# Patient Record
Sex: FEMALE | Race: WHITE | Employment: OTHER | URBAN - METROPOLITAN AREA
[De-identification: names, ages, dates, MRNs, and addresses within clinical notes are randomized per-mention and may not be internally consistent; named-entity substitution may affect disease eponyms.]

---

## 2017-07-31 ENCOUNTER — HOSPITAL ENCOUNTER (EMERGENCY)
Age: 67
Discharge: HOME | End: 2017-07-31
Attending: EMERGENCY MEDICINE
Payer: MEDICARE

## 2017-07-31 VITALS
BODY MASS INDEX: 35.34 KG/M2 | TEMPERATURE: 97.4 F | HEART RATE: 58 BPM | OXYGEN SATURATION: 95 % | RESPIRATION RATE: 17 BRPM | SYSTOLIC BLOOD PRESSURE: 144 MMHG | DIASTOLIC BLOOD PRESSURE: 61 MMHG | HEIGHT: 60 IN | WEIGHT: 180 LBS

## 2017-07-31 DIAGNOSIS — B02.9 HERPES ZOSTER WITHOUT COMPLICATION: Primary | ICD-10-CM

## 2017-07-31 PROCEDURE — 99282 EMERGENCY DEPT VISIT SF MDM: CPT

## 2017-07-31 RX ORDER — ACYCLOVIR 800 MG/1
800 TABLET ORAL
Qty: 25 TAB | Refills: 0 | Status: SHIPPED | OUTPATIENT
Start: 2017-07-31 | End: 2017-08-05

## 2017-07-31 RX ORDER — HYDROCODONE BITARTRATE AND ACETAMINOPHEN 5; 325 MG/1; MG/1
1 TABLET ORAL
Qty: 15 TAB | Refills: 0 | Status: SHIPPED | OUTPATIENT
Start: 2017-07-31 | End: 2017-10-06

## 2017-07-31 NOTE — ED TRIAGE NOTES
Patient arrived to ER with reports or right foot, ankle, and hip pain that started 6 days ago. Treated with steroids and flexeril for pain, however after completing flexeril and steroids, patient noted a rash to lower right leg, hip and buttocks. Rash is sore.

## 2017-07-31 NOTE — ED PROVIDER NOTES
HPI Comments: 5:08 PM   Saskia Sutton is a 78 y.o. female presents to the ED c/o andrey right leg onset 6 days ago. Associated sxs include right foot, ankle, knee, hip, low back and leg pain. Pt states her pain starts in her right ankle and shoots up her leg to her right hip and her pain is 5/10 in all locations. Pt saw PCP 6 days ago because she thought it was a bug bite and was given Flexeril and Steroids with no relief. Pt denies any other symptoms or complaints. Written by HEATHER Ivy, as dictated by Odnina Arceo PA-C     The history is provided by the patient. No  was used. Past Medical History:   Diagnosis Date    [facility]fib ([facility]     Anxiety     Chronic kidney disease     Diabetes ([facility]     High cholesterol     Hypertension     Kidney stones        Past Surgical History:   Procedure Laterality Date    CARDIAC SURG PROCEDURE UNLIST      HX CHOLECYSTECTOMY      HX HEART CATHETERIZATION      HX HYSTERECTOMY      HX ORTHOPAEDIC      bilateral knee surgery    HX ORTHOPAEDIC      right elbow    HX UROLOGICAL      stent placement         Family History:   Problem Relation Age of Onset    Breast Cancer Maternal Aunt        Social History     Social History    Marital status:      Spouse name: N/A    Number of children: N/A    Years of education: N/A     Occupational History    Not on file. Social History Main Topics    Smoking status: Never Smoker    Smokeless tobacco: Never Used    Alcohol use No    Drug use: No    Sexual activity: Not on file     Other Topics Concern    Not on file     Social History Narrative         ALLERGIES: Review of patient's allergies indicates no known allergies. Review of Systems   Musculoskeletal: Positive for arthralgias (right foot, right ankle, right knee and right hip, 5/10), back pain (right low back, 5/10) and myalgias (right leg 5/10). Skin: Positive for color change (erythema on right leg).    All other systems reviewed and are negative. Vitals:    07/31/17 1714   BP: 144/61   Pulse: (!) 58   Resp: 17   Temp: 97.4 °F (36.3 °C)   SpO2: 95%   Weight: 81.6 kg (180 lb)   Height: 5' (1.524 m)            Physical Exam   Constitutional: She is oriented to person, place, and time. She appears well-developed and well-nourished. No distress. Pt appears well sitting up in bed in no distress, speaking in full sentences without evidence of dyspnea, increased work of breathing or any obvious pain at this time     HENT:   Head: Normocephalic and atraumatic. Right Ear: Hearing, tympanic membrane, external ear and ear canal normal.   Left Ear: Hearing, tympanic membrane, external ear and ear canal normal.   Nose: Nose normal.   Mouth/Throat: Uvula is midline, oropharynx is clear and moist and mucous membranes are normal. Mucous membranes are not dry. No trismus in the jaw. No uvula swelling. No oropharyngeal exudate, posterior oropharyngeal edema, posterior oropharyngeal erythema or tonsillar abscesses. No face, tongue and swelling  Airway patent, no throat swelling   No intraoral lesions    Neck: Normal range of motion. Neck supple. Cardiovascular: Normal rate, regular rhythm, normal heart sounds and intact distal pulses. No murmur heard. Pulmonary/Chest: Effort normal and breath sounds normal. No stridor. No respiratory distress. She has no wheezes. She has no rales. She exhibits no tenderness. Abdominal: Soft. Bowel sounds are normal. There is no tenderness. Musculoskeletal:        Legs:  Tender, grouped vesicles over erythematous base scattered over right buttock, thigh, lower leg and foot in dermatomal pattern    Neurological: She is alert and oriented to person, place, and time. Skin: Rash noted. See musc  Remainder of skin clear    Psychiatric: She has a normal mood and affect. Judgment normal.   Nursing note and vitals reviewed.      RESULTS:    PULSE OXIMETRY NOTE:  Pulse-ox is 95% on RA  Interpretation: normal       No orders to display        Labs Reviewed - No data to display    No results found for this or any previous visit (from the past 12 hour(s)). MDM  Number of Diagnoses or Management Options  Herpes zoster without complication:   Diagnosis management comments: Shingles     ED Course     MEDICATIONS GIVEN:  Medications - No data to display     Procedures      PROGRESS NOTE:  5:08 PM  Initial assessment performed. DISCHARGE NOTE:  5:24 PM  Merlene Back's  results have been reviewed with her. She has been counseled regarding her diagnosis, treatment, and plan. She verbally conveys understanding and agreement of the signs, symptoms, diagnosis, treatment and prognosis and additionally agrees to follow up as discussed. She also agrees with the care-plan and conveys that all of her questions have been answered. I have also provided discharge instructions for her that include: educational information regarding their diagnosis and treatment, and list of reasons why they would want to return to the ED prior to their follow-up appointment, should her condition change. The patient has been provided with education for proper Emergency Department utilization. CLINICAL IMPRESSION:    1. Herpes zoster without complication        PLAN: DISCHARGE HOME    Follow-up Information     Follow up With Details Comments [address] [facility],  Schedule an appointment as soon as possible for a visit in 2 days for primary care follow up  [address]      THE [facility] EMERGENCY DEPT Go to As needed, If symptoms worsen [address] [place] [id number] [phone]          Current Discharge Medication List      START taking these medications    Details   acyclovir (ZOVIRAX) 800 mg tablet Take 1 Tab by mouth five (5) times daily for 5 days.   Qty: 25 Tab, Refills: 0      HYDROcodone-acetaminophen (NORCO) 5-325 mg per tablet Take 1 Tab by mouth every four (4) hours as needed for Pain. Max Daily Amount: 6 Tabs. Qty: 15 Tab, Refills: 0         CONTINUE these medications which have NOT CHANGED    Details   cyclobenzaprine (FLEXERIL) 10 mg tablet Take 10 mg by mouth three (3) times daily as needed for Muscle Spasm(s). metoprolol (LOPRESSOR) 50 mg tablet Take 37.5 mg by mouth daily. furosemide (LASIX) 40 mg tablet Take 40 mg by mouth daily. warfarin (COUMADIN) 5 mg tablet Take 5 mg by mouth daily. diltiazem (TAZTIA XT) 300 mg SR capsule Take 300 mg by mouth daily. aspirin delayed-release 81 mg tablet Take  by mouth daily. folic acid (FOLVITE) 1 mg tablet Take  by mouth daily. potassium chloride (K-DUR, KLOR-CON) 20 mEq tablet Take  by mouth two (2) times a day. simvastatin (ZOCOR) 10 mg tablet Take  by mouth nightly. STOP taking these medications       acetaminophen-codeine (TYLENOL-CODEINE #3) 300-30 mg per tablet Comments:   Reason for Stopping:                   SCRIBE ATTESTATION STATEMENT  Documented by: Maria Elena Saul, tor for and in the presence of Maria Dolores Champagne PA-C      PROVIDER ATTESTATION STATEMENT  Maria Dolores Champagne PA-C  : I personally performed the services described in the documentation, reviewed the documentation, as recorded by the scribe in my presence, and it accurately and completely records my words and actions.

## 2017-07-31 NOTE — DISCHARGE INSTRUCTIONS

## 2017-10-06 ENCOUNTER — APPOINTMENT (OUTPATIENT)
Dept: GENERAL RADIOLOGY | Age: 67
End: 2017-10-06
Attending: EMERGENCY MEDICINE
Payer: MEDICARE

## 2017-10-06 ENCOUNTER — HOSPITAL ENCOUNTER (EMERGENCY)
Age: 67
Discharge: HOME | End: 2017-10-06
Attending: EMERGENCY MEDICINE
Payer: MEDICARE

## 2017-10-06 VITALS
TEMPERATURE: 97.6 F | RESPIRATION RATE: 20 BRPM | WEIGHT: 252 LBS | BODY MASS INDEX: 39.55 KG/M2 | HEIGHT: 67 IN | DIASTOLIC BLOOD PRESSURE: 57 MMHG | HEART RATE: 65 BPM | SYSTOLIC BLOOD PRESSURE: 149 MMHG | OXYGEN SATURATION: 100 %

## 2017-10-06 DIAGNOSIS — M79.672 LEFT FOOT PAIN: Primary | ICD-10-CM

## 2017-10-06 PROCEDURE — 99283 EMERGENCY DEPT VISIT LOW MDM: CPT

## 2017-10-06 PROCEDURE — 73630 X-RAY EXAM OF FOOT: CPT

## 2017-10-06 PROCEDURE — L1902 AFO ANKLE GAUNTLET PRE OTS: HCPCS

## 2017-10-07 NOTE — ED PROVIDER NOTES
HPI Comments: 8:02 PM   Unique Hayden is a 75 y.o. female presents to the ED c/o 9/10 left foot pain onset yesterday last night while walking and hearing a pop. Associated sxs include gait difficulty. Pt took Tylenol with no relief. Pt was advised by her PCP to only take Tylenol because of existing medical conditions. NKDA. PMHx includes HTN, DM, coma and anxiety. PSHx includes bilateral knee surgery. Pt denies left leg pain, wound, left foot swelling, injury and any other symptoms or complaints. Written by HEATHER Morales, as dictated by Jackie Roth PA-C     Patient is a 75 y.o. female presenting with foot pain. The history is provided by the patient. No  was used. Foot Pain    This is a new problem. The current episode started yesterday. Past Medical History:   Diagnosis Date    A-fib ([facility] [address]     Anxiety     Chronic kidney disease     Diabetes ([facility]     High cholesterol     Hypertension     Kidney stones        Past Surgical History:   Procedure Laterality Date    CARDIAC SURG PROCEDURE UNLIST      HX CHOLECYSTECTOMY      HX HEART CATHETERIZATION      HX HYSTERECTOMY      HX ORTHOPAEDIC      bilateral knee surgery    HX ORTHOPAEDIC      right elbow    HX UROLOGICAL      stent placement         Family History:   Problem Relation Age of Onset    Breast Cancer Maternal Aunt        Social History     Social History    Marital status:      Spouse name: N/A    Number of children: N/A    Years of education: N/A     Occupational History    Not on file. Social History Main Topics    Smoking status: Never Smoker    Smokeless tobacco: Never Used    Alcohol use No    Drug use: No    Sexual activity: Not on file     Other Topics Concern    Not on file     Social History Narrative         ALLERGIES: Review of patient's allergies indicates no known allergies. Review of Systems   Musculoskeletal: Positive for arthralgias (left foot).  Negative for joint swelling (left foot) and myalgias (left leg). Skin: Negative for wound. All other systems reviewed and are negative. Vitals:    10/06/17 1854   BP: 149/57   Pulse: 65   Resp: 20   Temp: 97.6 °F (36.4 °C)   SpO2: 100%   Weight: 114.3 kg (252 lb)   Height: 5' 7\" (1.702 m)            Physical Exam   Constitutional: She is oriented to person, place, and time. She appears well-developed and well-nourished. Pt appears well sitting up in wheelchair, no distress, speaking in full sentences without evidence of dyspnea, increased work of breathing or any obvious pain at this time     HENT:   Head: Normocephalic and atraumatic. Cardiovascular: Normal rate, regular rhythm, normal heart sounds and intact distal pulses. No murmur heard. Pulmonary/Chest: Effort normal and breath sounds normal. No respiratory distress. She has no wheezes. She has no rales. Musculoskeletal:        Right ankle: Normal. Achilles tendon normal.        Right foot: There is tenderness. There is no swelling, no crepitus and no deformity. Feet:    Neurological: She is alert and oriented to person, place, and time. Psychiatric: She has a normal mood and affect. Judgment normal.   Nursing note and vitals reviewed. RESULTS:    CARDIAC MONITOR NOTE:  Cardiac Rhythm: NSR  Rate: 65 bpm     PULSE OXIMETRY NOTE:  Pulse-ox is 100% on RA  Interpretation: normal        XR FOOT LT MIN 3 V    (Results Pending)      RADIOLOGY FINDINGS  Left foot X-ray shows NAP  Pending review by Radiologist  Recorded by Michel Lanza ED Scribe, as dictated by Deborah Bennett PA-C      Labs Reviewed - No data to display    No results found for this or any previous visit (from the past 12 hour(s)).      MDM  Number of Diagnoses or Management Options  Left foot pain:   Diagnosis management comments: Tendonitis, fx, strain       Amount and/or Complexity of Data Reviewed  Tests in the radiology section of CPT®: ordered and reviewed (XR Left foot)      ED Course     MEDICATIONS GIVEN:  Medications - No data to display     Procedures    PROGRESS NOTE:  8:02 PM  Initial assessment performed. Placed in post op shoe, she has crutches at home. Advised to use. No weight bearing. F/u with ortho or podiatry       DISCHARGE NOTE:  8:06 PM   Merlene Back's  results have been reviewed with her. She has been counseled regarding her diagnosis, treatment, and plan. She verbally conveys understanding and agreement of the signs, symptoms, diagnosis, treatment and prognosis and additionally agrees to follow up as discussed. She also agrees with the care-plan and conveys that all of her questions have been answered. I have also provided discharge instructions for her that include: educational information regarding their diagnosis and treatment, and list of reasons why they would want to return to the ED prior to their follow-up appointment, should her condition change. The patient has been provided with education for proper Emergency Department utilization. CLINICAL IMPRESSION:    1. Left foot pain        PLAN: DISCHARGE HOME    Follow-up Information     Follow up With Details Comments Contact Info    Jenny Negrete MD Schedule an appointment as soon as possible for a visit in 2 days for orthopedic follow up  [address]      THE [facility] EMERGENCY DEPT Go to As needed, If symptoms worsen [address] [id number]  [phone]          Current Discharge Medication List              [address] [facility]  Documented by: tor Gonzalez for and in the presence of Yolanda Cee PA-C      PROVIDER ATTESTATION STATEMENT  Yolanda Cee PA-C  : I personally performed the services described in the documentation, reviewed the documentation, as recorded by the scribe in my presence, and it accurately and completely records my words and actions.

## 2017-10-07 NOTE — ED NOTES
Arrived into ed w/  via w/c - reports walking out of her mothers NH room last pm and sudden onset pain in Left foot - reports pain as ache and worse w/ weight bearing. No known injury reported. Pt reports pain from top of left foot to heel area.

## 2017-10-07 NOTE — DISCHARGE INSTRUCTIONS

## 2017-12-30 ENCOUNTER — HOSPITAL ENCOUNTER (EMERGENCY)
Age: 67
Discharge: HOME | End: 2017-12-30
Attending: EMERGENCY MEDICINE
Payer: MEDICARE

## 2017-12-30 VITALS
RESPIRATION RATE: 16 BRPM | OXYGEN SATURATION: 98 % | TEMPERATURE: 99.2 F | HEART RATE: 56 BPM | BODY MASS INDEX: 39.55 KG/M2 | SYSTOLIC BLOOD PRESSURE: 165 MMHG | HEIGHT: 67 IN | DIASTOLIC BLOOD PRESSURE: 58 MMHG | WEIGHT: 252 LBS

## 2017-12-30 DIAGNOSIS — L03.115 CELLULITIS OF RIGHT LEG: ICD-10-CM

## 2017-12-30 DIAGNOSIS — T14.90XD HEALING WOUND: Primary | ICD-10-CM

## 2017-12-30 PROCEDURE — 99282 EMERGENCY DEPT VISIT SF MDM: CPT

## 2017-12-30 RX ORDER — CEPHALEXIN 250 MG/1
500 CAPSULE ORAL
Status: DISCONTINUED | OUTPATIENT
Start: 2017-12-30 | End: 2017-12-30 | Stop reason: HOSPADM

## 2017-12-30 RX ORDER — CEPHALEXIN 500 MG/1
500 CAPSULE ORAL 2 TIMES DAILY
Qty: 14 CAP | Refills: 0 | Status: SHIPPED | OUTPATIENT
Start: 2017-12-30 | End: 2018-01-06

## 2017-12-31 NOTE — ED NOTES
I have reviewed discharge instructions with the patient. The patient verbalized understanding. Patient armband removed and shredded.  Patient given Rx x 1

## 2017-12-31 NOTE — ED PROVIDER NOTES
EMERGENCY DEPARTMENT HISTORY AND PHYSICAL EXAM    Date: 12/30/2017  Patient Name: Manoj Jarvis    History of Presenting Illness     Chief Complaint   Patient presents with    Skin Problem         History Provided By: Patient    Chief Complaint: Rash  Duration: 1 Weeks  Timing:  Acute  Location: Right lateral thigh  Severity: 0 out of 10  Associated Symptoms: denies any other associated signs or symptoms    Additional History (Context):   7:45 PM  Manoj Jarvis is a 75 y.o. female with PMHx of HTN, high cholesterol, A fib, diabetes, CKD, and kidney stones who presents to the emergency department C/O rash to right lateral thigh onset 1 week ago. No associated sxs. Pt reports she has her heater situated next to her right thigh when sitting and states concerns about this affecting/cotributing to the rash as redness waxes and wanes. Reports Hx of shingles, this is different. Denies known existence of abnormal mole in this area or history of skin cancers. Last INR was \"low;\" gets checked every 3 weeks. Pt reports she has not taken his evening blood pressure medications yet. Pt reports she is on Coumadin. PCP is Taiwo Butler DO. NKDA. Pt denies known injury/fall, fever, and any other sxs or complaints. PCP: Taiwo Butler DO    Current Facility-Administered Medications   Medication Dose Route Frequency Provider Last Rate Last Dose    cephALEXin (KEFLEX) capsule 500 mg  500 mg Oral NOW PALAK Waite         Current Outpatient Prescriptions   Medication Sig Dispense Refill    cephALEXin (KEFLEX) 500 mg capsule Take 1 Cap by mouth two (2) times a day for 7 days. 14 Cap 0    amiodarone (CORDARONE) 200 mg tablet Take 200 mg by mouth.  metoprolol (LOPRESSOR) 50 mg tablet Take 37.5 mg by mouth daily.  furosemide (LASIX) 40 mg tablet Take 40 mg by mouth daily.  warfarin (COUMADIN) 5 mg tablet Take 5 mg by mouth daily. 5 mg on Friday. 2.5 mg all other days.       diltiazem (TAZTIA XT) 300 mg SR capsule Take 300 mg by mouth daily.  aspirin delayed-release 81 mg tablet Take  by mouth daily.  folic acid (FOLVITE) 1 mg tablet Take  by mouth daily.  potassium chloride (K-DUR, KLOR-CON) 20 mEq tablet Take  by mouth two (2) times a day.  simvastatin (ZOCOR) 10 mg tablet Take  by mouth nightly. Past History     Past Medical History:  Past Medical History:   Diagnosis Date    [facility] [address]     Anxiety     Chronic kidney disease     Diabetes ([facility]     High cholesterol     Hypertension     Kidney stones        Past Surgical History:  Past Surgical History:   Procedure Laterality Date    CARDIAC SURG PROCEDURE UNLIST      HX CHOLECYSTECTOMY      HX HEART CATHETERIZATION      HX HYSTERECTOMY      HX ORTHOPAEDIC      bilateral knee surgery    HX ORTHOPAEDIC      right elbow    HX UROLOGICAL      stent placement       Family History:  Family History   Problem Relation Age of Onset    Breast Cancer Maternal Aunt        Social History:  Social History   Substance Use Topics    Smoking status: Never Smoker    Smokeless tobacco: Never Used    Alcohol use No       Allergies:  No Known Allergies      Review of Systems   Review of Systems   Constitutional: Negative for fever. Skin: Positive for rash (right thigh). All other systems reviewed and are negative. Physical Exam     Vitals:    12/30/17 1944   BP: 165/58   Pulse: (!) 56   Resp: 16   Temp: 99.2 °F (37.3 °C)   SpO2: 98%   Weight: 114.3 kg (252 lb)   Height: 5' 7\" (1.702 m)     Physical Exam   Nursing note and vitals reviewed. Vital signs and nursing notes reviewed. CONSTITUTIONAL: Alert. Well-appearing; obese; in no apparent distress. EXT: Normal ROM in all four extremities; non-tender to palpation. SKIN: Normal for age and race; warm; dry; good turgor.  Right lateral mid thigh: 1cm dark scab with surrounding mild erythema in webbed pattern; no swelling, tenderness or discharge; possible early cellulitis from healing scabbed wound, but more likely hypervascularity from applied heat. NEURO: A & O x3. PSYCH:  Mood and affect appropriate. Diagnostic Study Results     Labs -   No results found for this or any previous visit (from the past 12 hour(s)). Radiologic Studies -   No orders to display     CT Results  (Last 48 hours)    None        CXR Results  (Last 48 hours)    None          Medications given in the ED-  Medications   cephALEXin (KEFLEX) capsule 500 mg (not administered)         Medical Decision Making   I am the first provider for this patient. I reviewed the vital signs, available nursing notes, past medical history, past surgical history, family history and social history. Vital Signs-Reviewed the patient's vital signs. Pulse Oximetry Analysis - 98% on RA     Records Reviewed: Nursing Notes      Procedures:  Procedures    ED Course:   7:45 PM Initial assessment performed. The patients presenting problems have been discussed, and they are in agreement with the care plan formulated and outlined with them. I have encouraged them to ask questions as they arise throughout their visit. Diagnosis and Disposition       DISCHARGE NOTE:  7:57 PM  Merlene Back's  results have been reviewed with her. She has been counseled regarding her diagnosis, treatment, and plan. She verbally conveys understanding and agreement of the signs, symptoms, diagnosis, treatment and prognosis and additionally agrees to follow up as discussed. She also agrees with the care-plan and conveys that all of her questions have been answered. I have also provided discharge instructions for her that include: educational information regarding their diagnosis and treatment, and list of reasons why they would want to return to the ED prior to their follow-up appointment, should her condition change. She has been provided with education for proper emergency department utilization. CLINICAL IMPRESSION:    1.  Healing wound    2. Cellulitis of right leg        PLAN:  1. D/C Home  2. Discharge Medication List as of 12/30/2017  7:57 PM      START taking these medications    Details   cephALEXin (KEFLEX) 500 mg capsule Take 1 Cap by mouth two (2) times a day for 7 days. , Normal, Disp-14 Cap, R-0         CONTINUE these medications which have NOT CHANGED    Details   amiodarone (CORDARONE) 200 mg tablet Take 200 mg by mouth., Historical Med      metoprolol (LOPRESSOR) 50 mg tablet Take 37.5 mg by mouth daily. , Historical Med      furosemide (LASIX) 40 mg tablet Take 40 mg by mouth daily. , Historical Med      warfarin (COUMADIN) 5 mg tablet Take 5 mg by mouth daily. 5 mg on Friday. 2.5 mg all other days. , Historical Med      diltiazem (TAZTIA XT) 300 mg SR capsule Take 300 mg by mouth daily. , Historical Med      aspirin delayed-release 81 mg tablet Take  by mouth daily. , Historical Med      folic acid (FOLVITE) 1 mg tablet Take  by mouth daily. , Historical Med      potassium chloride (K-DUR, KLOR-CON) 20 mEq tablet Take  by mouth two (2) times a day., Historical Med      simvastatin (ZOCOR) 10 mg tablet Take  by mouth nightly., Historical Med           3. Follow-up Information     Follow up With Details Comments [address] [place] [address], DO Schedule an appointment as soon as possible for a visit in 3 days For primary care follow up. [address] [facility]      [facility] [place] [facility] EMERGENCY DEPT Go to As needed, If symptoms worsen [address] [place] [address]  [phone]        _______________________________    Attestations: This note is prepared by Omar Bradley, acting as Scribe for [employer]. [employer]:  The scribe's documentation has been prepared under my direction and personally reviewed by me in its entirety.   I confirm that the note above accurately reflects all work, treatment, procedures, and medical decision making performed by me.  _______________________________

## 2017-12-31 NOTE — DISCHARGE INSTRUCTIONS
Cellulitis: Care Instructions  Your Care Instructions    Cellulitis is a skin infection. It often occurs after a break in the skin from a scrape, cut, bite, or puncture, or after a rash. The doctor has checked you carefully, but problems can develop later. If you notice any problems or new symptoms, get medical treatment right away. Follow-up care is a key part of your treatment and safety. Be sure to make and go to all appointments, and call your doctor if you are having problems. It's also a good idea to know your test results and keep a list of the medicines you take. How can you care for yourself at home? · Take your antibiotics as directed. Do not stop taking them just because you feel better. You need to take the full course of antibiotics. · Prop up the infected area on pillows to reduce pain and swelling. Try to keep the area above the level of your heart as often as you can. · If your doctor told you how to care for your wound, follow your doctor's instructions. If you did not get instructions, follow this general advice:  ¨ Wash the wound with clean water 2 times a day. Don't use hydrogen peroxide or alcohol, which can slow healing. ¨ You may cover the wound with a thin layer of petroleum jelly, such as Vaseline, and a nonstick bandage. ¨ Apply more petroleum jelly and replace the bandage as needed. · Be safe with medicines. Take pain medicines exactly as directed. ¨ If the doctor gave you a prescription medicine for pain, take it as prescribed. ¨ If you are not taking a prescription pain medicine, ask your doctor if you can take an over-the-counter medicine. To prevent cellulitis in the future  · Try to prevent cuts, scrapes, or other injuries to your skin. Cellulitis most often occurs where there is a break in the skin. · If you get a scrape, cut, mild burn, or bite, wash the wound with clean water as soon as you can to help avoid infection.  Don't use hydrogen peroxide or alcohol, which can slow healing. · If you have swelling in your legs (edema), support stockings and good skin care may help prevent leg sores and cellulitis. · Take care of your feet, especially if you have diabetes or other conditions that increase the risk of infection. Wear shoes and socks. Do not go barefoot. If you have athlete's foot or other skin problems on your feet, talk to your doctor about how to treat them. When should you call for help? Call your doctor now or seek immediate medical care if:  ? · You have signs that your infection is getting worse, such as:  ¨ Increased pain, swelling, warmth, or redness. ¨ Red streaks leading from the area. ¨ Pus draining from the area. ¨ A fever. ? · You get a rash. ? Watch closely for changes in your health, and be sure to contact your doctor if:  ? · You are not getting better after 1 day (24 hours). ? · You do not get better as expected. Where can you learn more? Go to http://jaiden-krzysztof.info/. Lahoma Claude in the search box to learn more about \"Cellulitis: Care Instructions. \"  Current as of: October 13, 2016  Content Version: 11.4  © 3441-3686 [employer]. Care instructions adapted under license by [employer] (which disclaims liability or warranty for this information). If you have questions about a medical condition or this instruction, always ask your healthcare professional. [place] [address] any warranty or liability for your use of this information.

## 2018-01-17 ENCOUNTER — HOSPITAL ENCOUNTER (OUTPATIENT)
Dept: GENERAL RADIOLOGY | Age: 68
Discharge: HOME | End: 2018-01-17
Payer: COMMERCIAL

## 2018-01-17 DIAGNOSIS — M25.531 WRIST PAIN, RIGHT: ICD-10-CM

## 2018-01-17 PROCEDURE — 73110 X-RAY EXAM OF WRIST: CPT

## 2018-01-23 ENCOUNTER — HOSPITAL ENCOUNTER (OUTPATIENT)
Dept: GENERAL RADIOLOGY | Age: 68
Discharge: HOME | End: 2018-01-23
Payer: MEDICARE

## 2018-01-23 DIAGNOSIS — M25.519 PAIN IN JOINT, SHOULDER REGION: ICD-10-CM

## 2018-01-23 PROCEDURE — 73030 X-RAY EXAM OF SHOULDER: CPT

## 2018-06-04 ENCOUNTER — APPOINTMENT (OUTPATIENT)
Dept: CT IMAGING | Age: 68
End: 2018-06-04
Attending: EMERGENCY MEDICINE
Payer: MEDICARE

## 2018-06-04 ENCOUNTER — APPOINTMENT (OUTPATIENT)
Dept: ULTRASOUND IMAGING | Age: 68
End: 2018-06-04
Attending: EMERGENCY MEDICINE
Payer: MEDICARE

## 2018-06-04 ENCOUNTER — HOSPITAL ENCOUNTER (EMERGENCY)
Age: 68
Discharge: HOME | End: 2018-06-04
Attending: EMERGENCY MEDICINE
Payer: MEDICARE

## 2018-06-04 VITALS
DIASTOLIC BLOOD PRESSURE: 58 MMHG | HEART RATE: 57 BPM | TEMPERATURE: 98 F | HEIGHT: 66 IN | WEIGHT: 252 LBS | SYSTOLIC BLOOD PRESSURE: 169 MMHG | OXYGEN SATURATION: 100 % | RESPIRATION RATE: 20 BRPM | BODY MASS INDEX: 40.5 KG/M2

## 2018-06-04 DIAGNOSIS — R10.9 RIGHT FLANK PAIN: Primary | ICD-10-CM

## 2018-06-04 DIAGNOSIS — N83.8 OVARIAN MASS, RIGHT: ICD-10-CM

## 2018-06-04 DIAGNOSIS — N20.0 KIDNEY STONES: ICD-10-CM

## 2018-06-04 LAB
APPEARANCE UR: CLEAR
BACTERIA URNS QL MICRO: ABNORMAL /HPF
BILIRUB UR QL: NEGATIVE
COLOR UR: YELLOW
EPITH CASTS URNS QL MICRO: ABNORMAL /LPF (ref 0–5)
GLUCOSE UR STRIP.AUTO-MCNC: NEGATIVE MG/DL
HGB UR QL STRIP: NEGATIVE
KETONES UR QL STRIP.AUTO: NEGATIVE MG/DL
LEUKOCYTE ESTERASE UR QL STRIP.AUTO: ABNORMAL
NITRITE UR QL STRIP.AUTO: NEGATIVE
PH UR STRIP: 7 [PH] (ref 5–8)
PROT UR STRIP-MCNC: NEGATIVE MG/DL
RBC #/AREA URNS HPF: ABNORMAL /HPF (ref 0–5)
SP GR UR REFRACTOMETRY: 1.01 (ref 1–1.03)
UROBILINOGEN UR QL STRIP.AUTO: 0.2 EU/DL (ref 0.2–1)
WBC URNS QL MICRO: ABNORMAL /HPF (ref 0–5)

## 2018-06-04 PROCEDURE — 74011250637 HC RX REV CODE- 250/637: Performed by: EMERGENCY MEDICINE

## 2018-06-04 PROCEDURE — 74176 CT ABD & PELVIS W/O CONTRAST: CPT

## 2018-06-04 PROCEDURE — 99284 EMERGENCY DEPT VISIT MOD MDM: CPT

## 2018-06-04 PROCEDURE — 81001 URINALYSIS AUTO W/SCOPE: CPT | Performed by: EMERGENCY MEDICINE

## 2018-06-04 PROCEDURE — 76856 US EXAM PELVIC COMPLETE: CPT

## 2018-06-04 RX ORDER — HYDRALAZINE HYDROCHLORIDE 50 MG/1
25 TABLET, FILM COATED ORAL 3 TIMES DAILY
COMMUNITY

## 2018-06-04 RX ORDER — DOCUSATE SODIUM 100 MG/1
100 CAPSULE, LIQUID FILLED ORAL
Qty: 20 CAP | Refills: 0 | Status: SHIPPED | OUTPATIENT
Start: 2018-06-04 | End: 2018-06-14

## 2018-06-04 RX ORDER — OXYCODONE AND ACETAMINOPHEN 5; 325 MG/1; MG/1
2 TABLET ORAL
Status: COMPLETED | OUTPATIENT
Start: 2018-06-04 | End: 2018-06-04

## 2018-06-04 RX ORDER — HYDROCODONE BITARTRATE AND ACETAMINOPHEN 5; 325 MG/1; MG/1
TABLET ORAL
Qty: 12 TAB | Refills: 0 | Status: ON HOLD | OUTPATIENT
Start: 2018-06-04 | End: 2021-01-01

## 2018-06-04 RX ORDER — SPIRONOLACTONE 25 MG/1
25 TABLET ORAL DAILY
Status: ON HOLD | COMMUNITY
End: 2021-01-01

## 2018-06-04 RX ORDER — ALLOPURINOL 100 MG/1
300 TABLET ORAL DAILY
Status: ON HOLD | COMMUNITY
End: 2021-01-01 | Stop reason: DRUGHIGH

## 2018-06-04 RX ORDER — ONDANSETRON 2 MG/ML
4 INJECTION INTRAMUSCULAR; INTRAVENOUS
Status: DISCONTINUED | OUTPATIENT
Start: 2018-06-04 | End: 2018-06-04 | Stop reason: HOSPADM

## 2018-06-04 RX ADMIN — OXYCODONE HYDROCHLORIDE AND ACETAMINOPHEN 2 TABLET: 5; 325 TABLET ORAL at 12:08

## 2018-06-04 NOTE — ED TRIAGE NOTES
Patient with complaints of right back pain with occasion pain to the right side and abdomen. Patient with history of infected kidney stone.

## 2018-06-04 NOTE — DISCHARGE INSTRUCTIONS
Flank Pain: Care Instructions  Your Care Instructions  Flank pain is pain on the side of the back just below the rib cage and above the waist. It can be on one or both sides. Flank pain has many possible causes, including a kidney stone, a urinary tract infection, or back strain. Flank pain may get better on its own. But don't ignore new symptoms, such as fever, nausea and vomiting, urination problems, pain that gets worse, and dizziness. These may be signs of a more serious problem. You may have to have tests or other treatment. Follow-up care is a key part of your treatment and safety. Be sure to make and go to all appointments, and call your doctor if you are having problems. It's also a good idea to know your test results and keep a list of the medicines you take. How can you care for yourself at home? · Rest until you feel better. · Take pain medicines exactly as directed. ¨ If the doctor gave you a prescription medicine for pain, take it as prescribed. ¨ If you are not taking a prescription pain medicine, ask your doctor if you can take an over-the-counter pain medicine, such as acetaminophen (Tylenol), ibuprofen (Advil, Motrin), or naproxen (Aleve). Read and follow all instructions on the label. · If your doctor prescribed antibiotics, take them as directed. Do not stop taking them just because you feel better. You need to take the full course of antibiotics. · To apply heat, put a warm water bottle, a heating pad set on low, or a warm cloth on the painful area. Do not go to sleep with a heating pad on your skin. · To prevent dehydration, drink plenty of fluids, enough so that your urine is light yellow or clear like water. Choose water and other caffeine-free clear liquids until you feel better. If you have kidney, heart, or liver disease and have to limit fluids, talk with your doctor before you increase the amount of fluids you drink. When should you call for help?   Call your doctor now or seek immediate medical care if:  ? · Your flank pain gets worse. ? · You have new symptoms, such as fever, nausea, or vomiting. ? · You have symptoms of a urinary problem. For example:  ¨ You have blood or pus in your urine. ¨ You have chills or body aches. ¨ It hurts to urinate. ¨ You have groin or belly pain. ? Watch closely for changes in your health, and be sure to contact your doctor if you do not get better as expected. Where can you learn more? Go to http://jaiden-krzysztof.info/. Enter S191 in the search box to learn more about \"Flank Pain: Care Instructions. \"  Current as of: March 20, 2017  Content Version: 11.4  © 0868-0731 [employer]. Care instructions adapted under license by [employer] (which disclaims liability or warranty for this information). If you have questions about a medical condition or this instruction, always ask your healthcare professional. [place] [address] any warranty or liability for your use of this information. Kidney Stone: Care Instructions  Your Care Instructions    Kidney stones are formed when salts, minerals, and other substances normally found in the urine clump together. They can be as small as grains of sand or, rarely, as large as golf balls. While the stone is traveling through the ureter, which is the tube that carries urine from the kidney to the bladder, you will probably feel pain. The pain may be mild or very severe. You may also have some blood in your urine. As soon as the stone reaches the bladder, any intense pain should go away. If a stone is too large to pass on its own, you may need a medical procedure to help you pass the stone. The doctor has checked you carefully, but problems can develop later. If you notice any problems or new symptoms, get medical treatment right away. Follow-up care is a key part of your treatment and safety.  Be sure to make and go to all appointments, and call your doctor if you are having problems. It's also a good idea to know your test results and keep a list of the medicines you take. How can you care for yourself at home? · Drink plenty of fluids, enough so that your urine is light yellow or clear like water. If you have kidney, heart, or liver disease and have to limit fluids, talk with your doctor before you increase the amount of fluids you drink. · Take pain medicines exactly as directed. Call your doctor if you think you are having a problem with your medicine. ¨ If the doctor gave you a prescription medicine for pain, take it as prescribed. ¨ If you are not taking a prescription pain medicine, ask your doctor if you can take an over-the-counter medicine. Read and follow all instructions on the label. · Your doctor may ask you to strain your urine so that you can collect your kidney stone when it passes. You can use a kitchen strainer or a tea strainer to catch the stone. Store it in a plastic bag until you see your doctor again. Preventing future kidney stones  Some changes in your diet may help prevent kidney stones. Depending on the cause of your stones, your doctor may recommend that you:  · Drink plenty of fluids, enough so that your urine is light yellow or clear like water. If you have kidney, heart, or liver disease and have to limit fluids, talk with your doctor before you increase the amount of fluids you drink. · Limit coffee, tea, and alcohol. Also avoid grapefruit juice. · Do not take more than the recommended daily dose of vitamins C and D.  · Avoid antacids such as Gaviscon, Maalox, Mylanta, or Tums. · Limit the amount of salt (sodium) in your diet. · Eat a balanced diet that is not too high in protein. · Limit foods that are high in a substance called oxalate, which can cause kidney stones. These foods include dark green vegetables, rhubarb, chocolate, wheat bran, nuts, cranberries, and beans.   When should you call for help?  Call your doctor now or seek immediate medical care if:  ? · You cannot keep down fluids. ? · Your pain gets worse. ? · You have a fever or chills. ? · You have new or worse pain in your back just below your rib cage (the flank area). ? · You have new or more blood in your urine. ? Watch closely for changes in your health, and be sure to contact your doctor if:  ? · You do not get better as expected. Where can you learn more? Go to http://jaiden-krzysztof.info/. Enter I881 in the search box to learn more about \"Kidney Stone: Care Instructions. \"  Current as of: May 12, 2017  Content Version: 11.4  © 2143-8227 [employer]. Care instructions adapted under license by [employer] (which disclaims liability or warranty for this information). If you have questions about a medical condition or this instruction, always ask your healthcare professional. [place] [address] any warranty or liability for your use of this information.

## 2018-06-04 NOTE — ED PROVIDER NOTES
EMERGENCY DEPARTMENT HISTORY AND PHYSICAL EXAM    Date: 6/4/2018  Patient Name: Armand Logan    History of Presenting Illness     Chief Complaint   Patient presents with    Back Pain         History Provided By: Patient    Chief Complaint: gradually worsening back pain  Duration: 3-4 days ago   Timing:  Gradual and Worsening  Location: back  Severity: 9 out of 10  Associated Symptoms: nausea and leg swelling    Additional History (Context):   11:53 AM  Armand Logan is a 75 y.o. 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 Associated sxs include nausea and leg swelling. PSHx of cholecystectomy, cardiac surgery, heart catheterization, hysterectomy, and orthopedic surgeries. Pt takes Warfarin. Pt is a non smoker and a non EtOH user. Pt denies fever, vomiting, chest pain, SOB, new exercising or lifting and any other sxs or complaints. PCP: Tevin Feliz DO    Current Facility-Administered Medications   Medication Dose Route Frequency Provider Last Rate Last Dose    ondansetron (ZOFRAN) injection 4 mg  4 mg IntraVENous NOW Shanique Verde MD         Current Outpatient Prescriptions   Medication Sig Dispense Refill    allopurinol (ZYLOPRIM) 100 mg tablet Take  by mouth daily.  spironolactone (ALDACTONE) 25 mg tablet Take 25 mg by mouth daily.  hydrALAZINE (APRESOLINE) 50 mg tablet Take 25 mg by mouth three (3) times daily.  HYDROcodone-acetaminophen (NORCO) 5-325 mg per tablet Take 1 tablets PO every 6 hours as needed for pain control. If over the counter ibuprofen or acetaminophen was suggested, then only take the vicodin for pain not well controlled with the over the counter medication.  12 Tab 0    docusate sodium (COLACE) 100 mg capsule Take 1 Cap by mouth two (2) times daily as needed for Constipation for up to 10 days. 20 Cap 0    metoprolol (LOPRESSOR) 50 mg tablet Take 37.5 mg by mouth daily.  warfarin (COUMADIN) 5 mg tablet Take 5 mg by mouth daily. 5 mg on Friday. 2.5 mg all other days.  aspirin delayed-release 81 mg tablet Take  by mouth daily.  folic acid (FOLVITE) 1 mg tablet Take  by mouth daily.  amiodarone (CORDARONE) 200 mg tablet Take 200 mg by mouth.  furosemide (LASIX) 40 mg tablet Take 40 mg by mouth daily.  diltiazem (TAZTIA XT) 300 mg SR capsule Take 300 mg by mouth daily.  potassium chloride (K-DUR, KLOR-CON) 20 mEq tablet Take  by mouth two (2) times a day.  simvastatin (ZOCOR) 10 mg tablet Take  by mouth nightly. Past History     Past Medical History:  Past Medical History:   Diagnosis Date    [facility]     Anxiety     Chronic kidney disease     Diabetes ([facility]     High cholesterol     Hypertension     Kidney stones        Past Surgical History:  Past Surgical History:   Procedure Laterality Date    CARDIAC SURG PROCEDURE UNLIST      HX CHOLECYSTECTOMY      HX HEART CATHETERIZATION      HX HYSTERECTOMY      HX ORTHOPAEDIC      bilateral knee surgery    HX ORTHOPAEDIC      right elbow    HX UROLOGICAL      stent placement       Family History:  Family History   Problem Relation Age of Onset    Breast Cancer Maternal Aunt        Social History:  Social History   Substance Use Topics    Smoking status: Never Smoker    Smokeless tobacco: Never Used    Alcohol use No       Allergies:  No Known Allergies      Review of Systems   Review of Systems   Constitutional: Negative for fever. Respiratory: Negative for shortness of breath. Cardiovascular: Negative for chest pain. Gastrointestinal: Positive for nausea. Negative for vomiting. Musculoskeletal: Positive for back pain. All other systems reviewed and are negative.       Physical Exam     Vitals:    06/04/18 1143   BP: 169/58   Pulse: (!) 57   Resp: 20 Temp: 98 °F (36.7 °C)   SpO2: 100%   Weight: 114.3 kg (252 lb)   Height: 5' 6\" (1.676 m)     Physical Exam   Nursing note and vitals reviewed. Constitutional: Well appearing, mild acute distress  Head: Normocephalic, Atraumatic  Eyes: Pupils are equal, round, and reactive to light, EOMI  Neck: Supple  Cardiovascular: Regular rate and rhythm, no murmurs, rubs, or gallops  Chest: Normal work of breathing and chest excursion bilaterally  Lungs: Clear to ausculation bilaterally  Abdomen: Soft, RLQ tenderness without rebound or guarding, non distended, normoactive bowel sounds  Back: Tenderness over right flank and lumbar spine without bony point TTP. Extremities: No evidence of trauma or deformity  Skin: Warm and dry, normal cap refill  Neuro: Alert and appropriate, normal gait, strength and sensation full symmetric  Psychiatric: Anxious mood and affect     Diagnostic Study Results     Labs -     Recent Results (from the past 12 hour(s))   URINALYSIS W/ RFLX MICROSCOPIC    Collection Time: 06/04/18  1:15 PM   Result Value Ref Range    Color YELLOW      Appearance CLEAR      Specific gravity 1.011 1.005 - 1.030      pH (UA) 7.0 5.0 - 8.0      Protein NEGATIVE  NEG mg/dL    Glucose NEGATIVE  NEG mg/dL    Ketone NEGATIVE  NEG mg/dL    Bilirubin NEGATIVE  NEG      Blood NEGATIVE  NEG      Urobilinogen 0.2 0.2 - 1.0 EU/dL    Nitrites NEGATIVE  NEG      Leukocyte Esterase TRACE (A) NEG     URINE MICROSCOPIC ONLY    Collection Time: 06/04/18  1:15 PM   Result Value Ref Range    WBC 0 to 2 0 - 5 /hpf    RBC 0 to 1 0 - 5 /hpf    Epithelial cells FEW 0 - 5 /lpf    Bacteria FEW (A) NEG /hpf       Radiologic Studies -   US PELV NON OB W TV   Final Result   IMPRESSION:     1. Technically limited examination secondary to body habitus and bowel gas. 2. Nonvisualization of the left ovary. 3. Predominantly anechoic right ovarian nodule, with limited visualization of  normal-appearing ovarian parenchyma.  Subsequently, there is limited  characterization of venous waveforms, without identifiable arterial waveforms on  the provided images. With regards to the right ovarian nodule, the sonographic  appearance is nonspecific, but has decreased in size on recent prior CT exams,  and decreased in size from 2014, favoring a benign process. Consideration for  GYN follow-up with repeat ultrasound in 6 months recommended. As read by the radiologist.   CT ABD PELV WO CONT   Final Result   IMPRESSION:     1. Nonobstructing 7 mm calculus in the left renal pelvis without hydronephrosis  or significant perinephric stranding. No obstructing urolithiasis or  hydronephrosis.     2. 5.9 cm cystic mass in the right adnexa, previously 6.4 cm.     3. Cholecystectomy.     4. Scoliosis and degenerative changes in the lumbar spine. As read by the radiologist.     CT Results  (Last 48 hours)               06/04/18 1405  CT ABD PELV WO CONT Final result    Impression:  IMPRESSION:       1. Nonobstructing 7 mm calculus in the left renal pelvis without hydronephrosis   or significant perinephric stranding. No obstructing urolithiasis or   hydronephrosis. 2. 5.9 cm cystic mass in the right adnexa, previously 6.4 cm. 3. Cholecystectomy. 4. Scoliosis and degenerative changes in the lumbar spine. Narrative:  CT of the abdomen and pelvis without contrast.       INDICATION: Back pain, history of kidney stone. COMPARISON: CT 01/02/2016. TECHNIQUE: Axial CT imaging of the abdomen and pelvis was performed without   intravenous contrast. Multiplanar reformats were generated. One or more dose   reduction techniques were used on this CT: automated exposure control,   adjustment of the mAs and/or kVp according to patient's size, and iterative   reconstruction techniques.  The specific techniques utilized on this CT exam have   been documented in the patient's electronic medical record.       _______________       FINDINGS:       LOWER CHEST: Unremarkable. LIVER, BILIARY: Liver is normal. No biliary dilation. Gallbladder is surgically   absent. SPLEEN: Normal.       PANCREAS: Normal.       ADRENALS: Normal.       KIDNEYS: Again noted is 6 mm x 4 mm x 7 mm calculus in the left renal pelvis   without hydronephrosis or new perinephric stranding. No other discrete calculus   within either kidneys, ureters, or urinary bladder. 1 cm cortical cystic lesion   anterior interpolar left kidney. LYMPH NODES: No enlarged lymph nodes. GASTROINTESTINAL TRACT: Normal retrocecal appendix. No bowel dilation or wall   thickening. VASCULATURE: Aortic atherosclerosis without aneurysm. PELVIC ORGANS: 5.9 cm x 5.1 cm x 5.9 cm cystic mass in the right adnexa,   previously 6.4 cm x 5.3 cm x 6.4 cm. Uterus and left adnexa appear unremarkable. BONES: Levoscoliosis and degenerative trace anterolisthesis of L4 on L5. L3-S1   degenerative discogenic changes. No acute or aggressive osseous abnormalities   identified. OTHER: None.       _______________             As read by the radiologist.    CXR Results  (Last 48 hours)    None          Medications given in the ED-  Medications   ondansetron (ZOFRAN) injection 4 mg (not administered)   oxyCODONE-acetaminophen (PERCOCET) 5-325 mg per tablet 2 Tab (2 Tabs Oral Given 6/4/18 1208)         Medical Decision Making   I am the first provider for this patient. I reviewed the vital signs, available nursing notes, past medical history, past surgical history, family history and social history. Vital Signs-Reviewed the patient's vital signs. Pulse Oximetry Analysis - 100% on room air. Records Reviewed: Nursing Notes    Provider Notes (Medical Decision Making): 75year old female with right flank pain. Only abnormality on imaging is ovarian mass. UA without evidence of infection. Awaiting US to further characterize mass.     Procedures:  Procedures    ED Course:   11:53 AM Initial assessment performed. The patients presenting problems have been discussed, and they are in agreement with the care plan formulated and outlined with them. I have encouraged them to ask questions as they arise throughout their visit. 2:43 PM Pt updated on CT results. Plan for US to evaluate adnexal mass. BEDSIDE TRANSFER OF CARE:  3:15 PM  Patient care will be transferred from Matthew Stack MD to Viktoriya Stephenson MD.  Discussed available diagnostic results and care plan at length at beside. Patient and family made aware of provider change. All questions answered. Patient and family voiced understanding. Written by  Colleen Rosas, ED Scribe, as dictated by Matthew Stack MD.    5:25 PM Pt reports 6/10 flank pain. Abdomen obese, no obvious mass. No HSM. Heart and lung with normal limits. Skin warm, dry, and no rash. [facility] experience difficulty accessing blood in ED with multiple attempts and pt did not want access from neck or legs. She would like to follow up with PCP for labs in 1-2 days. Recommended follow up with OBGYN 1-2 days. Return for vomiting, worsening pain, bleeding, or any signs or symptoms of concern. Understand liver disease, sepsis, anemia, and electrolyte abnormal which can lead to disability and/or death. Diagnosis and Disposition       DISCHARGE NOTE:  5:30 PM  Merlene Back's  results have been reviewed with her. She has been counseled regarding her diagnosis, treatment, and plan. She verbally conveys understanding and agreement of the signs, symptoms, diagnosis, treatment and prognosis and additionally agrees to follow up as discussed. She also agrees with the care-plan and conveys that all of her questions have been answered. I have also provided discharge instructions for her that include: educational information regarding their diagnosis and treatment, and list of reasons why they would want to return to the ED prior to their follow-up appointment, should her condition change.  She has been provided with education for proper emergency department utilization. CLINICAL IMPRESSION:    1. Right flank pain    2. Ovarian mass, right    3. Kidney stones        PLAN:  1. D/C Home  2. Current Discharge Medication List      START taking these medications    Details   HYDROcodone-acetaminophen (NORCO) 5-325 mg per tablet Take 1 tablets PO every 6 hours as needed for pain control. If over the counter ibuprofen or acetaminophen was suggested, then only take the vicodin for pain not well controlled with the over the counter medication. Qty: 12 Tab, Refills: 0    Associated Diagnoses: Right flank pain; Kidney stones      docusate sodium (COLACE) 100 mg capsule Take 1 Cap by mouth two (2) times daily as needed for Constipation for up to 10 days. Qty: 20 Cap, Refills: 0           3. Follow-up Information     Follow up With Details Comments [address] [facility],  Schedule an appointment as soon as possible for a visit in 2 days For primary care follow up [address] [facility]      Cordelia Bar MD Schedule an appointment as soon as possible for a visit in 2 days For follow up with OBGYN  [address] [address] [facility]      [facility] EMERGENCY DEPT Go to As needed, as symptoms worsen [address] [place] [address]  [phone]        _______________________________    Attestations: This note is prepared by Leroy Lai, acting as Scribe for Negar Valdovinos MD.    Negar Valdovinos MD:  The scribe's documentation has been prepared under my direction and personally reviewed by me in its entirety. I confirm that the note above accurately reflects all work, treatment, procedures, and medical decision making performed by me.     This note is prepared by Joshua Aquino, acting as Scribe for Les Tamayo MD.    Les Tamayo MD:  The scribe's documentation has been prepared under my direction and personally reviewed by me in its entirety.   I confirm that the note above accurately reflects all work, treatment, procedures, and medical decision making performed by me.  _______________________________

## 2018-06-04 NOTE — ED NOTES
Pt is difficult lab draw. Multiple attempts by multiple staff members to obtain blood work. Phlebotomy team called to assist. Dr. Ekaterina Pena notified.

## 2018-07-25 ENCOUNTER — HOSPITAL ENCOUNTER (OUTPATIENT)
Dept: MRI IMAGING | Age: 68
Discharge: HOME | End: 2018-07-25
Attending: INTERNAL MEDICINE
Payer: MEDICARE

## 2018-07-25 DIAGNOSIS — M54.17 LUMBOSACRAL RADICULOPATHY: ICD-10-CM

## 2018-07-25 PROCEDURE — 72148 MRI LUMBAR SPINE W/O DYE: CPT

## 2020-01-01 ENCOUNTER — HOSPITAL ENCOUNTER (OUTPATIENT)
Dept: LAB | Age: 70
Discharge: HOME | End: 2020-12-21
Payer: MEDICARE

## 2020-01-01 LAB
FAX TO INFO,FAXT: NORMAL
FAX TO INFO,FAXT: NORMAL
FAX TO NUMBER,FAXN: NORMAL
FAX TO NUMBER,FAXN: NORMAL
H PYLORI IGA SER-ACNC: <9 UNITS (ref 0–8.9)
H PYLORI IGG SER IA-ACNC: 0.18 INDEX VALUE (ref 0–0.79)
HEMOCCULT STL QL: POSITIVE

## 2020-01-01 PROCEDURE — 82272 OCCULT BLD FECES 1-3 TESTS: CPT

## 2020-12-20 ENCOUNTER — HOSPITAL ENCOUNTER (OUTPATIENT)
Dept: LAB | Age: 70
Discharge: HOME | End: 2020-12-20
Payer: MEDICARE

## 2020-12-20 LAB
BASOPHILS # BLD: 0 K/UL (ref 0–0.1)
BASOPHILS NFR BLD: 0 % (ref 0–2)
CHOLEST SERPL-MCNC: 208 MG/DL
DIFFERENTIAL METHOD BLD: ABNORMAL
EOSINOPHIL # BLD: 0.2 K/UL (ref 0–0.4)
EOSINOPHIL NFR BLD: 3 % (ref 0–5)
ERYTHROCYTE [DISTWIDTH] IN BLOOD BY AUTOMATED COUNT: 14.2 % (ref 11.6–14.5)
HCT VFR BLD AUTO: 35.7 % (ref 35–45)
HDLC SERPL-MCNC: 47 MG/DL (ref 40–60)
HDLC SERPL: 4.4 {RATIO} (ref 0–5)
HGB BLD-MCNC: 11.9 G/DL (ref 12–16)
LDLC SERPL CALC-MCNC: 128.6 MG/DL (ref 0–100)
LIPID PROFILE,FLP: ABNORMAL
LYMPHOCYTES # BLD: 1 K/UL (ref 0.9–3.6)
LYMPHOCYTES NFR BLD: 17 % (ref 21–52)
MCH RBC QN AUTO: 33.9 PG (ref 24–34)
MCHC RBC AUTO-ENTMCNC: 33.3 G/DL (ref 31–37)
MCV RBC AUTO: 101.7 FL (ref 74–97)
MONOCYTES # BLD: 0.5 K/UL (ref 0.05–1.2)
MONOCYTES NFR BLD: 8 % (ref 3–10)
NEUTS SEG # BLD: 4.1 K/UL (ref 1.8–8)
NEUTS SEG NFR BLD: 72 % (ref 40–73)
PLATELET # BLD AUTO: 117 K/UL (ref 135–420)
PMV BLD AUTO: 10.1 FL (ref 9.2–11.8)
RBC # BLD AUTO: 3.51 M/UL (ref 4.2–5.3)
TRIGL SERPL-MCNC: 162 MG/DL (ref ?–150)
VLDLC SERPL CALC-MCNC: 32.4 MG/DL
WBC # BLD AUTO: 5.7 K/UL (ref 4.6–13.2)

## 2020-12-20 PROCEDURE — 36415 COLL VENOUS BLD VENIPUNCTURE: CPT

## 2020-12-20 PROCEDURE — 80061 LIPID PANEL: CPT

## 2020-12-20 PROCEDURE — 85025 COMPLETE CBC W/AUTO DIFF WBC: CPT

## 2020-12-20 PROCEDURE — 86677 HELICOBACTER PYLORI ANTIBODY: CPT

## 2021-01-01 ENCOUNTER — APPOINTMENT (OUTPATIENT)
Dept: CT IMAGING | Age: 71
DRG: 207 | End: 2021-01-01
Attending: EMERGENCY MEDICINE
Payer: MEDICARE

## 2021-01-01 ENCOUNTER — HOSPITAL ENCOUNTER (OUTPATIENT)
Age: 71
Setting detail: OUTPATIENT SURGERY
Discharge: HOME | End: 2021-01-15
Attending: INTERNAL MEDICINE | Admitting: INTERNAL MEDICINE
Payer: MEDICARE

## 2021-01-01 ENCOUNTER — APPOINTMENT (OUTPATIENT)
Dept: GENERAL RADIOLOGY | Age: 71
DRG: 207 | End: 2021-01-01
Attending: INTERNAL MEDICINE
Payer: MEDICARE

## 2021-01-01 ENCOUNTER — APPOINTMENT (OUTPATIENT)
Dept: VASCULAR SURGERY | Age: 71
DRG: 207 | End: 2021-01-01
Attending: INTERNAL MEDICINE
Payer: MEDICARE

## 2021-01-01 ENCOUNTER — HOSPITAL ENCOUNTER (EMERGENCY)
Age: 71
Discharge: HOME | End: 2021-11-30
Attending: EMERGENCY MEDICINE
Payer: MEDICARE

## 2021-01-01 ENCOUNTER — APPOINTMENT (OUTPATIENT)
Dept: CT IMAGING | Age: 71
End: 2021-01-01
Attending: EMERGENCY MEDICINE
Payer: MEDICARE

## 2021-01-01 ENCOUNTER — ANESTHESIA EVENT (OUTPATIENT)
Dept: ENDOSCOPY | Age: 71
End: 2021-01-01
Payer: MEDICARE

## 2021-01-01 ENCOUNTER — TRANSCRIBE ORDER (OUTPATIENT)
Dept: REGISTRATION | Age: 71
End: 2021-01-01

## 2021-01-01 ENCOUNTER — APPOINTMENT (OUTPATIENT)
Dept: GENERAL RADIOLOGY | Age: 71
DRG: 207 | End: 2021-01-01
Attending: EMERGENCY MEDICINE
Payer: MEDICARE

## 2021-01-01 ENCOUNTER — HOSPITAL ENCOUNTER (OUTPATIENT)
Dept: PREADMISSION TESTING | Age: 71
Discharge: HOME | End: 2021-01-11
Payer: MEDICARE

## 2021-01-01 ENCOUNTER — ANESTHESIA (OUTPATIENT)
Dept: ENDOSCOPY | Age: 71
End: 2021-01-01
Payer: MEDICARE

## 2021-01-01 ENCOUNTER — APPOINTMENT (OUTPATIENT)
Dept: GENERAL RADIOLOGY | Age: 71
End: 2021-01-01
Attending: EMERGENCY MEDICINE
Payer: MEDICARE

## 2021-01-01 ENCOUNTER — APPOINTMENT (OUTPATIENT)
Dept: NON INVASIVE DIAGNOSTICS | Age: 71
DRG: 207 | End: 2021-01-01
Attending: INTERNAL MEDICINE
Payer: MEDICARE

## 2021-01-01 ENCOUNTER — ANESTHESIA (OUTPATIENT)
Dept: MEDSURG UNIT | Age: 71
DRG: 207 | End: 2021-01-01
Payer: MEDICARE

## 2021-01-01 ENCOUNTER — HOSPITAL ENCOUNTER (OUTPATIENT)
Dept: GENERAL RADIOLOGY | Age: 71
Discharge: HOME | End: 2021-06-07
Payer: MEDICARE

## 2021-01-01 ENCOUNTER — ANESTHESIA EVENT (OUTPATIENT)
Dept: MEDSURG UNIT | Age: 71
DRG: 207 | End: 2021-01-01
Payer: MEDICARE

## 2021-01-01 ENCOUNTER — TRANSCRIBE ORDER (OUTPATIENT)
Dept: SCHEDULING | Age: 71
End: 2021-01-01

## 2021-01-01 ENCOUNTER — HOSPITAL ENCOUNTER (OUTPATIENT)
Dept: MRI IMAGING | Age: 71
Discharge: HOME | End: 2021-07-19
Attending: PODIATRIST
Payer: MEDICARE

## 2021-01-01 ENCOUNTER — HOSPITAL ENCOUNTER (INPATIENT)
Age: 71
LOS: 16 days | DRG: 207 | End: 2021-12-21
Attending: EMERGENCY MEDICINE | Admitting: EMERGENCY MEDICINE
Payer: MEDICARE

## 2021-01-01 VITALS
HEIGHT: 66 IN | SYSTOLIC BLOOD PRESSURE: 148 MMHG | HEART RATE: 90 BPM | RESPIRATION RATE: 24 BRPM | DIASTOLIC BLOOD PRESSURE: 73 MMHG | OXYGEN SATURATION: 100 % | WEIGHT: 190.92 LBS | BODY MASS INDEX: 30.68 KG/M2 | TEMPERATURE: 97.9 F

## 2021-01-01 VITALS — WEIGHT: 238 LBS | BODY MASS INDEX: 39.61 KG/M2

## 2021-01-01 VITALS
BODY MASS INDEX: 40.65 KG/M2 | RESPIRATION RATE: 15 BRPM | OXYGEN SATURATION: 99 % | SYSTOLIC BLOOD PRESSURE: 142 MMHG | HEIGHT: 65 IN | TEMPERATURE: 97.2 F | WEIGHT: 244 LBS | DIASTOLIC BLOOD PRESSURE: 64 MMHG | HEART RATE: 60 BPM

## 2021-01-01 VITALS
OXYGEN SATURATION: 95 % | BODY MASS INDEX: 40.5 KG/M2 | DIASTOLIC BLOOD PRESSURE: 59 MMHG | TEMPERATURE: 97.4 F | SYSTOLIC BLOOD PRESSURE: 145 MMHG | HEART RATE: 66 BPM | HEIGHT: 66 IN | RESPIRATION RATE: 19 BRPM | WEIGHT: 252 LBS

## 2021-01-01 DIAGNOSIS — A41.9 SEPSIS WITH ACUTE HYPOXIC RESPIRATORY FAILURE AND SEPTIC SHOCK, DUE TO UNSPECIFIED ORGANISM (HCC): Primary | ICD-10-CM

## 2021-01-01 DIAGNOSIS — J96.01 SEPSIS WITH ACUTE HYPOXIC RESPIRATORY FAILURE AND SEPTIC SHOCK, DUE TO UNSPECIFIED ORGANISM (HCC): Primary | ICD-10-CM

## 2021-01-01 DIAGNOSIS — M79.673 FOOT PAIN: Primary | ICD-10-CM

## 2021-01-01 DIAGNOSIS — G89.29 CHRONIC PAIN: Primary | ICD-10-CM

## 2021-01-01 DIAGNOSIS — M79.673 FOOT PAIN: ICD-10-CM

## 2021-01-01 DIAGNOSIS — R10.9 RIGHT FLANK PAIN: Primary | ICD-10-CM

## 2021-01-01 DIAGNOSIS — J12.82 PNEUMONIA DUE TO COVID-19 VIRUS: ICD-10-CM

## 2021-01-01 DIAGNOSIS — U07.1 COVID-19: ICD-10-CM

## 2021-01-01 DIAGNOSIS — G89.29 CHRONIC PAIN: ICD-10-CM

## 2021-01-01 DIAGNOSIS — J18.9 COMMUNITY ACQUIRED PNEUMONIA OF RIGHT LOWER LOBE OF LUNG: ICD-10-CM

## 2021-01-01 DIAGNOSIS — J96.01 ACUTE RESPIRATORY FAILURE WITH HYPOXEMIA (HCC): ICD-10-CM

## 2021-01-01 DIAGNOSIS — R79.1 SUBTHERAPEUTIC INTERNATIONAL NORMALIZED RATIO (INR): ICD-10-CM

## 2021-01-01 DIAGNOSIS — U07.1 PNEUMONIA DUE TO COVID-19 VIRUS: ICD-10-CM

## 2021-01-01 DIAGNOSIS — R65.21 SEPSIS WITH ACUTE HYPOXIC RESPIRATORY FAILURE AND SEPTIC SHOCK, DUE TO UNSPECIFIED ORGANISM (HCC): Primary | ICD-10-CM

## 2021-01-01 DIAGNOSIS — E66.01 CLASS 3 SEVERE OBESITY WITH SERIOUS COMORBIDITY AND BODY MASS INDEX (BMI) OF 40.0 TO 44.9 IN ADULT, UNSPECIFIED OBESITY TYPE (HCC): ICD-10-CM

## 2021-01-01 DIAGNOSIS — Z71.89 GOALS OF CARE, COUNSELING/DISCUSSION: ICD-10-CM

## 2021-01-01 LAB
25(OH)D3 SERPL-MCNC: 67.9 NG/ML (ref 30–100)
ALBUMIN SERPL-MCNC: 1.9 G/DL (ref 3.4–5)
ALBUMIN SERPL-MCNC: 1.9 G/DL (ref 3.4–5)
ALBUMIN SERPL-MCNC: 2 G/DL (ref 3.4–5)
ALBUMIN SERPL-MCNC: 2.2 G/DL (ref 3.4–5)
ALBUMIN SERPL-MCNC: 2.3 G/DL (ref 3.4–5)
ALBUMIN SERPL-MCNC: 2.4 G/DL (ref 3.4–5)
ALBUMIN SERPL-MCNC: 2.9 G/DL (ref 3.4–5)
ALBUMIN SERPL-MCNC: 3.3 G/DL (ref 3.4–5)
ALBUMIN/GLOB SERPL: 0.5 {RATIO} (ref 0.8–1.7)
ALBUMIN/GLOB SERPL: 0.6 {RATIO} (ref 0.8–1.7)
ALBUMIN/GLOB SERPL: 0.7 {RATIO} (ref 0.8–1.7)
ALP SERPL-CCNC: 110 U/L (ref 45–117)
ALP SERPL-CCNC: 63 U/L (ref 45–117)
ALP SERPL-CCNC: 63 U/L (ref 45–117)
ALP SERPL-CCNC: 65 U/L (ref 45–117)
ALP SERPL-CCNC: 66 U/L (ref 45–117)
ALP SERPL-CCNC: 68 U/L (ref 45–117)
ALP SERPL-CCNC: 74 U/L (ref 45–117)
ALP SERPL-CCNC: 75 U/L (ref 45–117)
ALP SERPL-CCNC: 81 U/L (ref 45–117)
ALP SERPL-CCNC: 83 U/L (ref 45–117)
ALP SERPL-CCNC: 84 U/L (ref 45–117)
ALP SERPL-CCNC: 86 U/L (ref 45–117)
ALP SERPL-CCNC: 89 U/L (ref 45–117)
ALP SERPL-CCNC: 92 U/L (ref 45–117)
ALP SERPL-CCNC: 93 U/L (ref 45–117)
ALT SERPL-CCNC: 17 U/L (ref 13–56)
ALT SERPL-CCNC: 18 U/L (ref 13–56)
ALT SERPL-CCNC: 19 U/L (ref 13–56)
ALT SERPL-CCNC: 19 U/L (ref 13–56)
ALT SERPL-CCNC: 21 U/L (ref 13–56)
ALT SERPL-CCNC: 22 U/L (ref 13–56)
ALT SERPL-CCNC: 33 U/L (ref 13–56)
ALT SERPL-CCNC: 35 U/L (ref 13–56)
ALT SERPL-CCNC: 36 U/L (ref 13–56)
ALT SERPL-CCNC: 37 U/L (ref 13–56)
ALT SERPL-CCNC: 40 U/L (ref 13–56)
ALT SERPL-CCNC: 50 U/L (ref 13–56)
ALT SERPL-CCNC: 60 U/L (ref 13–56)
ALT SERPL-CCNC: 74 U/L (ref 13–56)
ALT SERPL-CCNC: 89 U/L (ref 13–56)
ANION GAP SERPL CALC-SCNC: 10 MMOL/L (ref 3–18)
ANION GAP SERPL CALC-SCNC: 5 MMOL/L (ref 3–18)
ANION GAP SERPL CALC-SCNC: 6 MMOL/L (ref 3–18)
ANION GAP SERPL CALC-SCNC: 6 MMOL/L (ref 3–18)
ANION GAP SERPL CALC-SCNC: 7 MMOL/L (ref 3–18)
ANION GAP SERPL CALC-SCNC: 8 MMOL/L (ref 3–18)
ANION GAP SERPL CALC-SCNC: 9 MMOL/L (ref 3–18)
ANION GAP SERPL CALC-SCNC: 9 MMOL/L (ref 3–18)
APPEARANCE UR: CLEAR
APPEARANCE UR: CLEAR
APTT PPP: 67.2 SEC (ref 23–36.4)
ARTERIAL PATENCY WRIST A: ABNORMAL
ARTERIAL PATENCY WRIST A: POSITIVE
AST SERPL-CCNC: 23 U/L (ref 10–38)
AST SERPL-CCNC: 24 U/L (ref 10–38)
AST SERPL-CCNC: 25 U/L (ref 10–38)
AST SERPL-CCNC: 28 U/L (ref 10–38)
AST SERPL-CCNC: 32 U/L (ref 10–38)
AST SERPL-CCNC: 36 U/L (ref 10–38)
AST SERPL-CCNC: 39 U/L (ref 10–38)
AST SERPL-CCNC: 41 U/L (ref 10–38)
AST SERPL-CCNC: 45 U/L (ref 10–38)
AST SERPL-CCNC: 53 U/L (ref 10–38)
AST SERPL-CCNC: 54 U/L (ref 10–38)
AST SERPL-CCNC: 68 U/L (ref 10–38)
ATRIAL RATE: 66 BPM
AV R PG: 66.77 MMHG
B PERT DNA SPEC QL NAA+PROBE: NOT DETECTED
BACTERIA SPEC CULT: ABNORMAL
BACTERIA SPEC CULT: NORMAL
BACTERIA URNS QL MICRO: ABNORMAL /HPF
BACTERIA URNS QL MICRO: ABNORMAL /HPF
BASE DEFICIT BLD-SCNC: 0.1 MMOL/L
BASE DEFICIT BLD-SCNC: 0.9 MMOL/L
BASE DEFICIT BLD-SCNC: 0.9 MMOL/L
BASE DEFICIT BLD-SCNC: 1.2 MMOL/L
BASE DEFICIT BLD-SCNC: 1.5 MMOL/L
BASE DEFICIT BLD-SCNC: 2.3 MMOL/L
BASE DEFICIT BLD-SCNC: 3.1 MMOL/L
BASE DEFICIT BLD-SCNC: 3.4 MMOL/L
BASE DEFICIT BLD-SCNC: 3.9 MMOL/L
BASE DEFICIT BLD-SCNC: 3.9 MMOL/L
BASE EXCESS BLD CALC-SCNC: 0.1 MMOL/L
BASE EXCESS BLD CALC-SCNC: 0.5 MMOL/L
BASE EXCESS BLD CALC-SCNC: 0.6 MMOL/L
BASE EXCESS BLD CALC-SCNC: 2.1 MMOL/L
BASOPHILS # BLD: 0 K/UL (ref 0–0.1)
BASOPHILS NFR BLD: 0 % (ref 0–2)
BDY SITE: ABNORMAL
BILIRUB SERPL-MCNC: 0.3 MG/DL (ref 0.2–1)
BILIRUB SERPL-MCNC: 0.4 MG/DL (ref 0.2–1)
BILIRUB SERPL-MCNC: 0.4 MG/DL (ref 0.2–1)
BILIRUB SERPL-MCNC: 0.5 MG/DL (ref 0.2–1)
BILIRUB SERPL-MCNC: 0.6 MG/DL (ref 0.2–1)
BILIRUB SERPL-MCNC: 0.7 MG/DL (ref 0.2–1)
BILIRUB SERPL-MCNC: 0.8 MG/DL (ref 0.2–1)
BILIRUB SERPL-MCNC: 0.8 MG/DL (ref 0.2–1)
BILIRUB SERPL-MCNC: 1 MG/DL (ref 0.2–1)
BILIRUB UR QL: NEGATIVE
BILIRUB UR QL: NEGATIVE
BNP SERPL-MCNC: 1010 PG/ML (ref 0–900)
BNP SERPL-MCNC: 1726 PG/ML (ref 0–900)
BNP SERPL-MCNC: 829 PG/ML (ref 0–900)
BODY TEMPERATURE: 97.6
BODY TEMPERATURE: 97.7
BODY TEMPERATURE: 97.7
BODY TEMPERATURE: 98
BODY TEMPERATURE: 99.1
BORDETELLA PARAPERTUSSIS PCR, BORPAR: NOT DETECTED
BUN SERPL-MCNC: 21 MG/DL (ref 7–18)
BUN SERPL-MCNC: 21 MG/DL (ref 7–18)
BUN SERPL-MCNC: 23 MG/DL (ref 7–18)
BUN SERPL-MCNC: 33 MG/DL (ref 7–18)
BUN SERPL-MCNC: 36 MG/DL (ref 7–18)
BUN SERPL-MCNC: 54 MG/DL (ref 7–18)
BUN SERPL-MCNC: 60 MG/DL (ref 7–18)
BUN SERPL-MCNC: 65 MG/DL (ref 7–18)
BUN SERPL-MCNC: 67 MG/DL (ref 7–18)
BUN SERPL-MCNC: 68 MG/DL (ref 7–18)
BUN SERPL-MCNC: 69 MG/DL (ref 7–18)
BUN SERPL-MCNC: 69 MG/DL (ref 7–18)
BUN SERPL-MCNC: 71 MG/DL (ref 7–18)
BUN SERPL-MCNC: 71 MG/DL (ref 7–18)
BUN SERPL-MCNC: 73 MG/DL (ref 7–18)
BUN/CREAT SERPL: 17 (ref 12–20)
BUN/CREAT SERPL: 18 (ref 12–20)
BUN/CREAT SERPL: 19 (ref 12–20)
BUN/CREAT SERPL: 26 (ref 12–20)
BUN/CREAT SERPL: 31 (ref 12–20)
BUN/CREAT SERPL: 36 (ref 12–20)
BUN/CREAT SERPL: 39 (ref 12–20)
BUN/CREAT SERPL: 41 (ref 12–20)
BUN/CREAT SERPL: 41 (ref 12–20)
BUN/CREAT SERPL: 44 (ref 12–20)
BUN/CREAT SERPL: 45 (ref 12–20)
BUN/CREAT SERPL: 45 (ref 12–20)
BUN/CREAT SERPL: 50 (ref 12–20)
BUN/CREAT SERPL: 58 (ref 12–20)
BUN/CREAT SERPL: 60 (ref 12–20)
C PNEUM DNA SPEC QL NAA+PROBE: NOT DETECTED
CALCIUM SERPL-MCNC: 8.2 MG/DL (ref 8.5–10.1)
CALCIUM SERPL-MCNC: 8.2 MG/DL (ref 8.5–10.1)
CALCIUM SERPL-MCNC: 8.3 MG/DL (ref 8.5–10.1)
CALCIUM SERPL-MCNC: 8.4 MG/DL (ref 8.5–10.1)
CALCIUM SERPL-MCNC: 8.4 MG/DL (ref 8.5–10.1)
CALCIUM SERPL-MCNC: 8.5 MG/DL (ref 8.5–10.1)
CALCIUM SERPL-MCNC: 8.6 MG/DL (ref 8.5–10.1)
CALCIUM SERPL-MCNC: 8.7 MG/DL (ref 8.5–10.1)
CALCIUM SERPL-MCNC: 8.7 MG/DL (ref 8.5–10.1)
CALCIUM SERPL-MCNC: 8.8 MG/DL (ref 8.5–10.1)
CALCIUM SERPL-MCNC: 8.9 MG/DL (ref 8.5–10.1)
CALCIUM SERPL-MCNC: 8.9 MG/DL (ref 8.5–10.1)
CALCIUM SERPL-MCNC: 9 MG/DL (ref 8.5–10.1)
CALCULATED P AXIS, ECG09: 72 DEGREES
CALCULATED R AXIS, ECG10: -11 DEGREES
CALCULATED R AXIS, ECG10: -27 DEGREES
CALCULATED T AXIS, ECG11: 10 DEGREES
CALCULATED T AXIS, ECG11: 129 DEGREES
CC UR VC: ABNORMAL
CHLORIDE SERPL-SCNC: 103 MMOL/L (ref 100–111)
CHLORIDE SERPL-SCNC: 104 MMOL/L (ref 100–111)
CHLORIDE SERPL-SCNC: 105 MMOL/L (ref 100–111)
CHLORIDE SERPL-SCNC: 106 MMOL/L (ref 100–111)
CHLORIDE SERPL-SCNC: 106 MMOL/L (ref 100–111)
CHLORIDE SERPL-SCNC: 109 MMOL/L (ref 100–111)
CHLORIDE SERPL-SCNC: 111 MMOL/L (ref 100–111)
CHLORIDE SERPL-SCNC: 112 MMOL/L (ref 100–111)
CHLORIDE SERPL-SCNC: 113 MMOL/L (ref 100–111)
CHLORIDE SERPL-SCNC: 115 MMOL/L (ref 100–111)
CHLORIDE SERPL-SCNC: 116 MMOL/L (ref 100–111)
CHLORIDE SERPL-SCNC: 118 MMOL/L (ref 100–111)
CHLORIDE SERPL-SCNC: 118 MMOL/L (ref 100–111)
CHLORIDE UR-SCNC: <10 MMOL/L (ref 55–125)
CK MB CFR SERPL CALC: ABNORMAL % (ref 0–4)
CK MB CFR SERPL CALC: NORMAL % (ref 0–4)
CK MB CFR SERPL CALC: NORMAL % (ref 0–4)
CK MB SERPL-MCNC: <1 NG/ML (ref 5–25)
CK SERPL-CCNC: 145 U/L (ref 26–192)
CK SERPL-CCNC: 54 U/L (ref 26–192)
CK SERPL-CCNC: 63 U/L (ref 26–192)
CO2 SERPL-SCNC: 21 MMOL/L (ref 21–32)
CO2 SERPL-SCNC: 22 MMOL/L (ref 21–32)
CO2 SERPL-SCNC: 23 MMOL/L (ref 21–32)
CO2 SERPL-SCNC: 23 MMOL/L (ref 21–32)
CO2 SERPL-SCNC: 24 MMOL/L (ref 21–32)
CO2 SERPL-SCNC: 24 MMOL/L (ref 21–32)
CO2 SERPL-SCNC: 25 MMOL/L (ref 21–32)
CO2 SERPL-SCNC: 25 MMOL/L (ref 21–32)
CO2 SERPL-SCNC: 26 MMOL/L (ref 21–32)
CO2 SERPL-SCNC: 26 MMOL/L (ref 21–32)
CO2 SERPL-SCNC: 27 MMOL/L (ref 21–32)
CO2 SERPL-SCNC: 27 MMOL/L (ref 21–32)
CO2 SERPL-SCNC: 31 MMOL/L (ref 21–32)
COLOR UR: YELLOW
COLOR UR: YELLOW
COVID-19 RAPID TEST, COVR: DETECTED
CREAT SERPL-MCNC: 1.04 MG/DL (ref 0.6–1.3)
CREAT SERPL-MCNC: 1.12 MG/DL (ref 0.6–1.3)
CREAT SERPL-MCNC: 1.15 MG/DL (ref 0.6–1.3)
CREAT SERPL-MCNC: 1.18 MG/DL (ref 0.6–1.3)
CREAT SERPL-MCNC: 1.26 MG/DL (ref 0.6–1.3)
CREAT SERPL-MCNC: 1.27 MG/DL (ref 0.6–1.3)
CREAT SERPL-MCNC: 1.28 MG/DL (ref 0.6–1.3)
CREAT SERPL-MCNC: 1.45 MG/DL (ref 0.6–1.3)
CREAT SERPL-MCNC: 1.5 MG/DL (ref 0.6–1.3)
CREAT SERPL-MCNC: 1.51 MG/DL (ref 0.6–1.3)
CREAT SERPL-MCNC: 1.57 MG/DL (ref 0.6–1.3)
CREAT SERPL-MCNC: 1.6 MG/DL (ref 0.6–1.3)
CREAT SERPL-MCNC: 1.61 MG/DL (ref 0.6–1.3)
CREAT SERPL-MCNC: 1.67 MG/DL (ref 0.6–1.3)
CREAT SERPL-MCNC: 1.79 MG/DL (ref 0.6–1.3)
CREAT UR-MCNC: 62 MG/DL (ref 30–125)
CRP SERPL-MCNC: 0.5 MG/DL (ref 0–0.3)
CRP SERPL-MCNC: 0.9 MG/DL (ref 0–0.3)
CRP SERPL-MCNC: 11.5 MG/DL (ref 0–0.3)
CRP SERPL-MCNC: 14.9 MG/DL (ref 0–0.3)
CRP SERPL-MCNC: 2.1 MG/DL (ref 0–0.3)
CRP SERPL-MCNC: 7.8 MG/DL (ref 0–0.3)
D DIMER PPP FEU-MCNC: 1.96 UG/ML(FEU)
D DIMER PPP FEU-MCNC: 2.27 UG/ML(FEU)
D DIMER PPP FEU-MCNC: 2.36 UG/ML(FEU)
D DIMER PPP FEU-MCNC: 2.39 UG/ML(FEU)
D DIMER PPP FEU-MCNC: 2.43 UG/ML(FEU)
D DIMER PPP FEU-MCNC: 2.59 UG/ML(FEU)
DIAGNOSIS, 93000: NORMAL
DIAGNOSIS, 93000: NORMAL
DIFFERENTIAL METHOD BLD: ABNORMAL
ECHO AO ROOT DIAM: 2.75 CM
ECHO AR MAX VEL PISA: 408.57 CM/S
ECHO AV AREA PEAK VELOCITY: 1.98 CM2
ECHO AV AREA VTI: 1.75 CM2
ECHO AV AREA/BSA PEAK VELOCITY: 0.9 CM2/M2
ECHO AV AREA/BSA VTI: 0.8 CM2/M2
ECHO AV MEAN GRADIENT: 6.75 MMHG
ECHO AV PEAK GRADIENT: 12.23 MMHG
ECHO AV PEAK VELOCITY: 174.82 CM/S
ECHO AV REGURGITANT PHT: 1054.37 MS
ECHO AV VTI: 44.44 CM
ECHO EST RA PRESSURE: 15 MMHG
ECHO IVC PROX: 2.75 CM
ECHO LA AREA 4C: 17.57 CM2
ECHO LA MAJOR AXIS: 3.21 CM
ECHO LA MINOR AXIS: 1.45 CM
ECHO LA VOL 2C: 27.56 ML (ref 22–52)
ECHO LA VOL 4C: 34.09 ML (ref 22–52)
ECHO LA VOL BP: 38.34 ML (ref 22–52)
ECHO LA VOL/BSA BIPLANE: 17.35 ML/M2 (ref 16–28)
ECHO LA VOLUME INDEX A2C: 12.47 ML/M2 (ref 16–28)
ECHO LA VOLUME INDEX A4C: 15.43 ML/M2 (ref 16–28)
ECHO LV E' LATERAL VELOCITY: 6.99 CM/S
ECHO LV E' SEPTAL VELOCITY: 5.71 CM/S
ECHO LV EDV A2C: 86.17 ML
ECHO LV EDV A4C: 124.68 ML
ECHO LV EDV BP: 104.15 ML (ref 56–104)
ECHO LV EDV INDEX A4C: 56.4 ML/M2
ECHO LV EDV INDEX BP: 47.1 ML/M2
ECHO LV EDV NDEX A2C: 39 ML/M2
ECHO LV EJECTION FRACTION A2C: 55 PERCENT
ECHO LV EJECTION FRACTION A4C: 68 PERCENT
ECHO LV EJECTION FRACTION BIPLANE: 62.1 PERCENT (ref 55–100)
ECHO LV ESV A2C: 38.38 ML
ECHO LV ESV A4C: 40.44 ML
ECHO LV ESV BP: 39.45 ML (ref 19–49)
ECHO LV ESV INDEX A2C: 17.4 ML/M2
ECHO LV ESV INDEX A4C: 18.3 ML/M2
ECHO LV ESV INDEX BP: 17.9 ML/M2
ECHO LV INTERNAL DIMENSION DIASTOLIC: 5.18 CM (ref 3.9–5.3)
ECHO LV INTERNAL DIMENSION SYSTOLIC: 3.71 CM
ECHO LV IVSD: 0.79 CM (ref 0.6–0.9)
ECHO LV MASS 2D: 154.7 G (ref 67–162)
ECHO LV MASS INDEX 2D: 70 G/M2 (ref 43–95)
ECHO LV POSTERIOR WALL DIASTOLIC: 0.9 CM (ref 0.6–0.9)
ECHO LVOT CARDIAC OUTPUT: 4.54 LITER/MINUTE
ECHO LVOT DIAM: 1.87 CM
ECHO LVOT PEAK GRADIENT: 6.42 MMHG
ECHO LVOT PEAK VELOCITY: 126.65 CM/S
ECHO LVOT SV: 47.8 ML
ECHO LVOT SV: 77.7 ML
ECHO LVOT VTI: 28.38 CM
ECHO MV A VELOCITY: 70.5 CM/S
ECHO MV E DECELERATION TIME (DT): 202.68 MS
ECHO MV E VELOCITY: 99.81 CM/S
ECHO MV E/A RATIO: 1.42
ECHO MV E/E' LATERAL: 14.28
ECHO MV E/E' RATIO (AVERAGED): 15.88
ECHO MV E/E' SEPTAL: 17.48
ECHO MV REGURGITANT VTIA: 146.7 CM
ECHO RA AREA 4C: 17.36 CM2
ECHO RIGHT VENTRICULAR SYSTOLIC PRESSURE (RVSP): 41.17 MMHG
ECHO RV INTERNAL DIMENSION: 3.91 CM
ECHO RV TAPSE: 2.14 CM (ref 1.5–2)
ECHO TV REGURGITANT MAX VELOCITY: 255.77 CM/S
ECHO TV REGURGITANT PEAK GRADIENT: 26.17 MMHG
EOSINOPHIL # BLD: 0 K/UL (ref 0–0.4)
EOSINOPHIL # BLD: 0.1 K/UL (ref 0–0.4)
EOSINOPHIL # BLD: 0.1 K/UL (ref 0–0.4)
EOSINOPHIL # BLD: 0.2 K/UL (ref 0–0.4)
EOSINOPHIL NFR BLD: 0 % (ref 0–5)
EOSINOPHIL NFR BLD: 1 % (ref 0–5)
EOSINOPHIL NFR BLD: 1 % (ref 0–5)
EOSINOPHIL NFR BLD: 2 % (ref 0–5)
EPITH CASTS URNS QL MICRO: ABNORMAL /LPF (ref 0–5)
EPITH CASTS URNS QL MICRO: ABNORMAL /LPF (ref 0–5)
ERYTHROCYTE [DISTWIDTH] IN BLOOD BY AUTOMATED COUNT: 13.3 % (ref 11.6–14.5)
ERYTHROCYTE [DISTWIDTH] IN BLOOD BY AUTOMATED COUNT: 13.4 % (ref 11.6–14.5)
ERYTHROCYTE [DISTWIDTH] IN BLOOD BY AUTOMATED COUNT: 13.6 % (ref 11.6–14.5)
ERYTHROCYTE [DISTWIDTH] IN BLOOD BY AUTOMATED COUNT: 13.6 % (ref 11.6–14.5)
ERYTHROCYTE [DISTWIDTH] IN BLOOD BY AUTOMATED COUNT: 13.7 % (ref 11.6–14.5)
ERYTHROCYTE [DISTWIDTH] IN BLOOD BY AUTOMATED COUNT: 14 % (ref 11.6–14.5)
ERYTHROCYTE [DISTWIDTH] IN BLOOD BY AUTOMATED COUNT: 14.1 % (ref 11.6–14.5)
ERYTHROCYTE [DISTWIDTH] IN BLOOD BY AUTOMATED COUNT: 14.3 % (ref 11.6–14.5)
ERYTHROCYTE [DISTWIDTH] IN BLOOD BY AUTOMATED COUNT: 14.3 % (ref 11.6–14.5)
ERYTHROCYTE [DISTWIDTH] IN BLOOD BY AUTOMATED COUNT: 14.4 % (ref 11.6–14.5)
ERYTHROCYTE [DISTWIDTH] IN BLOOD BY AUTOMATED COUNT: 14.6 % (ref 11.6–14.5)
FERRITIN SERPL-MCNC: 1104 NG/ML (ref 8–388)
FERRITIN SERPL-MCNC: 1354 NG/ML (ref 8–388)
FERRITIN SERPL-MCNC: 836 NG/ML (ref 8–388)
FERRITIN SERPL-MCNC: 902 NG/ML (ref 8–388)
FERRITIN SERPL-MCNC: 918 NG/ML (ref 8–388)
FIBRINOGEN PPP-MCNC: 318 MG/DL (ref 210–451)
FIBRINOGEN PPP-MCNC: 376 MG/DL (ref 210–451)
FIBRINOGEN PPP-MCNC: 402 MG/DL (ref 210–451)
FLUAV H1 2009 PAND RNA SPEC QL NAA+PROBE: NOT DETECTED
FLUAV H1 RNA SPEC QL NAA+PROBE: NOT DETECTED
FLUAV H3 RNA SPEC QL NAA+PROBE: NOT DETECTED
FLUAV SUBTYP SPEC NAA+PROBE: NOT DETECTED
FLUBV RNA SPEC QL NAA+PROBE: NOT DETECTED
GAS FLOW.O2 O2 DELIVERY SYS: ABNORMAL L/MIN
GAS FLOW.O2 SETTING OXYMISER: 18 BPM
GAS FLOW.O2 SETTING OXYMISER: 20 BPM
GAS FLOW.O2 SETTING OXYMISER: 20 BPM
GLOBULIN SER CALC-MCNC: 2.9 G/DL (ref 2–4)
GLOBULIN SER CALC-MCNC: 3 G/DL (ref 2–4)
GLOBULIN SER CALC-MCNC: 3.1 G/DL (ref 2–4)
GLOBULIN SER CALC-MCNC: 3.2 G/DL (ref 2–4)
GLOBULIN SER CALC-MCNC: 3.2 G/DL (ref 2–4)
GLOBULIN SER CALC-MCNC: 3.3 G/DL (ref 2–4)
GLOBULIN SER CALC-MCNC: 3.4 G/DL (ref 2–4)
GLOBULIN SER CALC-MCNC: 3.5 G/DL (ref 2–4)
GLOBULIN SER CALC-MCNC: 3.8 G/DL (ref 2–4)
GLOBULIN SER CALC-MCNC: 4 G/DL (ref 2–4)
GLOBULIN SER CALC-MCNC: 4.1 G/DL (ref 2–4)
GLOBULIN SER CALC-MCNC: 4.5 G/DL (ref 2–4)
GLOBULIN SER CALC-MCNC: 4.9 G/DL (ref 2–4)
GLUCOSE BLD STRIP.AUTO-MCNC: 106 MG/DL (ref 70–110)
GLUCOSE BLD STRIP.AUTO-MCNC: 115 MG/DL (ref 70–110)
GLUCOSE BLD STRIP.AUTO-MCNC: 120 MG/DL (ref 70–110)
GLUCOSE BLD STRIP.AUTO-MCNC: 122 MG/DL (ref 70–110)
GLUCOSE BLD STRIP.AUTO-MCNC: 130 MG/DL (ref 70–110)
GLUCOSE BLD STRIP.AUTO-MCNC: 130 MG/DL (ref 70–110)
GLUCOSE BLD STRIP.AUTO-MCNC: 133 MG/DL (ref 70–110)
GLUCOSE BLD STRIP.AUTO-MCNC: 133 MG/DL (ref 70–110)
GLUCOSE BLD STRIP.AUTO-MCNC: 136 MG/DL (ref 70–110)
GLUCOSE BLD STRIP.AUTO-MCNC: 139 MG/DL (ref 70–110)
GLUCOSE BLD STRIP.AUTO-MCNC: 141 MG/DL (ref 70–110)
GLUCOSE BLD STRIP.AUTO-MCNC: 144 MG/DL (ref 70–110)
GLUCOSE BLD STRIP.AUTO-MCNC: 144 MG/DL (ref 70–110)
GLUCOSE BLD STRIP.AUTO-MCNC: 145 MG/DL (ref 70–110)
GLUCOSE BLD STRIP.AUTO-MCNC: 146 MG/DL (ref 70–110)
GLUCOSE BLD STRIP.AUTO-MCNC: 153 MG/DL (ref 70–110)
GLUCOSE BLD STRIP.AUTO-MCNC: 154 MG/DL (ref 70–110)
GLUCOSE BLD STRIP.AUTO-MCNC: 155 MG/DL (ref 70–110)
GLUCOSE BLD STRIP.AUTO-MCNC: 155 MG/DL (ref 70–110)
GLUCOSE BLD STRIP.AUTO-MCNC: 157 MG/DL (ref 70–110)
GLUCOSE BLD STRIP.AUTO-MCNC: 158 MG/DL (ref 70–110)
GLUCOSE BLD STRIP.AUTO-MCNC: 158 MG/DL (ref 70–110)
GLUCOSE BLD STRIP.AUTO-MCNC: 159 MG/DL (ref 70–110)
GLUCOSE BLD STRIP.AUTO-MCNC: 159 MG/DL (ref 70–110)
GLUCOSE BLD STRIP.AUTO-MCNC: 161 MG/DL (ref 70–110)
GLUCOSE BLD STRIP.AUTO-MCNC: 161 MG/DL (ref 70–110)
GLUCOSE BLD STRIP.AUTO-MCNC: 162 MG/DL (ref 70–110)
GLUCOSE BLD STRIP.AUTO-MCNC: 165 MG/DL (ref 70–110)
GLUCOSE BLD STRIP.AUTO-MCNC: 172 MG/DL (ref 70–110)
GLUCOSE BLD STRIP.AUTO-MCNC: 172 MG/DL (ref 70–110)
GLUCOSE BLD STRIP.AUTO-MCNC: 176 MG/DL (ref 70–110)
GLUCOSE BLD STRIP.AUTO-MCNC: 179 MG/DL (ref 70–110)
GLUCOSE BLD STRIP.AUTO-MCNC: 185 MG/DL (ref 70–110)
GLUCOSE BLD STRIP.AUTO-MCNC: 187 MG/DL (ref 70–110)
GLUCOSE BLD STRIP.AUTO-MCNC: 196 MG/DL (ref 70–110)
GLUCOSE BLD STRIP.AUTO-MCNC: 199 MG/DL (ref 70–110)
GLUCOSE BLD STRIP.AUTO-MCNC: 205 MG/DL (ref 70–110)
GLUCOSE BLD STRIP.AUTO-MCNC: 210 MG/DL (ref 70–110)
GLUCOSE BLD STRIP.AUTO-MCNC: 214 MG/DL (ref 70–110)
GLUCOSE BLD STRIP.AUTO-MCNC: 218 MG/DL (ref 70–110)
GLUCOSE BLD STRIP.AUTO-MCNC: 221 MG/DL (ref 70–110)
GLUCOSE BLD STRIP.AUTO-MCNC: 227 MG/DL (ref 70–110)
GLUCOSE BLD STRIP.AUTO-MCNC: 233 MG/DL (ref 70–110)
GLUCOSE BLD STRIP.AUTO-MCNC: 249 MG/DL (ref 70–110)
GLUCOSE BLD STRIP.AUTO-MCNC: 256 MG/DL (ref 70–110)
GLUCOSE BLD STRIP.AUTO-MCNC: 256 MG/DL (ref 70–110)
GLUCOSE BLD STRIP.AUTO-MCNC: 260 MG/DL (ref 70–110)
GLUCOSE BLD STRIP.AUTO-MCNC: 279 MG/DL (ref 70–110)
GLUCOSE BLD STRIP.AUTO-MCNC: 295 MG/DL (ref 70–110)
GLUCOSE BLD STRIP.AUTO-MCNC: 88 MG/DL (ref 70–110)
GLUCOSE BLD STRIP.AUTO-MCNC: 93 MG/DL (ref 70–110)
GLUCOSE SERPL-MCNC: 101 MG/DL (ref 74–99)
GLUCOSE SERPL-MCNC: 125 MG/DL (ref 74–99)
GLUCOSE SERPL-MCNC: 125 MG/DL (ref 74–99)
GLUCOSE SERPL-MCNC: 126 MG/DL (ref 74–99)
GLUCOSE SERPL-MCNC: 133 MG/DL (ref 74–99)
GLUCOSE SERPL-MCNC: 135 MG/DL (ref 74–99)
GLUCOSE SERPL-MCNC: 141 MG/DL (ref 74–99)
GLUCOSE SERPL-MCNC: 153 MG/DL (ref 74–99)
GLUCOSE SERPL-MCNC: 159 MG/DL (ref 74–99)
GLUCOSE SERPL-MCNC: 162 MG/DL (ref 74–99)
GLUCOSE SERPL-MCNC: 174 MG/DL (ref 74–99)
GLUCOSE SERPL-MCNC: 214 MG/DL (ref 74–99)
GLUCOSE SERPL-MCNC: 226 MG/DL (ref 74–99)
GLUCOSE SERPL-MCNC: 251 MG/DL (ref 74–99)
GLUCOSE SERPL-MCNC: 83 MG/DL (ref 74–99)
GLUCOSE UR STRIP.AUTO-MCNC: NEGATIVE MG/DL
GLUCOSE UR STRIP.AUTO-MCNC: NEGATIVE MG/DL
HADV DNA SPEC QL NAA+PROBE: NOT DETECTED
HBV CORE AB SERPL QL IA: NEGATIVE
HBV SURFACE AB SER QL IA: NEGATIVE
HBV SURFACE AB SERPL IA-ACNC: 4.69 MIU/ML
HBV SURFACE AG SER QL: <0.1 INDEX
HBV SURFACE AG SER QL: NEGATIVE
HCO3 BLD-SCNC: 21 MMOL/L (ref 22–26)
HCO3 BLD-SCNC: 21.6 MMOL/L (ref 22–26)
HCO3 BLD-SCNC: 21.7 MMOL/L (ref 22–26)
HCO3 BLD-SCNC: 21.8 MMOL/L (ref 22–26)
HCO3 BLD-SCNC: 21.9 MMOL/L (ref 22–26)
HCO3 BLD-SCNC: 22.1 MMOL/L (ref 22–26)
HCO3 BLD-SCNC: 23.4 MMOL/L (ref 22–26)
HCO3 BLD-SCNC: 23.7 MMOL/L (ref 22–26)
HCO3 BLD-SCNC: 23.7 MMOL/L (ref 22–26)
HCO3 BLD-SCNC: 24 MMOL/L (ref 22–26)
HCO3 BLD-SCNC: 24 MMOL/L (ref 22–26)
HCO3 BLD-SCNC: 24.4 MMOL/L (ref 22–26)
HCO3 BLD-SCNC: 24.5 MMOL/L (ref 22–26)
HCO3 BLD-SCNC: 26.2 MMOL/L (ref 22–26)
HCOV 229E RNA SPEC QL NAA+PROBE: NOT DETECTED
HCOV HKU1 RNA SPEC QL NAA+PROBE: NOT DETECTED
HCOV NL63 RNA SPEC QL NAA+PROBE: NOT DETECTED
HCOV OC43 RNA SPEC QL NAA+PROBE: NOT DETECTED
HCT VFR BLD AUTO: 28.3 % (ref 35–45)
HCT VFR BLD AUTO: 29.5 % (ref 35–45)
HCT VFR BLD AUTO: 29.7 % (ref 35–45)
HCT VFR BLD AUTO: 31.3 % (ref 35–45)
HCT VFR BLD AUTO: 31.3 % (ref 35–45)
HCT VFR BLD AUTO: 31.7 % (ref 35–45)
HCT VFR BLD AUTO: 32.1 % (ref 35–45)
HCT VFR BLD AUTO: 32.2 % (ref 35–45)
HCT VFR BLD AUTO: 32.7 % (ref 35–45)
HCT VFR BLD AUTO: 32.8 % (ref 35–45)
HCT VFR BLD AUTO: 33.4 % (ref 35–45)
HCT VFR BLD AUTO: 34.1 % (ref 35–45)
HCT VFR BLD AUTO: 35.4 % (ref 35–45)
HCT VFR BLD AUTO: 36.5 % (ref 35–45)
HCT VFR BLD AUTO: 41.6 % (ref 35–45)
HCV AB SER IA-ACNC: <0.02 INDEX
HCV AB SERPL QL IA: NEGATIVE
HCV COMMENT,HCGAC: NORMAL
HEP BS AB COMMENT,HBSAC: ABNORMAL
HGB BLD-MCNC: 10.4 G/DL (ref 12–16)
HGB BLD-MCNC: 10.5 G/DL (ref 12–16)
HGB BLD-MCNC: 10.5 G/DL (ref 12–16)
HGB BLD-MCNC: 10.7 G/DL (ref 12–16)
HGB BLD-MCNC: 10.8 G/DL (ref 12–16)
HGB BLD-MCNC: 11 G/DL (ref 12–16)
HGB BLD-MCNC: 11.3 G/DL (ref 12–16)
HGB BLD-MCNC: 11.7 G/DL (ref 12–16)
HGB BLD-MCNC: 12.5 G/DL (ref 12–16)
HGB BLD-MCNC: 13.6 G/DL (ref 12–16)
HGB BLD-MCNC: 9.6 G/DL (ref 12–16)
HGB BLD-MCNC: 9.7 G/DL (ref 12–16)
HGB BLD-MCNC: 9.9 G/DL (ref 12–16)
HGB UR QL STRIP: NEGATIVE
HGB UR QL STRIP: NEGATIVE
HMPV RNA SPEC QL NAA+PROBE: NOT DETECTED
HPIV1 RNA SPEC QL NAA+PROBE: NOT DETECTED
HPIV2 RNA SPEC QL NAA+PROBE: NOT DETECTED
HPIV3 RNA SPEC QL NAA+PROBE: NOT DETECTED
HPIV4 RNA SPEC QL NAA+PROBE: NOT DETECTED
HYALINE CASTS URNS QL MICRO: ABNORMAL /LPF (ref 0–2)
IMM GRANULOCYTES # BLD AUTO: 0 K/UL (ref 0–0.04)
IMM GRANULOCYTES # BLD AUTO: 0.1 K/UL (ref 0–0.04)
IMM GRANULOCYTES # BLD AUTO: 0.2 K/UL (ref 0–0.04)
IMM GRANULOCYTES NFR BLD AUTO: 0 % (ref 0–0.5)
IMM GRANULOCYTES NFR BLD AUTO: 1 % (ref 0–0.5)
IMM GRANULOCYTES NFR BLD AUTO: 2 % (ref 0–0.5)
IMM GRANULOCYTES NFR BLD AUTO: 3 % (ref 0–0.5)
INR PPP: 1.2 (ref 0.8–1.2)
INR PPP: 1.3 (ref 0.8–1.2)
INR PPP: 1.3 (ref 0.8–1.2)
INR PPP: 1.5 (ref 0.8–1.2)
INR PPP: 1.8 (ref 0.8–1.2)
INR PPP: 2.1 (ref 0.8–1.2)
INR PPP: 2.1 (ref 0.8–1.2)
INR PPP: 2.2 (ref 0.8–1.2)
INR PPP: 2.2 (ref 0.8–1.2)
INR PPP: 2.3 (ref 0.8–1.2)
INR PPP: 2.6 (ref 0.8–1.2)
INR PPP: 3.3 (ref 0.8–1.2)
INR PPP: 4.6 (ref 0.8–1.2)
INR PPP: 5.1 (ref 0.8–1.2)
INSPIRATION.DURATION SETTING TIME VENT: 1.16 SEC
KETONES UR QL STRIP.AUTO: NEGATIVE MG/DL
KETONES UR QL STRIP.AUTO: NEGATIVE MG/DL
L PNEUMO AG UR QL IA: NEGATIVE
LA VOL DISK BP: 36.22 ML (ref 22–52)
LACTATE BLD-SCNC: 1.61 MMOL/L (ref 0.4–2)
LACTATE BLD-SCNC: 2.19 MMOL/L (ref 0.4–2)
LDH SERPL L TO P-CCNC: 512 U/L (ref 81–234)
LDH SERPL L TO P-CCNC: 571 U/L (ref 81–234)
LEUKOCYTE ESTERASE UR QL STRIP.AUTO: ABNORMAL
LEUKOCYTE ESTERASE UR QL STRIP.AUTO: ABNORMAL
LIPASE SERPL-CCNC: 111 U/L (ref 73–393)
LVOT MG: 3.54 MMHG
LYMPHOCYTES # BLD: 0.2 K/UL (ref 0.9–3.6)
LYMPHOCYTES # BLD: 0.3 K/UL (ref 0.9–3.6)
LYMPHOCYTES # BLD: 0.5 K/UL (ref 0.9–3.6)
LYMPHOCYTES # BLD: 0.5 K/UL (ref 0.9–3.6)
LYMPHOCYTES # BLD: 0.6 K/UL (ref 0.9–3.6)
LYMPHOCYTES # BLD: 0.7 K/UL (ref 0.9–3.6)
LYMPHOCYTES NFR BLD: 1 % (ref 21–52)
LYMPHOCYTES NFR BLD: 11 % (ref 21–52)
LYMPHOCYTES NFR BLD: 13 % (ref 21–52)
LYMPHOCYTES NFR BLD: 15 % (ref 21–52)
LYMPHOCYTES NFR BLD: 2 % (ref 21–52)
LYMPHOCYTES NFR BLD: 2 % (ref 21–52)
LYMPHOCYTES NFR BLD: 3 % (ref 21–52)
LYMPHOCYTES NFR BLD: 4 % (ref 21–52)
M PNEUMO DNA SPEC QL NAA+PROBE: NOT DETECTED
M TB IFN-G BLD-IMP: ABNORMAL
M TB IFN-G BLD-IMP: NEGATIVE
MAGNESIUM SERPL-MCNC: 2 MG/DL (ref 1.6–2.6)
MCH RBC QN AUTO: 31.9 PG (ref 24–34)
MCH RBC QN AUTO: 32 PG (ref 24–34)
MCH RBC QN AUTO: 32.1 PG (ref 24–34)
MCH RBC QN AUTO: 32.2 PG (ref 24–34)
MCH RBC QN AUTO: 32.3 PG (ref 24–34)
MCH RBC QN AUTO: 32.4 PG (ref 24–34)
MCH RBC QN AUTO: 32.5 PG (ref 24–34)
MCH RBC QN AUTO: 33 PG (ref 24–34)
MCH RBC QN AUTO: 33.1 PG (ref 24–34)
MCH RBC QN AUTO: 33.4 PG (ref 24–34)
MCH RBC QN AUTO: 35.5 PG (ref 24–34)
MCHC RBC AUTO-ENTMCNC: 31.7 G/DL (ref 31–37)
MCHC RBC AUTO-ENTMCNC: 32.3 G/DL (ref 31–37)
MCHC RBC AUTO-ENTMCNC: 32.4 G/DL (ref 31–37)
MCHC RBC AUTO-ENTMCNC: 32.6 G/DL (ref 31–37)
MCHC RBC AUTO-ENTMCNC: 32.7 G/DL (ref 31–37)
MCHC RBC AUTO-ENTMCNC: 32.9 G/DL (ref 31–37)
MCHC RBC AUTO-ENTMCNC: 33 G/DL (ref 31–37)
MCHC RBC AUTO-ENTMCNC: 33.1 G/DL (ref 31–37)
MCHC RBC AUTO-ENTMCNC: 33.1 G/DL (ref 31–37)
MCHC RBC AUTO-ENTMCNC: 33.2 G/DL (ref 31–37)
MCHC RBC AUTO-ENTMCNC: 33.5 G/DL (ref 31–37)
MCHC RBC AUTO-ENTMCNC: 33.6 G/DL (ref 31–37)
MCHC RBC AUTO-ENTMCNC: 33.8 G/DL (ref 31–37)
MCHC RBC AUTO-ENTMCNC: 34.2 G/DL (ref 31–37)
MCHC RBC AUTO-ENTMCNC: 34.3 G/DL (ref 31–37)
MCV RBC AUTO: 100 FL (ref 78–100)
MCV RBC AUTO: 100.6 FL (ref 78–100)
MCV RBC AUTO: 100.6 FL (ref 78–100)
MCV RBC AUTO: 100.9 FL (ref 78–100)
MCV RBC AUTO: 101.3 FL (ref 78–100)
MCV RBC AUTO: 103.7 FL (ref 78–100)
MCV RBC AUTO: 95.5 FL (ref 78–100)
MCV RBC AUTO: 96.1 FL (ref 78–100)
MCV RBC AUTO: 96.3 FL (ref 78–100)
MCV RBC AUTO: 96.3 FL (ref 78–100)
MCV RBC AUTO: 97.6 FL (ref 78–100)
MCV RBC AUTO: 97.8 FL (ref 78–100)
MCV RBC AUTO: 98.1 FL (ref 78–100)
MCV RBC AUTO: 99 FL (ref 78–100)
MCV RBC AUTO: 99.7 FL (ref 78–100)
MONOCYTES # BLD: 0.1 K/UL (ref 0.05–1.2)
MONOCYTES # BLD: 0.2 K/UL (ref 0.05–1.2)
MONOCYTES # BLD: 0.2 K/UL (ref 0.05–1.2)
MONOCYTES # BLD: 0.3 K/UL (ref 0.05–1.2)
MONOCYTES # BLD: 0.4 K/UL (ref 0.05–1.2)
MONOCYTES # BLD: 0.4 K/UL (ref 0.05–1.2)
MONOCYTES # BLD: 0.5 K/UL (ref 0.05–1.2)
MONOCYTES # BLD: 0.6 K/UL (ref 0.05–1.2)
MONOCYTES NFR BLD: 2 % (ref 3–10)
MONOCYTES NFR BLD: 3 % (ref 3–10)
MONOCYTES NFR BLD: 4 % (ref 3–10)
MONOCYTES NFR BLD: 5 % (ref 3–10)
MONOCYTES NFR BLD: 8 % (ref 3–10)
MONOCYTES NFR BLD: 9 % (ref 3–10)
MR MG: 69.24 MMHG
NEUTS SEG # BLD: 11.6 K/UL (ref 1.8–8)
NEUTS SEG # BLD: 11.7 K/UL (ref 1.8–8)
NEUTS SEG # BLD: 11.8 K/UL (ref 1.8–8)
NEUTS SEG # BLD: 12 K/UL (ref 1.8–8)
NEUTS SEG # BLD: 2.4 K/UL (ref 1.8–8)
NEUTS SEG # BLD: 4 K/UL (ref 1.8–8)
NEUTS SEG # BLD: 4.2 K/UL (ref 1.8–8)
NEUTS SEG # BLD: 7 K/UL (ref 1.8–8)
NEUTS SEG # BLD: 7.2 K/UL (ref 1.8–8)
NEUTS SEG # BLD: 7.3 K/UL (ref 1.8–8)
NEUTS SEG # BLD: 7.4 K/UL (ref 1.8–8)
NEUTS SEG # BLD: 8 K/UL (ref 1.8–8)
NEUTS SEG # BLD: 8.9 K/UL (ref 1.8–8)
NEUTS SEG # BLD: 9.1 K/UL (ref 1.8–8)
NEUTS SEG NFR BLD: 77 % (ref 40–73)
NEUTS SEG NFR BLD: 80 % (ref 40–73)
NEUTS SEG NFR BLD: 81 % (ref 40–73)
NEUTS SEG NFR BLD: 89 % (ref 40–73)
NEUTS SEG NFR BLD: 89 % (ref 40–73)
NEUTS SEG NFR BLD: 91 % (ref 40–73)
NEUTS SEG NFR BLD: 92 % (ref 40–73)
NEUTS SEG NFR BLD: 93 % (ref 40–73)
NEUTS SEG NFR BLD: 94 % (ref 40–73)
NITRITE UR QL STRIP.AUTO: NEGATIVE
NITRITE UR QL STRIP.AUTO: NEGATIVE
NRBC # BLD: 0 K/UL (ref 0–0.01)
NRBC # BLD: 0.02 K/UL (ref 0–0.01)
NRBC # BLD: 0.02 K/UL (ref 0–0.01)
NRBC BLD-RTO: 0 PER 100 WBC
NRBC BLD-RTO: 0.2 PER 100 WBC
NRBC BLD-RTO: 0.2 PER 100 WBC
O2/TOTAL GAS SETTING VFR VENT: 100 %
O2/TOTAL GAS SETTING VFR VENT: 100 %
O2/TOTAL GAS SETTING VFR VENT: 3 %
O2/TOTAL GAS SETTING VFR VENT: 45 %
O2/TOTAL GAS SETTING VFR VENT: 45 %
O2/TOTAL GAS SETTING VFR VENT: 55 %
O2/TOTAL GAS SETTING VFR VENT: 60 %
O2/TOTAL GAS SETTING VFR VENT: 60 %
O2/TOTAL GAS SETTING VFR VENT: 65 %
O2/TOTAL GAS SETTING VFR VENT: 70 %
O2/TOTAL GAS SETTING VFR VENT: 80 %
P-R INTERVAL, ECG05: 186 MS
PAW @ MEAN EXP FLOW ON VENT: 13 CMH2O
PAW @ MEAN EXP FLOW ON VENT: 13 CMH2O
PAW @ MEAN EXP FLOW ON VENT: 14 CMH2O
PAW @ MEAN EXP FLOW ON VENT: 15 CMH2O
PCO2 BLD: 32.7 MMHG (ref 35–45)
PCO2 BLD: 33.3 MMHG (ref 35–45)
PCO2 BLD: 33.9 MMHG (ref 35–45)
PCO2 BLD: 34 MMHG (ref 35–45)
PCO2 BLD: 34.5 MMHG (ref 35–45)
PCO2 BLD: 35 MMHG (ref 35–45)
PCO2 BLD: 36.7 MMHG (ref 35–45)
PCO2 BLD: 37.1 MMHG (ref 35–45)
PCO2 BLD: 37.8 MMHG (ref 35–45)
PCO2 BLD: 38.9 MMHG (ref 35–45)
PCO2 BLD: 38.9 MMHG (ref 35–45)
PCO2 BLD: 39.2 MMHG (ref 35–45)
PCO2 BLD: 39.9 MMHG (ref 35–45)
PCO2 BLD: 42.4 MMHG (ref 35–45)
PEEP RESPIRATORY: 10 CMH2O
PEEP RESPIRATORY: 6 CMH2O
PEEP RESPIRATORY: 8 CMH2O
PH BLD: 7.34 [PH] (ref 7.35–7.45)
PH BLD: 7.36 [PH] (ref 7.35–7.45)
PH BLD: 7.37 [PH] (ref 7.35–7.45)
PH BLD: 7.37 [PH] (ref 7.35–7.45)
PH BLD: 7.38 [PH] (ref 7.35–7.45)
PH BLD: 7.4 [PH] (ref 7.35–7.45)
PH BLD: 7.4 [PH] (ref 7.35–7.45)
PH BLD: 7.41 [PH] (ref 7.35–7.45)
PH BLD: 7.44 [PH] (ref 7.35–7.45)
PH BLD: 7.45 [PH] (ref 7.35–7.45)
PH BLD: 7.46 [PH] (ref 7.35–7.45)
PH UR STRIP: 5 [PH] (ref 5–8)
PH UR STRIP: 6.5 [PH] (ref 5–8)
PIP ISTAT,IPIP: 20
PIP ISTAT,IPIP: 23
PIP ISTAT,IPIP: 24
PIP ISTAT,IPIP: 26
PIP ISTAT,IPIP: 32
PLATELET # BLD AUTO: 102 K/UL (ref 135–420)
PLATELET # BLD AUTO: 106 K/UL (ref 135–420)
PLATELET # BLD AUTO: 113 K/UL (ref 135–420)
PLATELET # BLD AUTO: 17 K/UL (ref 135–420)
PLATELET # BLD AUTO: 18 K/UL (ref 135–420)
PLATELET # BLD AUTO: 22 K/UL (ref 135–420)
PLATELET # BLD AUTO: 28 K/UL (ref 135–420)
PLATELET # BLD AUTO: 30 K/UL (ref 135–420)
PLATELET # BLD AUTO: 48 K/UL (ref 135–420)
PLATELET # BLD AUTO: 55 K/UL (ref 135–420)
PLATELET # BLD AUTO: 87 K/UL (ref 135–420)
PLATELET # BLD AUTO: 87 K/UL (ref 135–420)
PLATELET # BLD AUTO: 88 K/UL (ref 135–420)
PLATELET # BLD AUTO: 92 K/UL (ref 135–420)
PLATELET # BLD AUTO: 93 K/UL (ref 135–420)
PMV BLD AUTO: 10.3 FL (ref 9.2–11.8)
PMV BLD AUTO: 11.3 FL (ref 9.2–11.8)
PMV BLD AUTO: 11.4 FL (ref 9.2–11.8)
PMV BLD AUTO: 11.9 FL (ref 9.2–11.8)
PMV BLD AUTO: 12.1 FL (ref 9.2–11.8)
PMV BLD AUTO: 12.2 FL (ref 9.2–11.8)
PMV BLD AUTO: 12.2 FL (ref 9.2–11.8)
PMV BLD AUTO: 12.3 FL (ref 9.2–11.8)
PMV BLD AUTO: 12.4 FL (ref 9.2–11.8)
PMV BLD AUTO: 12.4 FL (ref 9.2–11.8)
PMV BLD AUTO: 12.5 FL (ref 9.2–11.8)
PMV BLD AUTO: 12.5 FL (ref 9.2–11.8)
PMV BLD AUTO: 12.8 FL (ref 9.2–11.8)
PMV BLD AUTO: 13 FL (ref 9.2–11.8)
PO2 BLD: 106 MMHG (ref 80–100)
PO2 BLD: 107 MMHG (ref 80–100)
PO2 BLD: 114 MMHG (ref 80–100)
PO2 BLD: 50 MMHG (ref 80–100)
PO2 BLD: 52 MMHG (ref 80–100)
PO2 BLD: 57 MMHG (ref 80–100)
PO2 BLD: 60 MMHG (ref 80–100)
PO2 BLD: 62 MMHG (ref 80–100)
PO2 BLD: 62 MMHG (ref 80–100)
PO2 BLD: 63 MMHG (ref 80–100)
PO2 BLD: 65 MMHG (ref 80–100)
PO2 BLD: 66 MMHG (ref 80–100)
PO2 BLD: 68 MMHG (ref 80–100)
PO2 BLD: 69 MMHG (ref 80–100)
POTASSIUM SERPL-SCNC: 3.5 MMOL/L (ref 3.5–5.5)
POTASSIUM SERPL-SCNC: 3.7 MMOL/L (ref 3.5–5.5)
POTASSIUM SERPL-SCNC: 3.7 MMOL/L (ref 3.5–5.5)
POTASSIUM SERPL-SCNC: 3.8 MMOL/L (ref 3.5–5.5)
POTASSIUM SERPL-SCNC: 3.8 MMOL/L (ref 3.5–5.5)
POTASSIUM SERPL-SCNC: 4.1 MMOL/L (ref 3.5–5.5)
POTASSIUM SERPL-SCNC: 4.2 MMOL/L (ref 3.5–5.5)
POTASSIUM SERPL-SCNC: 4.2 MMOL/L (ref 3.5–5.5)
POTASSIUM SERPL-SCNC: 4.3 MMOL/L (ref 3.5–5.5)
POTASSIUM SERPL-SCNC: 4.6 MMOL/L (ref 3.5–5.5)
PROCALCITONIN SERPL-MCNC: 0.08 NG/ML
PROCALCITONIN SERPL-MCNC: 0.16 NG/ML
PROCALCITONIN SERPL-MCNC: 0.17 NG/ML
PROCALCITONIN SERPL-MCNC: 0.28 NG/ML
PROCALCITONIN SERPL-MCNC: <0.05 NG/ML
PROT SERPL-MCNC: 4.8 G/DL (ref 6.4–8.2)
PROT SERPL-MCNC: 5 G/DL (ref 6.4–8.2)
PROT SERPL-MCNC: 5 G/DL (ref 6.4–8.2)
PROT SERPL-MCNC: 5.2 G/DL (ref 6.4–8.2)
PROT SERPL-MCNC: 5.3 G/DL (ref 6.4–8.2)
PROT SERPL-MCNC: 5.4 G/DL (ref 6.4–8.2)
PROT SERPL-MCNC: 5.4 G/DL (ref 6.4–8.2)
PROT SERPL-MCNC: 5.7 G/DL (ref 6.4–8.2)
PROT SERPL-MCNC: 6 G/DL (ref 6.4–8.2)
PROT SERPL-MCNC: 6.2 G/DL (ref 6.4–8.2)
PROT SERPL-MCNC: 6.3 G/DL (ref 6.4–8.2)
PROT SERPL-MCNC: 6.3 G/DL (ref 6.4–8.2)
PROT SERPL-MCNC: 6.4 G/DL (ref 6.4–8.2)
PROT SERPL-MCNC: 7.4 G/DL (ref 6.4–8.2)
PROT SERPL-MCNC: 8.2 G/DL (ref 6.4–8.2)
PROT UR STRIP-MCNC: 100 MG/DL
PROT UR STRIP-MCNC: NEGATIVE MG/DL
PROT UR-MCNC: 36 MG/DL
PROT/CREAT UR-RTO: 0.6
PROTHROMBIN TIME: 14.4 SEC (ref 11.5–15.2)
PROTHROMBIN TIME: 15.8 SEC (ref 11.5–15.2)
PROTHROMBIN TIME: 15.9 SEC (ref 11.5–15.2)
PROTHROMBIN TIME: 17.8 SEC (ref 11.5–15.2)
PROTHROMBIN TIME: 20.5 SEC (ref 11.5–15.2)
PROTHROMBIN TIME: 22.4 SEC (ref 11.5–15.2)
PROTHROMBIN TIME: 22.8 SEC (ref 11.5–15.2)
PROTHROMBIN TIME: 23.5 SEC (ref 11.5–15.2)
PROTHROMBIN TIME: 23.7 SEC (ref 11.5–15.2)
PROTHROMBIN TIME: 24.5 SEC (ref 11.5–15.2)
PROTHROMBIN TIME: 27.1 SEC (ref 11.5–15.2)
PROTHROMBIN TIME: 32.1 SEC (ref 11.5–15.2)
PROTHROMBIN TIME: 42.1 SEC (ref 11.5–15.2)
PROTHROMBIN TIME: 45.3 SEC (ref 11.5–15.2)
Q-T INTERVAL, ECG07: 290 MS
Q-T INTERVAL, ECG07: 428 MS
QRS DURATION, ECG06: 102 MS
QRS DURATION, ECG06: 98 MS
QTC CALCULATION (BEZET), ECG08: 428 MS
QTC CALCULATION (BEZET), ECG08: 448 MS
QUANTIFERON CRITERIA, QFI1T: ABNORMAL
QUANTIFERON CRITERIA, QFI1T: NORMAL
QUANTIFERON MITOGEN VALUE: 0.33 IU/ML
QUANTIFERON MITOGEN VALUE: 0.64 IU/ML
QUANTIFERON NIL VALUE: 0 IU/ML
QUANTIFERON NIL VALUE: 0.01 IU/ML
QUANTIFERON TB1 AG: 0.01 IU/ML
QUANTIFERON TB1 AG: 0.05 IU/ML
QUANTIFERON TB2 AG: 0.01 IU/ML
QUANTIFERON TB2 AG: 0.01 IU/ML
RBC # BLD AUTO: 2.94 M/UL (ref 4.2–5.3)
RBC # BLD AUTO: 3 M/UL (ref 4.2–5.3)
RBC # BLD AUTO: 3.07 M/UL (ref 4.2–5.3)
RBC # BLD AUTO: 3.18 M/UL (ref 4.2–5.3)
RBC # BLD AUTO: 3.2 M/UL (ref 4.2–5.3)
RBC # BLD AUTO: 3.21 M/UL (ref 4.2–5.3)
RBC # BLD AUTO: 3.25 M/UL (ref 4.2–5.3)
RBC # BLD AUTO: 3.26 M/UL (ref 4.2–5.3)
RBC # BLD AUTO: 3.32 M/UL (ref 4.2–5.3)
RBC # BLD AUTO: 3.32 M/UL (ref 4.2–5.3)
RBC # BLD AUTO: 3.35 M/UL (ref 4.2–5.3)
RBC # BLD AUTO: 3.38 M/UL (ref 4.2–5.3)
RBC # BLD AUTO: 3.52 M/UL (ref 4.2–5.3)
RBC # BLD AUTO: 3.55 M/UL (ref 4.2–5.3)
RBC # BLD AUTO: 4.24 M/UL (ref 4.2–5.3)
RBC #/AREA URNS HPF: ABNORMAL /HPF (ref 0–5)
RBC #/AREA URNS HPF: ABNORMAL /HPF (ref 0–5)
RSV RNA SPEC QL NAA+PROBE: NOT DETECTED
RV+EV RNA SPEC QL NAA+PROBE: NOT DETECTED
S PNEUM AG UR QL: NEGATIVE
SAO2 % BLD: 87.2 % (ref 92–97)
SAO2 % BLD: 87.4 % (ref 92–97)
SAO2 % BLD: 90.7 % (ref 92–97)
SAO2 % BLD: 90.8 % (ref 92–97)
SAO2 % BLD: 91 % (ref 92–97)
SAO2 % BLD: 91.1 % (ref 92–97)
SAO2 % BLD: 91.7 % (ref 92–97)
SAO2 % BLD: 93.2 % (ref 92–97)
SAO2 % BLD: 93.4 % (ref 92–97)
SAO2 % BLD: 93.7 % (ref 92–97)
SAO2 % BLD: 94.6 % (ref 92–97)
SAO2 % BLD: 97.7 % (ref 92–97)
SAO2 % BLD: 98.1 % (ref 92–97)
SAO2 % BLD: 98.7 % (ref 92–97)
SARS-COV-2 PCR, COVPCR: DETECTED
SARS-COV-2, COV2NT: NOT DETECTED
SARS-COV-2, NAA: DETECTED
SERVICE CMNT-IMP: ABNORMAL
SERVICE CMNT-IMP: NORMAL
SODIUM SERPL-SCNC: 138 MMOL/L (ref 136–145)
SODIUM SERPL-SCNC: 139 MMOL/L (ref 136–145)
SODIUM SERPL-SCNC: 139 MMOL/L (ref 136–145)
SODIUM SERPL-SCNC: 140 MMOL/L (ref 136–145)
SODIUM SERPL-SCNC: 142 MMOL/L (ref 136–145)
SODIUM SERPL-SCNC: 142 MMOL/L (ref 136–145)
SODIUM SERPL-SCNC: 143 MMOL/L (ref 136–145)
SODIUM SERPL-SCNC: 143 MMOL/L (ref 136–145)
SODIUM SERPL-SCNC: 144 MMOL/L (ref 136–145)
SODIUM SERPL-SCNC: 145 MMOL/L (ref 136–145)
SODIUM SERPL-SCNC: 147 MMOL/L (ref 136–145)
SODIUM SERPL-SCNC: 148 MMOL/L (ref 136–145)
SODIUM SERPL-SCNC: 150 MMOL/L (ref 136–145)
SODIUM UR-SCNC: 13 MMOL/L (ref 20–110)
SOURCE, COVRS: ABNORMAL
SP GR UR REFRACTOMETRY: 1.01 (ref 1–1.03)
SP GR UR REFRACTOMETRY: 1.01 (ref 1–1.03)
SPECIMEN TYPE: ABNORMAL
T3FREE SERPL-MCNC: 1.2 PG/ML (ref 2.18–3.98)
T4 FREE SERPL-MCNC: 1.6 NG/DL (ref 0.7–1.5)
TOTAL RESP. RATE, ITRR: 18
TOTAL RESP. RATE, ITRR: 18
TOTAL RESP. RATE, ITRR: 19
TOTAL RESP. RATE, ITRR: 24
TROPONIN I SERPL-MCNC: 0.03 NG/ML (ref 0–0.04)
TROPONIN I SERPL-MCNC: 0.07 NG/ML (ref 0–0.04)
TROPONIN I SERPL-MCNC: <0.02 NG/ML (ref 0–0.04)
TROPONIN-HIGH SENSITIVITY: 31 NG/L (ref 0–54)
TSH SERPL DL<=0.05 MIU/L-ACNC: 0.19 UIU/ML (ref 0.36–3.74)
UROBILINOGEN UR QL STRIP.AUTO: 0.2 EU/DL (ref 0.2–1)
UROBILINOGEN UR QL STRIP.AUTO: 0.2 EU/DL (ref 0.2–1)
VENTILATION MODE VENT: ABNORMAL
VENTRICULAR RATE, ECG03: 131 BPM
VENTRICULAR RATE, ECG03: 66 BPM
VT SETTING VENT: 369 ML
VT SETTING VENT: 400 ML
VT SETTING VENT: 403 ML
VT SETTING VENT: 430 ML
WBC # BLD AUTO: 10 K/UL (ref 4.6–13.2)
WBC # BLD AUTO: 10.3 K/UL (ref 4.6–13.2)
WBC # BLD AUTO: 12.5 K/UL (ref 4.6–13.2)
WBC # BLD AUTO: 12.7 K/UL (ref 4.6–13.2)
WBC # BLD AUTO: 12.8 K/UL (ref 4.6–13.2)
WBC # BLD AUTO: 12.9 K/UL (ref 4.6–13.2)
WBC # BLD AUTO: 3 K/UL (ref 4.6–13.2)
WBC # BLD AUTO: 5.1 K/UL (ref 4.6–13.2)
WBC # BLD AUTO: 5.4 K/UL (ref 4.6–13.2)
WBC # BLD AUTO: 7.7 K/UL (ref 4.6–13.2)
WBC # BLD AUTO: 7.8 K/UL (ref 4.6–13.2)
WBC # BLD AUTO: 7.8 K/UL (ref 4.6–13.2)
WBC # BLD AUTO: 8.3 K/UL (ref 4.6–13.2)
WBC # BLD AUTO: 9 K/UL (ref 4.6–13.2)
WBC # BLD AUTO: 9.8 K/UL (ref 4.6–13.2)
WBC URNS QL MICRO: ABNORMAL /HPF (ref 0–5)
WBC URNS QL MICRO: ABNORMAL /HPF (ref 0–5)
YEAST BUDDING URNS QL: ABNORMAL

## 2021-01-01 PROCEDURE — 84300 ASSAY OF URINE SODIUM: CPT

## 2021-01-01 PROCEDURE — 87449 NOS EACH ORGANISM AG IA: CPT

## 2021-01-01 PROCEDURE — 82728 ASSAY OF FERRITIN: CPT

## 2021-01-01 PROCEDURE — 86140 C-REACTIVE PROTEIN: CPT

## 2021-01-01 PROCEDURE — 74011250636 HC RX REV CODE- 250/636: Performed by: NURSE ANESTHETIST, CERTIFIED REGISTERED

## 2021-01-01 PROCEDURE — 73630 X-RAY EXAM OF FOOT: CPT

## 2021-01-01 PROCEDURE — 85027 COMPLETE CBC AUTOMATED: CPT

## 2021-01-01 PROCEDURE — 74011636637 HC RX REV CODE- 636/637: Performed by: FAMILY MEDICINE

## 2021-01-01 PROCEDURE — 87077 CULTURE AEROBIC IDENTIFY: CPT

## 2021-01-01 PROCEDURE — 74011000258 HC RX REV CODE- 258: Performed by: INTERNAL MEDICINE

## 2021-01-01 PROCEDURE — 85384 FIBRINOGEN ACTIVITY: CPT

## 2021-01-01 PROCEDURE — 82962 GLUCOSE BLOOD TEST: CPT

## 2021-01-01 PROCEDURE — 71045 X-RAY EXAM CHEST 1 VIEW: CPT

## 2021-01-01 PROCEDURE — 74011250637 HC RX REV CODE- 250/637: Performed by: INTERNAL MEDICINE

## 2021-01-01 PROCEDURE — 65610000006 HC RM INTENSIVE CARE

## 2021-01-01 PROCEDURE — 36415 COLL VENOUS BLD VENIPUNCTURE: CPT

## 2021-01-01 PROCEDURE — 82803 BLOOD GASES ANY COMBINATION: CPT

## 2021-01-01 PROCEDURE — A4217 STERILE WATER/SALINE, 500 ML: HCPCS

## 2021-01-01 PROCEDURE — 84481 FREE ASSAY (FT-3): CPT

## 2021-01-01 PROCEDURE — 74011000636 HC RX REV CODE- 636: Performed by: EMERGENCY MEDICINE

## 2021-01-01 PROCEDURE — 74011636637 HC RX REV CODE- 636/637: Performed by: INTERNAL MEDICINE

## 2021-01-01 PROCEDURE — 86480 TB TEST CELL IMMUN MEASURE: CPT

## 2021-01-01 PROCEDURE — 85025 COMPLETE CBC W/AUTO DIFF WBC: CPT

## 2021-01-01 PROCEDURE — 80053 COMPREHEN METABOLIC PANEL: CPT

## 2021-01-01 PROCEDURE — 74011250636 HC RX REV CODE- 250/636: Performed by: FAMILY MEDICINE

## 2021-01-01 PROCEDURE — 93005 ELECTROCARDIOGRAM TRACING: CPT

## 2021-01-01 PROCEDURE — C9113 INJ PANTOPRAZOLE SODIUM, VIA: HCPCS | Performed by: INTERNAL MEDICINE

## 2021-01-01 PROCEDURE — 85610 PROTHROMBIN TIME: CPT

## 2021-01-01 PROCEDURE — 94003 VENT MGMT INPAT SUBQ DAY: CPT

## 2021-01-01 PROCEDURE — 82306 VITAMIN D 25 HYDROXY: CPT

## 2021-01-01 PROCEDURE — 74011000250 HC RX REV CODE- 250: Performed by: INTERNAL MEDICINE

## 2021-01-01 PROCEDURE — 77010033678 HC OXYGEN DAILY

## 2021-01-01 PROCEDURE — 74011250637 HC RX REV CODE- 250/637: Performed by: FAMILY MEDICINE

## 2021-01-01 PROCEDURE — 77030018798 HC PMP KT ENTRL FED COVD -A

## 2021-01-01 PROCEDURE — 83880 ASSAY OF NATRIURETIC PEPTIDE: CPT

## 2021-01-01 PROCEDURE — 36600 WITHDRAWAL OF ARTERIAL BLOOD: CPT

## 2021-01-01 PROCEDURE — 74011250636 HC RX REV CODE- 250/636: Performed by: INTERNAL MEDICINE

## 2021-01-01 PROCEDURE — 99284 EMERGENCY DEPT VISIT MOD MDM: CPT

## 2021-01-01 PROCEDURE — 65270000029 HC RM PRIVATE

## 2021-01-01 PROCEDURE — 77030040393 HC DRSG OPTIFOAM GENT MDII -B

## 2021-01-01 PROCEDURE — 76040000019: Performed by: INTERNAL MEDICINE

## 2021-01-01 PROCEDURE — 76450000000

## 2021-01-01 PROCEDURE — 83605 ASSAY OF LACTIC ACID: CPT

## 2021-01-01 PROCEDURE — 0202U NFCT DS 22 TRGT SARS-COV-2: CPT

## 2021-01-01 PROCEDURE — 84439 ASSAY OF FREE THYROXINE: CPT

## 2021-01-01 PROCEDURE — 87040 BLOOD CULTURE FOR BACTERIA: CPT

## 2021-01-01 PROCEDURE — 87635 SARS-COV-2 COVID-19 AMP PRB: CPT

## 2021-01-01 PROCEDURE — 77030005513 HC CATH URETH FOL11 MDII -B

## 2021-01-01 PROCEDURE — 82550 ASSAY OF CK (CPK): CPT

## 2021-01-01 PROCEDURE — 96375 TX/PRO/DX INJ NEW DRUG ADDON: CPT

## 2021-01-01 PROCEDURE — 0BH17EZ INSERTION OF ENDOTRACHEAL AIRWAY INTO TRACHEA, VIA NATURAL OR ARTIFICIAL OPENING: ICD-10-PCS | Performed by: HOSPITALIST

## 2021-01-01 PROCEDURE — 83615 LACTATE (LD) (LDH) ENZYME: CPT

## 2021-01-01 PROCEDURE — 0BP1XDZ REMOVAL OF INTRALUMINAL DEVICE FROM TRACHEA, EXTERNAL APPROACH: ICD-10-PCS | Performed by: HOSPITALIST

## 2021-01-01 PROCEDURE — 84145 PROCALCITONIN (PCT): CPT

## 2021-01-01 PROCEDURE — 74011000250 HC RX REV CODE- 250: Performed by: NURSE ANESTHETIST, CERTIFIED REGISTERED

## 2021-01-01 PROCEDURE — 74011250636 HC RX REV CODE- 250/636: Performed by: EMERGENCY MEDICINE

## 2021-01-01 PROCEDURE — 77030040392 HC DRSG OPTIFOAM MDII -A

## 2021-01-01 PROCEDURE — 82436 ASSAY OF URINE CHLORIDE: CPT

## 2021-01-01 PROCEDURE — 65660000000 HC RM CCU STEPDOWN

## 2021-01-01 PROCEDURE — 86803 HEPATITIS C AB TEST: CPT

## 2021-01-01 PROCEDURE — 86022 PLATELET ANTIBODIES: CPT

## 2021-01-01 PROCEDURE — 84484 ASSAY OF TROPONIN QUANT: CPT

## 2021-01-01 PROCEDURE — 81001 URINALYSIS AUTO W/SCOPE: CPT

## 2021-01-01 PROCEDURE — 99233 SBSQ HOSP IP/OBS HIGH 50: CPT | Performed by: NURSE PRACTITIONER

## 2021-01-01 PROCEDURE — 84443 ASSAY THYROID STIM HORMONE: CPT

## 2021-01-01 PROCEDURE — 85379 FIBRIN DEGRADATION QUANT: CPT

## 2021-01-01 PROCEDURE — 74011000258 HC RX REV CODE- 258: Performed by: EMERGENCY MEDICINE

## 2021-01-01 PROCEDURE — 77030021593 HC FCPS BIOP ENDOSC BSC -A: Performed by: INTERNAL MEDICINE

## 2021-01-01 PROCEDURE — 77030040831 HC BAG URINE DRNG MDII -A

## 2021-01-01 PROCEDURE — 76060000031 HC ANESTHESIA FIRST 0.5 HR: Performed by: INTERNAL MEDICINE

## 2021-01-01 PROCEDURE — 93970 EXTREMITY STUDY: CPT

## 2021-01-01 PROCEDURE — 2709999900 HC NON-CHARGEABLE SUPPLY

## 2021-01-01 PROCEDURE — 94002 VENT MGMT INPAT INIT DAY: CPT

## 2021-01-01 PROCEDURE — 77010033711 HC HIGH FLOW OXYGEN

## 2021-01-01 PROCEDURE — 83735 ASSAY OF MAGNESIUM: CPT

## 2021-01-01 PROCEDURE — 87340 HEPATITIS B SURFACE AG IA: CPT

## 2021-01-01 PROCEDURE — 88305 TISSUE EXAM BY PATHOLOGIST: CPT

## 2021-01-01 PROCEDURE — 77030040361 HC SLV COMPR DVT MDII -B: Performed by: INTERNAL MEDICINE

## 2021-01-01 PROCEDURE — 77030020291 HC FLEXSEAL FMS BMS -C

## 2021-01-01 PROCEDURE — 96374 THER/PROPH/DIAG INJ IV PUSH: CPT

## 2021-01-01 PROCEDURE — 74011250636 HC RX REV CODE- 250/636

## 2021-01-01 PROCEDURE — 74011250636 HC RX REV CODE- 250/636: Performed by: NURSE PRACTITIONER

## 2021-01-01 PROCEDURE — 77030039961 HC KT CUST COLON BSC -D: Performed by: INTERNAL MEDICINE

## 2021-01-01 PROCEDURE — 87186 SC STD MICRODIL/AGAR DIL: CPT

## 2021-01-01 PROCEDURE — 87086 URINE CULTURE/COLONY COUNT: CPT

## 2021-01-01 PROCEDURE — 86704 HEP B CORE ANTIBODY TOTAL: CPT

## 2021-01-01 PROCEDURE — 74011000258 HC RX REV CODE- 258

## 2021-01-01 PROCEDURE — 2709999900 HC NON-CHARGEABLE SUPPLY: Performed by: INTERNAL MEDICINE

## 2021-01-01 PROCEDURE — 99221 1ST HOSP IP/OBS SF/LOW 40: CPT | Performed by: NURSE PRACTITIONER

## 2021-01-01 PROCEDURE — 82553 CREATINE MB FRACTION: CPT

## 2021-01-01 PROCEDURE — 74176 CT ABD & PELVIS W/O CONTRAST: CPT

## 2021-01-01 PROCEDURE — 31500 INSERT EMERGENCY AIRWAY: CPT

## 2021-01-01 PROCEDURE — 84156 ASSAY OF PROTEIN URINE: CPT

## 2021-01-01 PROCEDURE — 85730 THROMBOPLASTIN TIME PARTIAL: CPT

## 2021-01-01 PROCEDURE — U0003 INFECTIOUS AGENT DETECTION BY NUCLEIC ACID (DNA OR RNA); SEVERE ACUTE RESPIRATORY SYNDROME CORONAVIRUS 2 (SARS-COV-2) (CORONAVIRUS DISEASE [COVID-19]), AMPLIFIED PROBE TECHNIQUE, MAKING USE OF HIGH THROUGHPUT TECHNOLOGIES AS DESCRIBED BY CMS-2020-01-R: HCPCS

## 2021-01-01 PROCEDURE — 73718 MRI LOWER EXTREMITY W/O DYE: CPT

## 2021-01-01 PROCEDURE — 86706 HEP B SURFACE ANTIBODY: CPT

## 2021-01-01 PROCEDURE — 99285 EMERGENCY DEPT VISIT HI MDM: CPT

## 2021-01-01 PROCEDURE — 93306 TTE W/DOPPLER COMPLETE: CPT

## 2021-01-01 PROCEDURE — 94762 N-INVAS EAR/PLS OXIMTRY CONT: CPT

## 2021-01-01 PROCEDURE — 83690 ASSAY OF LIPASE: CPT

## 2021-01-01 PROCEDURE — 77030040361 HC SLV COMPR DVT MDII -B

## 2021-01-01 PROCEDURE — 99232 SBSQ HOSP IP/OBS MODERATE 35: CPT | Performed by: NURSE PRACTITIONER

## 2021-01-01 PROCEDURE — 71275 CT ANGIOGRAPHY CHEST: CPT

## 2021-01-01 PROCEDURE — 5A1955Z RESPIRATORY VENTILATION, GREATER THAN 96 CONSECUTIVE HOURS: ICD-10-PCS | Performed by: HOSPITALIST

## 2021-01-01 RX ORDER — ISOSORBIDE MONONITRATE 30 MG/1
30 TABLET, EXTENDED RELEASE ORAL DAILY
COMMUNITY

## 2021-01-01 RX ORDER — FUROSEMIDE 10 MG/ML
20 INJECTION INTRAMUSCULAR; INTRAVENOUS ONCE
Status: COMPLETED | OUTPATIENT
Start: 2021-01-01 | End: 2021-01-01

## 2021-01-01 RX ORDER — GLYCOPYRROLATE 0.2 MG/ML
0.2 INJECTION INTRAMUSCULAR; INTRAVENOUS DAILY PRN
Status: DISCONTINUED | OUTPATIENT
Start: 2021-01-01 | End: 2021-01-01 | Stop reason: HOSPADM

## 2021-01-01 RX ORDER — DOCUSATE SODIUM 100 MG/1
100 CAPSULE, LIQUID FILLED ORAL 2 TIMES DAILY
COMMUNITY

## 2021-01-01 RX ORDER — INSULIN GLARGINE 100 [IU]/ML
10 INJECTION, SOLUTION SUBCUTANEOUS
Status: DISCONTINUED | OUTPATIENT
Start: 2021-01-01 | End: 2021-01-01

## 2021-01-01 RX ORDER — LORAZEPAM 2 MG/ML
1 INJECTION INTRAMUSCULAR
Status: DISCONTINUED | OUTPATIENT
Start: 2021-01-01 | End: 2021-01-01 | Stop reason: HOSPADM

## 2021-01-01 RX ORDER — AMOXICILLIN AND CLAVULANATE POTASSIUM 875; 125 MG/1; MG/1
1 TABLET, FILM COATED ORAL 2 TIMES DAILY
Qty: 14 TABLET | Refills: 0 | Status: SHIPPED | OUTPATIENT
Start: 2021-01-01 | End: 2021-01-01

## 2021-01-01 RX ORDER — CALCIUM CARB/MAGNESIUM CARB 311-232MG
10 TABLET ORAL
Status: DISCONTINUED | OUTPATIENT
Start: 2021-01-01 | End: 2021-01-01

## 2021-01-01 RX ORDER — ONDANSETRON 2 MG/ML
4 INJECTION INTRAMUSCULAR; INTRAVENOUS
Status: DISCONTINUED | OUTPATIENT
Start: 2021-01-01 | End: 2021-01-01 | Stop reason: HOSPADM

## 2021-01-01 RX ORDER — DEXAMETHASONE SODIUM PHOSPHATE 4 MG/ML
20 INJECTION, SOLUTION INTRA-ARTICULAR; INTRALESIONAL; INTRAMUSCULAR; INTRAVENOUS; SOFT TISSUE DAILY
Status: COMPLETED | OUTPATIENT
Start: 2021-01-01 | End: 2021-01-01

## 2021-01-01 RX ORDER — METOPROLOL SUCCINATE 50 MG/1
150 TABLET, EXTENDED RELEASE ORAL DAILY
Status: DISCONTINUED | OUTPATIENT
Start: 2021-01-01 | End: 2021-01-01

## 2021-01-01 RX ORDER — ISOSORBIDE MONONITRATE 60 MG/1
30 TABLET, EXTENDED RELEASE ORAL DAILY
Status: DISCONTINUED | OUTPATIENT
Start: 2021-01-01 | End: 2021-01-01

## 2021-01-01 RX ORDER — WARFARIN 2 MG/1
4 TABLET ORAL DAILY
COMMUNITY

## 2021-01-01 RX ORDER — CHLORHEXIDINE GLUCONATE 1.2 MG/ML
10 RINSE ORAL EVERY 12 HOURS
Status: DISCONTINUED | OUTPATIENT
Start: 2021-01-01 | End: 2021-01-01

## 2021-01-01 RX ORDER — SODIUM CHLORIDE 0.9 % (FLUSH) 0.9 %
5-10 SYRINGE (ML) INJECTION AS NEEDED
Status: DISCONTINUED | OUTPATIENT
Start: 2021-01-01 | End: 2021-01-01 | Stop reason: SDUPTHER

## 2021-01-01 RX ORDER — MAGNESIUM SULFATE 100 %
16 CRYSTALS MISCELLANEOUS AS NEEDED
Status: DISCONTINUED | OUTPATIENT
Start: 2021-01-01 | End: 2021-01-01

## 2021-01-01 RX ORDER — MORPHINE SULFATE 2 MG/ML
2 INJECTION, SOLUTION INTRAMUSCULAR; INTRAVENOUS
Status: DISCONTINUED | OUTPATIENT
Start: 2021-01-01 | End: 2021-01-01 | Stop reason: HOSPADM

## 2021-01-01 RX ORDER — ACETAMINOPHEN 650 MG/1
650 SUPPOSITORY RECTAL
Status: DISCONTINUED | OUTPATIENT
Start: 2021-01-01 | End: 2021-01-01

## 2021-01-01 RX ORDER — ERGOCALCIFEROL 1.25 MG/1
50000 CAPSULE ORAL
COMMUNITY

## 2021-01-01 RX ORDER — NALOXONE HYDROCHLORIDE 0.4 MG/ML
0.1 INJECTION, SOLUTION INTRAMUSCULAR; INTRAVENOUS; SUBCUTANEOUS
Status: CANCELLED | OUTPATIENT
Start: 2021-01-01

## 2021-01-01 RX ORDER — PROPOFOL 10 MG/ML
INJECTION, EMULSION INTRAVENOUS AS NEEDED
Status: DISCONTINUED | OUTPATIENT
Start: 2021-01-01 | End: 2021-01-01 | Stop reason: HOSPADM

## 2021-01-01 RX ORDER — FUROSEMIDE 10 MG/ML
20 INJECTION INTRAMUSCULAR; INTRAVENOUS 2 TIMES DAILY
Status: DISCONTINUED | OUTPATIENT
Start: 2021-01-01 | End: 2021-01-01

## 2021-01-01 RX ORDER — POLYETHYLENE GLYCOL 3350 17 G/17G
17 POWDER, FOR SOLUTION ORAL DAILY PRN
Status: DISCONTINUED | OUTPATIENT
Start: 2021-01-01 | End: 2021-01-01

## 2021-01-01 RX ORDER — METOPROLOL SUCCINATE 100 MG/1
150 TABLET, EXTENDED RELEASE ORAL DAILY
COMMUNITY

## 2021-01-01 RX ORDER — NYSTATIN 100000 [USP'U]/G
POWDER TOPICAL 2 TIMES DAILY
Status: DISCONTINUED | OUTPATIENT
Start: 2021-01-01 | End: 2021-01-01

## 2021-01-01 RX ORDER — WARFARIN 1 MG/1
2 TABLET ORAL ONCE
Status: COMPLETED | OUTPATIENT
Start: 2021-01-01 | End: 2021-01-01

## 2021-01-01 RX ORDER — DEXAMETHASONE SODIUM PHOSPHATE 4 MG/ML
10 INJECTION, SOLUTION INTRA-ARTICULAR; INTRALESIONAL; INTRAMUSCULAR; INTRAVENOUS; SOFT TISSUE DAILY
Status: DISCONTINUED | OUTPATIENT
Start: 2021-01-01 | End: 2021-01-01

## 2021-01-01 RX ORDER — ONDANSETRON 4 MG/1
4 TABLET, ORALLY DISINTEGRATING ORAL
Status: DISCONTINUED | OUTPATIENT
Start: 2021-01-01 | End: 2021-01-01

## 2021-01-01 RX ORDER — PANTOPRAZOLE SODIUM 40 MG/1
40 TABLET, DELAYED RELEASE ORAL DAILY
COMMUNITY

## 2021-01-01 RX ORDER — LIDOCAINE HYDROCHLORIDE 20 MG/ML
INJECTION, SOLUTION EPIDURAL; INFILTRATION; INTRACAUDAL; PERINEURAL AS NEEDED
Status: DISCONTINUED | OUTPATIENT
Start: 2021-01-01 | End: 2021-01-01 | Stop reason: HOSPADM

## 2021-01-01 RX ORDER — FENTANYL CITRATE 50 UG/ML
INJECTION, SOLUTION INTRAMUSCULAR; INTRAVENOUS
Status: DISPENSED
Start: 2021-01-01 | End: 2021-01-01

## 2021-01-01 RX ORDER — HYDROCODONE BITARTRATE AND ACETAMINOPHEN 10; 325 MG/1; MG/1
1 TABLET ORAL
Status: DISCONTINUED | OUTPATIENT
Start: 2021-01-01 | End: 2021-01-01

## 2021-01-01 RX ORDER — MORPHINE SULFATE 2 MG/ML
1 INJECTION, SOLUTION INTRAMUSCULAR; INTRAVENOUS
Status: DISCONTINUED | OUTPATIENT
Start: 2021-01-01 | End: 2021-01-01

## 2021-01-01 RX ORDER — SODIUM CHLORIDE, SODIUM LACTATE, POTASSIUM CHLORIDE, CALCIUM CHLORIDE 600; 310; 30; 20 MG/100ML; MG/100ML; MG/100ML; MG/100ML
50 INJECTION, SOLUTION INTRAVENOUS CONTINUOUS
Status: CANCELLED | OUTPATIENT
Start: 2021-01-01

## 2021-01-01 RX ORDER — ACETAMINOPHEN 500 MG
1000 TABLET ORAL ONCE
Status: DISCONTINUED | OUTPATIENT
Start: 2021-01-01 | End: 2021-01-01

## 2021-01-01 RX ORDER — SODIUM CHLORIDE 9 MG/ML
150 INJECTION, SOLUTION INTRAVENOUS CONTINUOUS
Status: DISCONTINUED | OUTPATIENT
Start: 2021-01-01 | End: 2021-01-01 | Stop reason: HOSPADM

## 2021-01-01 RX ORDER — DEXAMETHASONE SODIUM PHOSPHATE 10 MG/ML
10 INJECTION INTRAMUSCULAR; INTRAVENOUS
Status: COMPLETED | OUTPATIENT
Start: 2021-01-01 | End: 2021-01-01

## 2021-01-01 RX ORDER — HYDROCODONE BITARTRATE AND ACETAMINOPHEN 10; 325 MG/1; MG/1
1 TABLET ORAL
COMMUNITY

## 2021-01-01 RX ORDER — LORAZEPAM 2 MG/ML
0.5 INJECTION INTRAMUSCULAR
Status: DISCONTINUED | OUTPATIENT
Start: 2021-01-01 | End: 2021-01-01

## 2021-01-01 RX ORDER — DEXTROSE 50 % IN WATER (D50W) INTRAVENOUS SYRINGE
25-50 AS NEEDED
Status: DISCONTINUED | OUTPATIENT
Start: 2021-01-01 | End: 2021-01-01

## 2021-01-01 RX ORDER — INSULIN GLARGINE 100 [IU]/ML
5 INJECTION, SOLUTION SUBCUTANEOUS
Status: DISCONTINUED | OUTPATIENT
Start: 2021-01-01 | End: 2021-01-01

## 2021-01-01 RX ORDER — INSULIN LISPRO 100 [IU]/ML
INJECTION, SOLUTION INTRAVENOUS; SUBCUTANEOUS EVERY 6 HOURS
Status: DISCONTINUED | OUTPATIENT
Start: 2021-01-01 | End: 2021-01-01

## 2021-01-01 RX ORDER — WARFARIN 2.5 MG/1
5 TABLET ORAL
Status: COMPLETED | OUTPATIENT
Start: 2021-01-01 | End: 2021-01-01

## 2021-01-01 RX ORDER — ASCORBIC ACID 250 MG
500 TABLET ORAL 2 TIMES DAILY
Status: DISCONTINUED | OUTPATIENT
Start: 2021-01-01 | End: 2021-01-01

## 2021-01-01 RX ORDER — LANOLIN ALCOHOL/MO/W.PET/CERES
400 CREAM (GRAM) TOPICAL DAILY
COMMUNITY

## 2021-01-01 RX ORDER — DEXAMETHASONE 4 MG/1
6 TABLET ORAL DAILY
Status: DISCONTINUED | OUTPATIENT
Start: 2021-01-01 | End: 2021-01-01

## 2021-01-01 RX ORDER — DIAZEPAM 10 MG/2ML
2.5 INJECTION INTRAMUSCULAR ONCE
Status: DISCONTINUED | OUTPATIENT
Start: 2021-01-01 | End: 2021-01-01

## 2021-01-01 RX ORDER — DOXYCYCLINE HYCLATE 100 MG
100 TABLET ORAL 2 TIMES DAILY
Qty: 14 TABLET | Refills: 0 | Status: SHIPPED | OUTPATIENT
Start: 2021-01-01 | End: 2021-01-01

## 2021-01-01 RX ORDER — ZINC SULFATE 50(220)MG
1 CAPSULE ORAL DAILY
Status: DISCONTINUED | OUTPATIENT
Start: 2021-01-01 | End: 2021-01-01

## 2021-01-01 RX ORDER — FENTANYL CITRATE 50 UG/ML
50 INJECTION, SOLUTION INTRAMUSCULAR; INTRAVENOUS
Status: DISCONTINUED | OUTPATIENT
Start: 2021-01-01 | End: 2021-01-01

## 2021-01-01 RX ORDER — MORPHINE SULFATE 5 MG/ML
INJECTION, SOLUTION INTRAVENOUS
Status: DISCONTINUED | OUTPATIENT
Start: 2021-01-01 | End: 2021-01-01

## 2021-01-01 RX ORDER — ALLOPURINOL 300 MG/1
300 TABLET ORAL DAILY
COMMUNITY

## 2021-01-01 RX ORDER — PANTOPRAZOLE SODIUM 40 MG/1
40 TABLET, DELAYED RELEASE ORAL DAILY
Status: DISCONTINUED | OUTPATIENT
Start: 2021-01-01 | End: 2021-01-01

## 2021-01-01 RX ORDER — MELATONIN
2000 DAILY
Status: DISCONTINUED | OUTPATIENT
Start: 2021-01-01 | End: 2021-01-01

## 2021-01-01 RX ORDER — WARFARIN 2 MG/1
4 TABLET ORAL ONCE
Status: DISCONTINUED | OUTPATIENT
Start: 2021-01-01 | End: 2021-01-01

## 2021-01-01 RX ORDER — DEXAMETHASONE SODIUM PHOSPHATE 4 MG/ML
6 INJECTION, SOLUTION INTRA-ARTICULAR; INTRALESIONAL; INTRAMUSCULAR; INTRAVENOUS; SOFT TISSUE DAILY
Status: COMPLETED | OUTPATIENT
Start: 2021-01-01 | End: 2021-01-01

## 2021-01-01 RX ORDER — MORPHINE SULFATE 2 MG/ML
2 INJECTION, SOLUTION INTRAMUSCULAR; INTRAVENOUS
Status: DISCONTINUED | OUTPATIENT
Start: 2021-01-01 | End: 2021-01-01

## 2021-01-01 RX ORDER — WARFARIN 1 MG/1
4 TABLET ORAL ONCE
Status: COMPLETED | OUTPATIENT
Start: 2021-01-01 | End: 2021-01-01

## 2021-01-01 RX ORDER — HYDRALAZINE HYDROCHLORIDE 10 MG/1
10 TABLET, FILM COATED ORAL 3 TIMES DAILY
Status: DISCONTINUED | OUTPATIENT
Start: 2021-01-01 | End: 2021-01-01

## 2021-01-01 RX ORDER — MIDAZOLAM HYDROCHLORIDE 1 MG/ML
INJECTION, SOLUTION INTRAMUSCULAR; INTRAVENOUS
Status: COMPLETED
Start: 2021-01-01 | End: 2021-01-01

## 2021-01-01 RX ORDER — NITROGLYCERIN 0.4 MG/1
0.4 TABLET SUBLINGUAL
COMMUNITY

## 2021-01-01 RX ORDER — SODIUM CHLORIDE 0.9 % (FLUSH) 0.9 %
5-40 SYRINGE (ML) INJECTION AS NEEDED
Status: DISCONTINUED | OUTPATIENT
Start: 2021-01-01 | End: 2021-01-01

## 2021-01-01 RX ORDER — OXYCODONE AND ACETAMINOPHEN 5; 325 MG/1; MG/1
1 TABLET ORAL AS NEEDED
Status: CANCELLED | OUTPATIENT
Start: 2021-01-01

## 2021-01-01 RX ORDER — ACETAMINOPHEN 325 MG/1
650 TABLET ORAL
Status: DISCONTINUED | OUTPATIENT
Start: 2021-01-01 | End: 2021-01-01

## 2021-01-01 RX ORDER — ONDANSETRON 2 MG/ML
4 INJECTION INTRAMUSCULAR; INTRAVENOUS ONCE
Status: CANCELLED | OUTPATIENT
Start: 2021-01-01 | End: 2021-01-01

## 2021-01-01 RX ORDER — FENTANYL CITRATE 50 UG/ML
100 INJECTION, SOLUTION INTRAMUSCULAR; INTRAVENOUS ONCE
Status: ACTIVE | OUTPATIENT
Start: 2021-01-01 | End: 2021-01-01

## 2021-01-01 RX ORDER — ENOXAPARIN SODIUM 100 MG/ML
30 INJECTION SUBCUTANEOUS EVERY 12 HOURS
Status: DISCONTINUED | OUTPATIENT
Start: 2021-01-01 | End: 2021-01-01

## 2021-01-01 RX ORDER — NITROGLYCERIN 0.4 MG/1
0.4 TABLET SUBLINGUAL
Status: DISCONTINUED | OUTPATIENT
Start: 2021-01-01 | End: 2021-01-01

## 2021-01-01 RX ORDER — INSULIN LISPRO 100 [IU]/ML
INJECTION, SOLUTION INTRAVENOUS; SUBCUTANEOUS
Status: DISCONTINUED | OUTPATIENT
Start: 2021-01-01 | End: 2021-01-01

## 2021-01-01 RX ORDER — DEXTROSE MONOHYDRATE 50 MG/ML
50 INJECTION, SOLUTION INTRAVENOUS CONTINUOUS
Status: DISCONTINUED | OUTPATIENT
Start: 2021-01-01 | End: 2021-01-01

## 2021-01-01 RX ORDER — MIDAZOLAM HYDROCHLORIDE 1 MG/ML
2 INJECTION, SOLUTION INTRAMUSCULAR; INTRAVENOUS
Status: DISCONTINUED | OUTPATIENT
Start: 2021-01-01 | End: 2021-01-01

## 2021-01-01 RX ORDER — SODIUM CHLORIDE 0.9 % (FLUSH) 0.9 %
5-40 SYRINGE (ML) INJECTION EVERY 8 HOURS
Status: DISCONTINUED | OUTPATIENT
Start: 2021-01-01 | End: 2021-01-01

## 2021-01-01 RX ORDER — MIDAZOLAM IN 0.9 % SOD.CHLORID 1 MG/ML
2-10 PLASTIC BAG, INJECTION (ML) INTRAVENOUS
Status: DISCONTINUED | OUTPATIENT
Start: 2021-01-01 | End: 2021-01-01

## 2021-01-01 RX ORDER — TORSEMIDE 100 MG/1
50 TABLET ORAL DAILY
COMMUNITY

## 2021-01-01 RX ADMIN — HYDRALAZINE HYDROCHLORIDE 10 MG: 10 TABLET, FILM COATED ORAL at 16:00

## 2021-01-01 RX ADMIN — DEXAMETHASONE SODIUM PHOSPHATE 20 MG: 4 INJECTION, SOLUTION INTRAMUSCULAR; INTRAVENOUS at 09:35

## 2021-01-01 RX ADMIN — Medication 500 MG: at 08:32

## 2021-01-01 RX ADMIN — LORAZEPAM 0.5 MG: 2 INJECTION INTRAMUSCULAR; INTRAVENOUS at 09:34

## 2021-01-01 RX ADMIN — 0.12% CHLORHEXIDINE GLUCONATE 10 ML: 1.2 RINSE ORAL at 22:07

## 2021-01-01 RX ADMIN — MIDAZOLAM 2 MG: 1 INJECTION INTRAMUSCULAR; INTRAVENOUS at 15:00

## 2021-01-01 RX ADMIN — Medication 2000 UNITS: at 08:52

## 2021-01-01 RX ADMIN — DEXTROSE MONOHYDRATE 75 ML/HR: 5 INJECTION, SOLUTION INTRAVENOUS at 06:12

## 2021-01-01 RX ADMIN — ENOXAPARIN SODIUM 30 MG: 100 INJECTION SUBCUTANEOUS at 18:16

## 2021-01-01 RX ADMIN — Medication 10 ML: at 22:04

## 2021-01-01 RX ADMIN — PIPERACILLIN AND TAZOBACTAM 3.38 G: 3; .375 INJECTION, POWDER, LYOPHILIZED, FOR SOLUTION INTRAVENOUS at 13:11

## 2021-01-01 RX ADMIN — 0.12% CHLORHEXIDINE GLUCONATE 10 ML: 1.2 RINSE ORAL at 08:36

## 2021-01-01 RX ADMIN — PANTOPRAZOLE SODIUM 40 MG: 40 TABLET, DELAYED RELEASE ORAL at 09:35

## 2021-01-01 RX ADMIN — MIDAZOLAM 2 MG/HR: 5 INJECTION, SOLUTION INTRAMUSCULAR; INTRAVENOUS at 23:07

## 2021-01-01 RX ADMIN — Medication 10 ML: at 14:00

## 2021-01-01 RX ADMIN — Medication 500 MG: at 22:07

## 2021-01-01 RX ADMIN — FENTANYL CITRATE 75 MCG/HR: 50 INJECTION INTRAVENOUS at 15:06

## 2021-01-01 RX ADMIN — Medication 2 UNITS: at 06:45

## 2021-01-01 RX ADMIN — DEXAMETHASONE SODIUM PHOSPHATE 10 MG: 10 INJECTION, SOLUTION INTRAMUSCULAR; INTRAVENOUS at 16:06

## 2021-01-01 RX ADMIN — MIDAZOLAM 3 MG/HR: 5 INJECTION, SOLUTION INTRAMUSCULAR; INTRAVENOUS at 19:55

## 2021-01-01 RX ADMIN — Medication 500 MG: at 20:28

## 2021-01-01 RX ADMIN — GLYCOPYRROLATE 0.2 MG: 0.2 INJECTION, SOLUTION INTRAMUSCULAR; INTRAVENOUS at 15:57

## 2021-01-01 RX ADMIN — DEXAMETHASONE SODIUM PHOSPHATE 20 MG: 4 INJECTION, SOLUTION INTRAMUSCULAR; INTRAVENOUS at 08:56

## 2021-01-01 RX ADMIN — HYDRALAZINE HYDROCHLORIDE 10 MG: 10 TABLET, FILM COATED ORAL at 09:46

## 2021-01-01 RX ADMIN — Medication 3 UNITS: at 12:00

## 2021-01-01 RX ADMIN — 0.12% CHLORHEXIDINE GLUCONATE 10 ML: 1.2 RINSE ORAL at 08:52

## 2021-01-01 RX ADMIN — PROPOFOL 50 MG: 10 INJECTION, EMULSION INTRAVENOUS at 15:04

## 2021-01-01 RX ADMIN — 0.12% CHLORHEXIDINE GLUCONATE 10 ML: 1.2 RINSE ORAL at 20:06

## 2021-01-01 RX ADMIN — DEXTROSE MONOHYDRATE 50 ML/HR: 5 INJECTION, SOLUTION INTRAVENOUS at 13:13

## 2021-01-01 RX ADMIN — Medication 3 UNITS: at 18:00

## 2021-01-01 RX ADMIN — Medication 10 ML: at 13:03

## 2021-01-01 RX ADMIN — 0.12% CHLORHEXIDINE GLUCONATE 10 ML: 1.2 RINSE ORAL at 09:00

## 2021-01-01 RX ADMIN — SODIUM CHLORIDE 40 MG: 9 INJECTION, SOLUTION INTRAMUSCULAR; INTRAVENOUS; SUBCUTANEOUS at 08:56

## 2021-01-01 RX ADMIN — 0.12% CHLORHEXIDINE GLUCONATE 10 ML: 1.2 RINSE ORAL at 22:03

## 2021-01-01 RX ADMIN — INSULIN GLARGINE 10 UNITS: 100 INJECTION, SOLUTION SUBCUTANEOUS at 23:40

## 2021-01-01 RX ADMIN — LIDOCAINE HYDROCHLORIDE 80 MG: 20 INJECTION, SOLUTION EPIDURAL; INFILTRATION; INTRACAUDAL; PERINEURAL at 15:01

## 2021-01-01 RX ADMIN — Medication 500 MG: at 09:35

## 2021-01-01 RX ADMIN — DEXAMETHASONE SODIUM PHOSPHATE 6 MG: 4 INJECTION, SOLUTION INTRAMUSCULAR; INTRAVENOUS at 08:52

## 2021-01-01 RX ADMIN — Medication 500 MG: at 09:46

## 2021-01-01 RX ADMIN — INSULIN LISPRO 2 UNITS: 100 INJECTION, SOLUTION INTRAVENOUS; SUBCUTANEOUS at 22:26

## 2021-01-01 RX ADMIN — LORAZEPAM 1 MG: 2 INJECTION INTRAMUSCULAR at 22:39

## 2021-01-01 RX ADMIN — DEXAMETHASONE SODIUM PHOSPHATE 6 MG: 4 INJECTION, SOLUTION INTRAMUSCULAR; INTRAVENOUS at 08:47

## 2021-01-01 RX ADMIN — Medication 2000 UNITS: at 09:34

## 2021-01-01 RX ADMIN — HYDRALAZINE HYDROCHLORIDE 10 MG: 10 TABLET, FILM COATED ORAL at 08:36

## 2021-01-01 RX ADMIN — PROPOFOL 100 MG: 10 INJECTION, EMULSION INTRAVENOUS at 15:01

## 2021-01-01 RX ADMIN — HYDRALAZINE HYDROCHLORIDE 10 MG: 10 TABLET, FILM COATED ORAL at 09:37

## 2021-01-01 RX ADMIN — Medication 2000 UNITS: at 08:55

## 2021-01-01 RX ADMIN — PIPERACILLIN AND TAZOBACTAM 3.38 G: 3; .375 INJECTION, POWDER, LYOPHILIZED, FOR SOLUTION INTRAVENOUS at 21:18

## 2021-01-01 RX ADMIN — Medication 10 ML: at 22:25

## 2021-01-01 RX ADMIN — PROPOFOL 50 MG: 10 INJECTION, EMULSION INTRAVENOUS at 15:07

## 2021-01-01 RX ADMIN — MORPHINE SULFATE 2 MG: 2 INJECTION, SOLUTION INTRAMUSCULAR; INTRAVENOUS at 16:20

## 2021-01-01 RX ADMIN — Medication 10 ML: at 21:25

## 2021-01-01 RX ADMIN — Medication 500 MG: at 21:04

## 2021-01-01 RX ADMIN — PIPERACILLIN AND TAZOBACTAM 4.5 G: 4; .5 INJECTION, POWDER, LYOPHILIZED, FOR SOLUTION INTRAVENOUS; PARENTERAL at 05:56

## 2021-01-01 RX ADMIN — ZINC SULFATE 220 MG (50 MG) CAPSULE 1 CAPSULE: CAPSULE at 09:46

## 2021-01-01 RX ADMIN — ZINC SULFATE 220 MG (50 MG) CAPSULE 1 CAPSULE: CAPSULE at 08:56

## 2021-01-01 RX ADMIN — PANTOPRAZOLE SODIUM 40 MG: 40 TABLET, DELAYED RELEASE ORAL at 08:38

## 2021-01-01 RX ADMIN — INSULIN GLARGINE 10 UNITS: 100 INJECTION, SOLUTION SUBCUTANEOUS at 22:00

## 2021-01-01 RX ADMIN — Medication 2000 UNITS: at 08:24

## 2021-01-01 RX ADMIN — ZINC SULFATE 220 MG (50 MG) CAPSULE 1 CAPSULE: CAPSULE at 08:32

## 2021-01-01 RX ADMIN — NYSTATIN: 100000 POWDER TOPICAL at 08:35

## 2021-01-01 RX ADMIN — MIDAZOLAM 6 MG/HR: 5 INJECTION, SOLUTION INTRAMUSCULAR; INTRAVENOUS at 10:28

## 2021-01-01 RX ADMIN — SODIUM CHLORIDE 1000 ML: 9 INJECTION, SOLUTION INTRAVENOUS at 11:39

## 2021-01-01 RX ADMIN — Medication 500 MG: at 22:03

## 2021-01-01 RX ADMIN — DEXAMETHASONE SODIUM PHOSPHATE 20 MG: 4 INJECTION, SOLUTION INTRAMUSCULAR; INTRAVENOUS at 09:36

## 2021-01-01 RX ADMIN — MIDAZOLAM 5 MG/HR: 5 INJECTION, SOLUTION INTRAMUSCULAR; INTRAVENOUS at 00:07

## 2021-01-01 RX ADMIN — PIPERACILLIN AND TAZOBACTAM 3.38 G: 3; .375 INJECTION, POWDER, LYOPHILIZED, FOR SOLUTION INTRAVENOUS at 14:13

## 2021-01-01 RX ADMIN — HYDRALAZINE HYDROCHLORIDE 10 MG: 10 TABLET, FILM COATED ORAL at 16:23

## 2021-01-01 RX ADMIN — ZINC SULFATE 220 MG (50 MG) CAPSULE 1 CAPSULE: CAPSULE at 08:38

## 2021-01-01 RX ADMIN — INSULIN GLARGINE 10 UNITS: 100 INJECTION, SOLUTION SUBCUTANEOUS at 23:49

## 2021-01-01 RX ADMIN — PHENYLEPHRINE HYDROCHLORIDE 30 MCG/MIN: 10 INJECTION INTRAVENOUS at 18:11

## 2021-01-01 RX ADMIN — Medication 10 MG: at 19:49

## 2021-01-01 RX ADMIN — ISOSORBIDE MONONITRATE 30 MG: 60 TABLET, EXTENDED RELEASE ORAL at 09:35

## 2021-01-01 RX ADMIN — 0.12% CHLORHEXIDINE GLUCONATE 10 ML: 1.2 RINSE ORAL at 21:02

## 2021-01-01 RX ADMIN — HYDRALAZINE HYDROCHLORIDE 10 MG: 10 TABLET, FILM COATED ORAL at 17:06

## 2021-01-01 RX ADMIN — FUROSEMIDE 20 MG: 10 INJECTION, SOLUTION INTRAMUSCULAR; INTRAVENOUS at 22:07

## 2021-01-01 RX ADMIN — Medication 6 UNITS: at 18:00

## 2021-01-01 RX ADMIN — Medication 10 ML: at 17:00

## 2021-01-01 RX ADMIN — Medication 10 ML: at 06:16

## 2021-01-01 RX ADMIN — Medication 2 UNITS: at 18:00

## 2021-01-01 RX ADMIN — Medication 10 MG: at 21:14

## 2021-01-01 RX ADMIN — Medication 10 ML: at 06:41

## 2021-01-01 RX ADMIN — LORAZEPAM 1 MG: 2 INJECTION INTRAMUSCULAR at 11:20

## 2021-01-01 RX ADMIN — 0.12% CHLORHEXIDINE GLUCONATE 10 ML: 1.2 RINSE ORAL at 21:04

## 2021-01-01 RX ADMIN — HYDRALAZINE HYDROCHLORIDE 10 MG: 10 TABLET, FILM COATED ORAL at 08:47

## 2021-01-01 RX ADMIN — Medication 9 UNITS: at 23:54

## 2021-01-01 RX ADMIN — Medication 500 MG: at 09:37

## 2021-01-01 RX ADMIN — DEXAMETHASONE SODIUM PHOSPHATE 10 MG: 4 INJECTION, SOLUTION INTRAMUSCULAR; INTRAVENOUS at 09:46

## 2021-01-01 RX ADMIN — Medication 500 MG: at 08:36

## 2021-01-01 RX ADMIN — Medication 10 MG: at 01:33

## 2021-01-01 RX ADMIN — Medication 10 ML: at 06:25

## 2021-01-01 RX ADMIN — Medication 10 ML: at 21:02

## 2021-01-01 RX ADMIN — IOPAMIDOL 100 ML: 755 INJECTION, SOLUTION INTRAVENOUS at 15:27

## 2021-01-01 RX ADMIN — Medication 6 UNITS: at 05:40

## 2021-01-01 RX ADMIN — SODIUM CHLORIDE 40 MG: 9 INJECTION, SOLUTION INTRAMUSCULAR; INTRAVENOUS; SUBCUTANEOUS at 08:46

## 2021-01-01 RX ADMIN — Medication 2000 UNITS: at 09:46

## 2021-01-01 RX ADMIN — MIDAZOLAM HYDROCHLORIDE 2 MG: 1 INJECTION, SOLUTION INTRAMUSCULAR; INTRAVENOUS at 15:00

## 2021-01-01 RX ADMIN — Medication 500 MG: at 08:52

## 2021-01-01 RX ADMIN — Medication 10 ML: at 06:00

## 2021-01-01 RX ADMIN — HYDRALAZINE HYDROCHLORIDE 10 MG: 10 TABLET, FILM COATED ORAL at 21:00

## 2021-01-01 RX ADMIN — MORPHINE SULFATE 2 MG: 2 INJECTION, SOLUTION INTRAMUSCULAR; INTRAVENOUS at 08:12

## 2021-01-01 RX ADMIN — WARFARIN SODIUM 4 MG: 1 TABLET ORAL at 17:06

## 2021-01-01 RX ADMIN — INSULIN GLARGINE 10 UNITS: 100 INJECTION, SOLUTION SUBCUTANEOUS at 21:05

## 2021-01-01 RX ADMIN — SODIUM CHLORIDE 40 MG: 9 INJECTION, SOLUTION INTRAMUSCULAR; INTRAVENOUS; SUBCUTANEOUS at 09:46

## 2021-01-01 RX ADMIN — MIDAZOLAM 5 MG/HR: 5 INJECTION, SOLUTION INTRAMUSCULAR; INTRAVENOUS at 22:02

## 2021-01-01 RX ADMIN — 0.12% CHLORHEXIDINE GLUCONATE 10 ML: 1.2 RINSE ORAL at 08:47

## 2021-01-01 RX ADMIN — NYSTATIN: 100000 POWDER TOPICAL at 20:05

## 2021-01-01 RX ADMIN — Medication 500 MG: at 08:34

## 2021-01-01 RX ADMIN — 0.12% CHLORHEXIDINE GLUCONATE 10 ML: 1.2 RINSE ORAL at 08:24

## 2021-01-01 RX ADMIN — HYDRALAZINE HYDROCHLORIDE 10 MG: 10 TABLET, FILM COATED ORAL at 21:24

## 2021-01-01 RX ADMIN — METOPROLOL SUCCINATE 150 MG: 100 TABLET, EXTENDED RELEASE ORAL at 08:32

## 2021-01-01 RX ADMIN — DEXAMETHASONE SODIUM PHOSPHATE 20 MG: 4 INJECTION, SOLUTION INTRAMUSCULAR; INTRAVENOUS at 08:38

## 2021-01-01 RX ADMIN — PIPERACILLIN AND TAZOBACTAM 3.38 G: 3; .375 INJECTION, POWDER, LYOPHILIZED, FOR SOLUTION INTRAVENOUS at 06:20

## 2021-01-01 RX ADMIN — AZITHROMYCIN MONOHYDRATE 500 MG: 500 INJECTION, POWDER, LYOPHILIZED, FOR SOLUTION INTRAVENOUS at 16:54

## 2021-01-01 RX ADMIN — FENTANYL CITRATE 50 MCG: 50 INJECTION INTRAMUSCULAR; INTRAVENOUS at 22:14

## 2021-01-01 RX ADMIN — Medication 10 MG: at 21:04

## 2021-01-01 RX ADMIN — Medication 10 MG: at 21:00

## 2021-01-01 RX ADMIN — INSULIN LISPRO 2 UNITS: 100 INJECTION, SOLUTION INTRAVENOUS; SUBCUTANEOUS at 06:40

## 2021-01-01 RX ADMIN — LORAZEPAM 1 MG: 2 INJECTION INTRAMUSCULAR at 06:10

## 2021-01-01 RX ADMIN — 0.12% CHLORHEXIDINE GLUCONATE 10 ML: 1.2 RINSE ORAL at 21:38

## 2021-01-01 RX ADMIN — METOPROLOL SUCCINATE 150 MG: 100 TABLET, EXTENDED RELEASE ORAL at 09:34

## 2021-01-01 RX ADMIN — FUROSEMIDE 20 MG: 10 INJECTION, SOLUTION INTRAMUSCULAR; INTRAVENOUS at 12:49

## 2021-01-01 RX ADMIN — HYDRALAZINE HYDROCHLORIDE 10 MG: 10 TABLET, FILM COATED ORAL at 21:58

## 2021-01-01 RX ADMIN — FENTANYL CITRATE 50 MCG/HR: 50 INJECTION INTRAVENOUS at 19:35

## 2021-01-01 RX ADMIN — Medication 2 UNITS: at 06:25

## 2021-01-01 RX ADMIN — Medication 10 ML: at 15:00

## 2021-01-01 RX ADMIN — MIDAZOLAM 3 MG/HR: 5 INJECTION, SOLUTION INTRAMUSCULAR; INTRAVENOUS at 14:00

## 2021-01-01 RX ADMIN — Medication 3 UNITS: at 17:02

## 2021-01-01 RX ADMIN — ZINC SULFATE 220 MG (50 MG) CAPSULE 1 CAPSULE: CAPSULE at 08:47

## 2021-01-01 RX ADMIN — FENTANYL CITRATE 50 MCG: 50 INJECTION INTRAMUSCULAR; INTRAVENOUS at 20:48

## 2021-01-01 RX ADMIN — Medication 500 MG: at 21:24

## 2021-01-01 RX ADMIN — MIDAZOLAM 4 MG/HR: 5 INJECTION, SOLUTION INTRAMUSCULAR; INTRAVENOUS at 07:12

## 2021-01-01 RX ADMIN — MORPHINE SULFATE 2 MG: 2 INJECTION, SOLUTION INTRAMUSCULAR; INTRAVENOUS at 01:10

## 2021-01-01 RX ADMIN — Medication 500 MG: at 08:39

## 2021-01-01 RX ADMIN — DEXAMETHASONE SODIUM PHOSPHATE 6 MG: 4 INJECTION, SOLUTION INTRAMUSCULAR; INTRAVENOUS at 08:35

## 2021-01-01 RX ADMIN — 0.12% CHLORHEXIDINE GLUCONATE 10 ML: 1.2 RINSE ORAL at 19:49

## 2021-01-01 RX ADMIN — PIPERACILLIN AND TAZOBACTAM 3.38 G: 3; .375 INJECTION, POWDER, LYOPHILIZED, FOR SOLUTION INTRAVENOUS at 06:42

## 2021-01-01 RX ADMIN — DEXAMETHASONE SODIUM PHOSPHATE 6 MG: 4 INJECTION, SOLUTION INTRAMUSCULAR; INTRAVENOUS at 08:24

## 2021-01-01 RX ADMIN — MORPHINE SULFATE 2 MG: 2 INJECTION, SOLUTION INTRAMUSCULAR; INTRAVENOUS at 14:08

## 2021-01-01 RX ADMIN — INSULIN LISPRO 2 UNITS: 100 INJECTION, SOLUTION INTRAVENOUS; SUBCUTANEOUS at 12:49

## 2021-01-01 RX ADMIN — Medication 4 UNITS: at 00:40

## 2021-01-01 RX ADMIN — Medication 500 MG: at 09:49

## 2021-01-01 RX ADMIN — ZINC SULFATE 220 MG (50 MG) CAPSULE 1 CAPSULE: CAPSULE at 09:37

## 2021-01-01 RX ADMIN — INSULIN LISPRO 4 UNITS: 100 INJECTION, SOLUTION INTRAVENOUS; SUBCUTANEOUS at 12:22

## 2021-01-01 RX ADMIN — Medication 10 ML: at 05:42

## 2021-01-01 RX ADMIN — SODIUM CHLORIDE 40 MG: 9 INJECTION, SOLUTION INTRAMUSCULAR; INTRAVENOUS; SUBCUTANEOUS at 08:36

## 2021-01-01 RX ADMIN — Medication 500 MG: at 21:57

## 2021-01-01 RX ADMIN — SODIUM CHLORIDE 40 MG: 9 INJECTION, SOLUTION INTRAMUSCULAR; INTRAVENOUS; SUBCUTANEOUS at 08:35

## 2021-01-01 RX ADMIN — MORPHINE SULFATE 2 MG: 2 INJECTION, SOLUTION INTRAMUSCULAR; INTRAVENOUS at 10:55

## 2021-01-01 RX ADMIN — Medication 10 MG: at 22:03

## 2021-01-01 RX ADMIN — LORAZEPAM 1 MG: 2 INJECTION INTRAMUSCULAR at 13:47

## 2021-01-01 RX ADMIN — Medication 9 UNITS: at 17:33

## 2021-01-01 RX ADMIN — FENTANYL CITRATE 50 MCG/HR: 50 INJECTION INTRAVENOUS at 02:07

## 2021-01-01 RX ADMIN — PIPERACILLIN AND TAZOBACTAM 4.5 G: 4; .5 INJECTION, POWDER, LYOPHILIZED, FOR SOLUTION INTRAVENOUS; PARENTERAL at 06:53

## 2021-01-01 RX ADMIN — MIDAZOLAM 2 MG/HR: 5 INJECTION, SOLUTION INTRAMUSCULAR; INTRAVENOUS at 00:15

## 2021-01-01 RX ADMIN — HYDRALAZINE HYDROCHLORIDE 10 MG: 10 TABLET, FILM COATED ORAL at 08:24

## 2021-01-01 RX ADMIN — 0.12% CHLORHEXIDINE GLUCONATE 10 ML: 1.2 RINSE ORAL at 08:34

## 2021-01-01 RX ADMIN — PROPOFOL 50 MG: 10 INJECTION, EMULSION INTRAVENOUS at 15:09

## 2021-01-01 RX ADMIN — Medication 3 UNITS: at 00:07

## 2021-01-01 RX ADMIN — SODIUM CHLORIDE 40 MG: 9 INJECTION, SOLUTION INTRAMUSCULAR; INTRAVENOUS; SUBCUTANEOUS at 08:52

## 2021-01-01 RX ADMIN — Medication 10 MG: at 21:02

## 2021-01-01 RX ADMIN — Medication 2000 UNITS: at 08:34

## 2021-01-01 RX ADMIN — FUROSEMIDE 20 MG: 10 INJECTION, SOLUTION INTRAMUSCULAR; INTRAVENOUS at 12:00

## 2021-01-01 RX ADMIN — HYDROCODONE BITARTRATE AND ACETAMINOPHEN 1 TABLET: 10; 325 TABLET ORAL at 21:47

## 2021-01-01 RX ADMIN — FUROSEMIDE 20 MG: 10 INJECTION, SOLUTION INTRAMUSCULAR; INTRAVENOUS at 19:50

## 2021-01-01 RX ADMIN — ZINC SULFATE 220 MG (50 MG) CAPSULE 1 CAPSULE: CAPSULE at 08:24

## 2021-01-01 RX ADMIN — PIPERACILLIN AND TAZOBACTAM 4.5 G: 4; .5 INJECTION, POWDER, LYOPHILIZED, FOR SOLUTION INTRAVENOUS; PARENTERAL at 00:25

## 2021-01-01 RX ADMIN — 0.12% CHLORHEXIDINE GLUCONATE 10 ML: 1.2 RINSE ORAL at 09:46

## 2021-01-01 RX ADMIN — Medication 9 UNITS: at 06:13

## 2021-01-01 RX ADMIN — MORPHINE SULFATE 2 MG: 2 INJECTION, SOLUTION INTRAMUSCULAR; INTRAVENOUS at 13:44

## 2021-01-01 RX ADMIN — 0.12% CHLORHEXIDINE GLUCONATE 10 ML: 1.2 RINSE ORAL at 09:49

## 2021-01-01 RX ADMIN — SODIUM CHLORIDE 40 MG: 9 INJECTION, SOLUTION INTRAMUSCULAR; INTRAVENOUS; SUBCUTANEOUS at 09:49

## 2021-01-01 RX ADMIN — Medication 500 MG: at 08:56

## 2021-01-01 RX ADMIN — Medication 4 UNITS: at 05:18

## 2021-01-01 RX ADMIN — WARFARIN SODIUM 2 MG: 1 TABLET ORAL at 17:16

## 2021-01-01 RX ADMIN — 0.12% CHLORHEXIDINE GLUCONATE 10 ML: 1.2 RINSE ORAL at 08:56

## 2021-01-01 RX ADMIN — 0.12% CHLORHEXIDINE GLUCONATE 10 ML: 1.2 RINSE ORAL at 21:57

## 2021-01-01 RX ADMIN — SODIUM CHLORIDE 1000 ML: 9 INJECTION, SOLUTION INTRAVENOUS at 11:16

## 2021-01-01 RX ADMIN — NYSTATIN: 100000 POWDER TOPICAL at 20:28

## 2021-01-01 RX ADMIN — Medication 3 UNITS: at 00:55

## 2021-01-01 RX ADMIN — 0.12% CHLORHEXIDINE GLUCONATE 10 ML: 1.2 RINSE ORAL at 20:28

## 2021-01-01 RX ADMIN — INSULIN GLARGINE 10 UNITS: 100 INJECTION, SOLUTION SUBCUTANEOUS at 00:07

## 2021-01-01 RX ADMIN — Medication 2 UNITS: at 12:00

## 2021-01-01 RX ADMIN — HYDRALAZINE HYDROCHLORIDE 10 MG: 10 TABLET, FILM COATED ORAL at 17:16

## 2021-01-01 RX ADMIN — Medication 2 UNITS: at 23:44

## 2021-01-01 RX ADMIN — Medication 500 MG: at 20:05

## 2021-01-01 RX ADMIN — WARFARIN SODIUM 5 MG: 2.5 TABLET ORAL at 19:59

## 2021-01-01 RX ADMIN — NYSTATIN: 100000 POWDER TOPICAL at 09:47

## 2021-01-01 RX ADMIN — HYDROCODONE BITARTRATE AND ACETAMINOPHEN 1 TABLET: 10; 325 TABLET ORAL at 16:46

## 2021-01-01 RX ADMIN — Medication 500 MG: at 21:02

## 2021-01-01 RX ADMIN — Medication 10 ML: at 22:26

## 2021-01-01 RX ADMIN — Medication 2000 UNITS: at 08:38

## 2021-01-01 RX ADMIN — Medication 500 MG: at 20:04

## 2021-01-01 RX ADMIN — PANTOPRAZOLE SODIUM 40 MG: 40 TABLET, DELAYED RELEASE ORAL at 08:32

## 2021-01-01 RX ADMIN — PIPERACILLIN AND TAZOBACTAM 4.5 G: 4; .5 INJECTION, POWDER, LYOPHILIZED, FOR SOLUTION INTRAVENOUS; PARENTERAL at 16:49

## 2021-01-01 RX ADMIN — Medication 3 UNITS: at 23:40

## 2021-01-01 RX ADMIN — NYSTATIN: 100000 POWDER TOPICAL at 09:00

## 2021-01-01 RX ADMIN — Medication 10 ML: at 06:13

## 2021-01-01 RX ADMIN — Medication 10 MG: at 21:24

## 2021-01-01 RX ADMIN — PIPERACILLIN AND TAZOBACTAM 4.5 G: 4; .5 INJECTION, POWDER, LYOPHILIZED, FOR SOLUTION INTRAVENOUS; PARENTERAL at 21:25

## 2021-01-01 RX ADMIN — ZINC SULFATE 220 MG (50 MG) CAPSULE 1 CAPSULE: CAPSULE at 09:49

## 2021-01-01 RX ADMIN — Medication 2000 UNITS: at 08:31

## 2021-01-01 RX ADMIN — ZINC SULFATE 220 MG (50 MG) CAPSULE 1 CAPSULE: CAPSULE at 08:36

## 2021-01-01 RX ADMIN — 0.12% CHLORHEXIDINE GLUCONATE 10 ML: 1.2 RINSE ORAL at 17:35

## 2021-01-01 RX ADMIN — INSULIN GLARGINE 5 UNITS: 100 INJECTION, SOLUTION SUBCUTANEOUS at 21:38

## 2021-01-01 RX ADMIN — ENOXAPARIN SODIUM 30 MG: 100 INJECTION SUBCUTANEOUS at 18:22

## 2021-01-01 RX ADMIN — MIDAZOLAM 5 MG/HR: 5 INJECTION, SOLUTION INTRAMUSCULAR; INTRAVENOUS at 06:06

## 2021-01-01 RX ADMIN — TOCILIZUMAB 810 MG: 180 INJECTION, SOLUTION SUBCUTANEOUS at 12:49

## 2021-01-01 RX ADMIN — ISOSORBIDE MONONITRATE 30 MG: 60 TABLET, EXTENDED RELEASE ORAL at 08:32

## 2021-01-01 RX ADMIN — Medication 2000 UNITS: at 09:36

## 2021-01-01 RX ADMIN — DEXAMETHASONE 6 MG: 4 TABLET ORAL at 08:32

## 2021-01-01 RX ADMIN — Medication 4 UNITS: at 23:53

## 2021-01-01 RX ADMIN — SODIUM CHLORIDE 150 ML/HR: 900 INJECTION, SOLUTION INTRAVENOUS at 14:20

## 2021-01-01 RX ADMIN — Medication 500 MG: at 08:24

## 2021-01-01 RX ADMIN — Medication 9 UNITS: at 12:41

## 2021-01-01 RX ADMIN — SODIUM CHLORIDE 40 MG: 9 INJECTION, SOLUTION INTRAMUSCULAR; INTRAVENOUS; SUBCUTANEOUS at 08:23

## 2021-01-01 RX ADMIN — Medication 10 ML: at 17:23

## 2021-01-01 RX ADMIN — CEFTRIAXONE SODIUM 2 G: 2 INJECTION, POWDER, FOR SOLUTION INTRAMUSCULAR; INTRAVENOUS at 16:08

## 2021-01-01 RX ADMIN — Medication 500 MG: at 08:47

## 2021-01-01 RX ADMIN — DEXAMETHASONE SODIUM PHOSPHATE 20 MG: 4 INJECTION, SOLUTION INTRAMUSCULAR; INTRAVENOUS at 08:36

## 2021-01-01 RX ADMIN — Medication 500 MG: at 22:25

## 2021-01-01 RX ADMIN — FENTANYL CITRATE 50 MCG: 50 INJECTION INTRAMUSCULAR; INTRAVENOUS at 19:44

## 2021-01-01 RX ADMIN — Medication 6 UNITS: at 11:47

## 2021-01-01 RX ADMIN — HYDRALAZINE HYDROCHLORIDE 10 MG: 10 TABLET, FILM COATED ORAL at 21:38

## 2021-01-01 RX ADMIN — Medication 10 ML: at 22:00

## 2021-01-01 RX ADMIN — NYSTATIN: 100000 POWDER TOPICAL at 21:00

## 2021-01-01 RX ADMIN — Medication 500 MG: at 21:38

## 2021-01-01 RX ADMIN — Medication 10 MG: at 21:58

## 2021-01-01 RX ADMIN — Medication 2000 UNITS: at 09:49

## 2021-01-01 RX ADMIN — Medication 10 ML: at 21:42

## 2021-01-01 RX ADMIN — HYDRALAZINE HYDROCHLORIDE 10 MG: 10 TABLET, FILM COATED ORAL at 16:50

## 2021-01-01 RX ADMIN — NYSTATIN: 100000 POWDER TOPICAL at 14:14

## 2021-01-01 RX ADMIN — Medication 9 UNITS: at 23:18

## 2021-01-01 RX ADMIN — PHYTONADIONE 5 MG: 10 INJECTION, EMULSION INTRAMUSCULAR; INTRAVENOUS; SUBCUTANEOUS at 13:55

## 2021-01-01 RX ADMIN — PANTOPRAZOLE SODIUM 40 MG: 40 TABLET, DELAYED RELEASE ORAL at 18:22

## 2021-01-01 RX ADMIN — Medication 2000 UNITS: at 08:36

## 2021-01-01 RX ADMIN — ENOXAPARIN SODIUM 30 MG: 100 INJECTION SUBCUTANEOUS at 04:00

## 2021-01-01 RX ADMIN — ENOXAPARIN SODIUM 30 MG: 100 INJECTION SUBCUTANEOUS at 17:35

## 2021-01-01 RX ADMIN — MIDAZOLAM 2 MG/HR: 5 INJECTION, SOLUTION INTRAMUSCULAR; INTRAVENOUS at 14:59

## 2021-01-01 RX ADMIN — FUROSEMIDE 20 MG: 10 INJECTION, SOLUTION INTRAMUSCULAR; INTRAVENOUS at 08:24

## 2021-01-01 RX ADMIN — WARFARIN SODIUM 4 MG: 3 TABLET ORAL at 18:16

## 2021-01-01 RX ADMIN — 0.12% CHLORHEXIDINE GLUCONATE 10 ML: 1.2 RINSE ORAL at 21:24

## 2021-01-01 RX ADMIN — INSULIN GLARGINE 10 UNITS: 100 INJECTION, SOLUTION SUBCUTANEOUS at 22:03

## 2021-01-01 RX ADMIN — Medication 10 ML: at 05:56

## 2021-01-01 RX ADMIN — Medication 6 UNITS: at 18:09

## 2021-01-01 RX ADMIN — ZINC SULFATE 220 MG (50 MG) CAPSULE 1 CAPSULE: CAPSULE at 09:35

## 2021-01-01 RX ADMIN — MORPHINE SULFATE 2 MG: 2 INJECTION, SOLUTION INTRAMUSCULAR; INTRAVENOUS at 11:36

## 2021-01-01 RX ADMIN — HYDRALAZINE HYDROCHLORIDE 10 MG: 10 TABLET, FILM COATED ORAL at 23:25

## 2021-01-01 RX ADMIN — DEXTROSE MONOHYDRATE 75 ML/HR: 5 INJECTION, SOLUTION INTRAVENOUS at 17:16

## 2021-01-01 RX ADMIN — DEXTROSE MONOHYDRATE 50 ML/HR: 5 INJECTION, SOLUTION INTRAVENOUS at 06:53

## 2021-01-01 RX ADMIN — ZINC SULFATE 220 MG (50 MG) CAPSULE 1 CAPSULE: CAPSULE at 08:52

## 2021-01-01 RX ADMIN — ENOXAPARIN SODIUM 30 MG: 100 INJECTION SUBCUTANEOUS at 06:40

## 2021-01-01 RX ADMIN — Medication 10 MG: at 22:25

## 2021-01-01 RX ADMIN — HYDRALAZINE HYDROCHLORIDE 10 MG: 10 TABLET, FILM COATED ORAL at 08:56

## 2021-01-01 RX ADMIN — HYDRALAZINE HYDROCHLORIDE 10 MG: 10 TABLET, FILM COATED ORAL at 21:04

## 2021-01-01 RX ADMIN — INSULIN GLARGINE 10 UNITS: 100 INJECTION, SOLUTION SUBCUTANEOUS at 21:25

## 2021-01-01 RX ADMIN — LORAZEPAM 1 MG: 2 INJECTION INTRAMUSCULAR at 15:57

## 2021-01-01 RX ADMIN — PIPERACILLIN AND TAZOBACTAM 3.38 G: 3; .375 INJECTION, POWDER, LYOPHILIZED, FOR SOLUTION INTRAVENOUS at 22:07

## 2021-01-01 RX ADMIN — MORPHINE SULFATE 2 MG: 2 INJECTION, SOLUTION INTRAMUSCULAR; INTRAVENOUS at 15:56

## 2021-01-01 RX ADMIN — Medication 3 UNITS: at 06:00

## 2021-01-01 RX ADMIN — SODIUM CHLORIDE 429 ML: 900 INJECTION, SOLUTION INTRAVENOUS at 14:56

## 2021-01-01 RX ADMIN — MORPHINE SULFATE 2 MG: 2 INJECTION, SOLUTION INTRAMUSCULAR; INTRAVENOUS at 17:57

## 2021-01-01 RX ADMIN — ZINC SULFATE 220 MG (50 MG) CAPSULE 1 CAPSULE: CAPSULE at 08:35

## 2021-01-01 RX ADMIN — Medication 10 ML: at 21:04

## 2021-01-01 RX ADMIN — LORAZEPAM 1 MG: 2 INJECTION INTRAMUSCULAR at 17:24

## 2021-01-01 RX ADMIN — PIPERACILLIN AND TAZOBACTAM 4.5 G: 4; .5 INJECTION, POWDER, LYOPHILIZED, FOR SOLUTION INTRAVENOUS; PARENTERAL at 16:23

## 2021-01-01 RX ADMIN — Medication 10 MG: at 22:07

## 2021-01-01 RX ADMIN — Medication 3 UNITS: at 23:49

## 2021-01-01 RX ADMIN — ENOXAPARIN SODIUM 30 MG: 100 INJECTION SUBCUTANEOUS at 06:21

## 2021-01-01 RX ADMIN — 0.12% CHLORHEXIDINE GLUCONATE 10 ML: 1.2 RINSE ORAL at 08:39

## 2021-01-01 RX ADMIN — Medication 2000 UNITS: at 08:47

## 2021-01-01 RX ADMIN — NYSTATIN: 100000 POWDER TOPICAL at 22:03

## 2021-01-15 PROBLEM — K92.1 MELENA: Status: ACTIVE | Noted: 2021-01-01

## 2021-01-15 NOTE — DISCHARGE INSTRUCTIONS
DISCHARGE SUMMARY from Nurse    PATIENT INSTRUCTIONS:    After general anesthesia or intravenous sedation, for 24 hours or while taking prescription Narcotics:  · Limit your activities  · Do not drive and operate hazardous machinery  · Do not make important personal or business decisions  · Do  not drink alcoholic beverages  · If you have not urinated within 8 hours after discharge, please contact your surgeon on call. Report the following to your surgeon:  · Excessive pain, swelling, redness or odor of or around the surgical area  · Temperature over 100.5  · Nausea and vomiting lasting longer than 4 hours or if unable to take medications  · Any signs of decreased circulation or nerve impairment to extremity: change in color, persistent  numbness, tingling, coldness or increase pain  · Any questions    What to do at Home:  Recommended activity: as above     If you experience any of the following symptoms as above , please follow up with Doctor Kimberly Odom. *  Please give a list of your current medications to your Primary Care Provider. *  Please update this list whenever your medications are discontinued, doses are      changed, or new medications (including over-the-counter products) are added. *  Please carry medication information at all times in case of emergency situations. These are general instructions for a healthy lifestyle:    No smoking/ No tobacco products/ Avoid exposure to second hand smoke  Surgeon General's Warning:  Quitting smoking now greatly reduces serious risk to your health.     Obesity, smoking, and sedentary lifestyle greatly increases your risk for illness    A healthy diet, regular physical exercise & weight monitoring are important for maintaining a healthy lifestyle    You may be retaining fluid if you have a history of heart failure or if you experience any of the following symptoms:  Weight gain of 3 pounds or more overnight or 5 pounds in a week, increased swelling in our hands or feet or shortness of breath while lying flat in bed. Please call your doctor as soon as you notice any of these symptoms; do not wait until your next office visit. The discharge information has been reviewed with the patient and spouse. The patient and spouse verbalized understanding. Discharge medications reviewed with the patient and spouse and appropriate educational materials and side effects teaching were provided. ___________________________________________________________________________________________________________________________________Continue the pantoprazole   Resume coumadin   Patient armband removed and shreddedPatient Education        Colonoscopy: What to Expect at [address] [place] [address]  After a colonoscopy, you'll stay at the clinic for 1 to 2 hours until the medicines wear off. Then you can go home. But you'll need to arrange for a ride. Your doctor will tell you when you can eat and do your other usual activities. Your doctor will talk to you about when you'll need your next colonoscopy. Your doctor can help you decide how often you need to be checked. This will depend on the results of your test and your risk for colorectal cancer. After the test, you may be bloated or have gas pains. You may need to pass gas. If a biopsy was done or a polyp was removed, you may have streaks of blood in your stool (feces) for a few days. Problems such as heavy rectal bleeding may not occur until several weeks after the test. This isn't common. But it can happen after polyps are removed. This care sheet gives you a general idea about how long it will take for you to recover. But each person recovers at a different pace. Follow the steps below to get better as quickly as possible. How can you care for yourself at home? Activity    · Rest when you feel tired.     · You can do your normal activities when it feels okay to do so.    Diet    · Follow your doctor's directions for eating.     · Unless your doctor has told you not to, drink plenty of fluids. This helps to replace the fluids that were lost during the colon prep.     · Do not drink alcohol. Medicines    · Your doctor will tell you if and when you can restart your medicines. He or she will also give you instructions about taking any new medicines.     · If you take aspirin or some other blood thinner, ask your doctor if and when to start taking it again. Make sure that you understand exactly what your doctor wants you to do.     · If polyps were removed or a biopsy was done during the test, your doctor may tell you not to take aspirin or other anti-inflammatory medicines for a few days. These include ibuprofen (Advil, Motrin) and naproxen (Aleve). Other instructions    · For your safety, do not drive or operate machinery until the medicine wears off and you can think clearly. Your doctor may tell you not to drive or operate machinery until the day after your test.     · Do not sign legal documents or make major decisions until the medicine wears off and you can think clearly. The anesthesia can make it hard for you to fully understand what you are agreeing to. Follow-up care is a key part of your treatment and safety. Be sure to make and go to all appointments, and call your doctor if you are having problems. It's also a good idea to know your test results and keep a list of the medicines you take. When should you call for help? Call 911 anytime you think you may need emergency care. For example, call if:    · You passed out (lost consciousness).     · You pass maroon or bloody stools.     · You have trouble breathing.    Call your doctor now or seek immediate medical care if:    · You have pain that does not get better after you take pain medicine.     · You are sick to your stomach or cannot drink fluids.     · You have new or worse belly pain.     · You have blood in your stools.     · You have a fever.     · You cannot pass stools or gas. Watch closely for changes in your health, and be sure to contact your doctor if you have any problems. Where can you learn more? Go to http://www.gray.com/  Enter E264 in the search box to learn more about \"Colonoscopy: What to Expect at Home. \"  Current as of: April 29, 2020               Content Version: 12.6  © 6597-9994 [employer]. Care instructions adapted under license by [employer] (which disclaims liability or warranty for this information). If you have questions about a medical condition or this instruction, always ask your healthcare professional. [place] [address] any warranty or liability for your use of this information.

## 2021-01-15 NOTE — ANESTHESIA POSTPROCEDURE EVALUATION
Post-Anesthesia Evaluation and Assessment Cardiovascular Function/Vital Signs Visit Vitals BP (!) 142/65 Pulse 60 Temp 36.2 °C (97.2 °F) Resp 15 Ht 5' 5\" (1.651 m) Wt 110.7 kg (244 lb) SpO2 98% BMI 40.60 kg/m² Patient is status post Procedure(s): EGD WITH MAC/ BIOPSY. Nausea/Vomiting: Controlled. Postoperative hydration reviewed and adequate. Pain: 
Pain Scale 1: Visual (01/15/21 1519) Pain Intensity 1: 0 (01/15/21 1519) Managed. Neurological Status: At baseline. Mental Status and Level of Consciousness: Baseline and appropriate for discharge. Pulmonary Status:  
O2 Device: Room air (01/15/21 1519) Adequate oxygenation and airway patent. Complications related to anesthesia: None Post-anesthesia assessment completed. No concerns. Patient has met all discharge requirements. Signed By: Maegan Yi MD  
 January 15, 2021

## 2021-01-15 NOTE — ANESTHESIA PREPROCEDURE EVALUATION
Relevant Problems CARDIOVASCULAR  
(+) A-fib (HCC)  
(+) Hypertension RENAL FAILURE  
(+) PA (acute kidney injury) ([facility]  
(+) Chronic kidney disease ENDOCRINE  
(+) [facility] Anesthetic History No history of anesthetic complications Review of Systems / Medical History Patient summary reviewed, nursing notes reviewed and pertinent labs reviewed Pulmonary Within defined limits Neuro/Psych Within defined limits Cardiovascular Hypertension CHF Dysrhythmias : atrial fibrillation CAD and cardiac stents Pertinent negatives: No CABG Exercise tolerance: >4 METS Comments: No recent angina or CHF - when Afib rate is well controlled, she does quite well GI/Hepatic/Renal 
Within defined limits Endo/Other Morbid obesity Pertinent negatives: No diabetes Other Findings Physical Exam 
 
Airway Mallampati: II 
TM Distance: 4 - 6 cm Neck ROM: normal range of motion Mouth opening: Normal 
 
 Cardiovascular Dental 
No notable dental hx Pulmonary Abdominal 
 
 
 
 Other Findings Anesthetic Plan ASA: 3 Anesthesia type: MAC Induction: Intravenous Anesthetic plan and risks discussed with: Patient and Spouse Very anxious about anesthesia. She appears well optimized from a cardiovascular standpoint and  states that Dr. Soni Mesa has been pleased during recent visits. No database

## 2021-01-16 NOTE — PROCEDURES
[facility] [place] PROCEDURE NOTE Name:  Corine Atkins 
MR#:   [id number] :  1950 ACCOUNT #:  [de-identified] DATE OF SERVICE:  01/15/2021 PREOPERATIVE DIAGNOSIS:  Melena. POSTOPERATIVE DIAGNOSIS:  Gastritis. PROCEDURE PERFORMED:  Upper endoscopy and biopsy. SURGEON:  Pilo Figueroa. Jaelyn Aguillon MD, Julian Neal. ASSISTANT:  none ANESTHESIA:  Propofol given by Scott Pearson. ESTIMATED BLOOD LOSS:  Nil. SPECIMENS REMOVED:  Stomach. COMPLICATIONS:  None. IMPLANTS:  None. PROCEDURE:  Upper endoscopy. With an informed consent obtained and placed on the chart, the patient was placed in the left lateral decubitus position. Oxygen was administered supplementally. A mild drowsy state was induced and maintained with the propofol-based sedation given by Scott Pearson, our CRNA. The GIF-180 video endoscope was passed through the oropharynx, advanced into the esophagus without difficulty. Esophageal mucosa was normal throughout. There was a small sliding-type hiatal hernia. The scope passed easily into the stomach. Retroflex view of the cardia and fundus and proximal body were normal.  The body of the stomach was normal with some increasing erythema as one moved towards the most distal body into the antrum, where there was more prominent linear erythema with superficial NSAID-appearing erosions as one moved to the immediate prepyloric antrum. At this time, there was no focal ulceration. Note that the patient has been on a proton pump inhibitor empirically since consultation on . Multiple biopsies were taken throughout the stomach. The pylorus was normal.  The duodenal bulb had punctate melanosis duodeni. Photograph was taken. Second duodenum was normal. 
 
IMPRESSION: 
1. Hiatal hernia. 2.  Gastritis in the antrum with some nonsteroidal anti-inflammatory drugs-appearing changes, possibly related to the patient's past history of melena 3. Melanosis duodeni.  
 
PLAN: 
 1.  Followup biopsies. Continue proton pump inhibitor. 2.  Resume Coumadin. 3.  Minimize aspirin and NSAIDs. 3.  H. pylori will be treated if positive. Josh Horan MD 
 
 
[id number] 
D:  01/15/2021 15:26 
T:  01/15/2021 22:02 JOB #:  [id number]

## 2021-11-30 NOTE — ED TRIAGE NOTES
Pt reports blood tinged sputum that started last night. Reports R flank pain as well. Decreased BMs. NAD.

## 2021-11-30 NOTE — ED PROVIDER NOTES
EMERGENCY DEPARTMENT HISTORY AND PHYSICAL EXAM    Date: 11/30/2021  Patient Name: Maya Oneal    History of Presenting Illness     Chief Complaint   Patient presents with    Cough    Flank Pain         History Provided By: Patient and Patient's     10:08 AM  Maya Oneal is a 66 y.o. female with PMHX of hypertension, hyperlipidemia, atrial fibrillation on Coumadin, diabetes, chronic kidney disease, kidney stones who presents to the emergency department C/O right-sided flank and abdominal pain. Patient reports the pain started 2 days ago. She reports there is constantly a low level of pain but then it will increase without clear relieving or exacerbating factors. She states she is also had a cough that started yesterday with some pink-tinged sputum. Denies any fever, chest pain, nausea, vomiting, bowel or urinary complaints. Notes chronic shortness of breath and lower extremity edema that she reports is similar to her baseline. Reports compliance with all her medications. Not vaccinated for COVID-19. Does not smoke or use any other substances. No known sick contacts or recent travel. PCP: Ren Leon, DO    Current Outpatient Medications   Medication Sig Dispense Refill    amoxicillin-clavulanate (Augmentin) 875-125 mg per tablet Take 1 Tablet by mouth two (2) times a day for 7 days. 14 Tablet 0    doxycycline (VIBRA-TABS) 100 mg tablet Take 1 Tablet by mouth two (2) times a day for 7 days. 14 Tablet 0    allopurinoL (ZYLOPRIM) 300 mg tablet Take 300 mg by mouth daily.  torsemide (DEMADEX) 100 mg tablet Take 50 mg by mouth daily.  HYDROcodone-acetaminophen (NORCO)  mg tablet Take 1 Tab by mouth every eight (8) hours as needed for Pain.  metoprolol succinate (TOPROL-XL) 100 mg tablet Take 150 mg by mouth daily.  warfarin (Coumadin) 2 mg tablet Take 4 mg by mouth daily.  isosorbide mononitrate ER (IMDUR) 30 mg tablet Take 30 mg by mouth daily.       magnesium oxide (MAG-OX) 400 mg tablet Take 400 mg by mouth daily.  pantoprazole (PROTONIX) 40 mg tablet Take 40 mg by mouth daily.  ergocalciferol (Vitamin D2) 1,250 mcg (50,000 unit) capsule Take 50,000 Units by mouth.  docusate sodium (Colace) 100 mg capsule Take 100 mg by mouth two (2) times a day.  nitroglycerin (NITROSTAT) 0.4 mg SL tablet 0.4 mg by SubLINGual route every five (5) minutes as needed for Chest Pain. Up to 3 doses.  hydrALAZINE (APRESOLINE) 50 mg tablet Take 25 mg by mouth three (3) times daily.  aspirin delayed-release 81 mg tablet Take  by mouth daily.  folic acid (FOLVITE) 1 mg tablet Take  by mouth daily.  potassium chloride (K-DUR, KLOR-CON) 20 mEq tablet Take  by mouth two (2) times a day. Past History       Past Medical History:  Past Medical History:   Diagnosis Date    A-fib ([facility] [address]     Anxiety     CAD (coronary artery disease)     Chronic kidney disease     Diabetes ([facility] [address]     borderline    High cholesterol     Hypertension     Kidney stones     Psychiatric disorder     anxiety       Past Surgical History:  Past Surgical History:   Procedure Laterality Date    HX CHOLECYSTECTOMY      HX HEART CATHETERIZATION      HX HYSTERECTOMY      HX ORTHOPAEDIC      bilateral knee surgery    HX ORTHOPAEDIC      right elbow    HX UROLOGICAL      stent placement    SC CARDIAC SURG PROCEDURE UNLIST      stent x 1       Family History:  Family History   Problem Relation Age of Onset    Breast Cancer Maternal Aunt        Social History:  Social History     Tobacco Use    Smoking status: Never Smoker    Smokeless tobacco: Never Used   Substance Use Topics    Alcohol use: No    Drug use: No       Allergies:  No Known Allergies      Review of Systems   Review of Systems   Constitutional: Negative for fever. Respiratory: Positive for cough and shortness of breath. Cardiovascular: Positive for leg swelling.    Gastrointestinal: Positive for abdominal pain. Negative for nausea and vomiting. Genitourinary: Negative for difficulty urinating. All other systems reviewed and are negative.         Physical Exam     Vitals:    11/30/21 1002 11/30/21 1003 11/30/21 1052   BP: (!) 172/60  (!) 140/66   Pulse: 66     Resp: 19     Temp: 97.4 °F (36.3 °C)     SpO2: 100%     Weight:  114.3 kg (252 lb)    Height:  5' 6\" (1.676 m)      Physical Exam    Nursing notes and vital signs reviewed    Constitutional: Non toxic appearing, elderly, moderate distress  Head: Normocephalic, Atraumatic  Eyes: EOMI  Neck: Supple  Cardiovascular: Regular rate and rhythm, no murmurs, rubs, or gallops  Chest: Normal work of breathing and chest excursion bilaterally  Lungs: Clear to ausculation bilaterally  Abdomen: Soft, tender over the right flank and the right side of the abdomen without rebound or guarding, otherwise nontender, non distended  Back: No evidence of trauma or deformity  Extremities: No evidence of trauma or deformity, moderate symmetric bilateral LE edema  Skin: Warm and dry, normal cap refill  Neuro: Alert and appropriate  Psychiatric: Normal mood and affect      Diagnostic Study Results     Labs -     Recent Results (from the past 12 hour(s))   EKG, 12 LEAD, INITIAL    Collection Time: 11/30/21 10:21 AM   Result Value Ref Range    Ventricular Rate 66 BPM    Atrial Rate 66 BPM    P-R Interval 186 ms    QRS Duration 102 ms    Q-T Interval 428 ms    QTC Calculation (Bezet) 448 ms    Calculated P Axis 72 degrees    Calculated R Axis -11 degrees    Calculated T Axis 10 degrees    Diagnosis       Normal sinus rhythm  Left ventricular hypertrophy with repolarization abnormality ( R in aVL ,   Dov product )  Abnormal ECG  When compared with ECG of 03-NOV-2015 20:09,  Sinus rhythm has replaced Atrial fibrillation  Nonspecific T wave abnormality, improved in Lateral leads     CARDIAC PANEL,(CK, CKMB & TROPONIN)    Collection Time: 11/30/21 10:35 AM   Result Value Ref Range    CK - MB <1.0 <3.6 ng/ml    CK-MB Index  0.0 - 4.0 %     CALCULATION NOT PERFORMED WHEN RESULT IS BELOW LINEAR LIMIT    CK 54 26 - 192 U/L    Troponin-I, QT <0.02 0.0 - 0.045 NG/ML   LIPASE    Collection Time: 11/30/21 10:35 AM   Result Value Ref Range    Lipase 111 73 - 720 U/L   METABOLIC PANEL, COMPREHENSIVE    Collection Time: 11/30/21 10:35 AM   Result Value Ref Range    Sodium 139 136 - 145 mmol/L    Potassium 3.8 3.5 - 5.5 mmol/L    Chloride 103 100 - 111 mmol/L    CO2 31 21 - 32 mmol/L    Anion gap 5 3.0 - 18 mmol/L    Glucose 101 (H) 74 - 99 mg/dL    BUN 21 (H) 7.0 - 18 MG/DL    Creatinine 1.27 0.6 - 1.3 MG/DL    BUN/Creatinine ratio 17 12 - 20      GFR est AA 50 (L) >60 ml/min/1.73m2    GFR est non-AA 41 (L) >60 ml/min/1.73m2    Calcium 9.0 8.5 - 10.1 MG/DL    Bilirubin, total 0.7 0.2 - 1.0 MG/DL    ALT (SGPT) 21 13 - 56 U/L    AST (SGOT) 23 10 - 38 U/L    Alk. phosphatase 110 45 - 117 U/L    Protein, total 8.2 6.4 - 8.2 g/dL    Albumin 3.3 (L) 3.4 - 5.0 g/dL    Globulin 4.9 (H) 2.0 - 4.0 g/dL    A-G Ratio 0.7 (L) 0.8 - 1.7     CBC WITH AUTOMATED DIFF    Collection Time: 11/30/21 10:46 AM   Result Value Ref Range    WBC 5.4 4.6 - 13.2 K/uL    RBC 3.52 (L) 4.20 - 5.30 M/uL    HGB 12.5 12.0 - 16.0 g/dL    HCT 36.5 35.0 - 45.0 %    .7 (H) 78.0 - 100.0 FL    MCH 35.5 (H) 24.0 - 34.0 PG    MCHC 34.2 31.0 - 37.0 g/dL    RDW 14.4 11.6 - 14.5 %    PLATELET 87 (L) 406 - 420 K/uL    MPV 11.3 9.2 - 11.8 FL    NRBC 0.0 0  WBC    ABSOLUTE NRBC 0.00 0.00 - 0.01 K/uL    NEUTROPHILS 77 (H) 40 - 73 %    LYMPHOCYTES 13 (L) 21 - 52 %    MONOCYTES 9 3 - 10 %    EOSINOPHILS 0 0 - 5 %    BASOPHILS 0 0 - 2 %    IMMATURE GRANULOCYTES 0 0.0 - 0.5 %    ABS. NEUTROPHILS 4.2 1.8 - 8.0 K/UL    ABS. LYMPHOCYTES 0.7 (L) 0.9 - 3.6 K/UL    ABS. MONOCYTES 0.5 0.05 - 1.2 K/UL    ABS. EOSINOPHILS 0.0 0.0 - 0.4 K/UL    ABS. BASOPHILS 0.0 0.0 - 0.1 K/UL    ABS. IMM.  GRANS. 0.0 0.00 - 0.04 K/UL    DF AUTOMATED     PROTHROMBIN TIME + INR Collection Time: 11/30/21 10:46 AM   Result Value Ref Range    Prothrombin time 15.9 (H) 11.5 - 15.2 sec    INR 1.3 (H) 0.8 - 1.2     URINALYSIS W/ RFLX MICROSCOPIC    Collection Time: 11/30/21 11:40 AM   Result Value Ref Range    Color YELLOW      Appearance CLEAR      Specific gravity 1.008 1.005 - 1.030      pH (UA) 6.5 5.0 - 8.0      Protein Negative NEG mg/dL    Glucose Negative NEG mg/dL    Ketone Negative NEG mg/dL    Bilirubin Negative NEG      Blood Negative NEG      Urobilinogen 0.2 0.2 - 1.0 EU/dL    Nitrites Negative NEG      Leukocyte Esterase TRACE (A) NEG     URINE MICROSCOPIC ONLY    Collection Time: 11/30/21 11:40 AM   Result Value Ref Range    WBC 4 to 10 0 - 5 /hpf    RBC NONE 0 - 5 /hpf    Epithelial cells 3+ 0 - 5 /lpf    Bacteria 3+ (A) NEG /hpf    Hyaline cast 0 to 3 0 - 2 /lpf       Radiologic Studies -   CT ABD PELV WO CONT   Final Result      1. No clearly acute intra-abdominal or pelvic abnormality demonstrated. 2. Left renal pelvic stone without evidence of hydronephrosis, unchanged. 3. Right adnexal hypodensity, similar to CT and ultrasound examination performed   2018. 4. Faint peribronchial and right lower lobe groundglass densities favored   infectious/inflammatory in etiology      XR CHEST PORT   Final Result      No acute radiographic cardiopulmonary abnormality. CT Results  (Last 48 hours)               11/30/21 1116  CT ABD PELV WO CONT Final result    Impression:      1. No clearly acute intra-abdominal or pelvic abnormality demonstrated. 2. Left renal pelvic stone without evidence of hydronephrosis, unchanged. 3. Right adnexal hypodensity, similar to CT and ultrasound examination performed   2018.    4. Faint peribronchial and right lower lobe groundglass densities favored   infectious/inflammatory in etiology       Narrative:  EXAM: CT of the abdomen and pelvis       INDICATION: Right-sided flank and abdominal pain       COMPARISON: June 4, 2018 TECHNIQUE: Axial CT imaging of the abdomen and pelvis was performed without   intravenous contrast. Multiplanar reformats were generated. One or more dose reduction techniques were used on this CT: automated exposure   control, adjustment of the mAs and/or kVp according to patient size, and   iterative reconstruction techniques. The specific techniques used on this CT   exam have been documented in the patient's electronic medical record. Digital   Imaging and Communications in Medicine (DICOM) format image data are available   to nonaffiliated external healthcare facilities or entities on a secure, media   free, reciprocally searchable basis with patient authorization for at least a   12-month period after this study. _______________       FINDINGS:       LOWER CHEST:      > There are faint peribronchial groundglass opacities within the right lower   lobe. No pleural abnormality. Multivessel coronary arterial atherosclerosis   without evidence of aneurysmal dilatation. LIVER, BILIARY: Liver is normal. No biliary dilation. Cholecystectomy       PANCREAS: Mild fatty infiltration the pancreas. SPLEEN: Normal.       ADRENALS: Normal.       KIDNEYS/URETERS/BLADDER:      > Calculus within the left renal pelvis unchanged from prior examination,   measuring approximately 7 mm in size. No evidence of hydronephrosis.      > No right-sided urolithiasis. > Urinary bladder unremarkable in CT appearance. PELVIC ORGANS: Uterus has an unremarkable CT appearance. Right ovarian   hypodensity measuring approximately 7 x 5 mm in size, previously approximately 6   by 5 mm in size; not appreciably changed       VASCULATURE: Diffuse atherosclerosis. No evidence of aneurysmal dilatation. LYMPH NODES: No enlarged lymph nodes. GASTROINTESTINAL TRACT: No bowel dilation or wall thickening. Normal appendix. Scattered colonic diverticula without diverticulitis.        BONES: No acute or aggressive osseous abnormalities identified. Lower lumbar   predominant spondylosis and facet joint osteoarthritis with grade 1   anterolisthesis L4 on L5.       OTHER: None.       _______________               CXR Results  (Last 48 hours)               11/30/21 1032  XR CHEST PORT Final result    Impression:      No acute radiographic cardiopulmonary abnormality. Narrative:  EXAM: XR CHEST PORT       CLINICAL INDICATION/HISTORY: Shortness of breath   -Additional: None       COMPARISON: None       TECHNIQUE: Frontal view of the chest       _______________       FINDINGS:       HEART AND MEDIASTINUM: Normal cardiac size and mediastinal contours. LUNGS AND PLEURAL SPACES: No focal pneumonic consolidation, pneumothorax, or   pleural effusion. BONY THORAX AND SOFT TISSUES: No acute osseous abnormality       _______________                 Medications given in the ED-  Medications - No data to display      Medical Decision Making   I am the first provider for this patient. I reviewed the vital signs, available nursing notes, past medical history, past surgical history, family history and social history. Vital Signs-Reviewed the patient's vital signs. Pulse Oximetry Analysis - 100% on room air, not hypoxic     EKG interpretation: (Preliminary)  EKG read by Dr. Marci Du at 10:25 AM  Normal sinus rhythm at a rate of 66 bpm, TN interval 186 ms, QRS duration 102 ms, change from prior in November 2015 when patient was in atrial fibrillation    Records Reviewed: Nursing Notes, Old Medical Records and Previous electrocardiograms    Provider Notes (Medical Decision Making): Luis Shore is a 66 y.o. female presenting for right-sided flank pain that started 2 days ago with some blood-tinged sputum. Patient denies chest pain and has chronic shortness of breath. Hemodynamically stable here. Labs reveal subtherapeutic INR.   Discussed this extensively with patient the importance of follow-up with primary care for Coumadin adjustment. CT reveals evidence of pneumonia in the right lower lung base. UA is bland but urine culture was sent due to history of pyelonephritis in the past.  Plan for discharge on oral antibiotics with early primary care follow-up and strict return precautions. Patient and  understand and agree with that plan. Procedures:  Procedures    ED Course:   12:45 PM  Updated patient on all results and plan. All questions answered. Diagnosis and Disposition     Critical Care: None    DISCHARGE NOTE:    Luciano Riverap's  results have been reviewed with her. She has been counseled regarding her diagnosis, treatment, and plan. She verbally conveys understanding and agreement of the signs, symptoms, diagnosis, treatment and prognosis and additionally agrees to follow up as discussed. She also agrees with the care-plan and conveys that all of her questions have been answered. I have also provided discharge instructions for her that include: educational information regarding their diagnosis and treatment, and list of reasons why they would want to return to the ED prior to their follow-up appointment, should her condition change. She has been provided with education for proper emergency department utilization. CLINICAL IMPRESSION:    1. Right flank pain    2. Community acquired pneumonia of right lower lobe of lung    3. Subtherapeutic international normalized ratio (INR)        PLAN:  1. D/C Home  2. Current Discharge Medication List      START taking these medications    Details   amoxicillin-clavulanate (Augmentin) 875-125 mg per tablet Take 1 Tablet by mouth two (2) times a day for 7 days. Qty: 14 Tablet, Refills: 0  Start date: 11/30/2021, End date: 12/7/2021      doxycycline (VIBRA-TABS) 100 mg tablet Take 1 Tablet by mouth two (2) times a day for 7 days. Qty: 14 Tablet, Refills: 0  Start date: 11/30/2021, End date: 12/7/2021           3.    Follow-up Information Follow up With Specialties Details Why Contact Info    Maciel Whatley, DO Internal Medicine Schedule an appointment as soon as possible for a visit   [address] [facility]  [phone]          _______________________________      Please note that this dictation was completed with [employer], the computer voice recognition software. Quite often unanticipated grammatical, syntax, homophones, and other interpretive errors are inadvertently transcribed by the computer software. Please disregard these errors. Please excuse any errors that have escaped final proofreading.

## 2021-12-03 NOTE — CALL BACK NOTE
10:19 AM  12/03/2021    On augmentin for UTI. pansensitive per C&S. On appropriate tx.      Jered Puri PA-C

## 2021-12-05 PROBLEM — J12.82 PNEUMONIA DUE TO COVID-19 VIRUS: Status: ACTIVE | Noted: 2021-01-01

## 2021-12-05 PROBLEM — E66.01 CLASS 3 SEVERE OBESITY WITH BODY MASS INDEX (BMI) OF 40.0 TO 44.9 IN ADULT (HCC): Status: ACTIVE | Noted: 2021-01-01

## 2021-12-05 PROBLEM — N30.00 ACUTE CYSTITIS: Status: ACTIVE | Noted: 2021-01-01

## 2021-12-05 PROBLEM — R09.02 HYPOXIA: Status: ACTIVE | Noted: 2021-01-01

## 2021-12-05 PROBLEM — D69.6 THROMBOCYTOPENIA (HCC): Status: ACTIVE | Noted: 2021-01-01

## 2021-12-05 PROBLEM — A41.9 SEPSIS (HCC): Status: ACTIVE | Noted: 2021-01-01

## 2021-12-05 PROBLEM — U07.1 COVID-19: Status: ACTIVE | Noted: 2021-01-01

## 2021-12-05 PROBLEM — N39.0 E-COLI UTI: Status: ACTIVE | Noted: 2021-01-01

## 2021-12-05 PROBLEM — B96.20 E-COLI UTI: Status: ACTIVE | Noted: 2021-01-01

## 2021-12-05 PROBLEM — E08.21 DM DUE TO UNDERLYING CONDITION WITH DIABETIC NEPHROPATHY (HCC): Status: ACTIVE | Noted: 2021-01-01

## 2021-12-05 NOTE — H&P
History & Physical    Patient: Kelly Yo MRN: [id number]  CSN: [id number]    YOB: 1950  Age: 66 y.o. Sex: female      DOA: 12/5/2021  Primary Care Provider:  Marlys Tate DO      Assessment/Plan     Patient Active Problem List   Diagnosis Code    Metabolic acidosis B66.3    Hypertension I10    Chronic kidney disease N18.9    Diabetes ([facility] E11.9    A-fib ([facility] I48.91    PA (acute kidney injury) ([address] N17.9    Melena K92.1    Sepsis ([facility] A41.9    Pneumonia due to COVID-19 virus U07.1, J12.82    Thrombocytopenia ([facility] [facility] D69.6    Hypoxia R09.02    DM due to underlying condition with diabetic nephropathy ([facility] [facility] E08.21    Acute cystitis N30.00    E-coli UTI N39.0, B96.20    Class 3 severe obesity with body mass index (BMI) of 40.0 to 44.9 in adult ([facility]) E66.01, Z68.41        A 67 YO female with past medical history of chronic A fib on  Coumadin, chronic leg pain, anxiety, DM with CKD and HTN presents to the ED with 1 wk of Fatigue, Cough and SOB. Found to be hypoxia, COVID-19 PNA and A fib with RVR. Sepsis protocol initiated, On NC O2 to keep saturation of at least 95%. She is at high risk for progression to intubation and mechanical ventilation given her unvaccinated COVID vaccine, comorbidities and the severity of her initial presentation. COVID-19 PNA:  Droplet plus isolation.  Admit to Tele Covid unit  Ordered Covid protocol  Because she is hypoxemic and hypoxia,On 10 day course of decadron  Left shift with decreased lymphocytes  Has exceeded timeframe eligibiity for remdesivir    HCW PPE   Will track inflammatory markers and if elevated, need to consult pharm monocolonal antibodies  Ordered sputum cultures   Immune-boosting vitamin cocktail   Consider ID consult if hypoxia persistent   CT scan obtained: No evidence of PE but does show acute atypical multifocal pulmonary infiltrates consistent with active COVID-19 pneumonia    Acute respiratory distress with hypoxemia: due to COVID. On 5L/min  Monitor 02 delivery   Good pulmonary toilet      E. Coli Urinary tract infection: Seen by ED on 11/30. UCx revealed with E.Coli. Start Rocephin. Sepsis: Source could be COVID-19 PNA and urinary tract   Normal white blood count but left shift present  Lactic acid high, will recheck     A. Fib with RVR: Restart Toprol. Continue coumadin     DM with CKD: Monitor glucose and Bcer. SSI     HTN: Resume Toprol and Imdur     Thyroid nodule per CT: outpatient f/u    Thrombocytopenia: Monitor PLT. BMI 40: Increase risks of M&M    GI/DVT prophylaxis ordered     CODE STATUS: Full      AC: I have had a discussion with patient regarding code status. Particularly, described potential options in event of cardiac or respiratory arrest. I have explained what being full code entails, including cardiorespiratory resuscitation attempts with chest compressions, potential cariodioversion/ \" shocks\" as well as intubation. I have also explained Do not resuscitate which would mean we allow a natural death with out aggressive interventions. She states that she is full code. Total time of care: 16 min    Estimated length of stay: 3- 5 days    CC: fatigue, cough and weakness for 1 wk       HPI:     Sharifa Serrano is a 66 y.o. female  with past medical history of chronic A fib on  Coumadin, chronic leg pain, anxiety, DM with CKD and HTN presents to the ED with 1 wk of Fatigue, Cough and SOB. She was seen on 11/30 for right flank pain and weakness, found to the have pneumonia and UTI at that time but stable and dc on oral Augmentin and Doxy. She states that her pain has not improved with the PO Abx. She is also had a cough that started on 11/27 with some pink-tinged sputum. More SOB for which prompted her ER visit today  Denies any fever, chest pain, nausea, vomiting, bowel or urinary compliant. At ER, She was fount hypoxia, tachyacardia and hypotension. EKG done on arrival shows a.fib with RVR. Code sepsis called. She was placed with Sepsis protocol. Rapid COVID test was positive. She is admitted for further care. Reports Neither she nor her  vaccinated for COVID-19. They both dx of COVID at ER     Past Medical History:   Diagnosis Date    A-fib ([facility]     Anxiety     CAD (coronary artery disease)     Chronic kidney disease     Diabetes ([facility] [facility]     borderline    High cholesterol     Hypertension     Kidney stones     Psychiatric disorder     anxiety       Past Surgical History:   Procedure Laterality Date    HX CHOLECYSTECTOMY      HX HEART CATHETERIZATION      HX HYSTERECTOMY      HX ORTHOPAEDIC      bilateral knee surgery    HX ORTHOPAEDIC      right elbow    HX UROLOGICAL      stent placement    [place] CARDIAC SURG PROCEDURE UNLIST      stent x 1       Family History   Problem Relation Age of Onset    Breast Cancer Maternal Aunt        Social History     Socioeconomic History    Marital status:    Tobacco Use    Smoking status: Never Smoker    Smokeless tobacco: Never Used   Substance and Sexual Activity    Alcohol use: No    Drug use: No       Prior to Admission medications    Medication Sig Start Date End Date Taking? Authorizing Provider   amoxicillin-clavulanate (Augmentin) 875-125 mg per tablet Take 1 Tablet by mouth two (2) times a day for 7 days. 11/30/21 12/7/21  Dutch Moy MD   doxycycline (VIBRA-TABS) 100 mg tablet Take 1 Tablet by mouth two (2) times a day for 7 days. 11/30/21 12/7/21  Dutch Moy MD   allopurinoL (ZYLOPRIM) 300 mg tablet Take 300 mg by mouth daily. Provider, Historical   torsemide (DEMADEX) 100 mg tablet Take 50 mg by mouth daily. Provider, Historical   HYDROcodone-acetaminophen (NORCO)  mg tablet Take 1 Tab by mouth every eight (8) hours as needed for Pain. Provider, Historical   metoprolol succinate (TOPROL-XL) 100 mg tablet Take 150 mg by mouth daily. Provider, Historical   warfarin (Coumadin) 2 mg tablet Take 4 mg by mouth daily. Provider, Historical   isosorbide mononitrate ER (IMDUR) 30 mg tablet Take 30 mg by mouth daily. Provider, Historical   magnesium oxide (MAG-OX) 400 mg tablet Take 400 mg by mouth daily. Provider, Historical   pantoprazole (PROTONIX) 40 mg tablet Take 40 mg by mouth daily. Provider, Historical   ergocalciferol (Vitamin D2) 1,250 mcg (50,000 unit) capsule Take 50,000 Units by mouth. Provider, Historical   docusate sodium (Colace) 100 mg capsule Take 100 mg by mouth two (2) times a day. Provider, Historical   nitroglycerin (NITROSTAT) 0.4 mg SL tablet 0.4 mg by SubLINGual route every five (5) minutes as needed for Chest Pain. Up to 3 doses. Provider, Historical   hydrALAZINE (APRESOLINE) 50 mg tablet Take 25 mg by mouth three (3) times daily. Layla Alex MD   aspirin delayed-release 81 mg tablet Take  by mouth daily. Layla Alex MD   folic acid (FOLVITE) 1 mg tablet Take  by mouth daily. Layla Alex MD   potassium chloride (K-DUR, KLOR-CON) 20 mEq tablet Take  by mouth two (2) times a day. Layla Aelx MD       No Known Allergies    Review of Systems  Gen: No fever, chills, + malaise, weight loss   Heent: No headache, rhinorrhea, epistaxis, ear pain, hearing loss, sinus pain, neck pain/stiffness, + sore throat. Heart: No chest pain, + palpitations, PERRY, pnd, or orthopnea. Resp: + cough, hemoptysis, wheezing and shortness of breath. GI: No nausea, vomiting, diarrhea, constipation, melena or hematochezia. : No urinary obstruction, dysuria or hematuria. Derm: No rash, new skin lesion or pruritis. Musc/skeletal: no bone or joint complains. Vasc: No edema, cyanosis or claudication. Endo: No heat/cold intolerance, no polyuria,polydipsia or polyphagia. Neuro: No unilateral weakness, numbness, tingling. No seizures. Heme: + easy bruising or bleeding.       Physical Exam:     Physical Exam:  Visit Vitals  BP (!) 155/78   Pulse (!) 119   Temp 97 °F (36.1 °C)   Resp 26   Ht 5' 6\" (1.676 m)   Wt 114.3 kg (252 lb)   SpO2 97%   BMI 40.67 kg/m²      O2 Device: None (Room air)    Temp (24hrs), Av.2 °F (36.2 °C), Min:97 °F (36.1 °C), Max:97.4 °F (36.3 °C)    No intake/output data recorded. No intake/output data recorded. General:  Awake, cooperative,frail. ON NC O2   Head:  Normocephalic, without obvious abnormality, atraumatic. Eyes:  Conjunctivae/corneas clear, sclera anicteric, PERRL, EOMs intact. Nose: Nares normal. No drainage or sinus tenderness. Throat: Lips, mucosa, and tongue dry. Neck: Supple, symmetrical, trachea midline, no adenopathy. Lungs:   Coarse BS b/l       Heart:  Irregular/irregular     Abdomen: Soft, non-tender. Bowel sounds normal. No masses,  No organomegaly. Extremities: 1+ edema    Pulses: 2+ and symmetric all extremities. Skin: Skin color mottled. No rashes or lesions   Neurologic: CNII-XII intact. No focal motor or sensory deficit. Labs Reviewed:    Recent Results (from the past 24 hour(s))   EKG, 12 LEAD, INITIAL    Collection Time: 21 10:31 AM   Result Value Ref Range    Ventricular Rate 131 BPM    QRS Duration 98 ms    Q-T Interval 290 ms    QTC Calculation (Bezet) 428 ms    Calculated R Axis -27 degrees    Calculated T Axis 129 degrees    Diagnosis       Atrial fibrillation with rapid ventricular response  Moderate voltage criteria for LVH, may be normal variant ( R in aVL , [place]   product )  ST & T wave abnormality, consider lateral ischemia  Abnormal ECG  When compared with ECG of 2021 10:21,  Atrial fibrillation has replaced Sinus rhythm  Vent.  rate has increased BY  65 BPM  Nonspecific T wave abnormality no longer evident in Anterior leads  T wave inversion now evident in Lateral leads  Confirmed by Ferny Moreno MD, -- ([id number]) on 2021 3:04:07 PM     POC LACTIC ACID    Collection Time: 21 10:41 AM   Result Value Ref Range    Lactic Acid (POC) 2.19 (HH) 0.40 - 2.00 mmol/L   CBC WITH AUTOMATED DIFF Collection Time: 12/05/21 11:08 AM   Result Value Ref Range    WBC 5.1 4.6 - 13.2 K/uL    RBC 4.24 4.20 - 5.30 M/uL    HGB 13.6 12.0 - 16.0 g/dL    HCT 41.6 35.0 - 45.0 %    MCV 98.1 78.0 - 100.0 FL    MCH 32.1 24.0 - 34.0 PG    MCHC 32.7 31.0 - 37.0 g/dL    RDW 14.1 11.6 - 14.5 %    PLATELET 88 (L) 701 - 420 K/uL    MPV 12.3 (H) 9.2 - 11.8 FL    NRBC 0.0 0  WBC    ABSOLUTE NRBC 0.00 0.00 - 0.01 K/uL    NEUTROPHILS 80 (H) 40 - 73 %    LYMPHOCYTES 11 (L) 21 - 52 %    MONOCYTES 8 3 - 10 %    EOSINOPHILS 0 0 - 5 %    BASOPHILS 0 0 - 2 %    IMMATURE GRANULOCYTES 1 (H) 0.0 - 0.5 %    ABS. NEUTROPHILS 4.0 1.8 - 8.0 K/UL    ABS. LYMPHOCYTES 0.6 (L) 0.9 - 3.6 K/UL    ABS. MONOCYTES 0.4 0.05 - 1.2 K/UL    ABS. EOSINOPHILS 0.0 0.0 - 0.4 K/UL    ABS. BASOPHILS 0.0 0.0 - 0.1 K/UL    ABS. IMM. GRANS. 0.0 0.00 - 0.04 K/UL    DF AUTOMATED     CARDIAC PANEL,(CK, CKMB & TROPONIN)    Collection Time: 12/05/21 11:08 AM   Result Value Ref Range    CK - MB <1.0 <3.6 ng/ml    CK-MB Index  0.0 - 4.0 %     CALCULATION NOT PERFORMED WHEN RESULT IS BELOW LINEAR LIMIT     26 - 192 U/L    Troponin-I, QT 0.07 (H) 0.0 - 9.494 NG/ML   METABOLIC PANEL, COMPREHENSIVE    Collection Time: 12/05/21 11:08 AM   Result Value Ref Range    Sodium 138 136 - 145 mmol/L    Potassium 3.7 3.5 - 5.5 mmol/L    Chloride 104 100 - 111 mmol/L    CO2 26 21 - 32 mmol/L    Anion gap 8 3.0 - 18 mmol/L    Glucose 125 (H) 74 - 99 mg/dL    BUN 23 (H) 7.0 - 18 MG/DL    Creatinine 1.26 0.6 - 1.3 MG/DL    BUN/Creatinine ratio 18 12 - 20      GFR est AA 51 (L) >60 ml/min/1.73m2    GFR est non-AA 42 (L) >60 ml/min/1.73m2    Calcium 8.9 8.5 - 10.1 MG/DL    Bilirubin, total 0.5 0.2 - 1.0 MG/DL    ALT (SGPT) 22 13 - 56 U/L    AST (SGOT) 45 (H) 10 - 38 U/L    Alk.  phosphatase 84 45 - 117 U/L    Protein, total 7.4 6.4 - 8.2 g/dL    Albumin 2.9 (L) 3.4 - 5.0 g/dL    Globulin 4.5 (H) 2.0 - 4.0 g/dL    A-G Ratio 0.6 (L) 0.8 - 1.7     MAGNESIUM    Collection Time: 12/05/21 11:08 AM   Result Value Ref Range    Magnesium 2.0 1.6 - 2.6 mg/dL   COVID-19 RAPID TEST    Collection Time: 12/05/21 11:09 AM   Result Value Ref Range    Specimen source Nasopharyngeal      COVID-19 rapid test Detected (AA) NOTD     RESPIRATORY VIRUS PANEL W/COVID-19, PCR    Collection Time: 12/05/21 11:10 AM    Specimen: Nasopharyngeal   Result Value Ref Range    Adenovirus Not detected NOTD      Coronavirus 229E Not detected NOTD      Coronavirus HKU1 Not detected NOTD      Coronavirus CVNL63 Not detected NOTD      Coronavirus OC43 Not detected NOTD      SARS-CoV-2, PCR Detected (AA) NOTD      Metapneumovirus Not detected NOTD      Rhinovirus and Enterovirus Not detected NOTD      Influenza A Not detected NOTD      Influenza A, subtype H1 Not detected NOTD      Influenza A, subtype H3 Not detected NOTD      INFLUENZA A H1N1 PCR Not detected NOTD      Influenza B Not detected NOTD      Parainfluenza 1 Not detected NOTD      Parainfluenza 2 Not detected NOTD      Parainfluenza 3 Not detected NOTD      Parainfluenza virus 4 Not detected NOTD      RSV by PCR Not detected NOTD      B. parapertussis, PCR Not detected NOTD      Bordetella pertussis - PCR Not detected NOTD      Chlamydophila pneumoniae DNA, QL, PCR Not detected NOTD      Mycoplasma pneumoniae DNA, QL, PCR Not detected NOTD     POC LACTIC ACID    Collection Time: 12/05/21 12:33 PM   Result Value Ref Range    Lactic Acid (POC) 1.61 0.40 - 2.00 mmol/L   URINALYSIS W/ RFLX MICROSCOPIC    Collection Time: 12/05/21  3:02 PM   Result Value Ref Range    Color YELLOW      Appearance CLEAR      Specific gravity 1.014 1.005 - 1.030      pH (UA) 5.0 5.0 - 8.0      Protein 100 (A) NEG mg/dL    Glucose Negative NEG mg/dL    Ketone Negative NEG mg/dL    Bilirubin Negative NEG      Blood Negative NEG      Urobilinogen 0.2 0.2 - 1.0 EU/dL    Nitrites Negative NEG      Leukocyte Esterase TRACE (A) NEG     URINE MICROSCOPIC ONLY    Collection Time: 12/05/21  3:02 PM Result Value Ref Range    WBC 4 to 10 0 - 5 /hpf    RBC 0 to 3 0 - 5 /hpf    Epithelial cells 2+ 0 - 5 /lpf    Bacteria FEW (A) NEG /hpf    Budding yeast 1+ NEG       Procedures/imaging: see electronic medical records for all procedures/Xrays and details which were not copied into this note but were reviewed prior to creation of Plan      CC: Kaelyn Avalos, DO

## 2021-12-05 NOTE — Clinical Note
Status[de-identified] INPATIENT [101]   Type of Bed: Telemetry [19]   Cardiac Monitoring Required?: Yes   Inpatient Hospitalization Certified Necessary for the Following Reasons: 3.  Patient receiving treatment that can only be provided in an inpatient setting (further clarification in H&P documentation)   Admitting Diagnosis: Sepsis [facility]) [[id number]]   Admitting Diagnosis: COVID-19 [[id number]]   Admitting Diagnosis: A-fib [facility]) [[id number]]   Admitting Physician: Rachel Enamorado   Attending Physician: Rachel Enamorado   Estimated Length of Stay: 3-4 Midnights   Discharge Plan[de-identified] Home with Office Follow-up

## 2021-12-05 NOTE — ED PROVIDER NOTES
66 y/oF with multiple medical conditions including Afib on coumadin, anxiety, CKD, DM and HTN presents to the ED with complaint of Fatigue, Cough and Weakness. She was seen on 11/30 for flank pain weakness, found to the have pneumonia at that time but stable and dc on oral abx. Presents to day in acute distress with hypoxia, tachyacardia and hypotension. EKG done on arrival shows a.fib with RVR. Pt immediately moved to a room. IV started. Labs drawn. Lactate returned positive. Code sepsis called. Covid test sent.            Past Medical History:   Diagnosis Date    A-fib ([facility] [address]     Anxiety     CAD (coronary artery disease)     Chronic kidney disease     Diabetes ([facility]     borderline    High cholesterol     Hypertension     Kidney stones     Psychiatric disorder     anxiety       Past Surgical History:   Procedure Laterality Date    HX CHOLECYSTECTOMY      HX HEART CATHETERIZATION      HX HYSTERECTOMY      HX ORTHOPAEDIC      bilateral knee surgery    HX ORTHOPAEDIC      right elbow    HX UROLOGICAL      stent placement    [place] CARDIAC SURG PROCEDURE UNLIST      stent x 1         Family History:   Problem Relation Age of Onset    Breast Cancer Maternal Aunt        Social History     Socioeconomic History    Marital status:      Spouse name: Not on file    Number of children: Not on file    Years of education: Not on file    Highest education level: Not on file   Occupational History    Not on file   Tobacco Use    Smoking status: Never Smoker    Smokeless tobacco: Never Used   Substance and Sexual Activity    Alcohol use: No    Drug use: No    Sexual activity: Not on file   Other Topics Concern    Not on file   Social History Narrative    Not on file     Social Determinants of Health     Financial Resource Strain:     Difficulty of Paying Living Expenses: Not on file   Food Insecurity:     Worried About Running Out of Food in the Last Year: Not on file    [place] of Food in the Last Year: Not on file   Transportation Needs:     Lack of Transportation (Medical): Not on file    Lack of Transportation (Non-Medical): Not on file   Physical Activity:     Days of Exercise per Week: Not on file    Minutes of Exercise per Session: Not on file   Stress:     Feeling of Stress : Not on file   Social Connections:     Frequency of Communication with Friends and Family: Not on file    Frequency of Social Gatherings with Friends and Family: Not on file    Attends [employer] Services: Not on file    Active Member of [address] [place] [address] [employer] or Organizations: Not on file    Attends Club or Organization Meetings: Not on file    Marital Status: Not on file   Intimate Partner Violence:     Fear of Current or Ex-Partner: Not on file    Emotionally Abused: Not on file    Physically Abused: Not on file    Sexually Abused: Not on file   Housing Stability:     Unable to Pay for Housing in the Last Year: Not on file    Number of [place] in the Last Year: Not on file    Unstable Housing in the Last Year: Not on file         ALLERGIES: Patient has no known allergies. Review of Systems   Constitutional: Positive for activity change, appetite change, chills, diaphoresis and fatigue. Negative for fever and unexpected weight change. HENT: Positive for congestion and rhinorrhea. Negative for dental problem, drooling, ear discharge, mouth sores, nosebleeds, sinus pressure and tinnitus. Eyes: Negative. Respiratory: Positive for cough and shortness of breath. Negative for chest tightness, wheezing and stridor. Cardiovascular: Positive for palpitations. Negative for leg swelling. Gastrointestinal: Negative. Endocrine: Negative. Genitourinary: Negative. Musculoskeletal: Positive for arthralgias, back pain and myalgias. Skin: Negative. Neurological: Negative. Psychiatric/Behavioral: Negative.         Vitals:    12/05/21 1013   BP: 109/86   Pulse: 74   Resp: 18   Temp: 97.4 °F (36.3 °C)   SpO2: 91%   Weight: 114.3 kg (252 lb)   Height: 5' 6\" (1.676 m)            Physical Exam  Vitals and nursing note reviewed. Constitutional:       General: She is in acute distress. Appearance: She is obese. She is ill-appearing. HENT:      Nose: Congestion present. No rhinorrhea. Mouth/Throat:      Mouth: Mucous membranes are dry. Pharynx: No oropharyngeal exudate or posterior oropharyngeal erythema. Eyes:      Extraocular Movements: Extraocular movements intact. Pupils: Pupils are equal, round, and reactive to light. Cardiovascular:      Rate and Rhythm: Tachycardia present. Rhythm irregular. Pulses: Normal pulses. Heart sounds: No murmur heard. No friction rub. No gallop. Pulmonary:      Effort: Respiratory distress present. Breath sounds: Rhonchi and rales present. No wheezing. Abdominal:      General: Abdomen is flat. There is no distension. Palpations: Abdomen is soft. There is no mass. Tenderness: There is no abdominal tenderness. Hernia: No hernia is present. Musculoskeletal:         General: No deformity or signs of injury. Normal range of motion. Cervical back: Normal range of motion. No rigidity or tenderness. Right lower leg: Edema present. Left lower leg: Edema present. Lymphadenopathy:      Cervical: No cervical adenopathy. Skin:     General: Skin is warm and dry. Capillary Refill: Capillary refill takes less than 2 seconds. Coloration: Skin is pale. Skin is not jaundiced. Findings: No bruising. Neurological:      General: No focal deficit present. Psychiatric:         Mood and Affect: Mood normal.         Behavior: Behavior normal.          MDM  Number of Diagnoses or Management Options  Diagnosis management comments: 65 y/o F with Hx of CKD, Afib, HTN, HLD, and recently diagnosed pneumonia presents to the ED in acute distress. Pt arrived acutely tachycardic and hypotensive.  She presented for cough and fatigue. EKG done on arrival showed a.fib with RVR. Exam showed ill appearing pt in acute distress with SPO2 on 91% on RA. IV was started and labs were sent. Lactate returned elevated and code sepsis was called. Pt was diagnosed with community acquire pneumonia on 11/30 and discharged on abx. Droplet precautions placed, pt not vaccinated for COVID 19. Aggressive IV fluid resuscitation given. CXR showed Increased interstitial markings. COVID 19 testing returned positive. RVR broke with Fluid resuscitation. CTA done shows no PE but does show bilateral ground glass opacities consistent with COVID19. PT given decadron, rocephin, and azithromycin. Cutlures sent and pending.  Will discuss with hospitalist for admission  Admitted to telemtry          Critical Care  Performed by: Yeni Yen MD  Authorized by: Yeni Yen MD     Critical care provider statement:     Critical care time (minutes):  60    Critical care was necessary to treat or prevent imminent or life-threatening deterioration of the following conditions:  Dehydration and sepsis    Critical care was time spent personally by me on the following activities:  Development of treatment plan with patient or surrogate, discussions with consultants, evaluation of patient's response to treatment, examination of patient, obtaining history from patient or surrogate, ordering and performing treatments and interventions, ordering and review of laboratory studies, ordering and review of radiographic studies, review of old charts and pulse oximetry

## 2021-12-05 NOTE — ED TRIAGE NOTES
Pt to triage via wheelchair with complaints of the weakness. Pt states that she feels like she can hardly keep her eyes open. Pt moaning \"oh God\" in triage. Pt has NOT been vaccinated against Covid.

## 2021-12-06 PROBLEM — J96.01 ACUTE RESPIRATORY FAILURE WITH HYPOXEMIA (HCC): Status: ACTIVE | Noted: 2021-01-01

## 2021-12-06 NOTE — CONSULTS
[facility]  (A Division of [address] [place] [address])                                                                                                                      Ru Stout MD  Office #: - Option # [phone]  Fax #: [phone]     Date of Admission: 12/5/2021Date of Note: 12/6/2021      Reason for Consult: Evaluation and antibiotic management of Severe COVID 19. Thank you for involving me in the care of this patient. Please do not hesitate to contact me on the above number if question or concern. Current Antimicrobials:    Prior Antimicrobials:    Ceftriaxone/ Zithromax 12/05 to date    Immunosuppressive drugs: NA Augmentin and doxycycline PO on 11/30       Assessment- ID related:  --------------------------------------------------------------------------  · Severe COVID 19 infection  · Viral PNA- BL. Extensive on CT chest  · Acute resp failure- Hypoxic 2/2 above  · Leucopenia 2/2 above  · E.coli bacteruia 10/30  · Hx of septic shock with prolonged intubation 6 yrs ago- [facility]  COVId rapid test/RVP PCR + 12/5  BCX 12/5L NGSF  procal 0.28  CRP 14  ferretin 1104  CPK/trop-OK  LD-not available      Other Medical Issues- Mx per respective team:  · Morbidly obese BMI 40  · Hx of thrombocytoepnia- chronic. Etiology unknown. · A fib on AC  · L renal stone- non obstructing on CT 11/30  · CKD  · DMT2  · HTN/HLD  · Hx of CHF per pt     Recommendation for ID issues I am following:  ------------------------------------------------------------------------------  She is on O2 3L at this time, but very high risk for intubation which she doesn't want.  She is agreeable to additional treatment of Tociluzumab/ Baricitinab  -will check ABG  -advised prone( states unable to do) and IS stressed  -if severe hypoxia with these measures, can add Tociluzumab.   -not a candidate for Remdesivir  -check echo and bnp--few doses of lasix may help as well with baseline hx of CHF per hx  -DW RN to send Urine ag for Pneumo/legionella    Daily ferretin/ crp/cbc/cmp    Check hep B/C/TB for baseline    DW RN. ROS Art       HPI:  Jacob Garcia is a 66 y.o. WHITE/NON- with PMH as listed above. Non vaccinated due to her medical conditions and her Anab[employer]ism per pt. She has been feeling sick since 2-3 days prior to Nov 30, when she came to ED with right flank pain and abd pain. No Fever or chills, she is SOB and had dry cough as always from her CHF no new changes. COVID tested neg,. CT done showed some infiltrate on lung and  UA and Urine culture done and sent out with Aumgentin and Doxycycline for treatment of possible PNA. Returned to ED on 12/5 with worsened SOB, hypoxia and tachycardia. COVID 19 test + and CT chest with extensive infiltrate B/L. Her  tested + for COVID 19 as well. She was aon 5L oxygen and on dexamethasone. She was placed on CTX and Zithromax as well. Chest not very tight per pt at time of exam. Asks if we can treat her with Ivermectin. She denies abd pain/N/V/chest pain. Denies severe ext pain. Was intubated X1 month 6 yrs ago- post septic shock. Hx of TB exposure- unknown. Runs a [employer].        Active Hospital Problems    Diagnosis Date Noted    Acute respiratory failure with hypoxemia ([facility] 12/06/2021    Sepsis ([address] 12/05/2021    Pneumonia due to COVID-19 virus 12/05/2021    Thrombocytopenia ([address] 12/05/2021    Hypoxia 12/05/2021    DM due to underlying condition with diabetic nephropathy ([address] 12/05/2021    E-coli UTI 12/05/2021    Class 3 severe obesity with body mass index (BMI) of 40.0 to 44.9 in adult ([address] 12/05/2021    A-fib ([facility]     Hypertension     Chronic kidney disease      Past Medical History:   Diagnosis Date    A-fib ([address]     Anxiety     CAD (coronary artery disease)     Chronic kidney disease     Diabetes ([address]     borderline    High cholesterol     Hypertension     Kidney stones     Psychiatric disorder     anxiety     Past Surgical History:   Procedure Laterality Date    HX CHOLECYSTECTOMY      HX HEART CATHETERIZATION      HX HYSTERECTOMY      HX ORTHOPAEDIC      bilateral knee surgery    HX ORTHOPAEDIC      right elbow    HX UROLOGICAL      stent placement    AL CARDIAC SURG PROCEDURE UNLIST      stent x 1     Family History   Problem Relation Age of Onset    Breast Cancer Maternal Aunt      Social History     Socioeconomic History    Marital status:      Spouse name: Not on file    Number of children: Not on file    Years of education: Not on file    Highest education level: Not on file   Occupational History    Not on file   Tobacco Use    Smoking status: Never Smoker    Smokeless tobacco: Never Used   Substance and Sexual Activity    Alcohol use: No    Drug use: No    Sexual activity: Not on file   Other Topics Concern    Not on file   Social History Narrative    Not on file     Social Determinants of Health     Financial Resource Strain:     Difficulty of Paying Living Expenses: Not on file   Food Insecurity:     Worried About Running Out of Food in the Last Year: Not on file    Sylvain of Food in the Last Year: Not on file   Transportation Needs:     Lack of Transportation (Medical): Not on file    Lack of Transportation (Non-Medical):  Not on file   Physical Activity:     Days of Exercise per Week: Not on file    Minutes of Exercise per Session: Not on file   Stress:     Feeling of Stress : Not on file   Social Connections:     Frequency of Communication with Friends and Family: Not on file    Frequency of Social Gatherings with Friends and Family: Not on file    Attends Jew Services: Not on file    Active Member of Clubs or Organizations: Not on file    Attends Club or Organization Meetings: Not on file    Marital Status: Not on file   Intimate Partner Violence:     Fear of Current or Ex-Partner: Not on file    Emotionally Abused: Not on file    Physically Abused: Not on file    Sexually Abused: Not on file   Housing Stability:     Unable to Pay for Housing in the Last Year: Not on file    Number of Places Lived in the Last Year: Not on file    Unstable Housing in the Last Year: Not on file       Allergies:  Patient has no known allergies.      Medications:  Current Facility-Administered Medications   Medication Dose Route Frequency    melatonin (rapid dissolve) tablet 10 mg  10 mg Oral QHS    ascorbic acid (vitamin C) (VITAMIN C) tablet 500 mg  500 mg Oral BID    zinc sulfate (ZINCATE) 50 mg zinc (220 mg) capsule 1 Capsule  1 Capsule Oral DAILY    Warfarin - Pharmacy to Dose  1 Each Other Rx Dosing/Monitoring    warfarin (COUMADIN) tablet 4 mg  4 mg Oral ONCE    cefTRIAXone (ROCEPHIN) 2 g in 0.9% sodium chloride (MBP/ADV) 50 mL MBP  2 g IntraVENous Q24H    azithromycin (ZITHROMAX) 500 mg in 0.9% sodium chloride 250 mL (VIAL-MATE)  500 mg IntraVENous Q24H    HYDROcodone-acetaminophen (NORCO)  mg tablet 1 Tablet  1 Tablet Oral Q6H PRN    isosorbide mononitrate ER (IMDUR) tablet 30 mg  30 mg Oral DAILY    metoprolol succinate (TOPROL-XL) XL tablet 150 mg  150 mg Oral DAILY    nitroglycerin (NITROSTAT) tablet 0.4 mg  0.4 mg SubLINGual Q5MIN PRN    pantoprazole (PROTONIX) tablet 40 mg  40 mg Oral DAILY    sodium chloride (NS) flush 5-40 mL  5-40 mL IntraVENous Q8H    sodium chloride (NS) flush 5-40 mL  5-40 mL IntraVENous PRN    acetaminophen (TYLENOL) tablet 650 mg  650 mg Oral Q6H PRN    Or    acetaminophen (TYLENOL) suppository 650 mg  650 mg Rectal Q6H PRN    polyethylene glycol (MIRALAX) packet 17 g  17 g Oral DAILY PRN    ondansetron (ZOFRAN ODT) tablet 4 mg  4 mg Oral Q8H PRN    Or    ondansetron (ZOFRAN) injection 4 mg  4 mg IntraVENous Q6H PRN    enoxaparin (LOVENOX) injection 30 mg  30 mg SubCUTAneous Q12H    dexAMETHasone (DECADRON) tablet 6 mg  6 mg Oral DAILY    cholecalciferol (VITAMIN D3) (1000 Units /25 mcg) tablet 2,000 Units  2,000 Units Oral DAILY    insulin lispro (HUMALOG) injection   SubCUTAneous AC&HS    glucose chewable tablet 16 g  16 g Oral PRN    glucagon (GLUCAGEN) injection 1 mg  1 mg IntraMUSCular PRN    dextrose (D50W) injection syrg 12.5-25 g  25-50 mL IntraVENous PRN        ROS:  A comprehensive review of systems was negative. Physical Exam:    Temp (24hrs), Av.8 °F (36.6 °C), Min:97 °F (36.1 °C), Max:98.5 °F (36.9 °C)    Visit Vitals  BP (!) 135/91   Pulse (!) 57   Temp 97.1 °F (36.2 °C)   Resp 18   Ht 5' 6\" (1.676 m)   Wt 114.3 kg (252 lb)   SpO2 93%   BMI 40.67 kg/m²        GEN: WD Morbidly obese, not in resp distress. HEENT: Unicteric. EOMI intact  CHEST: Non laboured breathing. CTA  CVS:RRR, no mur/gallop  ABD: Obese/soft. Non tender. Not able to appreciate organomegaly  GIRISH: No ratliff  EXT: No apparent swelling or redness on UE/LE joints. Skin: Dry and intact. No rash, no redness. CNS: A, OX3. Moves all extremity. CN grossly ok.       Microbiology  All Micro Results     Procedure Component Value Units Date/Time    CULTURE, BLOOD [[id number]] Collected: 21    Order Status: Completed Specimen: Blood Updated: 21     Special Requests: NO SPECIAL REQUESTS        Culture result: NO GROWTH AFTER 19 HOURS       CULTURE, BLOOD [[id number]] Collected: 21    Order Status: Completed Specimen: Blood Updated: 21     Special Requests: NO SPECIAL REQUESTS        Culture result: NO GROWTH AFTER 19 HOURS       LEGIONELLA PNEUMOPHILA AG, URINE [[id number]]     Order Status: Sent Specimen: Urine     STREP Nelta Amber, URINE [[id number]]     Order Status: Sent Specimen: Urine     RESPIRATORY VIRUS PANEL W/COVID-19, PCR [[id number]]  (Abnormal) Collected: 21 1110    Order Status: Completed Specimen: Nasopharyngeal Updated: 21 1633     Adenovirus Not detected        Coronavirus 229E Not detected        Coronavirus HKU1 Not detected Coronavirus CVNL63 Not detected        Coronavirus OC43 Not detected        SARS-CoV-2, PCR Detected        Comment: CALLED TO AND CORRECTLY REPEATED BY:  DR Martin Massed [facility] ER ON [id number] AT 1626 HRS TO 1396. Metapneumovirus Not detected        Rhinovirus and Enterovirus Not detected        Influenza A Not detected        Influenza A, subtype H1 Not detected        Influenza A, subtype H3 Not detected        INFLUENZA A H1N1 PCR Not detected        Influenza B Not detected        Parainfluenza 1 Not detected        Parainfluenza 2 Not detected        Parainfluenza 3 Not detected        Parainfluenza virus 4 Not detected        RSV by PCR Not detected        B. parapertussis, PCR Not detected        Bordetella pertussis - PCR Not detected        Chlamydophila pneumoniae DNA, QL, PCR Not detected        Mycoplasma pneumoniae DNA, QL, PCR Not detected       COVID-19 RAPID TEST [[id number]]  (Abnormal) Collected: 12/05/21 1109    Order Status: Completed Specimen: Nasopharyngeal Updated: 12/05/21 1136     Specimen source Nasopharyngeal        COVID-19 rapid test Detected        Comment:      The specimen is POSITIVE for SARS-CoV-2, the novel coronavirus associated with COVID-19. This test has been authorized by the FDA under an Emergency Use Authorization (EUA) for use by authorized laboratories.         Fact sheet for Healthcare Providers: ConventionUpdate.co.nz  Fact sheet for Patients: ConventionUpdate.co.nz       Methodology: Isothermal Nucleic Acid Amplification  CALLED TO AND CORRECTLY REPEATED BY:  Guido Beasley RN ER, TO JAF AT 1136 12/5/2021                Lab results:    Chemistry  Recent Labs     12/06/21  0605 12/05/21  1108   * 125*    138   K 4.1 3.7   * 104   CO2 22 26   BUN 21* 23*   CREA 1.12 1.26   CA 8.4* 8.9   AGAP 9 8   BUCR 19 18   AP 68 84   TP 6.4 7.4   ALB 2.4* 2.9*   GLOB 4.0 4.5*   AGRAT 0.6* 0.6*       CBC w/ Diff  Recent Labs     12/06/21  0605 12/05/21  1108   WBC 3.0* 5.1   RBC 3.55* 4.24   HGB 11.7* 13.6   HCT 35.4 41.6   PLT 87* 88*   GRANS 81* 80*   LYMPH 15* 11*   EOS 0 0       Imaging: report posted below as per radiologist     CTA chest 12/5       No evidence of a pulmonary embolism or aortic dissection.     Moderate groundglass opacities and airspace disease seen bilaterally suggesting  atypical pneumonia and the findings are typical in appearance for Covid 19  pneumonia.     Mildly enlarged right hilar lymph node which may be reactive. Advise follow-up  to exclude any neoplastic or myeloproliferative process.     15 mm slightly complex right thyroid lobe nodule.  Advise nonemergent thyroid  ultrasound.

## 2021-12-06 NOTE — PROGRESS NOTES
Reason for Admission:   fatigue, cough and weakness                    RUR Score:     Mod; 16%             PCP: First and Last name:   Carline Reynoso DO     Name of Practice:    Are you a current patient: Yes/No:    Approximate date of last visit:    Can you participate in a virtual visit if needed:     Do you (patient/family) have any concerns for transition/discharge? Plan for utilizing home health:       Current Advanced Directive/Advance Care Plan:  Full Code      Healthcare Decision Maker:   Click here to complete [address] including selection of the Healthcare Decision Maker Relationship (ie \"Primary\")            Primary Decision MakerCal Cord - Spouse - [phone]    Transition of Care Plan:  [place] and physician follow up vs SNF    Chart reviewed. Per H&P \"    Jovan Cardoza is a 66 y.o. female  with past medical history of chronic A fib on  Coumadin, chronic leg pain, anxiety, DM with CKD and HTN presents to the ED with 1 wk of Fatigue, Cough and SOB. She was seen on 11/30 for right flank pain and weakness, found to the have pneumonia and UTI at that time but stable and dc on oral Augmentin and Doxy. She states that her pain has not improved with the PO Abx. She is also had a cough that started on 11/27 with some pink-tinged sputum. More SOB for which prompted her ER visit today  Denies any fever, chest pain, nausea, vomiting, bowel or urinary compliant.      At ER, She was fount hypoxia, tachyacardia and hypotension. EKG done on arrival shows a.fib with RVR. Code sepsis called. She was placed with Sepsis protocol. Rapid COVID test was positive. She is admitted for further care.     Reports Neither she nor her  vaccinated for COVID-19. They both dx of COVID at ER . \"    CM spoke with pt to discuss transition of care. Pt is  and independent. Pt has access to a RW if needed. Pt has confirmed her PCP in the community.   Please encourage ambulation or order therapy services when appropriate to assist with identifying a transition of care. Anticipate pt will transition home with [place] and physician follow up.   CM to continue to follow and assist.\"    Care Management Interventions  Transition of Care Consult (CM Consult): Discharge Planning  Health Maintenance Reviewed: Yes  Support Systems: Spouse/Significant Other  The Plan for Transition of Care is Related to the Following Treatment Goals : [place] and physician follow up vs SNF  The Patient and/or Patient Representative was Provided with a Choice of Provider and Agrees with the Discharge Plan?: Yes  Name of the Patient Representative Who was Provided with a Choice of Provider and Agrees with the Discharge Plan: pt  Freedom of Choice List was Provided with Basic Dialogue that Supports the Patient's Individualized Plan of Care/Goals, Treatment Preferences and Shares the Quality Data Associated with the Providers?: Yes  Discharge Location  Discharge Placement: Home with home health

## 2021-12-06 NOTE — ROUTINE PROCESS
Bedside shift change report given to Oneida Essex RN (oncoming nurse) by Buddy Shepherd RN (offgoing nurse). Report included the following information SBAR, Kardex, Intake/Output and MAR.

## 2021-12-06 NOTE — PROGRESS NOTES
Pharmacy Dosing Services: Warfarin    Consult for Warfarin Dosing by Pharmacy by Dr. Bradford Ga provided for this 66 y.o.  female , for indication of Atrial Fibrillation. Day of Therapy  - home medication  Dose to achieve an INR goal of 2-3    Order entered for Warfarin 4 mg to be given today at 18:00. Pt has current order for Enoxaparin 30 mg SQ q12h     Recommendation: consider discontinuing Enoxaparin when INR therapeutic for 24 hours. Continue to monitor platelets. Significant drug interactions: azithromycin  LABS    PT/INR Lab Results   Component Value Date/Time    INR 2.2 (H) 12/06/2021 06:05 AM      Platelets Lab Results   Component Value Date/Time    PLATELET 87 (L) 18/15/3723 06:05 AM      H/H     Lab Results   Component Value Date/Time    HGB 11.7 (L) 12/06/2021 06:05 AM        Warfarin Administrations (last 168 hours)       Date/Time Action Medication Dose    12/05/21 1959 Given    warfarin (COUMADIN) tablet 5 mg 5 mg            Pharmacy to follow daily and will provide subsequent Warfarin dosing based on clinical status.   KAVIN Toro  Contact information [phone]

## 2021-12-06 NOTE — PROGRESS NOTES
Bedside and Verbal shift change report given to Louise Matson (oncoming nurse) by Marden Scheuermann (offgoing nurse). Report included the following information SBAR, Kardex, MAR and Recent Results.

## 2021-12-06 NOTE — PROGRESS NOTES
Problem: Airway Clearance - Ineffective  Goal: Achieve or maintain patent airway  Outcome: Progressing Towards Goal     Problem: Gas Exchange - Impaired  Goal: Absence of hypoxia  Outcome: Progressing Towards Goal  Goal: Promote optimal lung function  Outcome: Progressing Towards Goal     Problem: Breathing Pattern - Ineffective  Goal: Ability to achieve and maintain a regular respiratory rate  Outcome: Progressing Towards Goal     Problem: Body Temperature -  Risk of, Imbalanced  Goal: Ability to maintain a body temperature within defined limits  Outcome: Progressing Towards Goal  Goal: Will regain or maintain usual level of consciousness  Outcome: Progressing Towards Goal  Goal: Complications related to the disease process, condition or treatment will be avoided or minimized  Outcome: Progressing Towards Goal     Problem: Isolation Precautions - Risk of Spread of Infection  Goal: Prevent transmission of infectious organism to others  Outcome: Progressing Towards Goal     Problem: Nutrition Deficits  Goal: Optimize nutrtional status  Outcome: Progressing Towards Goal     Problem: Risk for Fluid Volume Deficit  Goal: Maintain normal heart rhythm  Outcome: Progressing Towards Goal  Goal: Maintain absence of muscle cramping  Outcome: Progressing Towards Goal  Goal: Maintain normal serum potassium, sodium, calcium, phosphorus, and pH  Outcome: Progressing Towards Goal     Problem: Loneliness or Risk for Loneliness  Goal: Demonstrate positive use of time alone when socialization is not possible  Outcome: Progressing Towards Goal     Problem: Fatigue  Goal: Verbalize increase energy and improved vitality  Outcome: Progressing Towards Goal     Problem: Patient Education: Go to Patient Education Activity  Goal: Patient/Family Education  Outcome: Progressing Towards Goal     Problem: Falls - Risk of  Goal: *Absence of Falls  Description: Document Estephanie Fall Risk and appropriate interventions in the flowsheet.   Outcome: Progressing Towards Goal  Note: Fall Risk Interventions:  Mobility Interventions: PT Consult for mobility concerns         Medication Interventions: Bed/chair exit alarm, Patient to call before getting OOB    Elimination Interventions: Call light in reach              Problem: Patient Education: Go to Patient Education Activity  Goal: Patient/Family Education  Outcome: Progressing Towards Goal

## 2021-12-06 NOTE — PROGRESS NOTES
Hospitalist Progress Note    Patient: Mildred Carter MRN: [id number]  CSN: [id number]    YOB: 1950  Age: 66 y.o. Sex: female    DOA: 12/5/2021 LOS:  LOS: 1 day          Chief Complaint:    fatigue, cough and weakness for 1 wk    Assessment/Plan     A 65 yo female with past medical history of chronic A fib on  Coumadin, chronic leg pain, anxiety, DM with CKD and HTN presents to the ED with 1 wk of Fatigue, Cough and SOB. Found to be hypoxic, COVID-19 PNA and A fib with RVR. Sepsis protocol initiated, On NC O2 to keep saturation of at least 95%.     She is at high risk for progression to intubation and mechanical ventilation given her unvaccinated COVID vaccine, comorbidities and the severity of her initial presentation.     COVID-19 PNA:  Droplet plus isolation  Covid protocol  Because she is hypoxemic and hypoxia, on 10 day course of decadron  Left shift with decreased lymphocytes  Has exceeded timeframe eligibiity for remdesivir    HCW PPE   CRP elevated but decreasing   Ferritin elevated, 1104  Pro-calcitonin not elevated  Will track inflammatory markers and if elevated, need to consult pharm monocolonal antibodies  Ordered sputum cultures   Immune-boosting vitamin cocktail   Consider ID consult if hypoxia persistent   CT scan obtained: No evidence of PE but does show acute atypical multifocal pulmonary infiltrates consistent with active COVID-19 pneumonia     Acute respiratory distress with hypoxemia: due to COVID. On 5L/min  Monitor 02 delivery   Good pulmonary toilet   Wean oxygen, oxygen requirement was 5 L on admission and now 2L   Needs to lay on her side   Wean oxygen as possible  Needs ICS q2 hours  Deep cough q1 hours    E. Coli Urinary tract infection: Seen by ED on 11/30. UCx revealed with E.Coli. IV Rocephin.      Sepsis: Source could be COVID-19 PNA and urinary tract   Normal white blood count but left shift present  Elevated lactic acid has resolved     A. Fib with RVR:Toprol. Continue coumadin , heart rate in the 50s and 60s     DM with CKD: Monitor glucose and BG. SSI      HTN: Resume Toprol and Imdur      Thyroid nodule per CT: outpatient f/u     Thrombocytopenia: Monitor PLT.     BMI 40: Increase risks of M&M     GI/DVT prophylaxis ordered     CODE STATUS: Full      Disposition :  Patient Active Problem List   Diagnosis Code    Metabolic acidosis C65.2    Hypertension I10    Chronic kidney disease N18.9    Diabetes ([facility] E11.9    [facility]-fib ([facility] I48.91    PA (acute kidney injury) ([facility] N17.9    Melena K92.1    Sepsis ([facility] A41.9    Pneumonia due to COVID-19 virus U07.1, J12.82    Thrombocytopenia ([facility] D69.6    Hypoxia R09.02    DM due to underlying condition with diabetic nephropathy ([facility] E08.21    Acute cystitis N30.00    E-coli UTI N39.0, B96.20    Class 3 severe obesity with body mass index (BMI) of 40.0 to 44.9 in adult ([facility]) E66.01, Z68.41    Acute respiratory failure with hypoxemia ([facility]) J96.01       Subjective:    Patient not feeling all that well this morning, having cough and some nausea with posttussive spitting up/vomiting. However does state that she feels better than when she was admitted.      Review of systems:    Constitutional: denies fevers, chills, myalgias  Respiratory: denies SOB, cough  Cardiovascular: denies chest pain, palpitations  Gastrointestinal: denies nausea, vomiting, diarrhea      Vital signs/Intake and Output:  Visit Vitals  BP (!) 135/91   Pulse (!) 57   Temp 97.1 °F (36.2 °C)   Resp 18   Ht 5' 6\" (1.676 m)   Wt 114.3 kg (252 lb)   SpO2 93%   BMI 40.67 kg/m²     Current Shift:  12/06 0701 - 12/06 1900  In: 240 [P.O.:240]  Out: -   Last three shifts:  12/04 1901 - 12/06 0700  In: 480 [P.O.:480]  Out: 300 [Urine:300]    Exam:    General: Alert older  female-year-old, appears weak and ill  Head/Neck: NCAT, supple, No masses, No lymphadenopathy  CVS:Regular rate and rhythm, no M/R/G, S1/S2 heard, no thrill  Lungs:Clear to auscultation bilaterally, no wheezes, rhonchi, or rales  Abdomen: Soft, Nontender, No distention, Normal Bowel sounds, No hepatomegaly  Extremities: No C/C/E, pulses palpable 2+  Skin:normal texture and turgor, no rashes, no lesions  Neuro:grossly normal , follows commands  Psych:appropriate                Labs: Results:       Chemistry Recent Labs     12/06/21  0605 12/05/21  1108   * 125*    138   K 4.1 3.7   * 104   CO2 22 26   BUN 21* 23*   CREA 1.12 1.26   CA 8.4* 8.9   AGAP 9 8   BUCR 19 18   AP 68 84   TP 6.4 7.4   ALB 2.4* 2.9*   GLOB 4.0 4.5*   AGRAT 0.6* 0.6*      CBC w/Diff Recent Labs     12/06/21  0605 12/05/21  1108   WBC 3.0* 5.1   RBC 3.55* 4.24   HGB 11.7* 13.6   HCT 35.4 41.6   PLT 87* 88*   GRANS 81* 80*   LYMPH 15* 11*   EOS 0 0      Cardiac Enzymes Recent Labs     12/05/21  1108      CKND1 CALCULATION NOT PERFORMED WHEN RESULT IS BELOW LINEAR LIMIT      Coagulation Recent Labs     12/06/21  0605 12/05/21  1911   PTP 23.7* 20.5*   INR 2.2* 1.8*   APTT 67.2*  --        Lipid Panel Lab Results   Component Value Date/Time    Cholesterol, total 208 (H) 12/20/2020 03:16 PM    HDL Cholesterol 47 12/20/2020 03:16 PM    LDL, calculated 128.6 (H) 12/20/2020 03:16 PM    VLDL, calculated 32.4 12/20/2020 03:16 PM    Triglyceride 162 (H) 12/20/2020 03:16 PM    CHOL/HDL Ratio 4.4 12/20/2020 03:16 PM      BNP No results for input(s): BNPP in the last 72 hours.    Liver Enzymes Recent Labs     12/06/21  0605   TP 6.4   ALB 2.4*   AP 68      Thyroid Studies Lab Results   Component Value Date/Time    TSH 1.02 11/11/2014 03:22 AM        Procedures/imaging: see electronic medical records for all procedures/Xrays and details which were not copied into this note but were reviewed prior to creation of Leana Montaño MD

## 2021-12-07 PROBLEM — N18.31 STAGE 3A CHRONIC KIDNEY DISEASE (HCC): Status: ACTIVE | Noted: 2021-01-01

## 2021-12-07 PROBLEM — U07.1 PNEUMONIA DUE TO SEVERE ACUTE RESPIRATORY SYNDROME CORONAVIRUS 2 (SARS-COV-2): Status: ACTIVE | Noted: 2021-01-01

## 2021-12-07 PROBLEM — K92.1 MELENA: Status: RESOLVED | Noted: 2021-01-01 | Resolved: 2021-01-01

## 2021-12-07 PROBLEM — N30.00 ACUTE CYSTITIS: Status: RESOLVED | Noted: 2021-01-01 | Resolved: 2021-01-01

## 2021-12-07 PROBLEM — D64.9 ANEMIA: Status: ACTIVE | Noted: 2021-01-01

## 2021-12-07 PROBLEM — J12.82 PNEUMONIA DUE TO SEVERE ACUTE RESPIRATORY SYNDROME CORONAVIRUS 2 (SARS-COV-2): Status: ACTIVE | Noted: 2021-01-01

## 2021-12-07 PROBLEM — A41.9 SEPSIS (HCC): Status: RESOLVED | Noted: 2021-01-01 | Resolved: 2021-01-01

## 2021-12-07 NOTE — PROGRESS NOTES
Bedside and Verbal shift change report given to Nicole Barragan (oncoming nurse) by María Elena Nick (offgoing nurse). Report included the following information SBAR, Kardex, MAR and Recent Results.

## 2021-12-07 NOTE — PALLIATIVE CARE
Palliative Medicine    Patient seen at bedside in full PPE for droplet plus isolation and COVID-19 protocol. Patient lying in bed, awake, drowsy at times, increased work of breathing noted, high flow oxygen in place with Vapotherm and NRB. Discussed goals of care including benefits and burdens of resuscitation, intubation and ventilation. Patient stated she did not want to be intubated but she was ok with chest compressions, shock and medications in the event of cardiac arrest.  Discussed with patient that her breathing is to the point of needing to be intubated and placed on a ventilator. Patient stated \"I need to talk to my  more about this\". Advised that we were trying to contact her  about her worsening condition. Discussed with hospitalist who was able to talk with patient's  who agreed for full code status. Thank you for the opportunity to participate in the care of Ms. Natty Ferguson.     Marianne Flanagan, [facility]  Palliative Medicine

## 2021-12-07 NOTE — PROGRESS NOTES
Called to the bedside to assess patient for HFNC. Patient SPO2 was 90 on 6lpm at this time.   Patient is currently on [place] High flow nasal cannula 12lpm

## 2021-12-07 NOTE — PROGRESS NOTES
Pharmacy Dosing Services: Tocilizumab    Pharmacy consult requested by: Vee Hernandez & Jeremiah    Patient meets the following criteria for dosing:  Age >2 yrs  Clinical status: Increasing O2 requirement  Receiving systemic steroids  Positive COVID test since admission (Result 12/05/21)  CRP > 7.5mg/dL  PLT: 106  No active bleeding noted  No exclusion criteria met    Patient weight: 114.3kg    Tocilizumab 810mg IV x1 ordered for 1100 administration today    Benigno Arevalo, PharmD  [phone]

## 2021-12-07 NOTE — PROGRESS NOTES
RT proned pt and she desatted to 70s  continues to have resp distress  Does not want intubation.      Transfer to ICU  Will d/w Dr. Brown Liang -obtain critical care consult

## 2021-12-07 NOTE — PROGRESS NOTES
Oxygen Saturation at 87% on 4L NC, Increased to 6L maintaining at 90%,  Denies any pain or discomfort, no distress noted, resp reg and unlabored at this time. Hospitalist notified of increased oxygen demand, no further orders given at this time. 2350  Pt oxygen saturations dropping to 88-89%   Dr Mary Hidalgo notified, Resp called to initiate High flow. Lung sounds Crackles throughout all right lung fields  Left diminished but clear.     0005  holding 93% at this time on 11 liters, 64%    0500 Oxygen saturations dropping again to 84-86%   at times up to 90% but not consistently holding  Resp called asses high flow needs, Dr Mary Hidalgo Notified    9271  Heated high flow applied at 35 L  100% FI02, Saturations holding at 95%

## 2021-12-07 NOTE — PROGRESS NOTES
[facility]  (A Division of [address] [place] [address])                                                                                                                      Ventura Burks MD  Office #: - Option # [phone]  Fax #: [phone]     Date of Admission: 12/5/2021Date of Note: 12/7/2021  Reason for FU: Evaluation and antibiotic management of Severe COVID 19. Current Antimicrobials:    Prior Antimicrobials: Actrema- ordered    Immunosuppressive drugs: dexamethasone 12/5 to date Augmentin and doxycycline PO on 11/30 to 12/5  Ceftriaxone/ Zithromax 12/05 to 12/6       Assessment- ID related:  --------------------------------------------------------------------------  Worsening hypoxia in severe COVID 19 pt who is DNI:  · Severe COVID 19 infection  · Viral PNA- BL. Extensive on CT chest  · Acute resp failure- Hypoxic 2/2 above: Worse  · Leucopenia 2/2 above  · E.coli bacteruia 10/30  · Hx of septic shock with prolonged intubation 6 yrs ago- [place], [place]  COVId rapid test/RVP PCR + 12/5  BCX 12/5L NGSF  procal 0.28  PaO2 57( on 3L oxygen)  CRP 14-> 11  ferretin 1104  CPK/trop-OK  LD-in progress      Other Medical Issues- Mx per respective team:  · Morbidly obese BMI 40  · Hx of thrombocytoepnia- chronic. Etiology unknown. · A fib on AC  · L renal stone- non obstructing on CT 11/30  · CKD  · DMT2  · HTN/HLD  · Hx of CHF per pt     Recommendation for ID issues I am following:  ------------------------------------------------------------------------------    Cont dexamethasone- dosing per primary team  Add Tociluzumab-- worsening hypoxia- DW pharmacy    FU echo  Proning-stressed  Resp support/AC/rial of lasix and vit cocktail per primary.    Consider higher level of care in ICU- DW Dr Sevilla Books w/diff,  CMP/ CRP/ferretin    Guarded prognosis     The following information was discussed with patient or caregiver regarding the use of tocilizumab, which is under Emergency Use Authorization:     FDA has authorized the emergency use of tocilizumab to treat COVID-19 in hospitalized adults who are receiving systemic corticosteroids and require supplemental oxygen, non-invasive or invasive mechanical ventilation, or ECMO. This is not an FDA-approved use of tocilizumab.  The patient or parent/caregiver has the option to accept or refuse tocilizumab.  The significant known and potential risks and benefits of tocilizumab, and the extent of which the potential risks and benefits are unknown.  Information on available alternative treatments and the risks and benefits of those alternatives, including clinical trials. The information is consistent with the EUA Fact Sheet for Patients, Parents and Caregivers and a copy of the sheet was made available to patient and/or caregiver. Subjective:  Events overnight noted  Requiring 100% FIO2- on 35L and NRM  Now she is agreeable to proning- she is anxious. She is a DNI- with tight chest  Denies fever or chills  No  nausea or vomiting or abd pain or diarrhea       HPI:  Susan Kurtz is a 66 y.o. WHITE/NON- with PMH as listed above. Non vaccinated due to her medical conditions and her Bahai per pt. She has been feeling sick since 2-3 days prior to Nov 30, when she came to ED with right flank pain and abd pain. No Fever or chills, she is SOB and had dry cough as always from her CHF no new changes. COVID tested neg,. CT done showed some infiltrate on lung and  UA and Urine culture done and sent out with Aumgentin and Doxycycline for treatment of possible PNA. Returned to ED on 12/5 with worsened SOB, hypoxia and tachycardia. COVID 19 test + and CT chest with extensive infiltrate B/L. Her  tested + for COVID 19 as well. She was aon 5L oxygen and on dexamethasone. She was placed on CTX and Zithromax as well. Chest not very tight per pt at time of exam. She asks if we can treat her with Ivermectin.  She denies abd pain/N/V/chest pain. Denies severe ext pain. Was intubated X1 month 6 yrs ago- post septic shock. Hx of TB exposure- unknown. Runs a [employer].        Active Hospital Problems    Diagnosis Date Noted    Acute respiratory failure with hypoxemia ([facility] 12/06/2021    Sepsis ([facility] 12/05/2021    Pneumonia due to COVID-19 virus 12/05/2021    Thrombocytopenia ([facility] 12/05/2021    Hypoxia 12/05/2021    DM due to underlying condition with diabetic nephropathy ([facility] 12/05/2021    E-coli UTI 12/05/2021    Class 3 severe obesity with body mass index (BMI) of 40.0 to 44.9 in adult ([facility] 12/05/2021    A-fib ([facility]     Hypertension     Chronic kidney disease      Past Medical History:   Diagnosis Date    [facility]     Anxiety     CAD (coronary artery disease)     Chronic kidney disease     Diabetes ([facility]     borderline    High cholesterol     Hypertension     Kidney stones     Psychiatric disorder     anxiety     Past Surgical History:   Procedure Laterality Date    HX CHOLECYSTECTOMY      HX HEART CATHETERIZATION      HX HYSTERECTOMY      HX ORTHOPAEDIC      bilateral knee surgery    HX ORTHOPAEDIC      right elbow    HX UROLOGICAL      stent placement    RI CARDIAC SURG PROCEDURE UNLIST      stent x 1     Family History   Problem Relation Age of Onset    Breast Cancer Maternal Aunt      Social History     Socioeconomic History    Marital status:      Spouse name: Not on file    Number of children: Not on file    Years of education: Not on file    Highest education level: Not on file   Occupational History    Not on file   Tobacco Use    Smoking status: Never Smoker    Smokeless tobacco: Never Used   Substance and Sexual Activity    Alcohol use: No    Drug use: No    Sexual activity: Not on file   Other Topics Concern    Not on file   Social History Narrative    Not on file     Social Determinants of Health     Financial Resource Strain:     Difficulty of Paying Living Expenses: Not on file   Food Insecurity:     Worried About [facility] in the Last Year: Not on file    Ran Out of Food in the Last Year: Not on file   Transportation Needs:     Lack of Transportation (Medical): Not on file    Lack of Transportation (Non-Medical): Not on file   Physical Activity:     Days of Exercise per Week: Not on file    Minutes of Exercise per Session: Not on file   Stress:     Feeling of Stress : Not on file   Social Connections:     Frequency of Communication with Friends and Family: Not on file    Frequency of Social Gatherings with Friends and Family: Not on file    Attends Synagogue Services: Not on file    Active Member of [address] [place] [address] or Organizations: Not on file    Attends Club or Organization Meetings: Not on file    Marital Status: Not on file   Intimate Partner Violence:     Fear of Current or Ex-Partner: Not on file    Emotionally Abused: Not on file    Physically Abused: Not on file    Sexually Abused: Not on file   Housing Stability:     Unable to Pay for Housing in the Last Year: Not on file    Number of [place] in the Last Year: Not on file    Unstable Housing in the Last Year: Not on file       Allergies:  Patient has no known allergies.      Medications:  Current Facility-Administered Medications   Medication Dose Route Frequency    dexamethasone (DECADRON) 4 mg/mL injection 20 mg  20 mg IntraVENous DAILY    [START ON 12/12/2021] dexamethasone (DECADRON) 4 mg/mL injection 10 mg  10 mg IntraVENous DAILY    LORazepam (ATIVAN) injection 0.5 mg  0.5 mg IntraVENous Q4H PRN    furosemide (LASIX) injection 20 mg  20 mg IntraVENous ONCE    tocilizumab (ACTEMRA) 810 mg in 0.9% sodium chloride 100 mL infusion  810 mg IntraVENous ONCE    melatonin (rapid dissolve) tablet 10 mg  10 mg Oral QHS    ascorbic acid (vitamin C) (VITAMIN C) tablet 500 mg  500 mg Oral BID    zinc sulfate (ZINCATE) 50 mg zinc (220 mg) capsule 1 Capsule  1 Capsule Oral DAILY    Warfarin - Pharmacy to Dose  1 Each Other Rx Dosing/Monitoring    HYDROcodone-acetaminophen (NORCO)  mg tablet 1 Tablet  1 Tablet Oral Q6H PRN    isosorbide mononitrate ER (IMDUR) tablet 30 mg  30 mg Oral DAILY    metoprolol succinate (TOPROL-XL) XL tablet 150 mg  150 mg Oral DAILY    nitroglycerin (NITROSTAT) tablet 0.4 mg  0.4 mg SubLINGual Q5MIN PRN    pantoprazole (PROTONIX) tablet 40 mg  40 mg Oral DAILY    sodium chloride (NS) flush 5-40 mL  5-40 mL IntraVENous Q8H    sodium chloride (NS) flush 5-40 mL  5-40 mL IntraVENous PRN    acetaminophen (TYLENOL) tablet 650 mg  650 mg Oral Q6H PRN    Or    acetaminophen (TYLENOL) suppository 650 mg  650 mg Rectal Q6H PRN    polyethylene glycol (MIRALAX) packet 17 g  17 g Oral DAILY PRN    ondansetron (ZOFRAN ODT) tablet 4 mg  4 mg Oral Q8H PRN    Or    ondansetron (ZOFRAN) injection 4 mg  4 mg IntraVENous Q6H PRN    enoxaparin (LOVENOX) injection 30 mg  30 mg SubCUTAneous Q12H    cholecalciferol (VITAMIN D3) (1000 Units /25 mcg) tablet 2,000 Units  2,000 Units Oral DAILY    insulin lispro (HUMALOG) injection   SubCUTAneous AC&HS    glucose chewable tablet 16 g  16 g Oral PRN    glucagon (GLUCAGEN) injection 1 mg  1 mg IntraMUSCular PRN    dextrose (D50W) injection syrg 12.5-25 g  25-50 mL IntraVENous PRN      Physical Exam:    Temp (24hrs), Av.7 °F (36.5 °C), Min:97 °F (36.1 °C), Max:98.6 °F (37 °C)    Visit Vitals  BP (!) 146/67   Pulse 64   Temp 98.2 °F (36.8 °C)   Resp 20   Ht 5' 6\" (1.676 m)   Wt 114.3 kg (252 lb)   SpO2 92%   BMI 40.67 kg/m²        GEN: WD Morbidly obese, in acute resp distress-and hypoxic  Sats around 92 on high flow and NRM  HEENT: Unicteric. EOMI intact  CHEST:  CTA posteriorly  CVS:Proned  ABD: Proned  GIRISH: No ratliff  EXT: No apparent swelling or redness on UE/LE joints. Skin: Dry and intact. No rash, no redness. CNS: A, OX3. Moves all extremity. CN grossly ok.       Microbiology  All Micro Results Procedure Component Value Units Date/Time    CULTURE, BLOOD [[id number]] Collected: 12/05/21 1106    Order Status: Completed Specimen: Blood Updated: 12/07/21 0655     Special Requests: NO SPECIAL REQUESTS        Culture result: NO GROWTH 2 DAYS       CULTURE, BLOOD [[id number]] Collected: 12/05/21 1106    Order Status: Completed Specimen: Blood Updated: 12/07/21 0655     Special Requests: NO SPECIAL REQUESTS        Culture result: NO GROWTH 2 DAYS       LEGIONELLA PNEUMOPHILA AG, URINE [[id number]]     Order Status: Sent Specimen: Urine     STREP Nathanvikkil Amis, URINE [[id number]]     Order Status: Sent Specimen: Urine     RESPIRATORY VIRUS PANEL W/COVID-19, PCR [[id number]]  (Abnormal) Collected: 12/05/21 1110    Order Status: Completed Specimen: Nasopharyngeal Updated: 12/05/21 1633     Adenovirus Not detected        Coronavirus 229E Not detected        Coronavirus HKU1 Not detected        Coronavirus CVNL63 Not detected        Coronavirus OC43 Not detected        SARS-CoV-2, PCR Detected        Comment: CALLED TO AND CORRECTLY REPEATED BY:  DR Cristina South [facility] ER ON [id number] AT 1626 HRS TO 1396.           Metapneumovirus Not detected        Rhinovirus and Enterovirus Not detected        Influenza A Not detected        Influenza A, subtype H1 Not detected        Influenza A, subtype H3 Not detected        INFLUENZA A H1N1 PCR Not detected        Influenza B Not detected        Parainfluenza 1 Not detected        Parainfluenza 2 Not detected        Parainfluenza 3 Not detected        Parainfluenza virus 4 Not detected        RSV by PCR Not detected        B. parapertussis, PCR Not detected        Bordetella pertussis - PCR Not detected        Chlamydophila pneumoniae DNA, QL, PCR Not detected        Mycoplasma pneumoniae DNA, QL, PCR Not detected       COVID-19 RAPID TEST [[id number]]  (Abnormal) Collected: 12/05/21 1109    Order Status: Completed Specimen: Nasopharyngeal Updated: 12/05/21 1136     Specimen source Nasopharyngeal        COVID-19 rapid test Detected        Comment:      The specimen is POSITIVE for SARS-CoV-2, the novel coronavirus associated with COVID-19. This test has been authorized by the FDA under an Emergency Use Authorization (EUA) for use by authorized laboratories. Fact sheet for Healthcare Providers: ConventionUpdate.co.nz  Fact sheet for Patients: ConventionUpdate.co.nz       Methodology: Isothermal Nucleic Acid Amplification  CALLED TO AND CORRECTLY REPEATED BY:  Lazaro Harding RN ER, TO JAF AT 1136 12/5/2021                Lab results:    Chemistry  Recent Labs     12/07/21  0730 12/06/21  0605 12/05/21  1108   * 174* 125*    143 138   K 4.1 4.1 3.7   * 112* 104   CO2 22 22 26   BUN 33* 21* 23*   CREA 1.28 1.12 1.26   CA 8.7 8.4* 8.9   AGAP 8 9 8   BUCR 26* 19 18   AP 66 68 84   TP 6.3* 6.4 7.4   ALB 2.3* 2.4* 2.9*   GLOB 4.0 4.0 4.5*   AGRAT 0.6* 0.6* 0.6*       CBC w/ Diff  Recent Labs     12/07/21  0730 12/06/21  0605 12/05/21  1108   WBC 12.7 3.0* 5.1   RBC 3.38* 3.55* 4.24   HGB 11.3* 11.7* 13.6   HCT 34.1* 35.4 41.6   * 87* 88*   GRANS 93* 81* 80*   LYMPH 4* 15* 11*   EOS 0 0 0       Imaging: report posted below as per radiologist     CTA chest 12/5       No evidence of a pulmonary embolism or aortic dissection.     Moderate groundglass opacities and airspace disease seen bilaterally suggesting  atypical pneumonia and the findings are typical in appearance for Covid 19  pneumonia.     Mildly enlarged right hilar lymph node which may be reactive. Advise follow-up  to exclude any neoplastic or myeloproliferative process.     15 mm slightly complex right thyroid lobe nodule.  Advise nonemergent thyroid  ultrasound.

## 2021-12-07 NOTE — PROGRESS NOTES
Pt c/o sob and is saying she \"cant breathe\". O2 sat currently 92% on 35L high flow. Respiratory notified.

## 2021-12-07 NOTE — ANESTHESIA PROCEDURE NOTES
Emergent Intubation  Performed by: Jb Howard CRNA  Authorized by: Jb Howard CRNA     Emergent Intubation:   Date/Time:  12/7/2021 1:44 PM  Indications:  Impending respiratory failure  Spontaneous Ventilation: present    Level of Consciousness: awake  Preoxygenated: Yes      Airway Documentation:   Airway:  ETT - Cuffed  Technique:  Intubating stylet  Advanced Technique:  Dukes  Insertion Site:  Oral  Blade Size:  3  ETT size (mm):  7.5  ETT Line Gonzalez:  Lips  ETT Insertion depth (cm):  22  Placement verified by: auscultation, EtCO2 and BBS    Attempts:  1  Difficult airway: No    Intubation discussed with patient and she said \"do whatever my  wants\". She agreed to the intubation. RN and respiratory therapist present. 100mcg fentanyl given iv, 100 mg propofol iv, 120mg succinylcholine iv, followed with 50 mg propofol and 10 mg ephedrine after intubation. Pt tolerated intubation well.

## 2021-12-07 NOTE — PROGRESS NOTES
Patient needs more oxygen no high flow   ?  DNI but as of now full code obtain palliative consult    Now with desats on 6liters  Discussed with patient rapid progression of oxygen requirements   She does not want intubation!!  But does want CPR  Chart updated as such

## 2021-12-07 NOTE — PROGRESS NOTES
Problem: Airway Clearance - Ineffective  Goal: Achieve or maintain patent airway  Outcome: Progressing Towards Goal     Problem: Gas Exchange - Impaired  Goal: Absence of hypoxia  Outcome: Progressing Towards Goal  Goal: Promote optimal lung function  Outcome: Progressing Towards Goal     Problem: Breathing Pattern - Ineffective  Goal: Ability to achieve and maintain a regular respiratory rate  Outcome: Progressing Towards Goal     Problem: Body Temperature -  Risk of, Imbalanced  Goal: Ability to maintain a body temperature within defined limits  Outcome: Progressing Towards Goal  Goal: Will regain or maintain usual level of consciousness  Outcome: Progressing Towards Goal  Goal: Complications related to the disease process, condition or treatment will be avoided or minimized  Outcome: Progressing Towards Goal     Problem: Isolation Precautions - Risk of Spread of Infection  Goal: Prevent transmission of infectious organism to others  Outcome: Progressing Towards Goal     Problem: Nutrition Deficits  Goal: Optimize nutrtional status  Outcome: Progressing Towards Goal     Problem: Risk for Fluid Volume Deficit  Goal: Maintain normal heart rhythm  Outcome: Progressing Towards Goal  Goal: Maintain absence of muscle cramping  Outcome: Progressing Towards Goal  Goal: Maintain normal serum potassium, sodium, calcium, phosphorus, and pH  Outcome: Progressing Towards Goal     Problem: Loneliness or Risk for Loneliness  Goal: Demonstrate positive use of time alone when socialization is not possible  Outcome: Progressing Towards Goal     Problem: Fatigue  Goal: Verbalize increase energy and improved vitality  Outcome: Progressing Towards Goal     Problem: Patient Education: Go to Patient Education Activity  Goal: Patient/Family Education  Outcome: Progressing Towards Goal     Problem: Falls - Risk of  Goal: *Absence of Falls  Description: Document Estephanie Fall Risk and appropriate interventions in the flowsheet.   Outcome: Progressing Towards Goal  Note: Fall Risk Interventions:  Mobility Interventions: PT Consult for mobility concerns         Medication Interventions: Patient to call before getting OOB, Teach patient to arise slowly    Elimination Interventions: Call light in reach              Problem: Patient Education: Go to Patient Education Activity  Goal: Patient/Family Education  Outcome: Progressing Towards Goal     Problem: Pressure Injury - Risk of  Goal: *Prevention of pressure injury  Description: Document Ru Scale and appropriate interventions in the flowsheet.   Outcome: Progressing Towards Goal     Problem: Patient Education: Go to Patient Education Activity  Goal: Patient/Family Education  Outcome: Progressing Towards Goal

## 2021-12-07 NOTE — PROGRESS NOTES
pt's , Marina Back,called us back. Called him at [phone]  He wants his wife to be full code and he wants her to be intubated and placed on a ventilator. Will call for  intubation in COVID-19 patient.    D/w Dr. Allison Whittaker

## 2021-12-07 NOTE — PROGRESS NOTES
Hospitalist Progress Note    Patient: Anne Bhagat MRN: [id number]  CSN: [id number]    YOB: 1950  Age: 66 y.o. Sex: female    DOA: 12/5/2021 LOS:  LOS: 2 days          Chief Complaint:    fatigue, cough and weakness for 1 wk    Assessment/Plan     A 67 yo female with past medical history of chronic A fib on  Coumadin, chronic leg pain, anxiety, DM with CKD and HTN presents to the ED with 1 wk of Fatigue, Cough and SOB. Found to be hypoxic, COVID-19 PNA and A fib with RVR. Sepsis protocol initiated, On NC O2 to keep saturation of at least 95%. Significant worsening overnight   On HFNC now with FI02 of 100% and 35L and NRB   02 sats holding after NRB at 93-94%   Had refused to prone after long discussion with her yesterday   Does not want to be intubated/on a ventilator     Convinced her to prone -finally agreed to do so   Increased Decadron to ARDS dosing starting with 20mg IV daily for 5 days   Appreciate Dr. Constanza Frias expertise yesterday   High amount of anxiety >add Ativan 0.5mg IV q6 hours     She is at high risk for death given her unvaccinated COVID vaccine, comorbidities and the severity of her illness and her current decision to refuse intubation/ventilation     Attempted to call pt's , Chhaya Gomez, to update him on her worsening status. No answer. Will try again.      COVID-19 PNA:  Droplet plus isolation  Covid protocol  Because she is hypoxemic and hypoxia, on 10 day course of decadron  Left shift with decreased lymphocytes  Has exceeded timeframe eligibiity for remdesivir    HCW PPE   CRP elevated but decreasing   Ferritin elevated, 1104  Pro-calcitonin not elevated  Will track inflammatory markers and if elevated, need to consult pharm monocolonal antibodies  Ordered sputum cultures   Immune-boosting vitamin cocktail   Consider ID consult if hypoxia persistent   CT scan obtained: No evidence of PE but does show acute atypical multifocal pulmonary infiltrates consistent with active COVID-19 pneumonia     E. Coli Urinary tract infection: Seen by ED on 11/30. UCx revealed with E.Coli. IV Rocephin.      Sepsis: Source could be COVID-19 PNA and urinary tract   Normal white blood count but left shift present  Elevated lactic acid has resolved     A. Fib with RVR:Toprol. Continue coumadin , heart rate in the 50s and 60s     DM with CKD: Monitor glucose and BG. SSI      HTN: Resume Toprol and Imdur      Thyroid nodule per CT: outpatient f/u     Thrombocytopenia: Monitor PLT.     BMI 40: Increase risks of M&M     GI/DVT prophylaxis ordered     CODE STATUS: Full      Disposition :  Patient Active Problem List   Diagnosis Code    Metabolic acidosis S96.1    Hypertension I10    Chronic kidney disease N18.9    Diabetes ([address] E11.9    A-fib ([address] I48.91    PA (acute kidney injury) ([address] N17.9    Melena K92.1    Sepsis ([address] A41.9    Pneumonia due to COVID-19 virus U07.1, J12.82    Thrombocytopenia ([address] D69.6    Hypoxia R09.02    DM due to underlying condition with diabetic nephropathy ([address] E08.21    Acute cystitis N30.00    E-coli UTI N39.0, B96.20    Class 3 severe obesity with body mass index (BMI) of 40.0 to 44.9 in adult ([facility]) E66.01, Z68.41    Acute respiratory failure with hypoxemia ([facility]) J96.01       Subjective:    Severe respiratory distress with SOB and coughing and agitation. desatting overnight and continuing this morning   Dropping her 02 sats to 80s    Review of systems:    Constitutional: denies fevers, chills, myalgias  Respiratory: denies SOB, cough  Cardiovascular: denies chest pain, palpitations  Gastrointestinal: denies nausea, vomiting, diarrhea      Vital signs/Intake and Output:  Visit Vitals  BP (!) 146/67   Pulse 64   Temp 98.2 °F (36.8 °C)   Resp 20   Ht 5' 6\" (1.676 m)   Wt 114.3 kg (252 lb)   SpO2 92%   BMI 40.67 kg/m²     Current Shift:  No intake/output data recorded.   Last three shifts:  12/05 1901 - 12/07 0700  In: 840 [P.O.:840]  Out: 600 [Urine:600]    Exam:    General: Alert older  female-year-old, appears weak and ill, in acute resp distress  Head/Neck: NCAT, supple, No masses, No lymphadenopathy  CVS:Regular rate and rhythm, no M/R/G, S1/S2 heard, no thrill  Lungs: tachypnea   Abdomen: Soft, Nontender, No distention, Normal Bowel sounds, No hepatomegaly  Extremities: No C/C/E, pulses palpable 2+  Skin:normal texture and turgor, no rashes, no lesions  Neuro:grossly normal , follows commands  Psych: agitated                 Labs: Results:       Chemistry Recent Labs     12/06/21  0605 12/05/21  1108   * 125*    138   K 4.1 3.7   * 104   CO2 22 26   BUN 21* 23*   CREA 1.12 1.26   CA 8.4* 8.9   AGAP 9 8   BUCR 19 18   AP 68 84   TP 6.4 7.4   ALB 2.4* 2.9*   GLOB 4.0 4.5*   AGRAT 0.6* 0.6*      CBC w/Diff Recent Labs     12/07/21  0730 12/06/21  0605 12/05/21  1108   WBC 12.7 3.0* 5.1   RBC 3.38* 3.55* 4.24   HGB 11.3* 11.7* 13.6   HCT 34.1* 35.4 41.6   * 87* 88*   GRANS 93* 81* 80*   LYMPH 4* 15* 11*   EOS 0 0 0      Cardiac Enzymes Recent Labs     12/05/21  1108      CKND1 CALCULATION NOT PERFORMED WHEN RESULT IS BELOW LINEAR LIMIT      Coagulation Recent Labs     12/06/21  0605 12/05/21  1911   PTP 23.7* 20.5*   INR 2.2* 1.8*   APTT 67.2*  --        Lipid Panel Lab Results   Component Value Date/Time    Cholesterol, total 208 (H) 12/20/2020 03:16 PM    HDL Cholesterol 47 12/20/2020 03:16 PM    LDL, calculated 128.6 (H) 12/20/2020 03:16 PM    VLDL, calculated 32.4 12/20/2020 03:16 PM    Triglyceride 162 (H) 12/20/2020 03:16 PM    CHOL/HDL Ratio 4.4 12/20/2020 03:16 PM      BNP No results for input(s): BNPP in the last 72 hours.    Liver Enzymes Recent Labs     12/06/21  0605   TP 6.4   ALB 2.4*   AP 68      Thyroid Studies Lab Results   Component Value Date/Time    TSH 1.02 11/11/2014 03:22 AM        Procedures/imaging: see electronic medical records for all procedures/Xrays and details which were not copied into this note but were reviewed prior to creation of Cheri Lira MD

## 2021-12-07 NOTE — CONSULTS
[facility]  Pulmonary, Critical Care, and Sleep Medicine    Name: Caren Flaherty MRN: [id number]   : 1950 Hospital: [facility] [place]    Date: 2021  Room: [address] [place] [address] Note                                              Consult requesting physician: Dr. Kun Kaplan  Reason for Consult: Severe Covid pneumonia with respiratory failure      Subjective/History of Present Illness:     Patient is a 66 y.o. female with PMHx significant for chronic A. fib on Coumadin therapy, diabetes mellitus, chronic kidney disease, hypertension, thyroid nodule was admitted to the hospital on 2021 with Covid infection. She has not been vaccinated for Covid infection. Patient progressed to worsening respiratory failure and hypoxemia. During admission, patient had declined intubation. Today, her  reported CODE STATUS and wanted patient to be intubated. Patient underwent COVID-19 intubation in the telemetry unit, and subsequently transferred to the ICU. Patient seen in ICU. She is currently on ventilator support, sedated. Some blood-tinged sputum in the endotracheal tube; does not appear to be traumatic intubation. Telemetrysinus rhythm. Blood pressurestable currently. No reports of chest pain or vomiting or diarrhea in the telemetry unit. Patient came to ER on  with right flank pain, and right basal pneumonia; was sent home with antibiotic therapyAugmentin and doxycycline. Urine culture ansensitive E. coli.       Review of Systems:  Limited due to patient condition      No Known Allergies   Past Medical History:   Diagnosis Date    A-fib ([facility] [address] [facility]     Anxiety     CAD (coronary artery disease)     Chronic kidney disease     Diabetes ([facility] [address] [facility]     borderline    High cholesterol     Hypertension     Kidney stones     Psychiatric disorder     anxiety      Past Surgical History:   Procedure Laterality Date    HX CHOLECYSTECTOMY      HX HEART CATHETERIZATION      HX HYSTERECTOMY      HX ORTHOPAEDIC      bilateral knee surgery    HX ORTHOPAEDIC      right elbow    HX UROLOGICAL      stent placement    MA CARDIAC SURG PROCEDURE UNLIST      stent x 1      Social History     Tobacco Use    Smoking status: Never Smoker    Smokeless tobacco: Never Used   Substance Use Topics    Alcohol use: No      Family History   Problem Relation Age of Onset    Breast Cancer Maternal Aunt       Prior to Admission medications    Medication Sig Start Date End Date Taking? Authorizing Provider   amoxicillin-clavulanate (Augmentin) 875-125 mg per tablet Take 1 Tablet by mouth two (2) times a day for 7 days. 11/30/21 12/7/21  Efren Moy MD   doxycycline (VIBRA-TABS) 100 mg tablet Take 1 Tablet by mouth two (2) times a day for 7 days. 11/30/21 12/7/21  Efren Moy MD   allopurinoL (ZYLOPRIM) 300 mg tablet Take 300 mg by mouth daily. Provider, Historical   torsemide (DEMADEX) 100 mg tablet Take 50 mg by mouth daily. Provider, Historical   HYDROcodone-acetaminophen (NORCO)  mg tablet Take 1 Tab by mouth every eight (8) hours as needed for Pain. Provider, Historical   metoprolol succinate (TOPROL-XL) 100 mg tablet Take 150 mg by mouth daily. Provider, Historical   warfarin (Coumadin) 2 mg tablet Take 4 mg by mouth daily. Provider, Historical   isosorbide mononitrate ER (IMDUR) 30 mg tablet Take 30 mg by mouth daily. Provider, Historical   magnesium oxide (MAG-OX) 400 mg tablet Take 400 mg by mouth daily. Provider, Historical   pantoprazole (PROTONIX) 40 mg tablet Take 40 mg by mouth daily. Provider, Historical   ergocalciferol (Vitamin D2) 1,250 mcg (50,000 unit) capsule Take 50,000 Units by mouth. Provider, Historical   docusate sodium (Colace) 100 mg capsule Take 100 mg by mouth two (2) times a day.     Provider, Historical   nitroglycerin (NITROSTAT) 0.4 mg SL tablet 0.4 mg by SubLINGual route every five (5) minutes as needed for Chest Pain. Up to 3 doses. Provider, Benito   hydrALAZINE (APRESOLINE) 50 mg tablet Take 25 mg by mouth three (3) times daily. Layla Alex MD   aspirin delayed-release 81 mg tablet Take  by mouth daily. Layla Alex MD   folic acid (FOLVITE) 1 mg tablet Take  by mouth daily. Layla Alex MD   potassium chloride (K-DUR, KLOR-CON) 20 mEq tablet Take  by mouth two (2) times a day.     Layla Alex MD     Current Facility-Administered Medications   Medication Dose Route Frequency    dexamethasone (DECADRON) 4 mg/mL injection 20 mg  20 mg IntraVENous DAILY    [START ON 12/12/2021] dexamethasone (DECADRON) 4 mg/mL injection 10 mg  10 mg IntraVENous DAILY    Warfarin-HOLD dose for today (12/07/21)   1 Each Other ONCE    fentaNYL citrate (PF) 50 mcg/mL injection        fentaNYL citrate (PF) injection 100 mcg  100 mcg IntraVENous ONCE    chlorhexidine (PERIDEX) 0.12 % mouthwash 10 mL  10 mL Oral Q12H    fentaNYL (PF) 900 mcg/30 ml infusion soln  0-200 mcg/hr IntraVENous TITRATE    midazolam in normal saline (VERSED) 1 mg/mL infusion  2-10 mg/hr IntraVENous TITRATE    melatonin (rapid dissolve) tablet 10 mg  10 mg Oral QHS    ascorbic acid (vitamin C) (VITAMIN C) tablet 500 mg  500 mg Oral BID    zinc sulfate (ZINCATE) 50 mg zinc (220 mg) capsule 1 Capsule  1 Capsule Oral DAILY    Warfarin - Pharmacy to Dose  1 Each Other Rx Dosing/Monitoring    isosorbide mononitrate ER (IMDUR) tablet 30 mg  30 mg Oral DAILY    metoprolol succinate (TOPROL-XL) XL tablet 150 mg  150 mg Oral DAILY    pantoprazole (PROTONIX) tablet 40 mg  40 mg Oral DAILY    sodium chloride (NS) flush 5-40 mL  5-40 mL IntraVENous Q8H    enoxaparin (LOVENOX) injection 30 mg  30 mg SubCUTAneous Q12H    cholecalciferol (VITAMIN D3) (1000 Units /25 mcg) tablet 2,000 Units  2,000 Units Oral DAILY    insulin lispro (HUMALOG) injection   SubCUTAneous AC&HS         Objective:   Vital Signs:    Visit Vitals  /75 Pulse 66   Temp 99.1 °F (37.3 °C)   Resp 16   Ht 5' 6\" (1.676 m)   Wt 114.3 kg (252 lb)   SpO2 93%   BMI 40.67 kg/m²       O2 Device: Heated, Hi flow nasal cannula   O2 Flow Rate (L/min): 40 l/min   Temp (24hrs), Av °F (36.7 °C), Min:97 °F (36.1 °C), Max:99.1 °F (37.3 °C)       Intake/Output:   Last shift:      No intake/output data recorded. Last 3 shifts:  1901 -  0700  In: 840 [P.O.:840]  Out: 600 [Urine:600]      Intake/Output Summary (Last 24 hours) at 2021 1529  [place] [address]  Gross per 24 hour   Intake 120 ml   Output 300 ml   Net -180 ml       Last 3 Recorded Weights in this Encounter    21 1013   Weight: 114.3 kg (252 lb)       ABG:    Lab Results   Component Value Date/Time    PHI 7.41 2021 01:15 PM    PCO2I 34.0 (L) 2021 01:15 PM    PO2I 52 (L) 2021 01:15 PM    HCO3I 21.8 (L) 2021 01:15 PM    FIO2I 100 2021 01:15 PM     Ventilator Mode: Assist control, VC+  Respiratory Rate  Back-Up Rate: 16  Insp Time (sec): 0.9 sec  I:E Ratio: 1:2.3  Ventilator Volumes  Vt Set (ml): 450 ml  Vt Exhaled (Machine Breath) (ml): 448 ml  Ve Observed (l/min): 9.3 l/min  Ventilator Pressures  PIP Observed (cm H2O): 26 cm H2O  MAP (cm H2O): 12  PEEP/VENT (cm H2O): 8 cm H20  Auto PEEP Observed (cm H2O): 0 cm H2O       Physical Exam: Limitations in exam due to full PPE requirements.   Patient currently intubated and sedated; appears comfortable; acyanotic  HEENT: pupils not dilated, reactive, no scleral jaundice, moist oral mucosa, no nasal drainage; neck supple  Neck: No adenopathy or thyroid swelling  Orotracheal tubeno significant respiratory secretions; blood-tinged secretions noted; no shalini hemoptysis   CVS: S1S2 no murmurs; JVD not elevated; telemetrycurrently sinus rhythmrate 60s  RS: Mod air entry bilaterally, decreased BS at bases, no wheezes, diffuse bilateral fine crackles, not tachypneic or in distress  Abd: soft, non tender, no hepatosplenomegaly, no abd distension, no guarding or rigidity, bowel sounds heard  Neuro: Intubated and sedated; limited exam  Extrm: no leg edema or swelling or clubbing  Skin: no rash  Lymphatic: no cervical or supraclavicular adenopathy  Vasc: DPs palpable both feet  MS: No joint swelling        Data:       Recent Results (from the past 24 hour(s))   GLUCOSE, POC    Collection Time: 12/06/21  4:45 PM   Result Value Ref Range    Glucose (POC) 130 (H) 70 - 110 mg/dL   BLOOD GAS, ARTERIAL POC    Collection Time: 12/06/21  4:49 PM   Result Value Ref Range    Device: NASAL CANNULA      FIO2 (POC) 3 %    pH (POC) 7.44 7.35 - 7.45      pCO2 (POC) 32.7 (L) 35.0 - 45.0 MMHG    pO2 (POC) 57 (L) 80 - 100 MMHG    HCO3 (POC) 22.1 22 - 26 MMOL/L    sO2 (POC) 90.7 (L) 92 - 97 %    Base deficit (POC) 1.5 mmol/L    Allens test (POC) NOT APPLICABLE      Site RIGHT BRACHIAL      Specimen type (POC) ARTERIAL      Performed by Maxwell Ignacio    GLUCOSE, POC    Collection Time: 12/06/21  9:43 PM   Result Value Ref Range    Glucose (POC) 155 (H) 70 - 110 mg/dL   GLUCOSE, POC    Collection Time: 12/07/21  6:10 AM   Result Value Ref Range    Glucose (POC) 130 (H) 70 - 110 mg/dL   CBC WITH AUTOMATED DIFF    Collection Time: 12/07/21  7:30 AM   Result Value Ref Range    WBC 12.7 4.6 - 13.2 K/uL    RBC 3.38 (L) 4.20 - 5.30 M/uL    HGB 11.3 (L) 12.0 - 16.0 g/dL    HCT 34.1 (L) 35.0 - 45.0 %    .9 (H) 78.0 - 100.0 FL    MCH 33.4 24.0 - 34.0 PG    MCHC 33.1 31.0 - 37.0 g/dL    RDW 14.1 11.6 - 14.5 %    PLATELET 516 (L) 392 - 420 K/uL    MPV 12.5 (H) 9.2 - 11.8 FL    NRBC 0.0 0  WBC    ABSOLUTE NRBC 0.00 0.00 - 0.01 K/uL    NEUTROPHILS 93 (H) 40 - 73 %    LYMPHOCYTES 4 (L) 21 - 52 %    MONOCYTES 3 3 - 10 %    EOSINOPHILS 0 0 - 5 %    BASOPHILS 0 0 - 2 %    IMMATURE GRANULOCYTES 1 (H) 0.0 - 0.5 %    ABS. NEUTROPHILS 11.8 (H) 1.8 - 8.0 K/UL    ABS. LYMPHOCYTES 0.5 (L) 0.9 - 3.6 K/UL    ABS.  MONOCYTES 0.3 0.05 - 1.2 K/UL ABS. EOSINOPHILS 0.0 0.0 - 0.4 K/UL    ABS. BASOPHILS 0.0 0.0 - 0.1 K/UL    ABS. IMM. GRANS. 0.1 (H) 0.00 - 0.04 K/UL    DF AUTOMATED     METABOLIC PANEL, COMPREHENSIVE    Collection Time: 12/07/21  7:30 AM   Result Value Ref Range    Sodium 142 136 - 145 mmol/L    Potassium 4.1 3.5 - 5.5 mmol/L    Chloride 112 (H) 100 - 111 mmol/L    CO2 22 21 - 32 mmol/L    Anion gap 8 3.0 - 18 mmol/L    Glucose 126 (H) 74 - 99 mg/dL    BUN 33 (H) 7.0 - 18 MG/DL    Creatinine 1.28 0.6 - 1.3 MG/DL    BUN/Creatinine ratio 26 (H) 12 - 20      GFR est AA 50 (L) >60 ml/min/1.73m2    GFR est non-AA 41 (L) >60 ml/min/1.73m2    Calcium 8.7 8.5 - 10.1 MG/DL    Bilirubin, total 0.4 0.2 - 1.0 MG/DL    ALT (SGPT) 19 13 - 56 U/L    AST (SGOT) 41 (H) 10 - 38 U/L    Alk.  phosphatase 66 45 - 117 U/L    Protein, total 6.3 (L) 6.4 - 8.2 g/dL    Albumin 2.3 (L) 3.4 - 5.0 g/dL    Globulin 4.0 2.0 - 4.0 g/dL    A-G Ratio 0.6 (L) 0.8 - 1.7     PROTHROMBIN TIME + INR    Collection Time: 12/07/21  7:30 AM   Result Value Ref Range    Prothrombin time 32.1 (H) 11.5 - 15.2 sec    INR 3.3 (H) 0.8 - 1.2     NT-PRO BNP    Collection Time: 12/07/21  7:30 AM   Result Value Ref Range    NT pro-BNP 1,726 (H) 0 - 900 PG/ML   GLUCOSE, POC    Collection Time: 12/07/21 11:56 AM   Result Value Ref Range    Glucose (POC) 157 (H) 70 - 110 mg/dL   BLOOD GAS, ARTERIAL POC    Collection Time: 12/07/21  1:15 PM   Result Value Ref Range    Device: High Flow Nasal Cannula      FIO2 (POC) 100 %    pH (POC) 7.41 7.35 - 7.45      pCO2 (POC) 34.0 (L) 35.0 - 45.0 MMHG    pO2 (POC) 52 (L) 80 - 100 MMHG    HCO3 (POC) 21.8 (L) 22 - 26 MMOL/L    sO2 (POC) 87.4 (L) 92 - 97 %    Base deficit (POC) 2.3 mmol/L    Allens test (POC) NOT APPLICABLE      Site RIGHT BRACHIAL      Specimen type (POC) ARTERIAL      Performed by Ernestina Godfrey Respiratory          Chemistry Recent Labs     12/07/21  0730 12/06/21  0605 12/05/21  1108   * 174* 125*    143 138   K 4.1 4.1 3.7   * 112* 104   CO2 22 22 26   BUN 33* 21* 23*   CREA 1.28 1.12 1.26   CA 8.7 8.4* 8.9   MG  --   --  2.0   AGAP 8 9 8   BUCR 26* 19 18   AP 66 68 84   TP 6.3* 6.4 7.4   ALB 2.3* 2.4* 2.9*   GLOB 4.0 4.0 4.5*   AGRAT 0.6* 0.6* 0.6*        Lactic Acid Lactic acid   Date Value Ref Range Status   11/11/2014 0.9 0.4 - 2.0 MMOL/L Final     No results for input(s): LAC in the last 72 hours. Liver Enzymes Protein, total   Date Value Ref Range Status   12/07/2021 6.3 (L) 6.4 - 8.2 g/dL Final     Albumin   Date Value Ref Range Status   12/07/2021 2.3 (L) 3.4 - 5.0 g/dL Final     Globulin   Date Value Ref Range Status   12/07/2021 4.0 2.0 - 4.0 g/dL Final     A-G Ratio   Date Value Ref Range Status   12/07/2021 0.6 (L) 0.8 - 1.7   Final     Alk.  phosphatase   Date Value Ref Range Status   12/07/2021 66 45 - 117 U/L Final     Recent Labs     12/07/21  0730 12/06/21  0605 12/05/21  1108   TP 6.3* 6.4 7.4   ALB 2.3* 2.4* 2.9*   GLOB 4.0 4.0 4.5*   AGRAT 0.6* 0.6* 0.6*   AP 66 68 84        CBC w/Diff Recent Labs     12/07/21  0730 12/06/21  0605 12/05/21  1108   WBC 12.7 3.0* 5.1   RBC 3.38* 3.55* 4.24   HGB 11.3* 11.7* 13.6   HCT 34.1* 35.4 41.6   * 87* 88*   GRANS 93* 81* 80*   LYMPH 4* 15* 11*   EOS 0 0 0        Cardiac Enzymes No results found for: CPK, CK, CKMMB, CKMB, RCK3, CKMBT, CKNDX, CKND1, MANE, TROPT, TROIQ, BANDAR, TROPT, TNIPOC, BNP, BNPP     BNP No results found for: BNP, BNPP, XBNPT     Coagulation Recent Labs     12/07/21  0730 12/06/21  0605 12/05/21  1911   PTP 32.1* 23.7* 20.5*   INR 3.3* 2.2* 1.8*   APTT  --  67.2*  --          Thyroid  Lab Results   Component Value Date/Time    TSH 1.02 11/11/2014 03:22 AM       No results found for: T4     Urinalysis Lab Results   Component Value Date/Time    Color YELLOW 12/05/2021 03:02 PM    Appearance CLEAR 12/05/2021 03:02 PM    Specific gravity 1.014 12/05/2021 03:02 PM    pH (UA) 5.0 12/05/2021 03:02 PM    Protein 100 (A) 12/05/2021 03:02 PM    Glucose Negative 12/05/2021 03:02 PM    Ketone Negative 12/05/2021 03:02 PM    Bilirubin Negative 12/05/2021 03:02 PM    Urobilinogen 0.2 12/05/2021 03:02 PM    Nitrites Negative 12/05/2021 03:02 PM    Leukocyte Esterase TRACE (A) 12/05/2021 03:02 PM    Epithelial cells 2+ 12/05/2021 03:02 PM    Bacteria FEW (A) 12/05/2021 03:02 PM    WBC 4 to 10 12/05/2021 03:02 PM    RBC 0 to 3 12/05/2021 03:02 PM          Culture data during this hospitalization. All Micro Results     Procedure Component Value Units Date/Time    CULTURE, BLOOD [[id number]] Collected: 12/05/21 1106    Order Status: Completed Specimen: Blood Updated: 12/07/21 0655     Special Requests: NO SPECIAL REQUESTS        Culture result: NO GROWTH 2 DAYS       CULTURE, BLOOD [[id number]] Collected: 12/05/21 1106    Order Status: Completed Specimen: Blood Updated: 12/07/21 0655     Special Requests: NO SPECIAL REQUESTS        Culture result: NO GROWTH 2 DAYS       LEGIONELLA PNEUMOPHILA AG, URINE [[id number]]     Order Status: Sent Specimen: Urine     STREP PNEUMO AG, URINE [[id number]]     Order Status: Sent Specimen: Urine     RESPIRATORY VIRUS PANEL W/COVID-19, PCR [[id number]]  (Abnormal) Collected: 12/05/21 1110    Order Status: Completed Specimen: Nasopharyngeal Updated: 12/05/21 1633     Adenovirus Not detected        Coronavirus 229E Not detected        Coronavirus HKU1 Not detected        Coronavirus CVNL63 Not detected        Coronavirus OC43 Not detected        SARS-CoV-2, PCR Detected        Comment: CALLED TO AND CORRECTLY REPEATED BY:  DR Rahul Alonso [facility] ON [id number] AT 1626 HRS TO 1396.           Jorge Garcia Not detected        Rhinovirus and Enterovirus Not detected        Influenza A Not detected        Influenza A, subtype H1 Not detected        Influenza A, subtype H3 Not detected        INFLUENZA A H1N1 PCR Not detected        Influenza B Not detected        Parainfluenza 1 Not detected        Parainfluenza 2 Not detected        Parainfluenza 3 Not detected Parainfluenza virus 4 Not detected        RSV by PCR Not detected        B. parapertussis, PCR Not detected        Bordetella pertussis - PCR Not detected        Chlamydophila pneumoniae DNA, QL, PCR Not detected        Mycoplasma pneumoniae DNA, QL, PCR Not detected       COVID-19 RAPID TEST [[id number]]  (Abnormal) Collected: 12/05/21 1109    Order Status: Completed Specimen: Nasopharyngeal Updated: 12/05/21 1136     Specimen source Nasopharyngeal        COVID-19 rapid test Detected        Comment:      The specimen is POSITIVE for SARS-CoV-2, the novel coronavirus associated with COVID-19. This test has been authorized by the FDA under an Emergency Use Authorization (EUA) for use by authorized laboratories. Fact sheet for Healthcare Providers: Dfmeibao.comdate.co.nz  Fact sheet for Patients: EVIAGENICS.co.nz       Methodology: Isothermal Nucleic Acid Amplification  CALLED TO AND CORRECTLY REPEATED BY:  Roxanne Griffin RN ER, TO SHAEF AT 1136 12/5/2021                  LE Doppler       ECHO       Images report reviewed by me:  CT 12/5 (Most Recent)  Results from [place] encounter on 12/05/21    CTA CHEST W OR W WO CONT    Narrative  EXAM: CTA chest    INDICATION: Rule out PE , Covid positive    COMPARISON: None    TECHNIQUE: Axial CT imaging from the thoracic inlet through the diaphragm with  intravenous contrast. Coronal and sagittal MIP reformats were generated. One or  more dose reduction techniques were used on this CT: automated exposure control,  adjustment of the mAs and/or kVp according to patient size, and iterative  reconstruction techniques. The specific techniques used on this CT exam have  been documented in the patient's electronic medical record.  Digital Imaging and  Communications in Medicine (DICOM) format image data are available to  nonaffiliated external healthcare facilities or entities on a secure, media  free, reciprocally searchable basis with patient authorization for at least a  12-month period after this study. _______________    FINDINGS:    EXAM QUALITY: Overall exam quality is satisfactory. Pulmonary arterial  enhancement is adequate with adequate breath hold and no significant artifact. PULMONARY ARTERIES: No evidence of pulmonary embolism. THYROID: There is a 15 mm right thyroid lobe nodule which appears complex. Advise nonemergent thyroid ultrasound. LYMPH Nodes: There is a mildly enlarged right infrahilar lymph node measuring 1  cm image 89. PLEURA: There are no pleural effusion. HEART: Cardiac size is enlarged. There is no pericardial effusion. There is mild  tricuspid regurgitation into the hepatic veins. Moderate to severe calcific  coronary disease present. VASCULATURE/MEDIASTINUM: Main pulmonary artery is enlarged measuring 4 cm  suggesting chronic pulmonary hypertension. Small hiatal hernia present. LUNGS: Moderate groundglass opacities and airspace disease seen bilaterally  suggesting atypical pneumonia. The findings are typical in appearance for Covid  19 pneumonia. AIRWAY: Patent    UPPER ABDOMEN: Unremarkable. OTHER: No acute or aggressive osseous abnormalities identified. _______________    Impression  No evidence of a pulmonary embolism or aortic dissection. Moderate groundglass opacities and airspace disease seen bilaterally suggesting  atypical pneumonia and the findings are typical in appearance for Covid 19  pneumonia. Mildly enlarged right hilar lymph node which may be reactive. Advise follow-up  to exclude any neoplastic or myeloproliferative process. 15 mm slightly complex right thyroid lobe nodule. Advise nonemergent thyroid  ultrasound. CXR reviewed by me:  XR 12/5 (Most Recent).   Results from [place] encounter on 12/05/21    XR CHEST PORT    Narrative  EXAM: CHEST RADIOGRAPH    CLINICAL INDICATION/HISTORY: irreg HR  > Additional: None    COMPARISON: 11/30/2021    TECHNIQUE: Portable frontal view of the chest    _______________    FINDINGS:    SUPPORT DEVICES: None. HEART AND MEDIASTINUM: Heart is enlarged. Atherosclerotic calcification in the  aorta. LUNGS AND PLEURAL SPACES: Increased interstitial markings since the prior exam.  No pleural effusion or pneumothorax. BONES AND SOFT TISSUES: Scoliosis with multilevel degenerative changes. _______________    Impression  Cardiomegaly with increased interstitial edema since the prior exam.         IMPRESSION:   · Acute respiratory failure with hypoxemia  · Severe Covid pneumonia  · Anemia  · Thrombocytopenia  · Chronic kidney disease stage III  · Paroxysmal atrial fibrillation  ·   Patient Active Problem List   Diagnosis Code    Hypertension I10    Paroxysmal A-fib ([facility] [address] I48.0    Pneumonia due to COVID-19 virus U07.1, J12.82    Thrombocytopenia ([facility]) D69.6    Hypoxia R09.02    DM due to underlying condition with diabetic nephropathy ([facility]) E08.21    E-coli UTI N39.0, B96.20    Class 3 severe obesity with body mass index (BMI) of 40.0 to 44.9 in adult ([facility]) E66.01, Z68.41    Acute respiratory failure with hypoxemia ([facility]) J96.01    Pneumonia due to severe acute respiratory syndrome coronavirus 2 (SARS-CoV-2) U07.1, J12.82    Stage 3a chronic kidney disease ([facility]) N18.31    Anemia D64.9       · Code status: Full code      RECOMMENDATIONS:   Respiratory: Patient with severe Covid pneumonia, and hypoxemia; patient presenting to ICU in ARDS state due to Covid inflammation. Patient has been intubated; VCV mode of ventilation20/400/100 percent/PEEP 10. Consider proning to improve oxygenation. Ventilator and sedation bundles ordered. Sedationmidazolam and fentanyl infusion. Paralyticconsider cisatracurium infusion during proning if patient does not tolerate. Keep SPO2 >=88%. HOB 30 degree elevation while not proning. Aspiration precautions.   CTA chest on admission reviewedno PE; severe bilateral pulmonary opacities and groundglass densities consistent with severe Covid pneumonia; no significant pleural effusions; right hilar adenopathy likely reactivewill need follow-up in outpatient; thyroid nodule will also need follow-up in outpatient. CVS: Blood pressure currently stable. proBNP mildly elevated; troponins were mildly elevated on admissionpeak troponin 0.07. Echocardiogrampending. Ultrasound of the legsordered. Patient on Coumadin at home for paroxysmal atrial fibrillation; INR therapeutic. ID: Severe Covid pneumonia. Unvaccinated status. Respiratory viral panel was positive for SARS-CoV-2 PCR. Procalcitonin 0.28not elevated  CRP was elevated14.9 on admission; ferritin was elevated as well. S/p 1 dose of Tocilizumab today morning. Dexamethasone20 mg daily for 5 doses, followed by 10 mg daily. Enoxaparin30 mg every 12 hours. CTA chest negative for PEs on admission. Ultrasound of the legs ordered. Patient currently not needing antibiotic therapy; procalcitonin not elevated as well. Continue vitamin supplements. ID on case. Hematology/Oncology: Mild anemiahemoglobin 11.3; mild thrombocytopeniaplatelets 032NIVOZM improvement compared to yesterday. INR 3.3 this morningpatient on chronic Coumadin therapy. Monitor hemoglobin and platelets while on anticoagulation. Renal: Known CKD; creatinine 1.28; patient appears to have CKD stage III at baseline. Sodium and potassium normal.  GI/: Keep n.p.o. for now. PPI prophylaxis. Montoya catheter needed. LFTs normal.  Endocrine: Monitor BS. Sliding scale insulin if needed while patient on steroids. Neurology: Intubated and sedatedlimited exam.  Skin/Wound: ICU nursing care. Electrolytes: Replace electrolytes per ICU electrolyte replacement protocol. IVF: Currently no maintenance IV fluids. Echo pending to evaluate ejection fraction.   Nutrition: Keep n.p.o. for now; consider tube feeding from tomorrow if patient not being prone. Prophylaxis: DVT Prophylaxis: Enoxaparin. GI Prophylaxis: Protonix. Restraints: Wrist soft restraints for patient interfering with medical therapy/management and patient safety. Lines/Tubes: PIVs; midline planned  ETT: 12/7/2021   OGT/NGT: 12/7/2021   Montoya: 12/7/2021 (Medically necessary for strict input/output monitoring in critically ill patient, will remove it when not needed. Montoya bundle followed). Advance Directive/Palliative Care: consulted    Prognosis appears very poor in this patient with Covid pneumonia with severe hypoxemic respiratory failure and ARDS needing to be intubated now; mortality is very high; risk of multiorgan failure with complications such as refractory hypoxemia, encephalopathy, critical illness myopathy, acute renal failure needing dialysis, shock state needing pressors, prolonged ventilator support, risk of tracheostomy and chronic debility/bedbound state. Quality Care: PPI, DVT prophylaxis, HOB elevated, Infection control all reviewed and addressed. Care of plan d/w hospitalist team, RN, RT. High complexity decision making was performed during the evaluation of this patient at high risk for decompensation with multiple organ involvement. Total critical care time spent rendering care exclusive of procedures/family discussion/coordination of care: 56 minutes. AddendumI have called and left message for  [phone]. He goes to voicemail, and advised patient's  to call ICU for updates, and ICU number provided. Please note: Voice-recognition software may have been used to generate this report, which may have resulted in some phonetic-based errors in grammar and contents. Even though attempts were made to correct all the mistakes, some may have been missed, and remained in the body of the document.       August Gonsalves MD  12/7/2021         Note  Patient with SARS-CoV-2 pneumonia with severe pneumonia and hypoxemic respiratory failure; patient was on high flow nasal cannula oxygen; patient is not vaccinated, and in the age group that is being infected with delta virus strain; the strain causes severe respiratory failure and complications reported in the [place] and worldwide; unfortunately, the prognosis is poor in unvaccinated patients, with a high risk of complications, multiorgan failure, chronic hypoxemia and respiratory failure, intubation, mechanical ventilation, thromboembolic disease risk in multiple organs, high risk for long Covid syndrome, and high risk for mortality. The CDC federal and state guidelines have emphasized repeatedly the importance of vaccination to prevent severe infection, mortality, disabilities, long Covid syndrome from Covid virus with several strains currently in the [place]. The treatment protocols are followed based on local hospital protocols, with guidance from centers such as [facility] protocols. [facility] is not part of any research protocol. The local hospital protocols have been guided by [facility] pharmacy department. Covid plasma is no longer considered standard of care in the [facility] due to lack of benefit in trials. Nebulization and CPAP therapies are not considered as part of the protocol in [facility] due to high risk of aerosolization, and high risks of spreading Covid infection to the healthcare staff. Dexamethasone considered standard of care in patients admitted with severe Covid pneumonia; variable dosing has been reported with this medication. ECMO and CRRT facilities not available at [facility]; patients with severe Covid pneumonia and respiratory failure are usually unstable for transportation when they are in critically ill state with a high risk of dying during transportation.     REMDESIVIR-not recommended in patients with late presentation with severe respiratory failure     Tocilizumab anti-inflammatory medicationhas shown some benefit in patients with elevated inflammatory markers. Ivermectin not considered standard of care in the [facility] [facility]; no significant randomized controlled studies done in the [facility] to suggest benefit of ivermectin in patients with COVID-19 with severe respiratory failure; risk of toxicity related to ivermectin outweighs any potential benefits based on consensus opinion in the [facility] [facility]. This drug is not considered standard of care in [facility].

## 2021-12-07 NOTE — PROGRESS NOTES
Care manager noted patient has had worsening respiratory status with O2 sats 84% while on HFNC 40L/100% - patient has stated that she does not want intubation but wants chest compressions per primary nurse. Will continue to monitor patient.     Care Management Interventions  Transition of Care Consult (CM Consult): Discharge Planning  Health Maintenance Reviewed: Yes  Support Systems: Spouse/Significant Other  The Plan for Transition of Care is Related to the Following Treatment Goals : [place] and physician follow up vs SNF  The Patient and/or Patient Representative was Provided with a Choice of Provider and Agrees with the Discharge Plan?: Yes  Name of the Patient Representative Who was Provided with a Choice of Provider and Agrees with the Discharge Plan: pt  Freedom of Choice List was Provided with Basic Dialogue that Supports the Patient's Individualized Plan of Care/Goals, Treatment Preferences and Shares the Quality Data Associated with the Providers?: Yes  Discharge Location  Discharge Placement: Home with home health/TBD based on patient response to therapy

## 2021-12-08 NOTE — PROGRESS NOTES
CM notes patient remains in ICU on vent. Patient not stable enough to transfer out of an acute care setting at this time. CM to continue to monitor and remain available. Care Management/Patient spouse Conversation: CM spoke with patient's spouse. Spouse confirmed contact info and PCP. Patient states patient does not use DME at home and does not use home O2. Care Management Interventions  PCP Verified by CM:  Yes  Transition of Care Consult (CM Consult): Discharge Planning  Health Maintenance Reviewed: Yes  Support Systems: Spouse/Significant Other  Confirm Follow Up Transport: Family  The Plan for Transition of Care is Related to the Following Treatment Goals : HH and physician follow up vs SNF  The Patient and/or Patient Representative was Provided with a Choice of Provider and Agrees with the Discharge Plan?: Yes  Name of the Patient Representative Who was Provided with a Choice of Provider and Agrees with the Discharge Plan: pt  Freedom of Choice List was Provided with Basic Dialogue that Supports the Patient's Individualized Plan of Care/Goals, Treatment Preferences and Shares the Quality Data Associated with the Providers?: Yes  Discharge Location  Discharge Placement:  (home with [facility] versus home with [facility] and home O2 versus SNF)

## 2021-12-08 NOTE — PROGRESS NOTES
Found pt on different settings than what was given in report. So settings were changed back. Didn't see a note of where and if anyone made those specific changes.  I time is 1.0 RR 18. fio2 80%  peep is on 10

## 2021-12-08 NOTE — PROGRESS NOTES
Comprehensive Nutrition Assessment    Type and Reason for Visit: Initial    Nutrition Recommendations/Plan: starting tube feeds with recommendations below to avoid prolong NPO status     12/08/21 1027   Enteral Nutrition   Feeding Route Orogastric   EN Formula Renal  (Nepro)   Schedule Continuous   Feeding Regimen Goal rate: Nepro @ 40ml/hr. Start @ 10ml/hr, increase by 10ml Q6 until goal reached. Water Flushes Defer to MD   Goal EN & Flush Order Provides Nepro @ 40ml/hr will provide:     1584kcals, 71g PRO, 147g CHO, 638ml free water. Nutrition Assessment:  PMHx: Chronic A fib, chronic leg pain, DM with CKD, HTN. Patient admitted with COVID 19 PNA, a fib RVR, sepsis. Patient was intubated 12/7 and transfered to ICU. Estimated Daily Nutrient Needs:  Energy (kcal): 8022-6844; Weight Used for Energy Requirements: Current  Protein (g): 91; Weight Used for Protein Requirements: Current (0.8g)  Fluid (ml/day): 2370-4630; Method Used for Fluid Requirements: 1 ml/kcal    Nutrition Related Findings:  Lab: GFR est nonAA 45. Med: zinc, warfarin, protonix, melatonin, humalog, vit c, vit d. +BM 12/7. No weight loss noted per chart review. OG tube in place. NPO x2 days.  Recommend Nepro TF d/t hx of CKD    Wounds:    None       Current Nutrition Therapies:  DIET NPO    Anthropometric Measures:  · Height:  5' 6\" (167.6 cm)  · Current Body Wt:  114.3 kg (252 lb) (12/7/21)   · Admission Body Wt:  252 lb    · Usual Body Wt:  112 kg (247 lb) (12/21/2020)     · Ideal Body Wt:  130 lbs:  193.8 %   · BMI Category:  Obese class 3 (BMI 40.0 or greater)       Nutrition Diagnosis:   · Inadequate oral intake related to impaired respiratory function as evidenced by NPO or clear liquid status due to medical condition, intubation    Nutrition Interventions:   Food and/or Nutrient Delivery: Start tube feeding  Nutrition Education and Counseling: No recommendations at this time  Coordination of Nutrition Care: Continue to monitor while inpatient    Goals:  Start TF to meet nutritional needs within the next 2-3 days. Nutrition Monitoring and Evaluation:   Behavioral-Environmental Outcomes: None identified  Food/Nutrient Intake Outcomes: Diet advancement/tolerance  Physical Signs/Symptoms Outcomes: Biochemical data, Skin, Weight, GI status, Nausea/vomiting    Discharge Planning:     Too soon to determine     Electronically signed by Marshall Hart RD on 12/8/2021 at 10:28 AM

## 2021-12-08 NOTE — PROGRESS NOTES
P[facility] Specialists  Pulmonary, Critical Care, and Sleep Medicine    Name: Brina Watson MRN: [id number]   : 1950 Hospital: [facility]    Date: 2021  Room: [address] [place] [address] Note                                              Consult requesting physician: Dr. Ade Blanco  Reason for Consult: Severe Covid pneumonia with respiratory failure      Subjective/History of Present Illness:     Patient is a 66 y.o. female with PMHx significant for chronic A. fib on Coumadin therapy, diabetes mellitus, chronic kidney disease, hypertension, thyroid nodule was admitted to the hospital on 2021 with Covid infection. She has not been vaccinated for Covid infection. Patient progressed to worsening respiratory failure and hypoxemia. During admission, patient had declined intubation. Today, her  reported CODE STATUS and wanted patient to be intubated. Patient underwent COVID-19 intubation in the telemetry unit, and subsequently transferred to the ICU. 2021  Patient in ICU. Patient on ventilator support. No acute issues overnight. No significant respiratory secretions or shalini hemoptysis reported. Afebrile; telemetrymild sinus bradycardia. Blood pressure stable; not on pressor. Urine output550 mL overnight. Patient came to ER on  with right flank pain, and right basal pneumonia; was sent home with antibiotic therapyAugmentin and doxycycline. Urine culture ansensitive E. coli.       Review of Systems:  Limited due to patient condition      No Known Allergies   Past Medical History:   Diagnosis Date    [facility]fib [facility] [address]     Anxiety     CAD (coronary artery disease)     Chronic kidney disease     Diabetes ([facility] [address]     borderline    High cholesterol     Hypertension     Kidney stones     Psychiatric disorder     anxiety      Past Surgical History:   Procedure Laterality Date    HX CHOLECYSTECTOMY      HX HEART CATHETERIZATION      HX HYSTERECTOMY      HX ORTHOPAEDIC      bilateral knee surgery    HX ORTHOPAEDIC      right elbow    HX UROLOGICAL      stent placement    [place] CARDIAC SURG PROCEDURE UNLIST      stent x 1      Social History     Tobacco Use    Smoking status: Never Smoker    Smokeless tobacco: Never Used   Substance Use Topics    Alcohol use: No      Family History   Problem Relation Age of Onset    Breast Cancer Maternal Aunt       Prior to Admission medications    Medication Sig Start Date End Date Taking? Authorizing Provider   amoxicillin-clavulanate (Augmentin) 875-125 mg per tablet Take 1 Tablet by mouth two (2) times a day for 7 days. 11/30/21 12/7/21  Matilde Moy MD   doxycycline (VIBRA-TABS) 100 mg tablet Take 1 Tablet by mouth two (2) times a day for 7 days. 11/30/21 12/7/21  Matilde Moy MD   allopurinoL (ZYLOPRIM) 300 mg tablet Take 300 mg by mouth daily. Provider, Historical   torsemide (DEMADEX) 100 mg tablet Take 50 mg by mouth daily. Provider, Historical   HYDROcodone-acetaminophen (NORCO)  mg tablet Take 1 Tab by mouth every eight (8) hours as needed for Pain. Provider, Historical   metoprolol succinate (TOPROL-XL) 100 mg tablet Take 150 mg by mouth daily. Provider, Historical   warfarin (Coumadin) 2 mg tablet Take 4 mg by mouth daily. Provider, Historical   isosorbide mononitrate ER (IMDUR) 30 mg tablet Take 30 mg by mouth daily. Provider, Historical   magnesium oxide (MAG-OX) 400 mg tablet Take 400 mg by mouth daily. Provider, Historical   pantoprazole (PROTONIX) 40 mg tablet Take 40 mg by mouth daily. Provider, Historical   ergocalciferol (Vitamin D2) 1,250 mcg (50,000 unit) capsule Take 50,000 Units by mouth. Provider, Historical   docusate sodium (Colace) 100 mg capsule Take 100 mg by mouth two (2) times a day. Provider, Historical   nitroglycerin (NITROSTAT) 0.4 mg SL tablet 0.4 mg by SubLINGual route every five (5) minutes as needed for Chest Pain.  Up to 3 doses.    Provider, Benito   hydrALAZINE (APRESOLINE) 50 mg tablet Take 25 mg by mouth three (3) times daily. Layla Alex MD   aspirin delayed-release 81 mg tablet Take  by mouth daily. Layla Alex MD   folic acid (FOLVITE) 1 mg tablet Take  by mouth daily. Layla Alex MD   potassium chloride (K-DUR, KLOR-CON) 20 mEq tablet Take  by mouth two (2) times a day.     Layla Alex MD     Current Facility-Administered Medications   Medication Dose Route Frequency    dexamethasone (DECADRON) 4 mg/mL injection 20 mg  20 mg IntraVENous DAILY    [START ON 12/12/2021] dexamethasone (DECADRON) 4 mg/mL injection 10 mg  10 mg IntraVENous DAILY    chlorhexidine (PERIDEX) 0.12 % mouthwash 10 mL  10 mL Oral Q12H    fentaNYL (PF) 900 mcg/30 ml infusion soln  0-200 mcg/hr IntraVENous TITRATE    midazolam in normal saline (VERSED) 1 mg/mL infusion  2-10 mg/hr IntraVENous TITRATE    insulin lispro (HUMALOG) injection   SubCUTAneous Q6H    melatonin (rapid dissolve) tablet 10 mg  10 mg Oral QHS    ascorbic acid (vitamin C) (VITAMIN C) tablet 500 mg  500 mg Oral BID    zinc sulfate (ZINCATE) 50 mg zinc (220 mg) capsule 1 Capsule  1 Capsule Oral DAILY    Warfarin - Pharmacy to Dose  1 Each Other Rx Dosing/Monitoring    isosorbide mononitrate ER (IMDUR) tablet 30 mg  30 mg Oral DAILY    metoprolol succinate (TOPROL-XL) XL tablet 150 mg  150 mg Oral DAILY    pantoprazole (PROTONIX) tablet 40 mg  40 mg Oral DAILY    sodium chloride (NS) flush 5-40 mL  5-40 mL IntraVENous Q8H    enoxaparin (LOVENOX) injection 30 mg  30 mg SubCUTAneous Q12H    cholecalciferol (VITAMIN D3) (1000 Units /25 mcg) tablet 2,000 Units  2,000 Units Oral DAILY         Objective:   Vital Signs:    Visit Vitals  BP (!) 146/70   Pulse (!) 57   Temp (!) 96.6 °F (35.9 °C)   Resp 21   Ht 5' 6\" (1.676 m)   Wt 114.3 kg (252 lb)   SpO2 100%   BMI 40.67 kg/m²       O2 Device: Endotracheal tube, Ventilator   O2 Flow Rate (L/min): 40 l/min   Temp (24hrs), Av °F (36.1 °C), Min:96 °F (35.6 °C), Max:98.9 °F (37.2 °C)       Intake/Output:   Last shift:      701 - 1900  In: 50.5 [I.V.:50.5]  Out: -     Last 3 shifts: 1901 -  07  In: 142.3 [P.O.:120; I.V.:22.3]  Out: 1100 [Urine:1100]      Intake/Output Summary (Last 24 hours) at 2021 1252  Last data filed at 2021 0800  Gross per 24 hour   Intake 72.75 ml   Output 800 ml   Net -727.25 ml       Last 3 Recorded Weights in this Encounter    21 1013 21 1827   Weight: 114.3 kg (252 lb) 114.3 kg (252 lb)       ABG:    Lab Results   Component Value Date/Time    PHI 7.36 2021 05:12 AM    PCO2I 39.2 2021 05:12 AM    PO2I 65 (L) 2021 05:12 AM    HCO3I 21.9 (L) 2021 05:12 AM    FIO2I 80 2021 05:12 AM     Ventilator Mode: Assist control, VC+  Respiratory Rate  Back-Up Rate: 18  Insp Time (sec): 1 sec  I:E Ratio: 1:2  Ventilator Volumes  Vt Set (ml): 400 ml  Vt Exhaled (Machine Breath) (ml): 486 ml  Ve Observed (l/min): 8.66 l/min  Ventilator Pressures  PIP Observed (cm H2O): 33 cm H2O  Plateau Pressure (cm H2O): 27 cm H2O  MAP (cm H2O): 16  PEEP/VENT (cm H2O): 10 cm H20  Auto PEEP Observed (cm H2O): 0 cm H2O       Physical Exam: Limitations in exam due to full PPE requirements.   Patient currently intubated and sedated; appears comfortable; acyanotic  HEENT: pupils not dilated, reactive, no scleral jaundice, moist oral mucosa, no nasal drainage; neck supple  Neck: No adenopathy or thyroid swelling  Orotracheal and orogastric tubesno secretions or bloody aspirates noted   CVS: Normal heart sounds; S1 and S2 with no murmur; JVD not elevated; telemetrymild sinus bradycardia  RS: Moderate air entry bilateral; no wheezes; mild bilateral diffuse crackles; not tachypneic or in distress while on ventilator support  Abd: Soft, no tenderness, no distention, no guarding or rigidity; bowel sounds present; no hepatosplenomegaly  Neuro: Intubated and sedated; limited exam  Extrm: no leg edema or swelling or clubbing  Skin: no rash  Lymphatic: no cervical or supraclavicular adenopathy  Vasc: DPs palpable both feet  MS: No joint swelling        Data:       Recent Results (from the past 24 hour(s))   BLOOD GAS, ARTERIAL POC    Collection Time: 12/07/21  1:15 PM   Result Value Ref Range    Device: High Flow Nasal Cannula      FIO2 (POC) 100 %    pH (POC) 7.41 7.35 - 7.45      pCO2 (POC) 34.0 (L) 35.0 - 45.0 MMHG    pO2 (POC) 52 (L) 80 - 100 MMHG    HCO3 (POC) 21.8 (L) 22 - 26 MMOL/L    sO2 (POC) 87.4 (L) 92 - 97 %    Base deficit (POC) 2.3 mmol/L    Allens test (POC) NOT APPLICABLE      Site RIGHT BRACHIAL      Specimen type (POC) ARTERIAL      Performed by Diana Kirkpatrick Respiratory    BLOOD GAS, ARTERIAL POC    Collection Time: 12/07/21  4:27 PM   Result Value Ref Range    Device: ADULT VENT      FIO2 (POC) 100 %    pH (POC) 7.34 (L) 7.35 - 7.45      pCO2 (POC) 39.9 35.0 - 45.0 MMHG    pO2 (POC) 106 (H) 80 - 100 MMHG    HCO3 (POC) 21.6 (L) 22 - 26 MMOL/L    sO2 (POC) 97.7 (H) 92 - 97 %    Base deficit (POC) 3.9 mmol/L    Mode ASSIST CONTROL      Tidal volume 400 ml    Set Rate 20 bpm    PEEP/CPAP (POC) 10 cmH2O    Allens test (POC) Positive      Site LEFT RADIAL      Specimen type (POC) ARTERIAL      Performed by Soni Tejeda    GLUCOSE, POC    Collection Time: 12/07/21  5:06 PM   Result Value Ref Range    Glucose (POC) 199 (H) 70 - 110 mg/dL   PROCALCITONIN    Collection Time: 12/07/21  5:30 PM   Result Value Ref Range    Procalcitonin 0.16 ng/mL   ECHO ADULT COMPLETE    Collection Time: 12/07/21  6:27 PM   Result Value Ref Range    IVSd 0.79 0.6 - 0.9 cm    LVIDd 5.18 3.9 - 5.3 cm    LVIDs 3.71 cm    LVOT d 1.87 cm    LVPWd 0.90 0.6 - 0.9 cm    BP EF 62.1 55 - 100 percent    LV Ejection Fraction MOD 2C 55 percent    LV Ejection Fraction MOD 4C 68 percent    LV ED Vol A2C 86.17 mL    LV ED Vol A4C 124.68 mL    LV ED Vol .15 (A) 56 - 104 mL    LV ES Vol A2C 38.38 mL    LV ES Vol A4C 40.44 mL    LV ES Vol BP 39.45 19 - 49 mL    LVOT SV 47.8 mL    LVOT Cardiac Output 4.54 liter/minute    LVOT Peak Gradient 6.42 mmHg    Left Ventricular Outflow Tract Mean Gradient 3.54 mmHg    LVOT SV 77.7 mL    LVOT Peak Velocity 126.65 cm/s    LVOT VTI 28.38 cm    RVIDd 3.91 cm    RVSP 41.17 mmHg    Left Atrium Major Axis 3.21 cm    LA Volume 38.34 22 - 52 mL    LA Area 4C 17.57 cm2    LA Vol 2C 27.56 22 - 52 mL    LA Vol 4C 34.09 22 - 52 mL    LA Volume DISK BP 36.22 22 - 52 mL    Right Atrial Area 4C 17.36 cm2    Est. RA Pressure 15.00 mmHg    Aortic Valve Area by Continuity of Peak Velocity 1.98 cm2    Aortic Valve Area by Continuity of VTI 1.75 cm2    AV R PG 66.77 mmHg    Aortic Regurgitant Pressure Half-time 1,054.37 ms    AR Max Todd 408.57 cm/s    AoV PG 12.23 mmHg    Aortic Valve Systolic Mean Gradient 7.85 mmHg    Aortic Valve Systolic Peak Velocity 126.02 cm/s    AoV VTI 44.44 cm    MV A Todd 70.50 cm/s    Mitral Valve E Wave Deceleration Time 202.68 ms    MV E Todd 99.81 cm/s    E/E' ratio (averaged) 15.88     E/E' lateral 14.28     E/E' septal 17.48     LV E' Lateral Velocity 6.99 cm/s    LV E' Septal Velocity 5.71 cm/s    MR Mean Gradient 69.24 mmHg    Mitral Regurgitant Velocity Time Integral 146.70 cm    Tapse 2.14 (A) 1.5 - 2.0 cm    Triscuspid Valve Regurgitation Peak Gradient 26.17 mmHg    TR Max Velocity 255.77 cm/s    Ao Root D 2.75 cm    IVC proximal 2.75 cm    MV E/A 1.42     LV Mass .7 67 - 162 g    LV Mass AL Index 70.0 43 - 95 g/m2    LVES Vol Index BP 17.9 mL/m2    LVED Vol Index BP 47.1 mL/m2    Left Atrium Minor Axis 1.45 cm    LA Vol Index 17.35 16 - 28 ml/m2    LA Vol Index 12.47 16 - 28 ml/m2    LA Vol Index 15.43 16 - 28 ml/m2    LVED Vol Index A4C 56.4 mL/m2    LVED Vol Index A2C 39.0 mL/m2    LVES Vol Index A4C 18.3 mL/m2    LVES Vol Index A2C 17.4 mL/m2    ERIN/BSA Pk Todd 0.9 cm2/m2    ERIN/BSA VTI 0.8 cm2/m2   GLUCOSE, POC    Collection Time: 12/07/21 11:48 PM   Result Value Ref Range    Glucose (POC) 205 (H) 70 - 110 mg/dL   PROTHROMBIN TIME + INR    Collection Time: 12/08/21  3:00 AM   Result Value Ref Range    Prothrombin time 45.3 (H) 11.5 - 15.2 sec    INR 5.1 (HH) 0.8 - 1.2     CBC WITH AUTOMATED DIFF    Collection Time: 12/08/21  3:00 AM   Result Value Ref Range    WBC 7.8 4.6 - 13.2 K/uL    RBC 3.18 (L) 4.20 - 5.30 M/uL    HGB 10.5 (L) 12.0 - 16.0 g/dL    HCT 32.2 (L) 35.0 - 45.0 %    .3 (H) 78.0 - 100.0 FL    MCH 33.0 24.0 - 34.0 PG    MCHC 32.6 31.0 - 37.0 g/dL    RDW 14.3 11.6 - 14.5 %    PLATELET 93 (L) 789 - 420 K/uL    MPV 12.2 (H) 9.2 - 11.8 FL    NRBC 0.0 0  WBC    ABSOLUTE NRBC 0.00 0.00 - 0.01 K/uL    NEUTROPHILS 94 (H) 40 - 73 %    LYMPHOCYTES 3 (L) 21 - 52 %    MONOCYTES 2 (L) 3 - 10 %    EOSINOPHILS 0 0 - 5 %    BASOPHILS 0 0 - 2 %    IMMATURE GRANULOCYTES 1 (H) 0.0 - 0.5 %    ABS. NEUTROPHILS 7.3 1.8 - 8.0 K/UL    ABS. LYMPHOCYTES 0.2 (L) 0.9 - 3.6 K/UL    ABS. MONOCYTES 0.2 0.05 - 1.2 K/UL    ABS. EOSINOPHILS 0.0 0.0 - 0.4 K/UL    ABS. BASOPHILS 0.0 0.0 - 0.1 K/UL    ABS. IMM. GRANS. 0.1 (H) 0.00 - 0.04 K/UL    DF AUTOMATED     METABOLIC PANEL, COMPREHENSIVE    Collection Time: 12/08/21  3:00 AM   Result Value Ref Range    Sodium 145 136 - 145 mmol/L    Potassium 4.1 3.5 - 5.5 mmol/L    Chloride 115 (H) 100 - 111 mmol/L    CO2 21 21 - 32 mmol/L    Anion gap 9 3.0 - 18 mmol/L    Glucose 159 (H) 74 - 99 mg/dL    BUN 36 (H) 7.0 - 18 MG/DL    Creatinine 1.18 0.6 - 1.3 MG/DL    BUN/Creatinine ratio 31 (H) 12 - 20      GFR est AA 55 (L) >60 ml/min/1.73m2    GFR est non-AA 45 (L) >60 ml/min/1.73m2    Calcium 8.7 8.5 - 10.1 MG/DL    Bilirubin, total 0.4 0.2 - 1.0 MG/DL    ALT (SGPT) 18 13 - 56 U/L    AST (SGOT) 53 (H) 10 - 38 U/L    Alk.  phosphatase 65 45 - 117 U/L    Protein, total 6.3 (L) 6.4 - 8.2 g/dL    Albumin 2.2 (L) 3.4 - 5.0 g/dL    Globulin 4.1 (H) 2.0 - 4.0 g/dL    A-G Ratio 0.5 (L) 0.8 - 1.7     TSH 3RD GENERATION Collection Time: 12/08/21  3:00 AM   Result Value Ref Range    TSH 0.19 (L) 0.36 - 3.74 uIU/mL   BLOOD GAS, ARTERIAL POC    Collection Time: 12/08/21  5:12 AM   Result Value Ref Range    Device: ADULT VENT      FIO2 (POC) 80 %    pH (POC) 7.36 7.35 - 7.45      pCO2 (POC) 39.2 35.0 - 45.0 MMHG    pO2 (POC) 65 (L) 80 - 100 MMHG    HCO3 (POC) 21.9 (L) 22 - 26 MMOL/L    sO2 (POC) 91.7 (L) 92 - 97 %    Base deficit (POC) 3.4 mmol/L    Mode ASSIST CONTROL      Tidal volume 400 ml    Set Rate 18 bpm    PEEP/CPAP (POC) 10 cmH2O    Allens test (POC) Positive      Site RIGHT RADIAL      Specimen type (POC) ARTERIAL      Performed by Todd VANG    GLUCOSE, POC    Collection Time: 12/08/21  5:57 AM   Result Value Ref Range    Glucose (POC) 162 (H) 70 - 110 mg/dL   GLUCOSE, POC    Collection Time: 12/08/21 11:55 AM   Result Value Ref Range    Glucose (POC) 158 (H) 70 - 110 mg/dL         Chemistry Recent Labs     12/08/21  0300 12/07/21  0730 12/06/21  0605   * 126* 174*    142 143   K 4.1 4.1 4.1   * 112* 112*   CO2 21 22 22   BUN 36* 33* 21*   CREA 1.18 1.28 1.12   CA 8.7 8.7 8.4*   AGAP 9 8 9   BUCR 31* 26* 19   AP 65 66 68   TP 6.3* 6.3* 6.4   ALB 2.2* 2.3* 2.4*   GLOB 4.1* 4.0 4.0   AGRAT 0.5* 0.6* 0.6*        Lactic Acid Lactic acid   Date Value Ref Range Status   11/11/2014 0.9 0.4 - 2.0 MMOL/L Final     No results for input(s): LAC in the last 72 hours. Liver Enzymes Protein, total   Date Value Ref Range Status   12/08/2021 6.3 (L) 6.4 - 8.2 g/dL Final     Albumin   Date Value Ref Range Status   12/08/2021 2.2 (L) 3.4 - 5.0 g/dL Final     Globulin   Date Value Ref Range Status   12/08/2021 4.1 (H) 2.0 - 4.0 g/dL Final     A-G Ratio   Date Value Ref Range Status   12/08/2021 0.5 (L) 0.8 - 1.7   Final     Alk.  phosphatase   Date Value Ref Range Status   12/08/2021 65 45 - 117 U/L Final     Recent Labs     12/08/21  0300 12/07/21  0730 12/06/21  0605   TP 6.3* 6.3* 6.4   ALB 2.2* 2.3* 2.4* GLOB 4.1* 4.0 4.0   AGRAT 0.5* 0.6* 0.6*   AP 65 66 68        CBC w/Diff Recent Labs     12/08/21  0300 12/07/21  0730 12/06/21  0605   WBC 7.8 12.7 3.0*   RBC 3.18* 3.38* 3.55*   HGB 10.5* 11.3* 11.7*   HCT 32.2* 34.1* 35.4   PLT 93* 106* 87*   GRANS 94* 93* 81*   LYMPH 3* 4* 15*   EOS 0 0 0        Cardiac Enzymes No results found for: CPK, CK, CKMMB, CKMB, RCK3, CKMBT, CKNDX, CKND1, MANE, TROPT, TROIQ, BANDAR, TROPT, TNIPOC, BNP, BNPP     BNP No results found for: BNP, BNPP, XBNPT     Coagulation Recent Labs     12/08/21 0300 12/07/21  0730 12/06/21  0605   PTP 45.3* 32.1* 23.7*   INR 5.1* 3.3* 2.2*   APTT  --   --  67.2*         Thyroid  Lab Results   Component Value Date/Time    TSH 0.19 (L) 12/08/2021 03:00 AM       No results found for: T4     Urinalysis Lab Results   Component Value Date/Time    Color YELLOW 12/05/2021 03:02 PM    Appearance CLEAR 12/05/2021 03:02 PM    Specific gravity 1.014 12/05/2021 03:02 PM    pH (UA) 5.0 12/05/2021 03:02 PM    Protein 100 (A) 12/05/2021 03:02 PM    Glucose Negative 12/05/2021 03:02 PM    Ketone Negative 12/05/2021 03:02 PM    Bilirubin Negative 12/05/2021 03:02 PM    Urobilinogen 0.2 12/05/2021 03:02 PM    Nitrites Negative 12/05/2021 03:02 PM    Leukocyte Esterase TRACE (A) 12/05/2021 03:02 PM    Epithelial cells 2+ 12/05/2021 03:02 PM    Bacteria FEW (A) 12/05/2021 03:02 PM    WBC 4 to 10 12/05/2021 03:02 PM    RBC 0 to 3 12/05/2021 03:02 PM          Culture data during this hospitalization.    All Micro Results     Procedure Component Value Units Date/Time    CULTURE, BLOOD [[id number]] Collected: 12/05/21 1106    Order Status: Completed Specimen: Blood Updated: 12/08/21 0813     Special Requests: NO SPECIAL REQUESTS        Culture result: NO GROWTH 3 DAYS       CULTURE, BLOOD [[id number]] Collected: 12/05/21 1106    Order Status: Completed Specimen: Blood Updated: 12/08/21 0813     Special Requests: NO SPECIAL REQUESTS        Culture result: NO GROWTH 3 DAYS LEGIONELLA PNEUMOPHILA AG, URINE [[id number]] Collected: 12/05/21 1730    Order Status: Canceled Specimen: Urine     STREP Justina Polk, URINE [[id number]] Collected: 12/05/21 1730    Order Status: Canceled Specimen: Urine     RESPIRATORY VIRUS PANEL W/COVID-19, PCR [[id number]]  (Abnormal) Collected: 12/05/21 1110    Order Status: Completed Specimen: Nasopharyngeal Updated: 12/05/21 1633     Adenovirus Not detected        Coronavirus 229E Not detected        Coronavirus HKU1 Not detected        Coronavirus CVNL63 Not detected        Coronavirus OC43 Not detected        SARS-CoV-2, PCR Detected        Comment: CALLED TO AND CORRECTLY REPEATED BY:  DR Shravan De La Rosa [facility] ER ON [id number] AT 1626 HRS TO 1396. Metapneumovirus Not detected        Rhinovirus and Enterovirus Not detected        Influenza A Not detected        Influenza A, subtype H1 Not detected        Influenza A, subtype H3 Not detected        INFLUENZA A H1N1 PCR Not detected        Influenza B Not detected        Parainfluenza 1 Not detected        Parainfluenza 2 Not detected        Parainfluenza 3 Not detected        Parainfluenza virus 4 Not detected        RSV by PCR Not detected        B. parapertussis, PCR Not detected        Bordetella pertussis - PCR Not detected        Chlamydophila pneumoniae DNA, QL, PCR Not detected        Mycoplasma pneumoniae DNA, QL, PCR Not detected       COVID-19 RAPID TEST [[id number]]  (Abnormal) Collected: 12/05/21 1109    Order Status: Completed Specimen: Nasopharyngeal Updated: 12/05/21 1136     Specimen source Nasopharyngeal        COVID-19 rapid test Detected        Comment:      The specimen is POSITIVE for SARS-CoV-2, the novel coronavirus associated with COVID-19. This test has been authorized by the FDA under an Emergency Use Authorization (EUA) for use by authorized laboratories.         Fact sheet for Healthcare Providers: ConventionUpdate.co.nz  Fact sheet for Patients: [facility]       Methodology: Isothermal Nucleic Acid Amplification  CALLED TO AND CORRECTLY REPEATED BY:  Kathy Morris RN ER, TO JAF AT 1136 12/5/2021                  LE Doppler 12/7/21 Interpretation Summary  · No evidence of deep vein thrombosis in the right lower extremity. · No evidence of deep vein thrombosis in the left lower extremity. ECHO 12/7/21 Interpretation Summary  Result status: Final result  · Left Ventricle: Normal cavity size, wall thickness and systolic function (ejection fraction normal). The estimated EF is 55 - 60%. There is moderate (grade 2) left ventricular diastolic dysfunction E/E' ratio = 15.88. Wall Scoring: The left ventricular wall motion is normal.  · Right Atrium: Mildly dilated right atrium. · Left Atrium: Dilated left atrium. · Mitral Valve: No stenosis. Mitral valve non-specific thickening. Mild mitral annular calcification. Mild to moderate regurgitation. · Pulmonary Artery: Pulmonary arterial systolic pressure (PASP) is 41 mmHg. Pulmonary hypertension found to be mild. · IVC/Hepatic Veins: Mechanically ventilated; cannot use inferior caval vein diameter to estimate central venous pressure. Images report reviewed by me:  CT 12/5 (Most Recent)  Results from [place] encounter on 12/05/21    CTA CHEST W OR W WO CONT    Narrative  EXAM: CTA chest    INDICATION: Rule out PE , Covid positive    COMPARISON: None    TECHNIQUE: Axial CT imaging from the thoracic inlet through the diaphragm with  intravenous contrast. Coronal and sagittal MIP reformats were generated. One or  more dose reduction techniques were used on this CT: automated exposure control,  adjustment of the mAs and/or kVp according to patient size, and iterative  reconstruction techniques. The specific techniques used on this CT exam have  been documented in the patient's electronic medical record.  Digital Imaging and  Communications in Medicine (DICOM) format image data are available to  nonaffiliated external healthcare facilities or entities on a secure, media  free, reciprocally searchable basis with patient authorization for at least a  12-month period after this study. _______________    FINDINGS:    EXAM QUALITY: Overall exam quality is satisfactory. Pulmonary arterial  enhancement is adequate with adequate breath hold and no significant artifact. PULMONARY ARTERIES: No evidence of pulmonary embolism. THYROID: There is a 15 mm right thyroid lobe nodule which appears complex. Advise nonemergent thyroid ultrasound. LYMPH Nodes: There is a mildly enlarged right infrahilar lymph node measuring 1  cm image 89. PLEURA: There are no pleural effusion. HEART: Cardiac size is enlarged. There is no pericardial effusion. There is mild  tricuspid regurgitation into the hepatic veins. Moderate to severe calcific  coronary disease present. VASCULATURE/MEDIASTINUM: Main pulmonary artery is enlarged measuring 4 cm  suggesting chronic pulmonary hypertension. Small hiatal hernia present. LUNGS: Moderate groundglass opacities and airspace disease seen bilaterally  suggesting atypical pneumonia. The findings are typical in appearance for Covid  19 pneumonia. AIRWAY: Patent    UPPER ABDOMEN: Unremarkable. OTHER: No acute or aggressive osseous abnormalities identified. _______________    Impression  No evidence of a pulmonary embolism or aortic dissection. Moderate groundglass opacities and airspace disease seen bilaterally suggesting  atypical pneumonia and the findings are typical in appearance for Covid 19  pneumonia. Mildly enlarged right hilar lymph node which may be reactive. Advise follow-up  to exclude any neoplastic or myeloproliferative process. 15 mm slightly complex right thyroid lobe nodule. Advise nonemergent thyroid  ultrasound. CXR reviewed by me:  XR 12/8 (Most Recent).   Results from [facility] encounter on 12/05/21    XR CHEST PORT    Narrative  EXAM: XR CHEST PORT    CLINICAL INDICATION/HISTORY: Intubated; await patient to come to the ICU for  first chest x-ray  -Additional: Covid pneumonia    COMPARISON: Several prior studies, most recently 12/17/2021    TECHNIQUE: Portable frontal view of the chest    _______________    FINDINGS:    SUPPORT DEVICES: Endotracheal tube is present approximately 2 cm above the  arie. Nasogastric tube is below the diaphragm, tip collimated from view. HEART AND MEDIASTINUM: Stable appearing cardiac size and mediastinal contours. LUNGS AND PLEURAL SPACES: Multifocal interstitial and groundglass infiltrates  are redemonstrated. No pneumothorax or pleural effusion. BONY THORAX AND SOFT TISSUES: Unremarkable.    _______________    Impression  1. Support devices in stable/appropriate position as visualized. 2. Multifocal interstitial and airspace disease essentially unchanged. 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 with diabetic nephropathy ([facility]) E08.21    E-coli UTI N39.0, B96.20    Class 3 severe obesity with body mass index (BMI) of 40.0 to 44.9 in adult ([facility]) E66.01, Z68.41    Acute respiratory failure with hypoxemia ([facility]) J96.01    Pneumonia due to severe acute respiratory syndrome coronavirus 2 (SARS-CoV-2) U07.1, J12.82    Stage 3a chronic kidney disease ([facility]) N18.31    Anemia D64.9       · Code status: Full code      RECOMMENDATIONS:   Respiratory: Patient with severe Covid pneumonia, and hypoxemia; patient presenting to ICU in ARDS state due to Covid inflammation.   Patient on mechanical ventilationVCV modeadjusted ventilator settings 18/400/50 5%/PEEP 8; O2 sats 95-96% on the setting; PIP 33; plateau pressure 27. Chest x-ray reviewedstable ET tube and OG tube position; persistent bilateral severe pulmonary opacities and infiltrates, along with groundglass densities; no pneumothorax or pleural effusions noted. Continue Ventilator and sedation bundles ordered. Sedationmidazolam and fentanyl infusion. Paralyticcurrently not needed. Keep SPO2 >=88%. HOB 30 degree elevation while not proning. Aspiration precautions. CTA chest on admission reviewedno PE; severe bilateral pulmonary opacities and groundglass densities consistent with severe Covid pneumonia; no significant pleural effusions; right hilar adenopathy likely reactivewill need follow-up in outpatient; thyroid nodule will also need follow-up in outpatient. CVS: Stable hemodynamics. Avoid beta-blockers due to mild bradycardia; added hydralazine 10 mg 3 times a day to manage hypertension. Ultrasound legsnegative for DVTs  Echocardiogramnormal LV function, and mild pulmonary hypertension; dilated left atrium; mild to moderate mitral regurgitation. proBNP mildly elevated; troponins were mildly elevated on admissionpeak troponin 0.07. Patient on Coumadin at home for paroxysmal atrial fibrillation; INR supratherapeutic5. 1 this morning; no active bleeding issues. ID: Severe Covid pneumonia. Unvaccinated status. Respiratory viral panel was positive for SARS-CoV-2 PCR. Procalcitonin 0.16not elevated  CRP down to 7.8; ferritin elevated but coming down. D-dimer not reliable since patient has elevated INR on chronic Coumadin therapy. S/p 1 dose of Tocilizumab 12/7 morning. Dexamethasone20 mg daily for 5 doses, followed by 10 mg daily. CTA chest negative for PEs on admission. Ultrasound of the legs ordered.   Patient currently not needing antibiotic therapy; procalcitonin not elevated as wellcontinue to follow. Continue vitamin supplements. Enoxaparin on hold due to elevated INR. ID on case. Hematology/Oncology: Mild anemiahemoglobin 10.5 this morning; mild thrombocytopeniaplatelets 23XJXBVDRC. Monitor [id number]. 1 this morning; vitamin K 5 mg 1 dose. Patient on chronic Coumadin therapy at home. Renal: Known CKD; stable renal function and urine output; creatinine improved to 1. 18. Electrolytes stable. Vearl Pippins [facility]/: Start enteral tube feedingNepro per nutrition recommendations. PPI prophylaxis. LFTsstable. Endocrine: Monitor BS. Sliding scale insulin if needed while patient on steroids. TSH 0.19likely sick euthyroid state; check free T3 and T4. Neurology: Intubated and sedatedlimited exam.  Skin/Wound: ICU nursing care. Electrolytes: Replace electrolytes per ICU electrolyte replacement protocol. IVF: Currently no maintenance IV fluids. Nutrition: Start tube feedingNeproadvance to goal.  Prophylaxis: DVT Prophylaxis: Enoxaparin on hold due to elevated INR. GI Prophylaxis: Protonix. Restraints: Wrist soft restraints for patient interfering with medical therapy/management and patient safety. Lines/Tubes: PIVs; midline planned  ETT: 12/7/2021   OGT/NGT: 12/7/2021   Montoya: 12/7/2021 (Medically necessary for strict input/output monitoring in critically ill patient, will remove it when not needed. Montoya bundle followed). Advance Directive/Palliative Care: consulted    Prognosis appears very poor in this patient with Covid pneumonia with severe hypoxemic respiratory failure and ARDS needing to be intubated now; mortality is very high; risk of multiorgan failure with complications such as refractory hypoxemia, encephalopathy, critical illness myopathy, acute renal failure needing dialysis, shock state needing pressors, prolonged ventilator support, risk of tracheostomy and chronic debility/bedbound state.   Quality Care: PPI, DVT prophylaxis, HOB elevated, Infection control all reviewed and addressed. Care of plan d/w RN, RT. High complexity decision making was performed during the evaluation of this patient at high risk for decompensation with multiple organ involvement. Total critical care time spent rendering care exclusive of procedures/family discussion/coordination of care: 42 minutes.  [phone]. I have called and discussed with ; he is also sick from Covid infection, but is at home; discussed about wife's medical condition; patient is on ventilator support; oxygen requirements are still high; hemodynamically stable; liver function and kidney function are stable; patient being sedated while on ventilator support; prognosis guarded. Questions answered to satisfaction. Please note: Voice-recognition software may have been used to generate this report, which may have resulted in some phonetic-based errors in grammar and contents. Even though attempts were made to correct all the mistakes, some may have been missed, and remained in the body of the document. Claudia Parsons MD  12/8/2021         Note  Patient with SARS-CoV-2 pneumonia with severe pneumonia and hypoxemic respiratory failure; patient was on high flow nasal cannula oxygen; patient is not vaccinated, and in the age group that is being infected with delta virus strain; the strain causes severe respiratory failure and complications reported in the [place] and worldwide; unfortunately, the prognosis is poor in unvaccinated patients, with a high risk of complications, multiorgan failure, chronic hypoxemia and respiratory failure, intubation, mechanical ventilation, thromboembolic disease risk in multiple organs, high risk for long Covid syndrome, and high risk for mortality.   The CDC federal and state guidelines have emphasized repeatedly the importance of vaccination to prevent severe infection, mortality, disabilities, long Covid syndrome from Covid virus with several strains currently in the [place]. The treatment protocols are followed based on local hospital protocols, with guidance from centers such as [facility]. [facility] is not part of any research protocol. The local hospital protocols have been guided by [facility] pharmacy department. Covid plasma is no longer considered standard of care in the [facility] due to lack of benefit in trials. Nebulization and CPAP therapies are not considered as part of the protocol in [facility] due to high risk of aerosolization, and high risks of spreading Covid infection to the healthcare staff. Dexamethasone considered standard of care in patients admitted with severe Covid pneumonia; variable dosing has been reported with this medication. ECMO and CRRT facilities not available at [facility]; patients with severe Covid pneumonia and respiratory failure are usually unstable for transportation when they are in critically ill state with a high risk of dying during transportation. REMDESIVIR-not recommended in patients with late presentation with severe respiratory failure     Tocilizumab anti-inflammatory medicationhas shown some benefit in patients with elevated inflammatory markers. Ivermectin not considered standard of care in the [address]; no significant randomized controlled studies done in the [facility] to suggest benefit of ivermectin in patients with COVID-19 with severe respiratory failure; risk of toxicity related to ivermectin outweighs any potential benefits based on consensus opinion in the [address]. This drug is not considered standard of care in [facility].

## 2021-12-08 NOTE — PROGRESS NOTES
Pharmacy Dosing Services: Warfarin    Consult for Warfarin Dosing by Pharmacy by Dr. Christine Morris provided for this 66 y.o.  female , for indication of Atrial Fibrillation. Day of Therapy 4  Dose to achieve an INR goal of 2-3    Warfarin dose will be held today - Supra therapeutic    Significant drug interactions: Lovenox  LABS    PT/INR Lab Results   Component Value Date/Time    INR 5.1 (HH) 12/08/2021 03:00 AM      Platelets Lab Results   Component Value Date/Time    PLATELET 93 (L) 48/03/6465 03:00 AM      H/H     Lab Results   Component Value Date/Time    HGB 10.5 (L) 12/08/2021 03:00 AM        Warfarin Administrations (last 168 hours)       Date/Time Action Medication Dose    12/06/21 1816 Given    warfarin (COUMADIN) tablet 4 mg 4 mg    12/05/21 1959 Given    warfarin (COUMADIN) tablet 5 mg 5 mg            Pharmacy to follow daily and will provide subsequent Warfarin dosing based on clinical status.   ORLANDO Leyva [facility])  Contact information

## 2021-12-08 NOTE — PROGRESS NOTES
[facility]  A Division of [address] [place] [address])                                                                                                                      Isela Madrigal MD  Office #: - Option # [phone]  Fax #: [phone]     Date of Admission: 12/5/2021Date of Note: 12/8/2021  Reason for FU: Evaluation and antibiotic management of Severe COVID 19. Current Antimicrobials:    Prior Antimicrobials: Actrema- X1 12/7  Dexamethasone 12/7 to date( 20mg)   Augmentin and doxycycline PO on 11/30 to 12/5  Ceftriaxone/ Zithromax 12/05 to 12/6       Assessment- ID related:  --------------------------------------------------------------------------  Critically ill patient with:   · Severe COVID 19 infection  · Viral PNA- BL. Extensive on CT chest  · Acute resp failure- Hypoxic 2/2 above: Worse  · Intubated 12/7  · E.coli bacteruia 10/30  · Hx of septic shock with prolonged intubation 6 yrs ago- [place], [place]  COVId rapid test/RVP PCR + 12/5  BCX 12/5L NGSF  procal 0.28-> 0.17/0.16  PaO2 57( on 3L oxygen)  CRP 14-> 11-> 7.8  ferretin 1104-> 902  CPK/trop-OK  LD-512  Hepatitis C ab/B sa negative, Quantiron TB-Pending    Other Medical Issues- Mx per respective team:  · Morbidly obese BMI 40  · Hx of thrombocytoepnia- chronic. Etiology unknown. · A fib on AC- INR 5  · L renal stone- non obstructing on CT 11/30  · CKD  · DMT2  · HTN/HLD  · Hx of CHF per pt     Recommendation for ID issues I am following:  ------------------------------------------------------------------------------    Cont dexamethasone- dosing per primary team  Add Tociluzumab-- worsening hypoxia- [facility] pharmacy    Resp support/AC/rial of lasix and vit cocktail per primary. Consider higher level of care in ICU- DW Dr Antonio Jasso w/diff, CMP/ CRP/ferretin    ROS ICU MD     Subjective:   Intubated/ unresponsive/sedated  Improving hypoxia numbers-> FIO2 55% and PEP 8 today  No high fevers- temp 97.8. WBC 7.8K  Notes/ labs reviewed  Inflammatory markers improving  TTE with EF 55-60%, no severe valvular pathology         HPI:  Julius Fontana is a 66 y.o. WHITE/NON- with PMH as listed above. Non vaccinated due to her medical conditions and her Faith per pt. She has been feeling sick since 2-3 days prior to Nov 30, when she came to ED with right flank pain and abd pain. No Fever or chills, she is SOB and had dry cough as always from her CHF no new changes. COVID tested neg,. CT done showed some infiltrate on lung and  UA and Urine culture done and sent out with Aumgentin and Doxycycline for treatment of possible PNA. Returned to ED on 12/5 with worsened SOB, hypoxia and tachycardia. COVID 19 test + and CT chest with extensive infiltrate B/L. Her  tested + for COVID 19 as well. She was aon 5L oxygen and on dexamethasone. She was placed on CTX and Zithromax as well. Chest not very tight per pt at time of exam. She asks if we can treat her with Ivermectin. She denies abd pain/N/V/chest pain. Denies severe ext pain. Was intubated X1 month 6 yrs ago- post septic shock. Hx of TB exposure- unknown. Runs a [employer].        Active Hospital Problems    Diagnosis Date Noted    Pneumonia due to severe acute respiratory syndrome coronavirus 2 (SARS-CoV-2) 12/07/2021    Stage 3a chronic kidney disease (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 [address] 12/05/2021    E-coli UTI 12/05/2021    Class 3 severe obesity with body mass index (BMI) of 40.0 to 44.9 in adult ([facility] [address] 12/05/2021    Paroxysmal A-fib ([facility] [address]     Hypertension      Past Medical History:   Diagnosis Date    A-fib ([facility] [address]     Anxiety     CAD (coronary artery disease)     Chronic kidney disease  Diabetes ([facility] [address]     borderline    High cholesterol     Hypertension     Kidney stones     Psychiatric disorder     anxiety     Past Surgical History:   Procedure Laterality Date    HX CHOLECYSTECTOMY      HX HEART CATHETERIZATION      HX HYSTERECTOMY      HX ORTHOPAEDIC      bilateral knee surgery    HX ORTHOPAEDIC      right elbow    HX UROLOGICAL      stent placement    [place] CARDIAC SURG PROCEDURE UNLIST      stent x 1     Family History   Problem Relation Age of Onset    Breast Cancer Maternal Aunt      Social History     Socioeconomic History    Marital status:      Spouse name: Not on file    Number of children: Not on file    Years of education: Not on file    Highest education level: Not on file   Occupational History    Not on file   Tobacco Use    Smoking status: Never Smoker    Smokeless tobacco: Never Used   Substance and Sexual Activity    Alcohol use: No    Drug use: No    Sexual activity: Not on file   Other Topics Concern    Not on file   Social History Narrative    Not on file     Social Determinants of Health     Financial Resource Strain:     Difficulty of Paying Living Expenses: Not on file   Food Insecurity:     Worried About Running Out of Food in the Last Year: Not on file    [place] of Food in the Last Year: Not on file   Transportation Needs:     Lack of Transportation (Medical): Not on file    Lack of Transportation (Non-Medical):  Not on file   Physical Activity:     Days of Exercise per Week: Not on file    Minutes of Exercise per Session: Not on file   Stress:     Feeling of Stress : Not on file   Social Connections:     Frequency of Communication with Friends and Family: Not on file    Frequency of Social Gatherings with Friends and Family: Not on file    Attends Latter day Services: Not on file    Active Member of Clubs or Organizations: Not on file    Attends Club or Organization Meetings: Not on file    Marital Status: Not on file Intimate Partner Violence:     Fear of Current or Ex-Partner: Not on file    Emotionally Abused: Not on file    Physically Abused: Not on file    Sexually Abused: Not on file   Housing Stability:     Unable to Pay for Housing in the Last Year: Not on file    Number of [place] in the Last Year: Not on file    Unstable Housing in the Last Year: Not on file       Allergies:  Patient has no known allergies.      Medications:  Current Facility-Administered Medications   Medication Dose Route Frequency    hydrALAZINE (APRESOLINE) tablet 10 mg  10 mg Oral TID    dexamethasone (DECADRON) 4 mg/mL injection 20 mg  20 mg IntraVENous DAILY    [START ON 12/12/2021] dexamethasone (DECADRON) 4 mg/mL injection 10 mg  10 mg IntraVENous DAILY    midazolam (VERSED) injection 2 mg  2 mg IntraVENous Q3H PRN    fentaNYL citrate (PF) injection 50 mcg  50 mcg IntraVENous Q4H PRN    chlorhexidine (PERIDEX) 0.12 % mouthwash 10 mL  10 mL Oral Q12H    fentaNYL (PF) 900 mcg/30 ml infusion soln  0-200 mcg/hr IntraVENous TITRATE    midazolam in normal saline (VERSED) 1 mg/mL infusion  2-10 mg/hr IntraVENous TITRATE    insulin lispro (HUMALOG) injection   SubCUTAneous Q6H    melatonin (rapid dissolve) tablet 10 mg  10 mg Oral QHS    ascorbic acid (vitamin C) (VITAMIN C) tablet 500 mg  500 mg Oral BID    zinc sulfate (ZINCATE) 50 mg zinc (220 mg) capsule 1 Capsule  1 Capsule Oral DAILY    Warfarin - Pharmacy to Dose  1 Each Other Rx Dosing/Monitoring    HYDROcodone-acetaminophen (NORCO)  mg tablet 1 Tablet  1 Tablet Oral Q6H PRN    isosorbide mononitrate ER (IMDUR) tablet 30 mg  30 mg Oral DAILY    nitroglycerin (NITROSTAT) tablet 0.4 mg  0.4 mg SubLINGual Q5MIN PRN    pantoprazole (PROTONIX) tablet 40 mg  40 mg Oral DAILY    sodium chloride (NS) flush 5-40 mL  5-40 mL IntraVENous Q8H    sodium chloride (NS) flush 5-40 mL  5-40 mL IntraVENous PRN    acetaminophen (TYLENOL) tablet 650 mg  650 mg Oral Q6H PRN Or    acetaminophen (TYLENOL) suppository 650 mg  650 mg Rectal Q6H PRN    polyethylene glycol (MIRALAX) packet 17 g  17 g Oral DAILY PRN    ondansetron (ZOFRAN ODT) tablet 4 mg  4 mg Oral Q8H PRN    Or    ondansetron (ZOFRAN) injection 4 mg  4 mg IntraVENous Q6H PRN    [Held by provider] enoxaparin (LOVENOX) injection 30 mg  30 mg SubCUTAneous Q12H    cholecalciferol (VITAMIN D3) (1000 Units /25 mcg) tablet 2,000 Units  2,000 Units Oral DAILY    glucose chewable tablet 16 g  16 g Oral PRN    glucagon (GLUCAGEN) injection 1 mg  1 mg IntraMUSCular PRN    dextrose (D50W) injection syrg 12.5-25 g  25-50 mL IntraVENous PRN      Physical Exam:    Temp (24hrs), Av °F (36.1 °C), Min:96 °F (35.6 °C), Max:98.9 °F (37.2 °C)    Visit Vitals  BP (!) 142/63   Pulse 60   Temp (!) 96.4 °F (35.8 °C)   Resp 18   Ht 5' 6\" (1.676 m)   Wt 114.3 kg (252 lb)   SpO2 94%   BMI 40.67 kg/m²        GEN: WD Morbidly obese, intubated and lying supine  sats in 100%    HEENT: Unicteric. .  Oral tube in place  CHEST:  CTA anteroirly  CVS:RRR  ABD: Obese  GIRISH: Montoya in place  EXT: No apparent swelling or redness on UE/LE joints. Skin: Dry and intact. No rash, no redness. CNS: A, OX3. Moves all extremity. CN grossly ok.       Microbiology  All Micro Results     Procedure Component Value Units Date/Time    CULTURE, BLOOD [[id number]] Collected: 21    Order Status: Completed Specimen: Blood Updated: 21     Special Requests: NO SPECIAL REQUESTS        Culture result: NO GROWTH 3 DAYS       CULTURE, BLOOD [[id number]] Collected: 21    Order Status: Completed Specimen: Blood Updated: 21     Special Requests: NO SPECIAL REQUESTS        Culture result: NO GROWTH 3 DAYS       LEGIONELLA PNEUMOPHILA AG, URINE [[id number]] Collected: 21    Order Status: Canceled Specimen: Urine     STREP Shelby Nascimento, URINE [[id number]] Collected: 21    Order Status: Canceled Specimen: Urine RESPIRATORY VIRUS PANEL W/COVID-19, PCR [[id number]]  (Abnormal) Collected: 12/05/21 1110    Order Status: Completed Specimen: Nasopharyngeal Updated: 12/05/21 1633     Adenovirus Not detected        Coronavirus 229E Not detected        Coronavirus HKU1 Not detected        Coronavirus CVNL63 Not detected        Coronavirus OC43 Not detected        SARS-CoV-2, PCR Detected        Comment: CALLED TO AND CORRECTLY REPEATED BY:  DR Fernanda Marrufo [facility] ER ON [id number] AT 1626 HRS TO 1396. Metapneumovirus Not detected        Rhinovirus and Enterovirus Not detected        Influenza A Not detected        Influenza A, subtype H1 Not detected        Influenza A, subtype H3 Not detected        INFLUENZA A H1N1 PCR Not detected        Influenza B Not detected        Parainfluenza 1 Not detected        Parainfluenza 2 Not detected        Parainfluenza 3 Not detected        Parainfluenza virus 4 Not detected        RSV by PCR Not detected        B. parapertussis, PCR Not detected        Bordetella pertussis - PCR Not detected        Chlamydophila pneumoniae DNA, QL, PCR Not detected        Mycoplasma pneumoniae DNA, QL, PCR Not detected       COVID-19 RAPID TEST [[id number]]  (Abnormal) Collected: 12/05/21 1109    Order Status: Completed Specimen: Nasopharyngeal Updated: 12/05/21 1136     Specimen source Nasopharyngeal        COVID-19 rapid test Detected        Comment:      The specimen is POSITIVE for SARS-CoV-2, the novel coronavirus associated with COVID-19. This test has been authorized by the FDA under an Emergency Use Authorization (EUA) for use by authorized laboratories.         Fact sheet for Healthcare Providers: ConventionUpdate.co.nz  Fact sheet for Patients: ConventionUpdate.co.nz       Methodology: Isothermal Nucleic Acid Amplification  CALLED TO AND CORRECTLY REPEATED BY:  Tamie Clark RN ER, TO JAF AT 1136 12/5/2021                Lab results:    Chemistry  Recent Labs 12/08/21  0300 12/07/21  0730 12/06/21  0605   * 126* 174*    142 143   K 4.1 4.1 4.1   * 112* 112*   CO2 21 22 22   BUN 36* 33* 21*   CREA 1.18 1.28 1.12   CA 8.7 8.7 8.4*   AGAP 9 8 9   BUCR 31* 26* 19   AP 65 66 68   TP 6.3* 6.3* 6.4   ALB 2.2* 2.3* 2.4*   GLOB 4.1* 4.0 4.0   AGRAT 0.5* 0.6* 0.6*       CBC w/ Diff  Recent Labs     12/08/21  0300 12/07/21  0730 12/06/21  0605   WBC 7.8 12.7 3.0*   RBC 3.18* 3.38* 3.55*   HGB 10.5* 11.3* 11.7*   HCT 32.2* 34.1* 35.4   PLT 93* 106* 87*   GRANS 94* 93* 81*   LYMPH 3* 4* 15*   EOS 0 0 0       Imaging: report posted below as per radiologist     CTA chest 12/5       No evidence of a pulmonary embolism or aortic dissection.     Moderate groundglass opacities and airspace disease seen bilaterally suggesting  atypical pneumonia and the findings are typical in appearance for Covid 19  pneumonia.     Mildly enlarged right hilar lymph node which may be reactive. Advise follow-up  to exclude any neoplastic or myeloproliferative process.     15 mm slightly complex right thyroid lobe nodule.  Advise nonemergent thyroid  ultrasound.

## 2021-12-08 NOTE — PROGRESS NOTES
Hospitalist Progress Note    Patient: Vanessa Topete MRN: [id number]  CSN: [id number]    YOB: 1950  Age: 66 y.o. Sex: female    DOA: 12/5/2021 LOS:  LOS: 3 days          Chief Complaint:    fatigue, cough and weakness for 1 wk    Assessment/Plan     A 65 yo female with past medical history of chronic A fib on  Coumadin, chronic leg pain, anxiety, DM with CKD and HTN presents to the ED with 1 wk of Fatigue, Cough and SOB. Found to be hypoxic, COVID-19 PNA and A fib with RVR. Sepsis protocol initiated, On NC O2 to keep saturation of at least 95%. Acute respiratory failure with hypoxemia: Worsened yesterday and maxed out high flow nasal cannula with nonrebreather,  decided to make his wife full code and patient agreed, status post COVID-19 intubation and transfer to the ICU, vent settings and management per intensivist    Severe Covid pneumonia: Patient on high-dose ARDS steroid dosing, followed by infectious disease, continue monoclonal antibody dosing, continue to follow inflammatory markers, vitamin cocktail when patient is taking p.o. again, procalcitonin not elevated, CRP trending down, ferritin elevated but trending down, Lovenox being held at this time because patient supratherapeutic INR which is greater than 5 due to chronic Coumadin therapy at home for paroxysmal atrial fibrillation    Anemia: Mild anemia mild thrombocytic cytopenia, continue to monitor  INR supratherapeutic greater than 5, vitamin K given x1 dose  Patient on chronic Coumadin therapy at home due to history of paroxysmal atrial fibrillation    Thrombocytopenia: Continue to monitor    Chronic kidney disease stage III: Avoid nephrotoxins, creatinine 1.18, electrolytes stable    Paroxysmal atrial fibrillation: Lovenox held because INR supratherapeutic greater than 5    She is at high risk for death given her unvaccinated COVID vaccine, comorbidities and the severity of her illness.      E.  Coli Urinary tract infection: Seen by ED on 11/30. Was on IV Rocephin course completed     Sepsis:  Resolved    A. Fib with RVR: Avoid beta-blockers due to mild bradycardia, no Toprol, monitor heart rate continue coumadin , heart rate in the 50s and 60s     DM with CKD: Monitor glucose and BG. SSI      HTN: Avoid beta-blockers due to mild bradycardia, using as needed hydralazine to manage blood pressure     Thyroid nodule per CT: outpatient f/u     GI/DVT prophylaxis ordered     CODE STATUS: Full      Disposition :  Patient Active Problem List   Diagnosis Code    Hypertension I10    Paroxysmal A-fib ([facility] [address] I48.0    Pneumonia due to COVID-19 virus U07.1, J12.82    Thrombocytopenia ([facility]) D69.6    Hypoxia R09.02    DM due to underlying condition with diabetic nephropathy ([facility]) E08.21    E-coli UTI N39.0, B96.20    Class 3 severe obesity with body mass index (BMI) of 40.0 to 44.9 in adult ([facility]) E66.01, Z68.41    Acute respiratory failure with hypoxemia ([facility]) J96.01    Pneumonia due to severe acute respiratory syndrome coronavirus 2 (SARS-CoV-2) U07.1, J12.82    Stage 3a chronic kidney disease (HCC) N18.31    Anemia D64.9       Subjective:    Vented and sedated    Review of systems:    Vented and sedated, unable to obtain    Vital signs/Intake and Output:  Visit Vitals  BP (!) 143/61   Pulse (!) 53   Temp (!) 96.3 °F (35.7 °C)   Resp 18   Ht 5' 6\" (1.676 m)   Wt 114.3 kg (252 lb)   SpO2 96%   BMI 40.67 kg/m²     Current Shift:  12/08 0701 - 12/08 1900  In: 69.3 [I.V.:69.3]  Out: -   Last three shifts:  12/06 1901 - 12/08 0700  In: 142.3 [P.O.:120;  I.V.:22.3]  Out: 1100 [Urine:1100]    Exam:    General: Alert older  female-year-old, appears weak and ill, vented and sedated  Head/Neck: NCAT, supple, No masses, No lymphadenopathy  CVS:Regular rate and rhythm, no M/R/G, S1/S2 heard, no thrill  Lungs: tachypnea   Abdomen: Soft, Nontender, No distention, Normal Bowel sounds, No hepatomegaly  Extremities: No C/C/E, pulses palpable 2+  Skin:normal texture and turgor, no rashes, no lesions  Neuro: Vented and sedated  Psych: Vented and sedated                Labs: Results:       Chemistry Recent Labs     12/08/21 0300 12/07/21  0730 12/06/21  0605   * 126* 174*    142 143   K 4.1 4.1 4.1   * 112* 112*   CO2 21 22 22   BUN 36* 33* 21*   CREA 1.18 1.28 1.12   CA 8.7 8.7 8.4*   AGAP 9 8 9   BUCR 31* 26* 19   AP 65 66 68   TP 6.3* 6.3* 6.4   ALB 2.2* 2.3* 2.4*   GLOB 4.1* 4.0 4.0   AGRAT 0.5* 0.6* 0.6*      CBC w/Diff Recent Labs     12/08/21 0300 12/07/21 0730 12/06/21  0605   WBC 7.8 12.7 3.0*   RBC 3.18* 3.38* 3.55*   HGB 10.5* 11.3* 11.7*   HCT 32.2* 34.1* 35.4   PLT 93* 106* 87*   GRANS 94* 93* 81*   LYMPH 3* 4* 15*   EOS 0 0 0      Cardiac Enzymes No results for input(s): CPK, CKND1, MAEN in the last 72 hours. No lab exists for component: CKRMB, TROIP   Coagulation Recent Labs     12/08/21 0300 12/07/21 0730 12/06/21  0605 12/06/21  0605   PTP 45.3* 32.1*   < > 23.7*   INR 5.1* 3.3*   < > 2.2*   APTT  --   --   --  67.2*    < > = values in this interval not displayed. Lipid Panel Lab Results   Component Value Date/Time    Cholesterol, total 208 (H) 12/20/2020 03:16 PM    HDL Cholesterol 47 12/20/2020 03:16 PM    LDL, calculated 128.6 (H) 12/20/2020 03:16 PM    VLDL, calculated 32.4 12/20/2020 03:16 PM    Triglyceride 162 (H) 12/20/2020 03:16 PM    CHOL/HDL Ratio 4.4 12/20/2020 03:16 PM      BNP No results for input(s): BNPP in the last 72 hours.    Liver Enzymes Recent Labs     12/08/21  0300   TP 6.3*   ALB 2.2*   AP 65      Thyroid Studies Lab Results   Component Value Date/Time    TSH 0.19 (L) 12/08/2021 03:00 AM        Procedures/imaging: see electronic medical records for all procedures/Xrays and details which were not copied into this note but were reviewed prior to creation of Noble Beckwith MD

## 2021-12-08 NOTE — PROGRESS NOTES
Pt seen and assessed. ett is secured 23@ lips. BBS are diminished. RR was dropped to 18, fio2 was dropped to 80%. No distress noted, vent check done, RR and fi02 changes were made. Will continue to monitor.

## 2021-12-08 NOTE — ACP (ADVANCE CARE PLANNING)
Advance Care Planning     General Advance Care Planning (ACP) Conversation    Date of Conversation: 2021  Conducted with: attempted to reach  via phone at 1100--no answer. We did reach her son and asked that he have his father call the Palliative Medicine office. Previous interaction with Donald revealed that Mrs Billy Nguyen does not have POA documents and niko , Jasper Meng, is her legal next of kin. Healthcare Decision Maker:     Primary Decision Maker: Marianne Alvarez - Spouse - [phone]    Today we documented Decision Maker(s) consistent with Legal Next of Kin hierarchy. Content/Action Overview:   Has NO ACP documents/care preferences. Reviewed DNR/DNI and  elects Full Code (Attempt Resuscitation)     2021 0855 Seen today in room ICU 5 through the window in accordance with COVID protocol along with Opal Apgar, NP. Lying in bed supine. Intubated/ventilated. Versed gtt infusing. Disposition plan: to be determined based on response to treatment and family decisions    Palliative care will continue to follow Martha Fuentes  and her family during her hospitalization and support them as they make healthcare decisions and define goals of care.       Sheri Oakes, RN, MSN  Palliative Medicine  P: [phone]

## 2021-12-08 NOTE — PROGRESS NOTES
1900: Report received from Benigno Coates RN. Assumed care of pt at this time. 2000: Patient is currently intubated/sedated. Family called and updated and aware patient is intubated. Currently on 2mg of Versed and 50 of Fentanyl. Patient responds to voice. Restraints in place. Montoya draining and is patent. OGT is clamped. 0000: No distress noted. 1363: Called Dr. Joana Michaud regarding INR and platelets. No new orders. 0725: Shift change report given to RADHA Maria (oncoming nurse) by Maurisio BERNARD RN (offgoing nurse). Report included the following information SBAR, Kardex and MAR.

## 2021-12-08 NOTE — PALLIATIVE CARE
Palliative Medicine    Attempted to call patient's , Oren Brar, at [phone]. No answer, no option to leave voicemail message. Received recording that this number could not be reached at this time. Called alternate number in chart - [phone] which belongs to patient's son, Adela Faria. Adela Faria shared that his father is at home sick and may be sleeping. Adela Faria will try to reach him and ask him to call palliative medicine. Contact number for palliative provided.     Shannen Whiteside, [facility]-BC  Palliative Medicine

## 2021-12-09 NOTE — PROGRESS NOTES
[facility]  A Division of [address] [place] [address])                                                                                                                      Jeramie Genao MD  Office #: - [phone]  Fax #: [phone]     Date of Admission: 12/5/2021Date of Note: 12/9/2021  Reason for FU: Evaluation and antibiotic management of Severe COVID 19. Current Antimicrobials:    Prior Antimicrobials: Actrema- X1 12/7  Dexamethasone 12/7 to date( 20mg)   Augmentin and doxycycline PO on 11/30 to 12/5  Ceftriaxone/ Zithromax 12/05 to 12/6       Assessment- ID related:  --------------------------------------------------------------------------  Critically ill patient with:   · Acute renal failure: fluid deficit vs other etiology? · Severe COVID 19 infection  · Viral PNA- BL. Extensive on CT chest  · Acute resp failure- Hypoxic 2/2 above: Worse  · Intubated 12/7  · E.coli bacteruia 10/30  · Hx of septic shock with prolonged intubation 6 yrs ago- [place], [place]  COVId rapid test/RVP PCR + 12/5  BCX 12/5L NGSF  procal 0.28-> 0.17/0.16  PaO2 57( on 3L oxygen)  CRP 14-> 11-> 7.8  ferretin 1104-> 902  CPK/trop-OK  LD-512  Hepatitis C ab/B sa negative, Quantiron TB-Pending    Other Medical Issues- Mx per respective team:  · Morbidly obese BMI 40  · Hx of thrombocytoepnia- chronic. Etiology unknown. · A fib on AC- INR 5  · L renal stone- non obstructing on CT 11/30  · CKD  · DMT2  · HTN/HLD  · Hx of CHF per pt     Recommendation for ID issues I am following:  ------------------------------------------------------------------------------  - hydration and monitor bmp per ICU  --if high fevers or hypotension, recommend pancultures and start emeperic Zosyn and Vanco. High risk for sepsis-> she is on steroid, S/P Acctremea.  CXR remains same    --Cont dexamethasone- dosing per primary team    ROS RN- she was placed on bear hug that was set high, which may have been etiology for LGF. Monitor  --procal remains low at 0.08    Monitor cbc w/diff, CMP  DW ICU MD     Subjective: Intubated/ unresponsive/sedated  Remains on same level of oxygen supplement-> FIO2 55% and PEP 8 today  Temp was low, placed on bear hug and went high to 100. 4. WBC remains  WBC 7.8K  Noted Cr bump to 1.5, making concentrated urine  I/O= -700cc  Notes/ labs reviewed  CXR this am remains with diffuse opacity-> same  TTE with EF 55-60%, no severe valvular pathology         HPI:  Sim Gannon is a 66 y.o. WHITE/NON- with PMH as listed above. Non vaccinated due to her medical conditions and her Christian per pt. She has been feeling sick since 2-3 days prior to Nov 30, when she came to ED with right flank pain and abd pain. No Fever or chills, she is SOB and had dry cough as always from her CHF no new changes. COVID tested neg,. CT done showed some infiltrate on lung and  UA and Urine culture done and sent out with Aumgentin and Doxycycline for treatment of possible PNA. Returned to ED on 12/5 with worsened SOB, hypoxia and tachycardia. COVID 19 test + and CT chest with extensive infiltrate B/L. Her  tested + for COVID 19 as well. She was aon 5L oxygen and on dexamethasone. She was placed on CTX and Zithromax as well. Chest not very tight per pt at time of exam. She asks if we can treat her with Ivermectin. She denies abd pain/N/V/chest pain. Denies severe ext pain. Was intubated X1 month 6 yrs ago- post septic shock. Hx of TB exposure- unknown. Runs a [employer].        Active Hospital Problems    Diagnosis Date Noted    Pneumonia due to severe acute respiratory syndrome coronavirus 2 (SARS-CoV-2) 12/07/2021    Stage 3a chronic kidney disease ([facility] [address] 12/07/2021    Anemia 12/07/2021    Acute respiratory failure with hypoxemia ([facility] [address] 12/06/2021    Pneumonia due to COVID-19 virus 12/05/2021    Thrombocytopenia ([facility] [address] 12/05/2021    Hypoxia 12/05/2021    DM due to underlying condition with diabetic nephropathy ([facility] 12/05/2021    E-coli UTI 12/05/2021    Class 3 severe obesity with body mass index (BMI) of 40.0 to 44.9 in adult [facility]) 12/05/2021    Paroxysmal A-fib ([facility]     Hypertension      Past Medical History:   Diagnosis Date    A-fib ([facility]     Anxiety     CAD (coronary artery disease)     Chronic kidney disease     Diabetes ([facility]     borderline    High cholesterol     Hypertension     Kidney stones     Psychiatric disorder     anxiety     Past Surgical History:   Procedure Laterality Date    HX CHOLECYSTECTOMY      HX HEART CATHETERIZATION      HX HYSTERECTOMY      HX ORTHOPAEDIC      bilateral knee surgery    HX ORTHOPAEDIC      right elbow    HX UROLOGICAL      stent placement    [place] CARDIAC SURG PROCEDURE UNLIST      stent x 1     Family History   Problem Relation Age of Onset    Breast Cancer Maternal Aunt      Social History     Socioeconomic History    Marital status:      Spouse name: Not on file    Number of children: Not on file    Years of education: Not on file    Highest education level: Not on file   Occupational History    Not on file   Tobacco Use    Smoking status: Never Smoker    Smokeless tobacco: Never Used   Substance and Sexual Activity    Alcohol use: No    Drug use: No    Sexual activity: Not on file   Other Topics Concern    Not on file   Social History Narrative    Not on file     Social Determinants of Health     Financial Resource Strain:     Difficulty of Paying Living Expenses: Not on file   Food Insecurity:     Worried About Running Out of Food in the Last Year: Not on file    [place] of Food in the Last Year: Not on file   Transportation Needs:     Lack of Transportation (Medical): Not on file    Lack of Transportation (Non-Medical):  Not on file   Physical Activity:     Days of Exercise per Week: Not on file    Minutes of Exercise per Session: Not on file   Stress:     Feeling of Stress : Not on file   Social Connections:     Frequency of Communication with Friends and Family: Not on file    Frequency of Social Gatherings with Friends and Family: Not on file    Attends Pentecostal Services: Not on file    Active Member of Clubs or Organizations: Not on file    Attends Club or Organization Meetings: Not on file    Marital Status: Not on file   Intimate Partner Violence:     Fear of Current or Ex-Partner: Not on file    Emotionally Abused: Not on file    Physically Abused: Not on file    Sexually Abused: Not on file   Housing Stability:     Unable to Pay for Housing in the Last Year: Not on file    Number of [place] in the Last Year: Not on file    Unstable Housing in the Last Year: Not on file       Allergies:  Patient has no known allergies.      Medications:  Current Facility-Administered Medications   Medication Dose Route Frequency    Warfarin - HOLD Warfarin today due to supra-therapeutic INR    Other ONCE    [START ON 12/10/2021] pantoprazole (PROTONIX) 40 mg in 0.9% sodium chloride 10 mL injection  40 mg IntraVENous DAILY    dextrose 5% infusion  50 mL/hr IntraVENous CONTINUOUS    hydrALAZINE (APRESOLINE) tablet 10 mg  10 mg Oral TID    dexamethasone (DECADRON) 4 mg/mL injection 20 mg  20 mg IntraVENous DAILY    [START ON 12/12/2021] dexamethasone (DECADRON) 4 mg/mL injection 10 mg  10 mg IntraVENous DAILY    midazolam (VERSED) injection 2 mg  2 mg IntraVENous Q3H PRN    fentaNYL citrate (PF) injection 50 mcg  50 mcg IntraVENous Q4H PRN    chlorhexidine (PERIDEX) 0.12 % mouthwash 10 mL  10 mL Oral Q12H    fentaNYL (PF) 900 mcg/30 ml infusion soln  0-200 mcg/hr IntraVENous TITRATE    midazolam in normal saline (VERSED) 1 mg/mL infusion  2-10 mg/hr IntraVENous TITRATE    insulin lispro (HUMALOG) injection   SubCUTAneous Q6H    melatonin (rapid dissolve) tablet 10 mg  10 mg Oral QHS    ascorbic acid (vitamin C) (VITAMIN C) tablet 500 mg  500 mg Oral BID    zinc sulfate (ZINCATE) 50 mg zinc (220 mg) capsule 1 Capsule  1 Capsule Oral DAILY    Warfarin - Pharmacy to Dose  1 Each Other Rx Dosing/Monitoring    HYDROcodone-acetaminophen (NORCO)  mg tablet 1 Tablet  1 Tablet Oral Q6H PRN    isosorbide mononitrate ER (IMDUR) tablet 30 mg  30 mg Oral DAILY    nitroglycerin (NITROSTAT) tablet 0.4 mg  0.4 mg SubLINGual Q5MIN PRN    sodium chloride (NS) flush 5-40 mL  5-40 mL IntraVENous Q8H    sodium chloride (NS) flush 5-40 mL  5-40 mL IntraVENous PRN    acetaminophen (TYLENOL) tablet 650 mg  650 mg Oral Q6H PRN    Or    acetaminophen (TYLENOL) suppository 650 mg  650 mg Rectal Q6H PRN    polyethylene glycol (MIRALAX) packet 17 g  17 g Oral DAILY PRN    ondansetron (ZOFRAN ODT) tablet 4 mg  4 mg Oral Q8H PRN    Or    ondansetron (ZOFRAN) injection 4 mg  4 mg IntraVENous Q6H PRN    cholecalciferol (VITAMIN D3) (1000 Units /25 mcg) tablet 2,000 Units  2,000 Units Oral DAILY    glucose chewable tablet 16 g  16 g Oral PRN    glucagon (GLUCAGEN) injection 1 mg  1 mg IntraMUSCular PRN    dextrose (D50W) injection syrg 12.5-25 g  25-50 mL IntraVENous PRN      Physical Exam:    Temp (24hrs), Av.3 °F (36.8 °C), Min:95.9 °F (35.5 °C), Max:100.6 °F (38.1 °C)    Visit Vitals  BP (!) 144/66   Pulse 66   Temp (!) 100.6 °F (38.1 °C)   Resp 20   Ht 5' 6\" (1.676 m)   Wt 114.3 kg (252 lb)   SpO2 94%   BMI 40.67 kg/m²        GEN: WD Morbidly obese, intubated and lying supine  sats in 100%    HEENT: Unicteric. .  Oral tube in place  CHEST:  CTA anteroirly  CVS:RRR  ABD: Obese  GIRISH: Montoya in place  EXT: No apparent swelling or redness on UE/LE joints. Skin: Dry and intact. No rash, no redness. CNS: A, OX3. Moves all extremity. CN grossly ok.       Microbiology  All Micro Results     Procedure Component Value Units Date/Time    Scott Maxwell [[id number]] Collected: 21    Order Status: Completed Specimen: Urine, random Updated: 21 1024     Strep pneumo Ag, urine Negative       LEGIONELLA PNEUMOPHILA AG, URINE [[id number]] Collected: 12/08/21 2051    Order Status: Completed Specimen: Urine, random Updated: 12/09/21 1024     Legionella Ag, urine Negative       CULTURE, BLOOD [[id number]] Collected: 12/05/21 1106    Order Status: Completed Specimen: Blood Updated: 12/08/21 0813     Special Requests: NO SPECIAL REQUESTS        Culture result: NO GROWTH 3 DAYS       CULTURE, BLOOD [[id number]] Collected: 12/05/21 1106    Order Status: Completed Specimen: Blood Updated: 12/08/21 0813     Special Requests: NO SPECIAL REQUESTS        Culture result: NO GROWTH 3 DAYS       LEGIONELLA PNEUMOPHILA AG, URINE [[id number]] Collected: 12/05/21 1730    Order Status: Canceled Specimen: Urine     STREP Julian Little, URINE [[id number]] Collected: 12/05/21 1730    Order Status: Canceled Specimen: Urine     RESPIRATORY VIRUS PANEL W/COVID-19, PCR [[id number]]  (Abnormal) Collected: 12/05/21 1110    Order Status: Completed Specimen: Nasopharyngeal Updated: 12/05/21 1633     Adenovirus Not detected        Coronavirus 229E Not detected        Coronavirus HKU1 Not detected        Coronavirus CVNL63 Not detected        Coronavirus OC43 Not detected        SARS-CoV-2, PCR Detected        Comment: CALLED TO AND CORRECTLY REPEATED BY:  DR Reina Mccullough [facility] ON [id number] AT 1626 HRS TO 1396.           Metapneumovirus Not detected        Rhinovirus and Enterovirus Not detected        Influenza A Not detected        Influenza A, subtype H1 Not detected        Influenza A, subtype H3 Not detected        INFLUENZA A H1N1 PCR Not detected        Influenza B Not detected        Parainfluenza 1 Not detected        Parainfluenza 2 Not detected        Parainfluenza 3 Not detected        Parainfluenza virus 4 Not detected        RSV by PCR Not detected        B. parapertussis, PCR Not detected        Bordetella pertussis - PCR Not detected        Chlamydophila pneumoniae DNA, QL, PCR Not detected        Mycoplasma pneumoniae DNA, QL, PCR Not detected       COVID-19 RAPID TEST [[id number]]  (Abnormal) Collected: 12/05/21 1109    Order Status: Completed Specimen: Nasopharyngeal Updated: 12/05/21 1136     Specimen source Nasopharyngeal        COVID-19 rapid test Detected        Comment:      The specimen is POSITIVE for SARS-CoV-2, the novel coronavirus associated with COVID-19. This test has been authorized by the FDA under an Emergency Use Authorization (EUA) for use by authorized laboratories. Fact sheet for Healthcare Providers: [employer]nz  Fact sheet for Patients: [employer] [facility] Listdate.co.nz       Methodology: Isothermal Nucleic Acid Amplification  CALLED TO AND CORRECTLY REPEATED BY:  Mayra Sanabria RN ER, TO JAF AT 1136 12/5/2021                Lab results:    Chemistry  Recent Labs     12/09/21 0518 12/08/21  0300 12/07/21  0730   * 159* 126*   * 145 142   K 4.2 4.1 4.1   * 115* 112*   CO2 23 21 22   BUN 54* 36* 33*   CREA 1.51* 1.18 1.28   CA 8.8 8.7 8.7   AGAP 7 9 8   BUCR 36* 31* 26*   AP 63 65 66   TP 6.2* 6.3* 6.3*   ALB 2.2* 2.2* 2.3*   GLOB 4.0 4.1* 4.0   AGRAT 0.6* 0.5* 0.6*       CBC w/ Diff  Recent Labs     12/09/21 0518 12/08/21  0300 12/07/21  0730   WBC 7.8 7.8 12.7   RBC 3.32* 3.18* 3.38*   HGB 11.0* 10.5* 11.3*   HCT 33.4* 32.2* 34.1*   * 93* 106*   GRANS 92* 94* 93*   LYMPH 4* 3* 4*   EOS 0 0 0       Imaging: report posted below as per radiologist     CTA chest 12/5       No evidence of a pulmonary embolism or aortic dissection.     Moderate groundglass opacities and airspace disease seen bilaterally suggesting  atypical pneumonia and the findings are typical in appearance for Covid 19  pneumonia.     Mildly enlarged right hilar lymph node which may be reactive. Advise follow-up  to exclude any neoplastic or myeloproliferative process.     15 mm slightly complex right thyroid lobe nodule.  Advise nonemergent thyroid  ultrasound.

## 2021-12-09 NOTE — PROGRESS NOTES
[facility]  Pulmonary, Critical Care, and Sleep Medicine    Name: Barron Mnotaño MRN: [id number]   : 1950 Hospital: [facility] [place]    Date: 2021  Room: [address] [place] [address] Note                                              Consult requesting physician: Dr. Vinay Hobbs  Reason for Consult: Severe Covid pneumonia with respiratory failure      Subjective/History of Present Illness:     Patient is a 66 y.o. female with PMHx significant for chronic A. fib on Coumadin therapy, diabetes mellitus, chronic kidney disease, hypertension, thyroid nodule was admitted to the hospital on 2021 with Covid infection. She has not been vaccinated for Covid infection. Patient progressed to worsening respiratory failure and hypoxemia. During admission, patient had declined intubation. Today, her  reported CODE STATUS and wanted patient to be intubated. Patient underwent COVID-19 intubation in the telemetry unit, and subsequently transferred to the ICU.    2021  Patient in ICU. Patient remains on ventilator support. Patient sedated. No significant respiratory secretions reported. No shalini hemoptysis through ET tube. Stable hemodynamics. Urine outputgood. Remains afebrile. Patient came to ER on  with right flank pain, and right basal pneumonia; was sent home with antibiotic therapyAugmentin and doxycycline. Urine culture ansensitive E. coli.       Review of Systems:  Limited due to patient condition      No Known Allergies   Past Medical History:   Diagnosis Date    [facility] [address]     Anxiety     CAD (coronary artery disease)     Chronic kidney disease     Diabetes ([facility] [address]     borderline    High cholesterol     Hypertension     Kidney stones     Psychiatric disorder     anxiety      Past Surgical History:   Procedure Laterality Date    HX CHOLECYSTECTOMY      HX HEART CATHETERIZATION      HX HYSTERECTOMY      HX ORTHOPAEDIC bilateral knee surgery    HX ORTHOPAEDIC      right elbow    HX UROLOGICAL      stent placement    SD CARDIAC SURG PROCEDURE UNLIST      stent x 1      Social History     Tobacco Use    Smoking status: Never Smoker    Smokeless tobacco: Never Used   Substance Use Topics    Alcohol use: No      Family History   Problem Relation Age of Onset    Breast Cancer Maternal Aunt       Prior to Admission medications    Medication Sig Start Date End Date Taking? Authorizing Provider   allopurinoL (ZYLOPRIM) 300 mg tablet Take 300 mg by mouth daily. Provider, Historical   torsemide (DEMADEX) 100 mg tablet Take 50 mg by mouth daily. Provider, Historical   HYDROcodone-acetaminophen (NORCO)  mg tablet Take 1 Tab by mouth every eight (8) hours as needed for Pain. Provider, Historical   metoprolol succinate (TOPROL-XL) 100 mg tablet Take 150 mg by mouth daily. Provider, Historical   warfarin (Coumadin) 2 mg tablet Take 4 mg by mouth daily. Provider, Historical   isosorbide mononitrate ER (IMDUR) 30 mg tablet Take 30 mg by mouth daily. Provider, Historical   magnesium oxide (MAG-OX) 400 mg tablet Take 400 mg by mouth daily. Provider, Historical   pantoprazole (PROTONIX) 40 mg tablet Take 40 mg by mouth daily. Provider, Historical   ergocalciferol (Vitamin D2) 1,250 mcg (50,000 unit) capsule Take 50,000 Units by mouth. Provider, Historical   docusate sodium (Colace) 100 mg capsule Take 100 mg by mouth two (2) times a day. Provider, Historical   nitroglycerin (NITROSTAT) 0.4 mg SL tablet 0.4 mg by SubLINGual route every five (5) minutes as needed for Chest Pain. Up to 3 doses. Provider, Historical   hydrALAZINE (APRESOLINE) 50 mg tablet Take 25 mg by mouth three (3) times daily. Layla Alex MD   aspirin delayed-release 81 mg tablet Take  by mouth daily. Layla Alex MD   folic acid (FOLVITE) 1 mg tablet Take  by mouth daily.     Layla Alex MD   potassium chloride (K-DUR, KLOR-CON) 20 mEq tablet Take  by mouth two (2) times a day. Isai, MD Layla     Current Facility-Administered Medications   Medication Dose Route Frequency    Warfarin - HOLD Warfarin today due to supra-therapeutic INR    Other ONCE    [START ON 12/10/2021] pantoprazole (PROTONIX) 40 mg in 0.9% sodium chloride 10 mL injection  40 mg IntraVENous DAILY    hydrALAZINE (APRESOLINE) tablet 10 mg  10 mg Oral TID    dexamethasone (DECADRON) 4 mg/mL injection 20 mg  20 mg IntraVENous DAILY    [START ON 2021] dexamethasone (DECADRON) 4 mg/mL injection 10 mg  10 mg IntraVENous DAILY    chlorhexidine (PERIDEX) 0.12 % mouthwash 10 mL  10 mL Oral Q12H    fentaNYL (PF) 900 mcg/30 ml infusion soln  0-200 mcg/hr IntraVENous TITRATE    midazolam in normal saline (VERSED) 1 mg/mL infusion  2-10 mg/hr IntraVENous TITRATE    insulin lispro (HUMALOG) injection   SubCUTAneous Q6H    melatonin (rapid dissolve) tablet 10 mg  10 mg Oral QHS    ascorbic acid (vitamin C) (VITAMIN C) tablet 500 mg  500 mg Oral BID    zinc sulfate (ZINCATE) 50 mg zinc (220 mg) capsule 1 Capsule  1 Capsule Oral DAILY    Warfarin - Pharmacy to Dose  1 Each Other Rx Dosing/Monitoring    isosorbide mononitrate ER (IMDUR) tablet 30 mg  30 mg Oral DAILY    sodium chloride (NS) flush 5-40 mL  5-40 mL IntraVENous Q8H    [Held by provider] enoxaparin (LOVENOX) injection 30 mg  30 mg SubCUTAneous Q12H    cholecalciferol (VITAMIN D3) (1000 Units /25 mcg) tablet 2,000 Units  2,000 Units Oral DAILY         Objective:   Vital Signs:    Visit Vitals  BP (!) 150/60   Pulse 65   Temp 98.8 °F (37.1 °C)   Resp 20   Ht 5' 6\" (1.676 m)   Wt 114.3 kg (252 lb)   SpO2 91%   BMI 40.67 kg/m²       O2 Device: Endotracheal tube   O2 Flow Rate (L/min): 40 l/min   Temp (24hrs), Av.5 °F (36.4 °C), Min:95.9 °F (35.5 °C), Max:99 °F (37.2 °C)       Intake/Output:   Last shift:      No intake/output data recorded.     Last 3 shifts:  1901 -  0700  In: 215.1 [I.V.:135.1]  Out: 1147 [Urine:1255]      Intake/Output Summary (Last 24 hours) at 12/9/2021 1131  Last data filed at 12/9/2021 0400  Gross per 24 hour   Intake 142.35 ml   Output 705 ml   Net -562.65 ml       Last 3 Recorded Weights in this Encounter    12/05/21 1013 12/07/21 1827   Weight: 114.3 kg (252 lb) 114.3 kg (252 lb)       ABG:    Lab Results   Component Value Date/Time    PHI 7.37 12/09/2021 04:59 AM    PCO2I 36.7 12/09/2021 04:59 AM    PO2I 62 (L) 12/09/2021 04:59 AM    HCO3I 21.0 (L) 12/09/2021 04:59 AM    FIO2I 55 12/09/2021 04:59 AM     Ventilator Mode: Assist control, VC+  Respiratory Rate  Back-Up Rate: 18  Insp Time (sec): 1.16 sec  I:E Ratio: 1:2.1  Ventilator Volumes  Vt Set (ml): 400 ml  Vt Exhaled (Machine Breath) (ml): 461 ml  Ve Observed (l/min): 8.95 l/min  Ventilator Pressures  PIP Observed (cm H2O): 21 cm H2O  Plateau Pressure (cm H2O): 22 cm H2O  MAP (cm H2O): 14  PEEP/VENT (cm H2O): 8 cm H20  Auto PEEP Observed (cm H2O): 0 cm H2O       Physical Exam: Limitations in exam due to full PPE requirements.   Patient currently intubated and sedated; appears comfortable; acyanotic  HEENT: pupils not dilated, reactive, no scleral jaundice, moist oral mucosa, no nasal drainage; neck supple  Neck: No adenopathy or thyroid swelling  Orotracheal and orogastric tubesno significant secretions or bloody aspirate seen  CVS: S1-S2; no murmurs; regular rhythm; telemetrysinus rhythm; JVD not elevated  RS: Moderate air entry bilateral; bilateral lower chest crackles; no wheezing; not tachypneic or in distress   Abd: Soft, nontender, not distended, no guarding or rigidity; bowel sounds present; no hepatosplenomegaly  Neuro: Intubated and sedated; limited exam  Extrm: no leg edema or swelling or clubbing  Skin: no rash  Lymphatic: no cervical or supraclavicular adenopathy  Vasc: DPs palpable both feet  MS: No joint swelling        Data:       Recent Results (from the past 24 hour(s))   GLUCOSE, POC Collection Time: 12/08/21 11:55 AM   Result Value Ref Range    Glucose (POC) 158 (H) 70 - 110 mg/dL   GLUCOSE, POC    Collection Time: 12/08/21  5:16 PM   Result Value Ref Range    Glucose (POC) 145 (H) 70 - 110 mg/dL   LEGIONELLA PNEUMOPHILA AG, URINE    Collection Time: 12/08/21  8:51 PM    Specimen: Urine, random   Result Value Ref Range    Legionella Ag, urine Negative NEG     STREP PNEUMO AG, URINE    Collection Time: 12/08/21  8:51 PM    Specimen: Urine, random   Result Value Ref Range    Strep pneumo Ag, urine Negative NEG     GLUCOSE, POC    Collection Time: 12/08/21 11:05 PM   Result Value Ref Range    Glucose (POC) 155 (H) 70 - 110 mg/dL   BLOOD GAS, ARTERIAL POC    Collection Time: 12/09/21  4:59 AM   Result Value Ref Range    Device: ADULT VENT      FIO2 (POC) 55 %    pH (POC) 7.37 7.35 - 7.45      pCO2 (POC) 36.7 35.0 - 45.0 MMHG    pO2 (POC) 62 (L) 80 - 100 MMHG    HCO3 (POC) 21.0 (L) 22 - 26 MMOL/L    sO2 (POC) 90.8 (L) 92 - 97 %    Base deficit (POC) 3.9 mmol/L    Mode ASSIST CONTROL      Tidal volume 403 ml    Set Rate 18 bpm    PEEP/CPAP (POC) 8 cmH2O    Allens test (POC) Positive      Site LEFT RADIAL      Specimen type (POC) ARTERIAL      Performed by Henry Morrison    GLUCOSE, POC    Collection Time: 12/09/21  5:07 AM   Result Value Ref Range    Glucose (POC) 154 (H) 70 - 110 mg/dL   PROTHROMBIN TIME + INR    Collection Time: 12/09/21  5:18 AM   Result Value Ref Range    Prothrombin time 42.1 (H) 11.5 - 15.2 sec    INR 4.6 (H) 0.8 - 1.2     CBC WITH AUTOMATED DIFF    Collection Time: 12/09/21  5:18 AM   Result Value Ref Range    WBC 7.8 4.6 - 13.2 K/uL    RBC 3.32 (L) 4.20 - 5.30 M/uL    HGB 11.0 (L) 12.0 - 16.0 g/dL    HCT 33.4 (L) 35.0 - 45.0 %    .6 (H) 78.0 - 100.0 FL    MCH 33.1 24.0 - 34.0 PG    MCHC 32.9 31.0 - 37.0 g/dL    RDW 14.4 11.6 - 14.5 %    PLATELET 189 (L) 907 - 420 K/uL    MPV 12.8 (H) 9.2 - 11.8 FL    NRBC 0.0 0  WBC    ABSOLUTE NRBC 0.00 0.00 - 0.01 K/uL NEUTROPHILS 92 (H) 40 - 73 %    LYMPHOCYTES 4 (L) 21 - 52 %    MONOCYTES 3 3 - 10 %    EOSINOPHILS 0 0 - 5 %    BASOPHILS 0 0 - 2 %    IMMATURE GRANULOCYTES 1 (H) 0.0 - 0.5 %    ABS. NEUTROPHILS 7.2 1.8 - 8.0 K/UL    ABS. LYMPHOCYTES 0.3 (L) 0.9 - 3.6 K/UL    ABS. MONOCYTES 0.2 0.05 - 1.2 K/UL    ABS. EOSINOPHILS 0.0 0.0 - 0.4 K/UL    ABS. BASOPHILS 0.0 0.0 - 0.1 K/UL    ABS. IMM. GRANS. 0.1 (H) 0.00 - 0.04 K/UL    DF AUTOMATED     METABOLIC PANEL, COMPREHENSIVE    Collection Time: 12/09/21  5:18 AM   Result Value Ref Range    Sodium 148 (H) 136 - 145 mmol/L    Potassium 4.2 3.5 - 5.5 mmol/L    Chloride 118 (H) 100 - 111 mmol/L    CO2 23 21 - 32 mmol/L    Anion gap 7 3.0 - 18 mmol/L    Glucose 153 (H) 74 - 99 mg/dL    BUN 54 (H) 7.0 - 18 MG/DL    Creatinine 1.51 (H) 0.6 - 1.3 MG/DL    BUN/Creatinine ratio 36 (H) 12 - 20      GFR est AA 41 (L) >60 ml/min/1.73m2    GFR est non-AA 34 (L) >60 ml/min/1.73m2    Calcium 8.8 8.5 - 10.1 MG/DL    Bilirubin, total 0.5 0.2 - 1.0 MG/DL    ALT (SGPT) 19 13 - 56 U/L    AST (SGOT) 25 10 - 38 U/L    Alk. phosphatase 63 45 - 117 U/L    Protein, total 6.2 (L) 6.4 - 8.2 g/dL    Albumin 2.2 (L) 3.4 - 5.0 g/dL    Globulin 4.0 2.0 - 4.0 g/dL    A-G Ratio 0.6 (L) 0.8 - 1.7     GLUCOSE, POC    Collection Time: 12/09/21  6:42 AM   Result Value Ref Range    Glucose (POC) 159 (H) 70 - 110 mg/dL         Chemistry Recent Labs     12/09/21  0518 12/08/21  0300 12/07/21  0730   * 159* 126*   * 145 142   K 4.2 4.1 4.1   * 115* 112*   CO2 23 21 22   BUN 54* 36* 33*   CREA 1.51* 1.18 1.28   CA 8.8 8.7 8.7   AGAP 7 9 8   BUCR 36* 31* 26*   AP 63 65 66   TP 6.2* 6.3* 6.3*   ALB 2.2* 2.2* 2.3*   GLOB 4.0 4.1* 4.0   AGRAT 0.6* 0.5* 0.6*        Lactic Acid Lactic acid   Date Value Ref Range Status   11/11/2014 0.9 0.4 - 2.0 MMOL/L Final     No results for input(s): LAC in the last 72 hours.      Liver Enzymes Protein, total   Date Value Ref Range Status   12/09/2021 6.2 (L) 6.4 - 8.2 g/dL Final     Albumin   Date Value Ref Range Status   12/09/2021 2.2 (L) 3.4 - 5.0 g/dL Final     Globulin   Date Value Ref Range Status   12/09/2021 4.0 2.0 - 4.0 g/dL Final     A-G Ratio   Date Value Ref Range Status   12/09/2021 0.6 (L) 0.8 - 1.7   Final     Alk. phosphatase   Date Value Ref Range Status   12/09/2021 63 45 - 117 U/L Final     Recent Labs     12/09/21 0518 12/08/21  0300 12/07/21  0730   TP 6.2* 6.3* 6.3*   ALB 2.2* 2.2* 2.3*   GLOB 4.0 4.1* 4.0   AGRAT 0.6* 0.5* 0.6*   AP 63 65 66        CBC w/Diff Recent Labs     12/09/21 0518 12/08/21  0300 12/07/21  0730   WBC 7.8 7.8 12.7   RBC 3.32* 3.18* 3.38*   HGB 11.0* 10.5* 11.3*   HCT 33.4* 32.2* 34.1*   * 93* 106*   GRANS 92* 94* 93*   LYMPH 4* 3* 4*   EOS 0 0 0        Cardiac Enzymes No results found for: CPK, CK, CKMMB, CKMB, RCK3, CKMBT, CKNDX, CKND1, MANE, TROPT, TROIQ, BANDAR, TROPT, TNIPOC, BNP, BNPP     BNP No results found for: BNP, BNPP, XBNPT     Coagulation Recent Labs     12/09/21 0518 12/08/21  0300 12/07/21  0730   PTP 42.1* 45.3* 32.1*   INR 4.6* 5.1* 3.3*         Thyroid  Lab Results   Component Value Date/Time    TSH 0.19 (L) 12/08/2021 03:00 AM       No results found for: T4     Urinalysis Lab Results   Component Value Date/Time    Color YELLOW 12/05/2021 03:02 PM    Appearance CLEAR 12/05/2021 03:02 PM    Specific gravity 1.014 12/05/2021 03:02 PM    pH (UA) 5.0 12/05/2021 03:02 PM    Protein 100 (A) 12/05/2021 03:02 PM    Glucose Negative 12/05/2021 03:02 PM    Ketone Negative 12/05/2021 03:02 PM    Bilirubin Negative 12/05/2021 03:02 PM    Urobilinogen 0.2 12/05/2021 03:02 PM    Nitrites Negative 12/05/2021 03:02 PM    Leukocyte Esterase TRACE (A) 12/05/2021 03:02 PM    Epithelial cells 2+ 12/05/2021 03:02 PM    Bacteria FEW (A) 12/05/2021 03:02 PM    WBC 4 to 10 12/05/2021 03:02 PM    RBC 0 to 3 12/05/2021 03:02 PM          Culture data during this hospitalization.    All Micro Results     Procedure Component Value Units Date/Time    Leslie Ro, URINE [[id number]] Collected: 12/08/21 2051    Order Status: Completed Specimen: Urine, random Updated: 12/09/21 1024     Strep pneumo Ag, urine Negative       LEGIONELLA PNEUMOPHILA AG, URINE [[id number]] Collected: 12/08/21 2051    Order Status: Completed Specimen: Urine, random Updated: 12/09/21 1024     Legionella Ag, urine Negative       CULTURE, BLOOD [[id number]] Collected: 12/05/21 1106    Order Status: Completed Specimen: Blood Updated: 12/08/21 0813     Special Requests: NO SPECIAL REQUESTS        Culture result: NO GROWTH 3 DAYS       CULTURE, BLOOD [[id number]] Collected: 12/05/21 1106    Order Status: Completed Specimen: Blood Updated: 12/08/21 0813     Special Requests: NO SPECIAL REQUESTS        Culture result: NO GROWTH 3 DAYS       LEGIONELLA PNEUMOPHILA AG, URINE [[id number]] Collected: 12/05/21 1730    Order Status: Canceled Specimen: Urine     STREP Chela Cardoza, URINE [[id number]] Collected: 12/05/21 1730    Order Status: Canceled Specimen: Urine     RESPIRATORY VIRUS PANEL W/COVID-19, PCR [[id number]]  (Abnormal) Collected: 12/05/21 1110    Order Status: Completed Specimen: Nasopharyngeal Updated: 12/05/21 1633     Adenovirus Not detected        Coronavirus 229E Not detected        Coronavirus HKU1 Not detected        Coronavirus CVNL63 Not detected        Coronavirus OC43 Not detected        SARS-CoV-2, PCR Detected        Comment: CALLED TO AND CORRECTLY REPEATED BY:  DR Heather Hightower [facility] ER ON [id number] AT 1626 HRS TO 1396.           Metapneumovirus Not detected        Rhinovirus and Enterovirus Not detected        Influenza A Not detected        Influenza A, subtype H1 Not detected        Influenza A, subtype H3 Not detected        INFLUENZA A H1N1 PCR Not detected        Influenza B Not detected        Parainfluenza 1 Not detected        Parainfluenza 2 Not detected        Parainfluenza 3 Not detected        Parainfluenza virus 4 Not detected        RSV by PCR Not detected B. parapertussis, PCR Not detected        Bordetella pertussis - PCR Not detected        Chlamydophila pneumoniae DNA, QL, PCR Not detected        Mycoplasma pneumoniae DNA, QL, PCR Not detected       COVID-19 RAPID TEST [[id number]]  (Abnormal) Collected: 12/05/21 1109    Order Status: Completed Specimen: Nasopharyngeal Updated: 12/05/21 1136     Specimen source Nasopharyngeal        COVID-19 rapid test Detected        Comment:      The specimen is POSITIVE for SARS-CoV-2, the novel coronavirus associated with COVID-19. This test has been authorized by the FDA under an Emergency Use Authorization (EUA) for use by authorized laboratories. Fact sheet for Healthcare Providers: [facility]  Fact sheet for Patients: [facility]       Methodology: Isothermal Nucleic Acid Amplification  CALLED TO AND CORRECTLY REPEATED BY:  Mayra Sanabria RN ER, TO SHAEF AT 1136 12/5/2021                  LE Doppler 12/7/21 Interpretation Summary  · No evidence of deep vein thrombosis in the right lower extremity. · No evidence of deep vein thrombosis in the left lower extremity. ECHO 12/7/21 Interpretation Summary  Result status: Final result  · Left Ventricle: Normal cavity size, wall thickness and systolic function (ejection fraction normal). The estimated EF is 55 - 60%. There is moderate (grade 2) left ventricular diastolic dysfunction E/E' ratio = 15.88. Wall Scoring: The left ventricular wall motion is normal.  · Right Atrium: Mildly dilated right atrium. · Left Atrium: Dilated left atrium. · Mitral Valve: No stenosis. Mitral valve non-specific thickening. Mild mitral annular calcification. Mild to moderate regurgitation. · Pulmonary Artery: Pulmonary arterial systolic pressure (PASP) is 41 mmHg. Pulmonary hypertension found to be mild.   · IVC/Hepatic Veins: Mechanically ventilated; cannot use inferior caval vein diameter to estimate central venous pressure. Images report reviewed by me:  CT 12/5 (Most Recent)  [facility]    CTA CHEST W OR W WO CONT    Narrative  EXAM: CTA chest    INDICATION: Rule out PE , Covid positive    COMPARISON: None    TECHNIQUE: Axial CT imaging from the thoracic inlet through the diaphragm with  intravenous contrast. Coronal and sagittal MIP reformats were generated. One or  more dose reduction techniques were used on this CT: automated exposure control,  adjustment of the mAs and/or kVp according to patient size, and iterative  reconstruction techniques. The specific techniques used on this CT exam have  been documented in the patient's electronic medical record. Digital Imaging and  Communications in Medicine (DICOM) format image data are available to  nonaffiliated external healthcare facilities or entities on a secure, media  free, reciprocally searchable basis with patient authorization for at least a  12-month period after this study. _______________    FINDINGS:    EXAM QUALITY: Overall exam quality is satisfactory. Pulmonary arterial  enhancement is adequate with adequate breath hold and no significant artifact. PULMONARY ARTERIES: No evidence of pulmonary embolism. THYROID: There is a 15 mm right thyroid lobe nodule which appears complex. Advise nonemergent thyroid ultrasound. LYMPH Nodes: There is a mildly enlarged right infrahilar lymph node measuring 1  cm image 89. PLEURA: There are no pleural effusion. HEART: Cardiac size is enlarged. There is no pericardial effusion. There is mild  tricuspid regurgitation into the hepatic veins. Moderate to severe calcific  coronary disease present. VASCULATURE/MEDIASTINUM: Main pulmonary artery is enlarged measuring 4 cm  suggesting chronic pulmonary hypertension. Small hiatal hernia present. LUNGS: Moderate groundglass opacities and airspace disease seen bilaterally  suggesting atypical pneumonia.  The findings are typical in appearance for Covid  19 pneumonia. AIRWAY: Patent    UPPER ABDOMEN: Unremarkable. OTHER: No acute or aggressive osseous abnormalities identified. _______________    Impression  No evidence of a pulmonary embolism or aortic dissection. Moderate groundglass opacities and airspace disease seen bilaterally suggesting  atypical pneumonia and the findings are typical in appearance for Covid 19  pneumonia. Mildly enlarged right hilar lymph node which may be reactive. Advise follow-up  to exclude any neoplastic or myeloproliferative process. 15 mm slightly complex right thyroid lobe nodule. Advise nonemergent thyroid  ultrasound. CXR reviewed by me:  XR 12/9 (Most Recent). Results from Hospital Encounter encounter on 12/05/21    XR CHEST PORT    Narrative  EXAM: XR CHEST PORT    CLINICAL INDICATION/HISTORY: Intubated; await patient to come to the ICU for  first chest x-ray  -Additional: None    COMPARISON: One day prior    TECHNIQUE: Portable frontal view of the chest    _______________    FINDINGS:    SUPPORT DEVICES: Endotracheal and enteric tubes unchanged. HEART AND MEDIASTINUM: Stable cardiomediastinal silhouette. LUNGS AND PLEURAL SPACES: Extensive bilateral airspace disease, unchanged. No  large effusion or pneumothorax.    _______________    Impression  Extensive bilateral airspace disease, unchanged.          IMPRESSION:   · Acute respiratory failure with hypoxemia  · Severe Covid pneumonia  · Anemia  · Thrombocytopenia  · Chronic kidney disease stage III  · Paroxysmal atrial fibrillation  ·   Patient Active Problem List   Diagnosis Code    Hypertension I10    Paroxysmal A-fib ([facility] [address] I48.0    Pneumonia due to COVID-19 virus U07.1, J12.82    Thrombocytopenia (HCC) D69.6    Hypoxia R09.02    DM due to underlying condition with diabetic nephropathy ([facility]) E08.21    E-coli UTI N39.0, B96.20    Class 3 severe obesity with body mass index (BMI) of 40.0 to 44.9 in adult ([facility] E66.01, Z68.41    Acute respiratory failure with hypoxemia (HCC) J96.01    Pneumonia due to severe acute respiratory syndrome coronavirus 2 (SARS-CoV-2) U07.1, J12.82    Stage 3a chronic kidney disease (HCC) N18.31    Anemia D64.9       · Code status: Full code      RECOMMENDATIONS:   Respiratory: Patient with severe Covid pneumonia, and hypoxemia; patient presenting to ICU in ARDS state due to Covid inflammation. Continue mechanical ventilator support; VCV 18/400/55%/PEEP 8; ABG showed stable ventilation, and adequate oxygenation; plateau pressure 22. Chest x-ray from today shows adequate ET tube and OG tube position; bilateral patchy opacities and groundglass densities; no pneumothorax or pleural effusions seen. Sedationmidazolam and fentanyl infusion; follow RASS protocol. Patient currently not needing paralytic. Continue ventilator and sedation bundles. Keep SPO2 >=88%. HOB 30 degree elevation while not proning. Aspiration precautions. CTA chest on admission reviewedno PE; severe bilateral pulmonary opacities and groundglass densities consistent with severe Covid pneumonia; no significant pleural effusions; right hilar adenopathy likely reactivewill need follow-up in outpatient; thyroid nodule will also need follow-up in outpatient. CVS: Hemodynamic stable; telemetrysinus rhythm; monitor for bradycardia while on sedation infusions. Blood pressure controlhydralazine 10 mg 3 times a day. Ultrasound legsnegative for DVTs  Echocardiogramnormal LV function, and mild pulmonary hypertension; dilated left atrium; mild to moderate mitral regurgitation. proBNP mildly elevated; troponins were mildly elevated on admissionpeak troponin 0.07. Patient on Coumadin at home for paroxysmal atrial fibrillation; INR remains supratherapeutic4. 6. ID: Severe Covid pneumonia. Unvaccinated status. Respiratory viral panel was positive for SARS-CoV-2 PCR.   Procalcitonin 0.16not elevated  CRP had come down to 7.8; ferritin elevated but coming down. D-dimer not reliable since patient has elevated INR on chronic Coumadin therapy. S/p 1 dose of Tocilizumab 12/7 morning. Dexamethasone20 mg daily for 5 doses, followed by 10 mg daily. CTA chest negative for PEs on admission. Ultrasound of the legs ordered. Patient currently not needing antibiotic therapy; procalcitonin not elevated as wellcontinue to follow. Continue vitamin supplements. Enoxaparin on hold due to elevated INR. ID on case. Hematology/Oncology: Stable hemoglobin11.0; platelets seems to be improving102 kmonitor. INR 4.6 this morning; s/p 5 mg vitamin K yesterday; no active bleeding issues; late INR drift down to therapeutic range and resume Coumadin at that point. Renal: Known CKD; s/p CTA chest during this admission; creatinine going up1.51, BUN 54; patient has been started on enteral free water; would add D5W 50 mL/h since sodium mildly elevated as well at 148. If creatinine continues to go up, would get nephrology consultation. Monitor and replace electrolytes as needed. GI/: Start enteral tube feedingNepro per nutrition recommendations. PPI prophylaxis. LFTsstable. Endocrine: Monitor BS. Sliding scale insulin if needed while patient on steroids. TSH 0.19likely sick euthyroid state; free T4 mildly increased; free T3 mildly decreased. Neurology: Intubated and sedatedlimited exam.  Skin/Wound: ICU nursing care. Electrolytes: Replace electrolytes per ICU electrolyte replacement protocol. IVF: Currently no maintenance IV fluids. Nutrition: Tube feedingdiscussed with nutrition team and RN to start tube feedingNeproadvance to goal.  Prophylaxis: DVT Prophylaxis: Coumadinelevated INR. GI Prophylaxis: Protonix. Restraints: Wrist soft restraints for patient interfering with medical therapy/management and patient safety.    Lines/Tubes: PIVs; midline planned  ETT: 12/7/2021   OGT/NGT: 12/7/2021   Montoya: 12/7/2021 (Medically necessary for strict input/output monitoring in critically ill patient, will remove it when not needed. Montoya bundle followed). Advance Directive/Palliative Care: consulted    Prognosis appears very poor in this patient with Covid pneumonia with severe hypoxemic respiratory failure and ARDS needing to be intubated now; mortality is very high; risk of multiorgan failure with complications such as refractory hypoxemia, encephalopathy, critical illness myopathy, acute renal failure needing dialysis, shock state needing pressors, prolonged ventilator support, risk of tracheostomy and chronic debility/bedbound state. Quality Care: PPI, DVT prophylaxis, HOB elevated, Infection control all reviewed and addressed. Care of plan d/w RN, RT. High complexity decision making was performed during the evaluation of this patient at high risk for decompensation with multiple organ involvement. Total critical care time spent rendering care exclusive of procedures/family discussion/coordination of care: 40 minutes.  [phone]. Discussion 12/8/21  I have called and discussed with ; he is also sick from Covid infection, but is at home; discussed about wife's medical condition; patient is on ventilator support; oxygen requirements are still high; hemodynamically stable; liver function and kidney function are stable; patient being sedated while on ventilator support; prognosis guarded. Questions answered to satisfaction. Please note: Voice-recognition software may have been used to generate this report, which may have resulted in some phonetic-based errors in grammar and contents. Even though attempts were made to correct all the mistakes, some may have been missed, and remained in the body of the document.       Cecily Jorgensen MD  12/9/2021         Note  Patient with SARS-CoV-2 pneumonia with severe pneumonia and hypoxemic respiratory failure; patient was on high flow nasal cannula oxygen; patient is not vaccinated, and in the age group that is being infected with delta virus strain; the strain causes severe respiratory failure and complications reported in the [place] and worldwide; unfortunately, the prognosis is poor in unvaccinated patients, with a high risk of complications, multiorgan failure, chronic hypoxemia and respiratory failure, intubation, mechanical ventilation, thromboembolic disease risk in multiple organs, high risk for long Covid syndrome, and high risk for mortality. The CDC federal and state guidelines have emphasized repeatedly the importance of vaccination to prevent severe infection, mortality, disabilities, long Covid syndrome from Covid virus with several strains currently in the [place]. The treatment protocols are followed based on local hospital protocols, with guidance from centers such as [facility]. [facility] is not part of any research protocol. The local hospital protocols have been guided by [facility] pharmacy department. Covid plasma is no longer considered standard of care in the [facility] due to lack of benefit in trials. Nebulization and CPAP therapies are not considered as part of the protocol in [facility] due to high risk of aerosolization, and high risks of spreading Covid infection to the healthcare staff. Dexamethasone considered standard of care in patients admitted with severe Covid pneumonia; variable dosing has been reported with this medication. ECMO and CRRT facilities not available at [facility]; patients with severe Covid pneumonia and respiratory failure are usually unstable for transportation when they are in critically ill state with a high risk of dying during transportation. REMDESIVIR-not recommended in patients with late presentation with severe respiratory failure     Tocilizumab anti-inflammatory medicationhas shown some benefit in patients with elevated inflammatory markers.     Ivermectin not considered standard of care in the [facility]; no significant randomized controlled studies done in the [facility] to suggest benefit of ivermectin in patients with COVID-19 with severe respiratory failure; risk of toxicity related to ivermectin outweighs any potential benefits based on consensus opinion in the [facility]. This drug is not considered standard of care in [facility].

## 2021-12-09 NOTE — PROGRESS NOTES
Pharmacy Dosing Services: Warfarin     Consult for Warfarin Dosing by Pharmacy by Dr. Yevgeniy Castillo provided for this 66 y.o.  female , for indication of Atrial Fibrillation. Day of Therapy: home med   Dose to achieve an INR goal of 2-3    Order entered to HOLD Warfarin today due to supra-therapeutic INR of 4.6    Significant drug interactions: Lovenox -currently on HOLD     PT/INR Lab Results   Component Value Date/Time    INR 4.6 (H) 12/09/2021 05:18 AM      Platelets Lab Results   Component Value Date/Time    PLATELET 829 (L) 80/34/5710 05:18 AM      H/H     Lab Results   Component Value Date/Time    HGB 11.0 (L) 12/09/2021 05:18 AM        Warfarin Administrations (last 168 hours)       Date/Time Action Medication Dose    12/06/21 1816 Given    warfarin (COUMADIN) tablet 4 mg 4 mg    12/05/21 1959 Given    warfarin (COUMADIN) tablet 5 mg 5 mg          Pharmacy to follow daily and will provide subsequent Warfarin dosing based on clinical status.   [place])  Contact information [phone]

## 2021-12-09 NOTE — PROGRESS NOTES
Hospitalist Progress Note    Patient: Melchor Pimentel MRN: [id number]  [facility]: [id number]    YOB: 1950  Age: 66 y.o. Sex: female    DOA: 12/5/2021 LOS:  LOS: 4 days          Chief Complaint: Ac resp failure      Assessment/Plan     A 67 yo female with past medical history of chronic A fib on  Coumadin, chronic leg pain, anxiety, DM with CKD and HTN presents to the ED with 1 wk of Fatigue, Cough and SOB. Found to be hypoxic, COVID-19 PNA and A fib with RVR. Acute respiratory failure with hypoxemia: intubated now, sedated     Severe Covid pneumonia: with resp failure  procalcitonin normal     Anemia: Mild anemia mild thrombocytic cytopenia, continue to monitor  INR supratherapeutic greater than 5, vitamin K given x1 dose  Patient on chronic Coumadin therapy at home due to history of paroxysmal atrial fibrillation     Thrombocytopenia: Continue to monitor     Chronic kidney disease stage III: Avoid nephrotoxins, creatinine more elevtaed this am, electrolytes stable     Paroxysmal atrial fibrillation: Lovenox held because INR supratherapeutic greater than 5, was on coumadin at home     She is at high risk for death given her unvaccinated COVID vaccine, comorbidities and the severity of her illness.      E. Coli Urinary tract infection: Seen by ED on 11/30. Was on IV Rocephin course completed      A. Fib with RVR: monitor     DM type 2 with CKD: Monitor glucose and BG.  SSI      HTN: hydralazine PRN to manage blood pressure     Thyroid nodule per CT: outpatient f/u     GI/DVT prophylaxis ordered     CODE STATUS: Full      Disposition :  Patient Active Problem List   Diagnosis Code    Hypertension I10    Paroxysmal A-fib ([facility] I48.0    Pneumonia due to COVID-19 virus U07.1, J12.82    Thrombocytopenia (HCC) D69.6    Hypoxia R09.02    DM due to underlying condition with diabetic nephropathy (HCC) E08.21    E-coli UTI N39.0, B96.20    Class 3 severe obesity with body mass index (BMI) of 40.0 to 44.9 in adult ([facility] E66.01, Z68.41    Acute respiratory failure with hypoxemia ([facility]) J96.01    Pneumonia due to severe acute respiratory syndrome coronavirus 2 (SARS-CoV-2) U07.1, J12.82    Stage 3a chronic kidney disease (HCC) N18.31    Anemia D64.9       Subjective:    No new events reported    Review of systems:    UTO due to sedated status      Vital signs/Intake and Output:  Visit Vitals  BP (!) 150/60   Pulse 61   Temp 98.8 °F (37.1 °C)   Resp 18   Ht 5' 6\" (1.676 m)   Wt 114.3 kg (252 lb)   SpO2 92%   BMI 40.67 kg/m²     Current Shift:  No intake/output data recorded. Last three shifts:  12/07 1901 - 12/09 0700  In: 215.1 [I.V.:135.1]  Out: 1887 [Urine:1255]    Exam:    General:elderly OW sedate WF intubated  CVS:Regular rate and rhythm, no M/R/G, S1/S2 heard, no thrill  Lungs:Clear to auscultation bilaterally, no wheezes, rhonchi, or rales  Abdomen: Soft, Nontender, No distention  Extremities: No C/C/E, pulses palpable 2+  Neuro:sedated                Labs: Results:       Chemistry Recent Labs     12/09/21 0518 12/08/21 0300 12/07/21  0730   * 159* 126*   * 145 142   K 4.2 4.1 4.1   * 115* 112*   CO2 23 21 22   BUN 54* 36* 33*   CREA 1.51* 1.18 1.28   CA 8.8 8.7 8.7   AGAP 7 9 8   BUCR 36* 31* 26*   AP 63 65 66   TP 6.2* 6.3* 6.3*   ALB 2.2* 2.2* 2.3*   GLOB 4.0 4.1* 4.0   AGRAT 0.6* 0.5* 0.6*      CBC w/Diff Recent Labs     12/09/21 0518 12/08/21  0300 12/07/21  0730   WBC 7.8 7.8 12.7   RBC 3.32* 3.18* 3.38*   HGB 11.0* 10.5* 11.3*   HCT 33.4* 32.2* 34.1*   * 93* 106*   GRANS 92* 94* 93*   LYMPH 4* 3* 4*   EOS 0 0 0      Cardiac Enzymes No results for input(s): CPK, CKND1, MANE in the last 72 hours.     No lab exists for component: CKRMB, TROIP   Coagulation Recent Labs     12/09/21  0518 12/08/21  0300   PTP 42.1* 45.3*   INR 4.6* 5.1*       Lipid Panel Lab Results   Component Value Date/Time    Cholesterol, total 208 (H) 12/20/2020 03:16 PM    HDL Cholesterol 47 12/20/2020 03:16 PM    LDL, calculated 128.6 (H) 12/20/2020 03:16 PM    VLDL, calculated 32.4 12/20/2020 03:16 PM    Triglyceride 162 (H) 12/20/2020 03:16 PM    CHOL/HDL Ratio 4.4 12/20/2020 03:16 PM      BNP No results for input(s): BNPP in the last 72 hours.    Liver Enzymes Recent Labs     12/09/21  0518   TP 6.2*   ALB 2.2*   AP 63      Thyroid Studies Lab Results   Component Value Date/Time    TSH 0.19 (L) 12/08/2021 03:00 AM        Procedures/imaging: see electronic medical records for all procedures/Xrays and details which were not copied into this note but were reviewed prior to creation of Juancarlos Coffey MD

## 2021-12-09 NOTE — PROGRESS NOTES
Physician Progress Note      Eli BONILLA #:                  [id number]  :                       1950  ADMIT DATE:       2021 10:18 AM  DISCH DATE:  RESPONDING  PROVIDER #:        Thierno Araujo MD          QUERY TEXT:    Patient admitted with Covid -19. Noted documentation of sepsis in Hospitalist H&P on . In order to support the diagnosis of sepsis, please include additional clinical indicators in your documentation. Or please document if the diagnosis of sepsis has been ruled out after further study. The medical record reflects the following:  Risk Factors: Covid-19 ? UTI  Clinical Indicators: Covid 19, H&H wnl, procalcitonin 0.28, lactic acid 2.19,   Treatment: 1000ml ns bolus, Zithromax, rocephin    Thank You  Malcolm Ritchie RN CDI CRCR  [phone]  Options provided:  -- Sepsis present as evidenced by, Please document evidence. -- Sepsis was ruled out after study  -- Other - I will add my own diagnosis  -- Disagree - Not applicable / Not valid  -- Disagree - Clinically unable to determine / Unknown  -- Refer to Clinical Documentation Reviewer    PROVIDER RESPONSE TEXT:    Sepsis was ruled out after study.     Query created by: Nyasia Harrington on 2021 2:52 PM      Electronically signed by:  Thierno Araujo MD 2021 4:39 PM

## 2021-12-09 NOTE — PROGRESS NOTES
1930 : Bedside and Verbal shift change report given to Marcelino Hernandez, RN (oncoming nurse) by Mike Mercado RN (offgoing nurse). Report included the following information SBAR, Kardex, Intake/Output, MAR and Recent Results. 2000 : Patient intubated and sedated, resting comfortably, eyes opened to pain. HR bradycardic between 50-60 bpm. Low temp improved with warming blanket. OG tube remains clamped without gastric residual. Montoya cath draining yellow/clear urine. Judah Medina, updated and all related questions answered satisfactorily. 0000 : Reassessment. Patient continues to rest comfortably, VSS, NAD.       0400 : Reassessment. No changes to previous assessment. 0720 : Bedside and Verbal shift change report given to RADHA Pedro (oncoming nurse) by Janine Alba RN  (offgoing nurse). Report included the following information SBAR, Kardex, Intake/Output, MAR and Recent Results.

## 2021-12-09 NOTE — PROGRESS NOTES
CM notes patient remains vented in the ICU. Patient not stable enough to transfer out of critical care setting. CM to continue to monitor and remain available as appropriate. Care Management Interventions  PCP Verified by CM:  Yes  Transition of Care Consult (CM Consult): Discharge Planning  Health Maintenance Reviewed: Yes  Support Systems: Spouse/Significant Other  Confirm Follow Up Transport: Family  The Plan for Transition of Care is Related to the Following Treatment Goals : HH and physician follow up vs SNF  The Patient and/or Patient Representative was Provided with a Choice of Provider and Agrees with the Discharge Plan?: Yes  Name of the Patient Representative Who was Provided with a Choice of Provider and Agrees with the Discharge Plan: pt  Freedom of Choice List was Provided with Basic Dialogue that Supports the Patient's Individualized Plan of Care/Goals, Treatment Preferences and Shares the Quality Data Associated with the Providers?: Yes  Discharge Location  Discharge Placement[facility]  [facility] with physician f/u and possible home O2 vesus SNF)

## 2021-12-09 NOTE — PROGRESS NOTES
Bedside shift change report received from ELIANE Adams RN. Report included the following information SBAR, Kardex, Intake/Output, MAR, Recent Results, Med Rec Status and Cardiac Rhythm Sinus Pedro Shaniqua and plan of care. Metoprolol held due to bradycardia, MD aware. Versed stopped due to bradycardia, MD aware. Bedside shift change report given to [address]. Report included the following information SBAR, Kardex, Intake/Output, MAR, Recent Results, Med Rec Status and Cardiac Rhythm Sinus Pedro Shaniqua and plan of care.

## 2021-12-09 NOTE — DIABETES MGMT
Diabetes/ Glycemic Control Plan of Care  Recommendations:   When tube feeds are started, recommend to initiate basal insulin, suggest 5 units Lantus      Assessment: Patient with known history of diabetes and CKD admitted with COVID in the ICU. Plan is to start TF today. Currently POC blood glucose within range (140-180 mg/dL) however anticipate this to increase with feedings and steroids. DX:   1. Sepsis with acute hypoxic respiratory failure and septic shock, due to unspecified organism ([address]     2. COVID-19        Steroids:   Rx Glucocorticoids (24h ago, onward)             Start     Dose Route Frequency Ordered Stop    12/12/21 0900  dexamethasone (DECADRON) 4 mg/mL injection 10 mg         10 mg IV DAILY 12/07/21 0819 12/17/21 0859    12/07/21 0900  dexamethasone (DECADRON) 4 mg/mL injection 20 mg         20 mg IV DAILY 12/07/21 0819 12/12/21 0859               Recent Glucose Results:   Lab Results   Component Value Date/Time     (H) 12/09/2021 05:18 AM    GLUCPOC 159 (H) 12/09/2021 06:42 AM    GLUCPOC 154 (H) 12/09/2021 05:07 AM    GLUCPOC 155 (H) 12/08/2021 11:05 PM       Within target range (70-180mg/dL): Yes  Current insulin orders: Humalog q6  Total Daily Dose previous 24 hours = 6 units Humalog    Nutrition/Diet:   Active Orders   Diet    DIET NPO      Plan/Goals:   Blood glucose will be within target of 70 - 180 mg/dl within 72 hours  Reinforce dietary and medication compliance at home.          Education:  [] Refer to Diabetes Education Record                       [x] Education not indicated at this time     Roseline Blakely, [address] [place] [address]  Glycemic Control Team  [phone]

## 2021-12-10 NOTE — PROGRESS NOTES
DC Plan: TBD     Care manager rounded and discussed patient updates with nursing - patient remains orally intubated, day 3 and on Versed; will continue to follow and assist when appropriate. Care Management Interventions  PCP Verified by CM:  Yes  Transition of Care Consult (CM Consult): Discharge Planning  Health Maintenance Reviewed: Yes  Support Systems: Spouse/Significant Other  Confirm Follow Up Transport: Family  The Plan for Transition of Care is Related to the Following Treatment Goals : HH and physician follow up vs SNF  The Patient and/or Patient Representative was Provided with a Choice of Provider and Agrees with the Discharge Plan?: Yes  Name of the Patient Representative Who was Provided with a Choice of Provider and Agrees with the Discharge Plan: pt  Freedom of Choice List was Provided with Basic Dialogue that Supports the Patient's Individualized Plan of Care/Goals, Treatment Preferences and Shares the Quality Data Associated with the Providers?: Yes  Discharge Location  Discharge Placement:  [facility] with physician f/u and possible home O2 vesus SNF)

## 2021-12-10 NOTE — PROGRESS NOTES
Hospitalist Progress Note    Patient: David Chacon MRN: [id number]  CSN: [id number]    YOB: 1950  Age: 66 y.o. Sex: female    DOA: 12/5/2021 LOS:  LOS: 5 days          Chief Complaint: Ac resp failure    Assessment/Plan     A 67 yo female with past medical history of chronic A fib on  Coumadin, chronic leg pain, anxiety, DM with CKD and HTN presents to the ED with 1 wk of Fatigue, Cough and SOB. Found to be hypoxic, COVID-19 PNA and A fib with RVR. Acute respiratory failure with hypoxemia: intubated now, sedated     Severe Covid pneumonia: with resp failure  procalcitonin remains low     Anemia: Mild anemia mild thrombocytic cytopenia, continue to monitor  INR supratherapeutic greater than 5, vitamin K given x1 dose  Patient on chronic Coumadin therapy at home due to history of paroxysmal atrial fibrillation     Thrombocytopenia: Continue to monitor     Chronic kidney disease stage III: Avoid nephrotoxins, creatinine more elevtaed this am, electrolytes stable     Paroxysmal atrial fibrillation: Lovenox held because INR supratherapeutic greater than 5, was on coumadin at home, INR 2.6 today     She is at high risk for death given her unvaccinated COVID vaccine, comorbidities and the severity of her illness. hypernatremia, sodium trending up, 150 this AM -148 yesterday, on D5W at 75cc/hr, will increase to 100cc/hr, will need to closely monitor blood glucose      E. Coli Urinary tract infection: Seen by ED on 11/30. Was on IV Rocephin course completed      A. Fib with RVR: monitor     DM type 2 with CKD: Monitor glucose and BG.  SSI      HTN: hydralazine PRN to manage blood pressure     Thyroid nodule per CT: outpatient f/u     GI/DVT prophylaxis ordered     CODE STATUS: Full      Disposition :  Patient Active Problem List   Diagnosis Code    Hypertension I10    Paroxysmal A-fib ([facility] I48.0    Pneumonia due to COVID-19 virus U07.1, J12.82    Thrombocytopenia ([facility] [facility] D69.6    Hypoxia R09.02    DM due to underlying condition with diabetic nephropathy ([facility]) E08.21    E-coli UTI N39.0, B96.20    Class 3 severe obesity with body mass index (BMI) of 40.0 to 44.9 in adult ([facility]) E66.01, Z68.41    Acute respiratory failure with hypoxemia ([facility]) J96.01    Pneumonia due to severe acute respiratory syndrome coronavirus 2 (SARS-CoV-2) U07.1, J12.82    Stage 3a chronic kidney disease ([facility]) N18.31    Anemia D64.9       Subjective:    pt resting in bed with eyes closed, no sign of distress, vss on ventilator and sedation    Review of systems:    UTO due to sedated status    Vital signs/Intake and Output:  Visit Vitals  BP (!) 144/62   Pulse (!) 59   Temp 99.1 °F (37.3 °C)   Resp 19   Ht 5' 6\" (1.676 m)   Wt 114.3 kg (252 lb)   SpO2 96%   BMI 40.67 kg/m²     Current Shift:  12/10 0701 - 12/10 1900  In: 433.6 [I.V.:193.6]  Out: -   Last three shifts:  12/08 1901 - 12/10 0700  In: 1772.5 [I.V.:652.5]  Out: 1055 [Urine:1055]    Exam:    General:elderly OW sedate WF intubated  CVS:Regular rate and rhythm, no M/R/G, S1/S2 heard, no thrill  Lungs:Clear to auscultation bilaterally, no wheezes, rhonchi, or rales  Abdomen: Soft, Nontender, No distention  Extremities: No C/C/E, pulses palpable 2+  Neuro:sedated                Labs: Results:       Chemistry Recent Labs     12/10/21  0345 12/09/21  0518 12/08/21  0300   * 153* 159*   * 148* 145   K 4.2 4.2 4.1   * 118* 115*   CO2 22 23 21   BUN 69* 54* 36*   CREA 1.79* 1.51* 1.18   CA 8.8 8.8 8.7   AGAP 10 7 9   BUCR 39* 36* 31*   AP 63 63 65   TP 6.0* 6.2* 6.3*   ALB 2.2* 2.2* 2.2*   GLOB 3.8 4.0 4.1*   AGRAT 0.6* 0.6* 0.5*      CBC w/Diff Recent Labs     12/10/21  0345 12/09/21  0518 12/08/21  0300   WBC 7.7 7.8 7.8   RBC 3.26* 3.32* 3.18*   HGB 10.4* 11.0* 10.5*   HCT 32.8* 33.4* 32.2*   * 102* 93*   GRANS 91* 92* 94*   LYMPH 4* 4* 3*   EOS 0 0 0      Cardiac Enzymes No results for input(s): CPK, CKND1, MANE in the last 72 hours.     No lab exists for component: CKRMB, TROIP   Coagulation Recent Labs     12/10/21  0345 12/09/21  0518   PTP 27.1* 42.1*   INR 2.6* 4.6*       Lipid Panel Lab Results   Component Value Date/Time    Cholesterol, total 208 (H) 12/20/2020 03:16 PM    HDL Cholesterol 47 12/20/2020 03:16 PM    LDL, calculated 128.6 (H) 12/20/2020 03:16 PM    VLDL, calculated 32.4 12/20/2020 03:16 PM    Triglyceride 162 (H) 12/20/2020 03:16 PM    CHOL/HDL Ratio 4.4 12/20/2020 03:16 PM      BNP No results for input(s): BNPP in the last 72 hours.    Liver Enzymes Recent Labs     12/10/21  0345   TP 6.0*   ALB 2.2*   AP 63      Thyroid Studies Lab Results   Component Value Date/Time    TSH 0.19 (L) 12/08/2021 03:00 AM        Procedures/imaging: see electronic medical records for all procedures/Xrays and details which were not copied into this note but were reviewed prior to creation of Janet Mccullough MD

## 2021-12-10 NOTE — PROGRESS NOTES
[facility]  A Division of [address] [place] [address])                                                                                                                      Silvia Sharma MD  Office #: [phone] Option # [phone]  Fax #: [phone]     Dr Nick Henriquez  will be available for questions/ consults by phone over the weekend. I will be back on Monday, Dec 13, 2021. Thanks. Date of Admission: 12/5/2021Date of Note: 12/10/2021  Reason for FU: Evaluation and antibiotic management of Severe COVID 19. Current Antimicrobials:    Prior Antimicrobials: Actrema- X1 12/7  Dexamethasone 12/7 to date( 20mg)   Augmentin and doxycycline PO on 11/30 to 12/5  Ceftriaxone/ Zithromax 12/05 to 12/6       Assessment- ID related:  --------------------------------------------------------------------------  Critically ill patient with:   · Acute renal failure: fluid deficit vs other etiology? · Severe COVID 19 infection  · Viral PNA- BL. Extensive on CT chest  · Acute resp failure- Hypoxic 2/2 above: Worse  · Intubated 12/7  · E.coli bacteruia 10/30  · Hx of septic shock with prolonged intubation 6 yrs ago- [place]  COVId rapid test/RVP PCR + 12/5  BCX 12/5L NGSF  procal X4-LN-> 0.08  CRP 14-> 11-> 7.8  ferretin 1104-> 902  CPK/trop-OK  LD-512  Hepatitis C ab/B sa negative, Quantiron TB-Pending    Other Medical Issues- Mx per respective team:  ·   · Hypernatremia  · Acute renal failure  · Morbidly obese BMI 40  · Hx of thrombocytoepnia- chronic. Etiology unknown.   · A fib on AC- INR 5  · L renal stone- non obstructing on CT 11/30  · CKD  · DMT2  · HTN/HLD  · Hx of CHF per pt     Recommendation for ID issues I am following:  ------------------------------------------------------------------------------  - hydration and Renal failure mx per ICU team  --Cont dexamethasone- dosing per primary team    Monitor closely biomarkers- crp/ferretin/ procal as well as cbc w/diff and cmp  -- so far procal remains low at 0.08    --if high fevers or hypotension, recommend pancultures and start emeperic Zosyn and Vanco. High risk for sepsis-> she is on steroid, S/P Acctremea. CXR remains with diffuse abnormality    DW ICU RN     Subjective: Intubated/ unresponsive/sedated  Not on pressor  Remains on same Vent etting- PEEP of 8, FIO2 55%  Thermal regulation seems an issue-> fluctuating and intermittently using bear hug  WBC 7.7K. Cr has gone up to 1.7  Notes/ labs reviewed  CXR this am remains with diffuse opacity-> same  TTE with EF 55-60%, no severe valvular pathology         HPI:  Shaun Lerma is a 66 y.o. WHITE/NON- with PMH as listed above. Non vaccinated due to her medical conditions and her Protestant per pt. She has been feeling sick since 2-3 days prior to Nov 30, when she came to ED with right flank pain and abd pain. No Fever or chills, she is SOB and had dry cough as always from her CHF no new changes. COVID tested neg,. CT done showed some infiltrate on lung and  UA and Urine culture done and sent out with Aumgentin and Doxycycline for treatment of possible PNA. Returned to ED on 12/5 with worsened SOB, hypoxia and tachycardia. COVID 19 test + and CT chest with extensive infiltrate B/L. Her  tested + for COVID 19 as well. She was aon 5L oxygen and on dexamethasone. She was placed on CTX and Zithromax as well. Chest not very tight per pt at time of exam. She asks if we can treat her with Ivermectin. She denies abd pain/N/V/chest pain. Denies severe ext pain. Was intubated X1 month 6 yrs ago- post septic shock. Hx of TB exposure- unknown. Runs a [employer].        Active Hospital Problems    Diagnosis Date Noted    Pneumonia due to severe acute respiratory syndrome coronavirus 2 (SARS-CoV-2) 12/07/2021    Stage 3a chronic kidney disease ([facility] 12/07/2021    Anemia 12/07/2021    Acute respiratory failure with hypoxemia (HCC) 12/06/2021    Pneumonia due to COVID-19 virus 12/05/2021    Thrombocytopenia ([facility] 12/05/2021    Hypoxia 12/05/2021    DM due to underlying condition with diabetic nephropathy ([facility] 12/05/2021    E-coli UTI 12/05/2021    Class 3 severe obesity with body mass index (BMI) of 40.0 to 44.9 in adult [facility]) 12/05/2021    Paroxysmal A-fib ([facility]     Hypertension      Past Medical History:   Diagnosis Date    A-fib ([facility]     Anxiety     CAD (coronary artery disease)     Chronic kidney disease     Diabetes ([facility]     borderline    High cholesterol     Hypertension     Kidney stones     Psychiatric disorder     anxiety     Past Surgical History:   Procedure Laterality Date    HX CHOLECYSTECTOMY      HX HEART CATHETERIZATION      HX HYSTERECTOMY      HX ORTHOPAEDIC      bilateral knee surgery    HX ORTHOPAEDIC      right elbow    HX UROLOGICAL      stent placement    SC CARDIAC SURG PROCEDURE UNLIST      stent x 1     Family History   Problem Relation Age of Onset    Breast Cancer Maternal Aunt      Social History     Socioeconomic History    Marital status:      Spouse name: Not on file    Number of children: Not on file    Years of education: Not on file    Highest education level: Not on file   Occupational History    Not on file   Tobacco Use    Smoking status: Never Smoker    Smokeless tobacco: Never Used   Substance and Sexual Activity    Alcohol use: No    Drug use: No    Sexual activity: Not on file   Other Topics Concern    Not on file   Social History Narrative    Not on file     Social Determinants of Health     Financial Resource Strain:     Difficulty of Paying Living Expenses: Not on file   Food Insecurity:     Worried About Running Out of Food in the Last Year: Not on file    [place] of Food in the Last Year: Not on file   Transportation Needs:     Lack of Transportation (Medical): Not on file    Lack of Transportation (Non-Medical):  Not on file   Physical Activity:     Days of Exercise per Week: Not on file    Minutes of Exercise per Session: Not on file   Stress:     Feeling of Stress : Not on file   Social Connections:     Frequency of Communication with Friends and Family: Not on file    Frequency of Social Gatherings with Friends and Family: Not on file    Attends Sabianism Services: Not on file    Active Member of Clubs or Organizations: Not on file    Attends Club or Organization Meetings: Not on file    Marital Status: Not on file   Intimate Partner Violence:     Fear of Current or Ex-Partner: Not on file    Emotionally Abused: Not on file    Physically Abused: Not on file    Sexually Abused: Not on file   Housing Stability:     Unable to Pay for Housing in the Last Year: Not on file    Number of [place] in the Last Year: Not on file    Unstable Housing in the Last Year: Not on file       Allergies:  Patient has no known allergies.      Medications:  Current Facility-Administered Medications   Medication Dose Route Frequency    insulin glargine (LANTUS) injection 5 Units  5 Units SubCUTAneous QHS    pantoprazole (PROTONIX) 40 mg in 0.9% sodium chloride 10 mL injection  40 mg IntraVENous DAILY    dextrose 5% infusion  75 mL/hr IntraVENous CONTINUOUS    hydrALAZINE (APRESOLINE) tablet 10 mg  10 mg Oral TID    dexamethasone (DECADRON) 4 mg/mL injection 20 mg  20 mg IntraVENous DAILY    [START ON 12/12/2021] dexamethasone (DECADRON) 4 mg/mL injection 10 mg  10 mg IntraVENous DAILY    midazolam (VERSED) injection 2 mg  2 mg IntraVENous Q3H PRN    fentaNYL citrate (PF) injection 50 mcg  50 mcg IntraVENous Q4H PRN    chlorhexidine (PERIDEX) 0.12 % mouthwash 10 mL  10 mL Oral Q12H    fentaNYL (PF) 900 mcg/30 ml infusion soln  0-200 mcg/hr IntraVENous TITRATE    midazolam in normal saline (VERSED) 1 mg/mL infusion  2-10 mg/hr IntraVENous TITRATE    insulin lispro (HUMALOG) injection   SubCUTAneous Q6H    melatonin (rapid dissolve) tablet 10 mg  10 mg Oral QHS    ascorbic acid (vitamin C) (VITAMIN C) tablet 500 mg  500 mg Oral BID    zinc sulfate (ZINCATE) 50 mg zinc (220 mg) capsule 1 Capsule  1 Capsule Oral DAILY    Warfarin - Pharmacy to Dose  1 Each Other Rx Dosing/Monitoring    HYDROcodone-acetaminophen (NORCO)  mg tablet 1 Tablet  1 Tablet Oral Q6H PRN    isosorbide mononitrate ER (IMDUR) tablet 30 mg  30 mg Oral DAILY    nitroglycerin (NITROSTAT) tablet 0.4 mg  0.4 mg SubLINGual Q5MIN PRN    sodium chloride (NS) flush 5-40 mL  5-40 mL IntraVENous Q8H    sodium chloride (NS) flush 5-40 mL  5-40 mL IntraVENous PRN    acetaminophen (TYLENOL) tablet 650 mg  650 mg Oral Q6H PRN    Or    acetaminophen (TYLENOL) suppository 650 mg  650 mg Rectal Q6H PRN    polyethylene glycol (MIRALAX) packet 17 g  17 g Oral DAILY PRN    ondansetron (ZOFRAN ODT) tablet 4 mg  4 mg Oral Q8H PRN    Or    ondansetron (ZOFRAN) injection 4 mg  4 mg IntraVENous Q6H PRN    cholecalciferol (VITAMIN D3) (1000 Units /25 mcg) tablet 2,000 Units  2,000 Units Oral DAILY    glucose chewable tablet 16 g  16 g Oral PRN    glucagon (GLUCAGEN) injection 1 mg  1 mg IntraMUSCular PRN    dextrose (D50W) injection syrg 12.5-25 g  25-50 mL IntraVENous PRN      Physical Exam:    Temp (24hrs), Av.2 °F (36.8 °C), Min:95.9 °F (35.5 °C), Max:100.6 °F (38.1 °C)    Visit Vitals  BP (!) 144/52   Pulse (!) 57   Temp 97.9 °F (36.6 °C)   Resp 17   Ht 5' 6\" (1.676 m)   Wt 114.3 kg (252 lb)   SpO2 96%   BMI 40.67 kg/m²        GEN: WD Morbidly obese, intubated and lying supine      HEENT: Unicteric. .  Oral tube in place  CHEST:  CTA anteroirly  CVS:RRR  ABD: Obese  GIRISH: Montoya in place  EXT: No apparent swelling or redness on UE/LE joints. Skin: Dry and intact. No rash, no redness.  Bruising by right PIV site  CNS:Intubated/ non responsive     Microbiology  All Micro Results     Procedure Component Value Units Date/Time    CULTURE, BLOOD [[id number]] Collected: 21 1106    Order Status: Completed Specimen: Blood Updated: 12/10/21 1030     Special Requests: NO SPECIAL REQUESTS        Culture result: NO GROWTH 5 DAYS       CULTURE, BLOOD [[id number]] Collected: 12/05/21 1106    Order Status: Completed Specimen: Blood Updated: 12/10/21 1030     Special Requests: NO SPECIAL REQUESTS        Culture result: NO GROWTH 5 DAYS       STREP PNEUMO AG, URINE [[id number]] Collected: 12/08/21 2051    Order Status: Completed Specimen: Urine, random Updated: 12/09/21 1024     Strep pneumo Ag, urine Negative       LEGIONELLA PNEUMOPHILA AG, URINE [[id number]] Collected: 12/08/21 2051    Order Status: Completed Specimen: Urine, random Updated: 12/09/21 1024     Legionella Ag, urine Negative       LEGIONELLA PNEUMOPHILA AG, URINE [[id number]] Collected: 12/05/21 1730    Order Status: Canceled Specimen: Urine     STREP [place], URINE [[id number]] Collected: 12/05/21 1730    Order Status: Canceled Specimen: Urine     RESPIRATORY VIRUS PANEL W/COVID-19, PCR [[id number]]  (Abnormal) Collected: 12/05/21 1110    Order Status: Completed Specimen: Nasopharyngeal Updated: 12/05/21 1633     Adenovirus Not detected        Coronavirus 229E Not detected        Coronavirus HKU1 Not detected        Coronavirus CVNL63 Not detected        Coronavirus OC43 Not detected        SARS-CoV-2, PCR Detected        Comment: CALLED TO AND CORRECTLY REPEATED BY:  DR Mena Siemens [address] [facility] ON [id number] AT 1626 HRS TO 1396.           Metapneumovirus Not detected        Rhinovirus and Enterovirus Not detected        Influenza A Not detected        Influenza A, subtype H1 Not detected        Influenza A, subtype H3 Not detected        INFLUENZA A H1N1 PCR Not detected        Influenza B Not detected        Parainfluenza 1 Not detected        Parainfluenza 2 Not detected        Parainfluenza 3 Not detected        Parainfluenza virus 4 Not detected        RSV by PCR Not detected        B. parapertussis, PCR Not detected        Bordetella pertussis - PCR Not detected        Chlamydophila pneumoniae DNA, QL, PCR Not detected        Mycoplasma pneumoniae DNA, QL, PCR Not detected       COVID-19 RAPID TEST [[id number]]  (Abnormal) Collected: 12/05/21 1109    Order Status: Completed Specimen: Nasopharyngeal Updated: 12/05/21 1136     Specimen source Nasopharyngeal        COVID-19 rapid test Detected        Comment:      The specimen is POSITIVE for SARS-CoV-2, the novel coronavirus associated with COVID-19. This test has been authorized by the FDA under an Emergency Use Authorization (EUA) for use by authorized laboratories. Fact sheet for Healthcare Providers: ConventionUpdate.co.nz  Fact sheet for Patients: ConventionUpdate.co.nz       Methodology: Isothermal Nucleic Acid Amplification  CALLED TO AND CORRECTLY REPEATED BY:  Carmine Meehan RN ER, TO SHAEF AT 1136 12/5/2021                Lab results:    Chemistry  Recent Labs     12/10/21  0345 12/09/21  0518 12/08/21  0300   * 153* 159*   * 148* 145   K 4.2 4.2 4.1   * 118* 115*   CO2 22 23 21   BUN 69* 54* 36*   CREA 1.79* 1.51* 1.18   CA 8.8 8.8 8.7   AGAP 10 7 9   BUCR 39* 36* 31*   AP 63 63 65   TP 6.0* 6.2* 6.3*   ALB 2.2* 2.2* 2.2*   GLOB 3.8 4.0 4.1*   AGRAT 0.6* 0.6* 0.5*       CBC w/ Diff  Recent Labs     12/10/21  0345 12/09/21  0518 12/08/21  0300   WBC 7.7 7.8 7.8   RBC 3.26* 3.32* 3.18*   HGB 10.4* 11.0* 10.5*   HCT 32.8* 33.4* 32.2*   * 102* 93*   GRANS 91* 92* 94*   LYMPH 4* 4* 3*   EOS 0 0 0       Imaging: report posted below as per radiologist     CTA chest 12/5       No evidence of a pulmonary embolism or aortic dissection.     Moderate groundglass opacities and airspace disease seen bilaterally suggesting  atypical pneumonia and the findings are typical in appearance for Covid 19  pneumonia.     Mildly enlarged right hilar lymph node which may be reactive.  Advise follow-up  to exclude any neoplastic or myeloproliferative process.     15 mm slightly complex right thyroid lobe nodule.  Advise nonemergent thyroid  ultrasound.

## 2021-12-10 NOTE — PROGRESS NOTES
Palliative Medicine    CODE STATUS: FULL CODE    AMD Status: none on file. , Cj Pate is the legal next of kin     12/10/2021 7007 Seen today in room ICU 5 along with Sanjiv Stinson NP. Seen through the window in accordance with COVID protocol Lying in bed with head of bed elevated. Intubated/ventilated. Versed gtt infusing    Renal function continues to worsen--nephrology consultation ordered. Ref. Range 12/5/2021 11:08 12/6/2021 06:05 12/7/2021 07:30 12/7/2021 17:30 12/8/2021 03:00 12/9/2021 05:18 12/10/2021 03:45   BUN Latest Ref Range: 7.0 - 18 MG/DL 23 (H) 21 (H) 33 (H)  36 (H) 54 (H) 69 (H)   Creatinine Latest Ref Range: 0.6 - 1.3 MG/DL 1.26 1.12 1.28  1.18 1.51 (H) 1.79 (H)      Ref. Range 12/6/2021 06:05 12/7/2021 07:30 12/7/2021 17:30 12/8/2021 03:00 12/9/2021 05:18 12/10/2021 03:45   GFR est non-AA Latest Ref Range: >60 ml/min/1.73m2 48 (L) 41 (L)  45 (L) 34 (L) 28 (L)     Disposition plan: to be determined based on response to treatment and family decisions    Palliative care will continue to follow Franklin Griffin  and her family during her hospitalization and support them as they make healthcare decisions and define goals of care.       Sonia Bowden, RN, MSN  Palliative Medicine  P: [phone]

## 2021-12-10 NOTE — PROGRESS NOTES
Pulm/CC    [place]ies[place]   [phone]     Unfortunately, patient's  is now in ICU with Covid pneumonia and on ventilator support. I have called and updated patient's grandson; reviewed ICU medical management; patient remains on ventilator support; FiO2 between 50-55%; high risk of ventilator dependence; high risk of worsening. Questions answered to satisfaction.     Claudia Parsons MD

## 2021-12-10 NOTE — PROGRESS NOTES
Pharmacy Dosing Services: Warfarin    Consult for Warfarin Dosing by Pharmacy by Dr. Carol Mehta provided for this 66 y.o.  female , for indication of Atrial Fibrillation. Day of Therapy 6  Dose to achieve an INR goal of 2-3    Order entered for  Warfarin  2 (mg) ordered to be given today at 18:00. Significant drug interactions:   LABS    PT/INR Lab Results   Component Value Date/Time    INR 2.6 (H) 12/10/2021 03:45 AM      Platelets Lab Results   Component Value Date/Time    PLATELET 924 (L) 73/30/6994 03:45 AM      H/H     Lab Results   Component Value Date/Time    HGB 10.4 (L) 12/10/2021 03:45 AM        Warfarin Administrations (last 168 hours)       Date/Time Action Medication Dose    12/06/21 1816 Given    warfarin (COUMADIN) tablet 4 mg 4 mg    12/05/21 1959 Given    warfarin (COUMADIN) tablet 5 mg 5 mg            Pharmacy to follow daily and will provide subsequent Warfarin dosing based on clinical status.   ORLANDO Lopez [facility])  Contact information

## 2021-12-10 NOTE — PROGRESS NOTES
[facility]  Pulmonary, Critical Care, and Sleep Medicine    Name: Julius Fontana MRN: [id number]   : 1950 Hospital: [facility] [place]    Date: 12/10/2021  Room: [address] [place] [address] Note                                              Consult requesting physician: Dr. Tiera Rosado  Reason for Consult: Severe Covid pneumonia with respiratory failure      Subjective/History of Present Illness:     Patient is a 66 y.o. female with PMHx significant for chronic A. fib on Coumadin therapy, diabetes mellitus, chronic kidney disease, hypertension, thyroid nodule was admitted to the hospital on 2021 with Covid infection. She has not been vaccinated for Covid infection. Patient progressed to worsening respiratory failure and hypoxemia. During admission, patient had declined intubation. Today, her  reported CODE STATUS and wanted patient to be intubated. Patient underwent COVID-19 intubation in the telemetry unit, and subsequently transferred to the ICU.    12/10/2021  Patient in ICU. Patient remains on ventilator support. Patient sedated while on ventilator. No acute issues overnight. Temperature was low overnight, and bear hugger discontinued; normothermic now. No significant respiratory secretions or bleeding issues. Telemetrymild sinus bradycardia. Blood pressuregood. Urine output500 mL overnight. Review of Systems:  Limited due to patient condition    Patient came to ER on  with right flank pain, and right basal pneumonia; was sent home with antibiotic therapyAugmentin and doxycycline. Urine culture ansensitive E. coli.       No Known Allergies   Past Medical History:   Diagnosis Date    A-fib ([facility] [address]     Anxiety     CAD (coronary artery disease)     Chronic kidney disease     Diabetes ([facility] [address]     borderline    High cholesterol     Hypertension     Kidney stones     Psychiatric disorder     anxiety      Past Surgical History: Procedure Laterality Date    HX CHOLECYSTECTOMY      HX HEART CATHETERIZATION      HX HYSTERECTOMY      HX ORTHOPAEDIC      bilateral knee surgery    HX ORTHOPAEDIC      right elbow    HX UROLOGICAL      stent placement    AL CARDIAC SURG PROCEDURE UNLIST      stent x 1      Social History     Tobacco Use    Smoking status: Never Smoker    Smokeless tobacco: Never Used   Substance Use Topics    Alcohol use: No      Family History   Problem Relation Age of Onset    Breast Cancer Maternal Aunt       Prior to Admission medications    Medication Sig Start Date End Date Taking? Authorizing Provider   allopurinoL (ZYLOPRIM) 300 mg tablet Take 300 mg by mouth daily. Provider, Historical   torsemide (DEMADEX) 100 mg tablet Take 50 mg by mouth daily. Provider, Historical   HYDROcodone-acetaminophen (NORCO)  mg tablet Take 1 Tab by mouth every eight (8) hours as needed for Pain. Provider, Historical   metoprolol succinate (TOPROL-XL) 100 mg tablet Take 150 mg by mouth daily. Provider, Historical   warfarin (Coumadin) 2 mg tablet Take 4 mg by mouth daily. Provider, Historical   isosorbide mononitrate ER (IMDUR) 30 mg tablet Take 30 mg by mouth daily. Provider, Historical   magnesium oxide (MAG-OX) 400 mg tablet Take 400 mg by mouth daily. Provider, Historical   pantoprazole (PROTONIX) 40 mg tablet Take 40 mg by mouth daily. Provider, Historical   ergocalciferol (Vitamin D2) 1,250 mcg (50,000 unit) capsule Take 50,000 Units by mouth. Provider, Historical   docusate sodium (Colace) 100 mg capsule Take 100 mg by mouth two (2) times a day. Provider, Historical   nitroglycerin (NITROSTAT) 0.4 mg SL tablet 0.4 mg by SubLINGual route every five (5) minutes as needed for Chest Pain. Up to 3 doses. Provider, Historical   hydrALAZINE (APRESOLINE) 50 mg tablet Take 25 mg by mouth three (3) times daily. Other, MD Layla   aspirin delayed-release 81 mg tablet Take  by mouth daily. Layla Alex MD   folic acid (FOLVITE) 1 mg tablet Take  by mouth daily. Layla Alex MD   potassium chloride (K-DUR, KLOR-CON) 20 mEq tablet Take  by mouth two (2) times a day.     Layla Alex MD     Current Facility-Administered Medications   Medication Dose Route Frequency    insulin glargine (LANTUS) injection 5 Units  5 Units SubCUTAneous QHS    pantoprazole (PROTONIX) 40 mg in 0.9% sodium chloride 10 mL injection  40 mg IntraVENous DAILY    dextrose 5% infusion  50 mL/hr IntraVENous CONTINUOUS    hydrALAZINE (APRESOLINE) tablet 10 mg  10 mg Oral TID    dexamethasone (DECADRON) 4 mg/mL injection 20 mg  20 mg IntraVENous DAILY    [START ON 2021] dexamethasone (DECADRON) 4 mg/mL injection 10 mg  10 mg IntraVENous DAILY    chlorhexidine (PERIDEX) 0.12 % mouthwash 10 mL  10 mL Oral Q12H    fentaNYL (PF) 900 mcg/30 ml infusion soln  0-200 mcg/hr IntraVENous TITRATE    midazolam in normal saline (VERSED) 1 mg/mL infusion  2-10 mg/hr IntraVENous TITRATE    insulin lispro (HUMALOG) injection   SubCUTAneous Q6H    melatonin (rapid dissolve) tablet 10 mg  10 mg Oral QHS    ascorbic acid (vitamin C) (VITAMIN C) tablet 500 mg  500 mg Oral BID    zinc sulfate (ZINCATE) 50 mg zinc (220 mg) capsule 1 Capsule  1 Capsule Oral DAILY    Warfarin - Pharmacy to Dose  1 Each Other Rx Dosing/Monitoring    isosorbide mononitrate ER (IMDUR) tablet 30 mg  30 mg Oral DAILY    sodium chloride (NS) flush 5-40 mL  5-40 mL IntraVENous Q8H    cholecalciferol (VITAMIN D3) (1000 Units /25 mcg) tablet 2,000 Units  2,000 Units Oral DAILY         Objective:   Vital Signs:    Visit Vitals  BP (!) 144/62   Pulse (!) 59   Temp 99.1 °F (37.3 °C)   Resp 19   Ht 5' 6\" (1.676 m)   Wt 114.3 kg (252 lb)   SpO2 96%   BMI 40.67 kg/m²       O2 Device: Endotracheal tube, Ventilator   O2 Flow Rate (L/min): 40 l/min   Temp (24hrs), Av.4 °F (36.9 °C), Min:95.9 °F (35.5 °C), Max:100.6 °F (38.1 °C)       Intake/Output:   Last shift: No intake/output data recorded. Last 3 shifts: 12/08 1901 - 12/10 0700  In: 1772.5 [I.V.:652.5]  Out: 1055 [Urine:1055]      Intake/Output Summary (Last 24 hours) at 12/10/2021 1018  Last data filed at 12/10/2021 2821  Gross per 24 hour   Intake 1542.17 ml   Output 700 ml   Net 842.17 ml       Last 3 Recorded Weights in this Encounter    12/05/21 1013 12/07/21 1827   Weight: 114.3 kg (252 lb) 114.3 kg (252 lb)       ABG:    Lab Results   Component Value Date/Time    PHI 7.40 12/10/2021 06:11 AM    PCO2I 38.9 12/10/2021 06:11 AM    PO2I 60 (L) 12/10/2021 06:11 AM    HCO3I 24.0 12/10/2021 06:11 AM    FIO2I 55 12/10/2021 06:11 AM     Ventilator Mode: Assist control  Respiratory Rate  Back-Up Rate: 18  Insp Time (sec): 1.16 sec  I:E Ratio: 1:1.87  Ventilator Volumes  Vt Set (ml): 400 ml  Vt Exhaled (Machine Breath) (ml): 519 ml  Ve Observed (l/min): 8.21 l/min  Ventilator Pressures  PIP Observed (cm H2O): 19 cm H2O  Plateau Pressure (cm H2O): 23 cm H2O  MAP (cm H2O): 13  PEEP/VENT (cm H2O): 8 cm H20  Auto PEEP Observed (cm H2O): 0 cm H2O       Physical Exam: Limitations in exam due to full PPE requirements. Patient currently intubated and sedated; appears comfortable; acyanotic  HEENT: pupils not dilated, reactive, no scleral jaundice, moist oral mucosa, no nasal drainage; neck supple  Neck: No adenopathy or thyroid swelling  Orotracheal tubeno significant respiratory secretions or bloody aspirates noted. Orogastric tubetube feed being given.   CVS: Normal heart sounds; S1-S2 with no murmur; JVD not elevated; telemetrymild sinus bradycardia while on sedation  RS: Moderate air entry bilateral; bilateral diffuse fine crackles; no wheezing or rhonchi; not tachypneic or in distress while on ventilator support    Abd: Soft, no tenderness, no distention, no guarding or rigidity; bowel sounds present; no hepatosplenomegaly  Neuro: Intubated and sedated; limited exam  Extrm: No peripheral leg edema; no focal swelling or clubbing  Skin: no rash  Lymphatic: no cervical or supraclavicular adenopathy  Vasc: DPs palpable both feet  MS: No joint swelling      Data:       Recent Results (from the past 24 hour(s))   GLUCOSE, POC    Collection Time: 12/09/21  1:07 PM   Result Value Ref Range    Glucose (POC) 146 (H) 70 - 110 mg/dL   GLUCOSE, POC    Collection Time: 12/09/21  4:21 PM   Result Value Ref Range    Glucose (POC) 179 (H) 70 - 110 mg/dL   GLUCOSE, POC    Collection Time: 12/09/21 11:49 PM   Result Value Ref Range    Glucose (POC) 214 (H) 70 - 110 mg/dL   PROTHROMBIN TIME + INR    Collection Time: 12/10/21  3:45 AM   Result Value Ref Range    Prothrombin time 27.1 (H) 11.5 - 15.2 sec    INR 2.6 (H) 0.8 - 1.2     CBC WITH AUTOMATED DIFF    Collection Time: 12/10/21  3:45 AM   Result Value Ref Range    WBC 7.7 4.6 - 13.2 K/uL    RBC 3.26 (L) 4.20 - 5.30 M/uL    HGB 10.4 (L) 12.0 - 16.0 g/dL    HCT 32.8 (L) 35.0 - 45.0 %    .6 (H) 78.0 - 100.0 FL    MCH 31.9 24.0 - 34.0 PG    MCHC 31.7 31.0 - 37.0 g/dL    RDW 14.6 (H) 11.6 - 14.5 %    PLATELET 017 (L) 166 - 420 K/uL    MPV 12.4 (H) 9.2 - 11.8 FL    NRBC 0.0 0  WBC    ABSOLUTE NRBC 0.00 0.00 - 0.01 K/uL    NEUTROPHILS 91 (H) 40 - 73 %    LYMPHOCYTES 4 (L) 21 - 52 %    MONOCYTES 3 3 - 10 %    EOSINOPHILS 0 0 - 5 %    BASOPHILS 0 0 - 2 %    IMMATURE GRANULOCYTES 2 (H) 0.0 - 0.5 %    ABS. NEUTROPHILS 7.0 1.8 - 8.0 K/UL    ABS. LYMPHOCYTES 0.3 (L) 0.9 - 3.6 K/UL    ABS. MONOCYTES 0.3 0.05 - 1.2 K/UL    ABS. EOSINOPHILS 0.0 0.0 - 0.4 K/UL    ABS. BASOPHILS 0.0 0.0 - 0.1 K/UL    ABS. IMM.  GRANS. 0.1 (H) 0.00 - 0.04 K/UL    DF AUTOMATED     METABOLIC PANEL, COMPREHENSIVE    Collection Time: 12/10/21  3:45 AM   Result Value Ref Range    Sodium 150 (H) 136 - 145 mmol/L    Potassium 4.2 3.5 - 5.5 mmol/L    Chloride 118 (H) 100 - 111 mmol/L    CO2 22 21 - 32 mmol/L    Anion gap 10 3.0 - 18 mmol/L    Glucose 214 (H) 74 - 99 mg/dL    BUN 69 (H) 7.0 - 18 MG/DL    Creatinine 1.79 (H) 0.6 - 1.3 MG/DL    BUN/Creatinine ratio 39 (H) 12 - 20      GFR est AA 34 (L) >60 ml/min/1.73m2    GFR est non-AA 28 (L) >60 ml/min/1.73m2    Calcium 8.8 8.5 - 10.1 MG/DL    Bilirubin, total 0.6 0.2 - 1.0 MG/DL    ALT (SGPT) 36 13 - 56 U/L    AST (SGOT) 54 (H) 10 - 38 U/L    Alk. phosphatase 63 45 - 117 U/L    Protein, total 6.0 (L) 6.4 - 8.2 g/dL    Albumin 2.2 (L) 3.4 - 5.0 g/dL    Globulin 3.8 2.0 - 4.0 g/dL    A-G Ratio 0.6 (L) 0.8 - 1.7     GLUCOSE, POC    Collection Time: 12/10/21  5:10 AM   Result Value Ref Range    Glucose (POC) 218 (H) 70 - 110 mg/dL   BLOOD GAS, ARTERIAL POC    Collection Time: 12/10/21  6:11 AM   Result Value Ref Range    Device: ADULT VENT      FIO2 (POC) 55 %    pH (POC) 7.40 7.35 - 7.45      pCO2 (POC) 38.9 35.0 - 45.0 MMHG    pO2 (POC) 60 (L) 80 - 100 MMHG    HCO3 (POC) 24.0 22 - 26 MMOL/L    sO2 (POC) 91.0 (L) 92 - 97 %    Base deficit (POC) 0.9 mmol/L    Mode ASSIST CONTROL      Tidal volume 400 ml    Set Rate 18 bpm    PEEP/CPAP (POC) 8 cmH2O    Mean Airway Pressure 15 cmH2O    PIP (POC) 24      Allens test (POC) NOT APPLICABLE      Inspiratory Time 1.16 sec    Total resp. rate 24      Site LEFT RADIAL      Patient temp. 97.7      Specimen type (POC) ARTERIAL      Performed by Javier Cole          Chemistry Recent Labs     12/10/21  0345 12/09/21  0518 12/08/21  0300   * 153* 159*   * 148* 145   K 4.2 4.2 4.1   * 118* 115*   CO2 22 23 21   BUN 69* 54* 36*   CREA 1.79* 1.51* 1.18   CA 8.8 8.8 8.7   AGAP 10 7 9   BUCR 39* 36* 31*   AP 63 63 65   TP 6.0* 6.2* 6.3*   ALB 2.2* 2.2* 2.2*   GLOB 3.8 4.0 4.1*   AGRAT 0.6* 0.6* 0.5*        Lactic Acid Lactic acid   Date Value Ref Range Status   11/11/2014 0.9 0.4 - 2.0 MMOL/L Final     No results for input(s): LAC in the last 72 hours.      Liver Enzymes Protein, total   Date Value Ref Range Status   12/10/2021 6.0 (L) 6.4 - 8.2 g/dL Final     Albumin   Date Value Ref Range Status   12/10/2021 2.2 (L) 3.4 - 5.0 g/dL Final     Globulin   Date Value Ref Range Status   12/10/2021 3.8 2.0 - 4.0 g/dL Final     A-G Ratio   Date Value Ref Range Status   12/10/2021 0.6 (L) 0.8 - 1.7   Final     Alk. phosphatase   Date Value Ref Range Status   12/10/2021 63 45 - 117 U/L Final     Recent Labs     12/10/21  0345 12/09/21  0518 12/08/21  0300   TP 6.0* 6.2* 6.3*   ALB 2.2* 2.2* 2.2*   GLOB 3.8 4.0 4.1*   AGRAT 0.6* 0.6* 0.5*   AP 63 63 65        CBC w/Diff Recent Labs     12/10/21  0345 12/09/21  0518 12/08/21  0300   WBC 7.7 7.8 7.8   RBC 3.26* 3.32* 3.18*   HGB 10.4* 11.0* 10.5*   HCT 32.8* 33.4* 32.2*   * 102* 93*   GRANS 91* 92* 94*   LYMPH 4* 4* 3*   EOS 0 0 0        Cardiac Enzymes No results found for: CPK, CK, CKMMB, CKMB, RCK3, CKMBT, CKNDX, CKND1, MANE, TROPT, TROIQ, BANDAR, TROPT, TNIPOC, BNP, BNPP     BNP No results found for: BNP, BNPP, XBNPT     Coagulation Recent Labs     12/10/21  0345 12/09/21  0518 12/08/21  0300   PTP 27.1* 42.1* 45.3*   INR 2.6* 4.6* 5.1*         Thyroid  Lab Results   Component Value Date/Time    TSH 0.19 (L) 12/08/2021 03:00 AM       No results found for: T4     Urinalysis Lab Results   Component Value Date/Time    Color YELLOW 12/05/2021 03:02 PM    Appearance CLEAR 12/05/2021 03:02 PM    Specific gravity 1.014 12/05/2021 03:02 PM    pH (UA) 5.0 12/05/2021 03:02 PM    Protein 100 (A) 12/05/2021 03:02 PM    Glucose Negative 12/05/2021 03:02 PM    Ketone Negative 12/05/2021 03:02 PM    Bilirubin Negative 12/05/2021 03:02 PM    Urobilinogen 0.2 12/05/2021 03:02 PM    Nitrites Negative 12/05/2021 03:02 PM    Leukocyte Esterase TRACE (A) 12/05/2021 03:02 PM    Epithelial cells 2+ 12/05/2021 03:02 PM    Bacteria FEW (A) 12/05/2021 03:02 PM    WBC 4 to 10 12/05/2021 03:02 PM    RBC 0 to 3 12/05/2021 03:02 PM          Culture data during this hospitalization.    All Micro Results     Procedure Component Value Units Date/Time    Penny Hernández URINE [[id number]] Collected: 12/08/21 2051    Order Status: Completed Specimen: Urine, random Updated: 12/09/21 1024     Strep pneumo Ag, urine Negative       LEGIONELLA PNEUMOPHILA AG, URINE [[id number]] Collected: 12/08/21 2051    Order Status: Completed Specimen: Urine, random Updated: 12/09/21 1024     Legionella Ag, urine Negative       CULTURE, BLOOD [[id number]] Collected: 12/05/21 1106    Order Status: Completed Specimen: Blood Updated: 12/08/21 0813     Special Requests: NO SPECIAL REQUESTS        Culture result: NO GROWTH 3 DAYS       CULTURE, BLOOD [[id number]] Collected: 12/05/21 1106    Order Status: Completed Specimen: Blood Updated: 12/08/21 0813     Special Requests: NO SPECIAL REQUESTS        Culture result: NO GROWTH 3 DAYS       LEGIONELLA PNEUMOPHILA AG, URINE [[id number]] Collected: 12/05/21 1730    Order Status: Canceled Specimen: Urine     STREP Starlette Calvin, URINE [[id number]] Collected: 12/05/21 1730    Order Status: Canceled Specimen: Urine     RESPIRATORY VIRUS PANEL W/COVID-19, PCR [[id number]]  (Abnormal) Collected: 12/05/21 1110    Order Status: Completed Specimen: Nasopharyngeal Updated: 12/05/21 1633     Adenovirus Not detected        Coronavirus 229E Not detected        Coronavirus HKU1 Not detected        Coronavirus CVNL63 Not detected        Coronavirus OC43 Not detected        SARS-CoV-2, PCR Detected        Comment: CALLED TO AND CORRECTLY REPEATED BY:  DR Guerita Holden [facility] ER ON [id number] AT 1626 HRS TO 1396.           Metapneumovirus Not detected        Rhinovirus and Enterovirus Not detected        Influenza A Not detected        Influenza A, subtype H1 Not detected        Influenza A, subtype H3 Not detected        INFLUENZA A H1N1 PCR Not detected        Influenza B Not detected        Parainfluenza 1 Not detected        Parainfluenza 2 Not detected        Parainfluenza 3 Not detected        Parainfluenza virus 4 Not detected        RSV by PCR Not detected        B. parapertussis, PCR Not detected        Bordetella pertussis - PCR Not detected Chlamydophila pneumoniae DNA, QL, PCR Not detected        Mycoplasma pneumoniae DNA, QL, PCR Not detected       COVID-19 RAPID TEST [[id number]]  (Abnormal) Collected: 12/05/21 1109    Order Status: Completed Specimen: Nasopharyngeal Updated: 12/05/21 1136     Specimen source Nasopharyngeal        COVID-19 rapid test Detected        Comment:      The specimen is POSITIVE for SARS-CoV-2, the novel coronavirus associated with COVID-19. This test has been authorized by the FDA under an Emergency Use Authorization (EUA) for use by authorized laboratories. Fact sheet for Healthcare Providers: Health[employer] Holdingsdate.co.nz  Fact sheet for Patients: Descomplica.co.nz       Methodology: Isothermal Nucleic Acid Amplification  CALLED TO AND CORRECTLY REPEATED BY:  Laura Bello RN ER, TO MARY AT 1136 12/5/2021                  LE Doppler 12/7/21 Interpretation Summary  · No evidence of deep vein thrombosis in the right lower extremity. · No evidence of deep vein thrombosis in the left lower extremity. ECHO 12/7/21 Interpretation Summary  Result status: Final result  · Left Ventricle: Normal cavity size, wall thickness and systolic function (ejection fraction normal). The estimated EF is 55 - 60%. There is moderate (grade 2) left ventricular diastolic dysfunction E/E' ratio = 15.88. Wall Scoring: The left ventricular wall motion is normal.  · Right Atrium: Mildly dilated right atrium. · Left Atrium: Dilated left atrium. · Mitral Valve: No stenosis. Mitral valve non-specific thickening. Mild mitral annular calcification. Mild to moderate regurgitation. · Pulmonary Artery: Pulmonary arterial systolic pressure (PASP) is 41 mmHg. Pulmonary hypertension found to be mild. · IVC/Hepatic Veins: Mechanically ventilated; cannot use inferior caval vein diameter to estimate central venous pressure.        Images report reviewed by me:  CT 12/5 (Most Recent)  Results from Hospital Encounter encounter on 12/05/21    CTA CHEST W OR W WO CONT    Narrative  EXAM: CTA chest    INDICATION: Rule out PE , Covid positive    COMPARISON: None    TECHNIQUE: Axial CT imaging from the thoracic inlet through the diaphragm with  intravenous contrast. Coronal and sagittal MIP reformats were generated. One or  more dose reduction techniques were used on this CT: automated exposure control,  adjustment of the mAs and/or kVp according to patient size, and iterative  reconstruction techniques. The specific techniques used on this CT exam have  been documented in the patient's electronic medical record. Digital Imaging and  Communications in Medicine (DICOM) format image data are available to  nonaffiliated external healthcare facilities or entities on a secure, media  free, reciprocally searchable basis with patient authorization for at least a  12-month period after this study. _______________    FINDINGS:    EXAM QUALITY: Overall exam quality is satisfactory. Pulmonary arterial  enhancement is adequate with adequate breath hold and no significant artifact. PULMONARY ARTERIES: No evidence of pulmonary embolism. THYROID: There is a 15 mm right thyroid lobe nodule which appears complex. Advise nonemergent thyroid ultrasound. LYMPH Nodes: There is a mildly enlarged right infrahilar lymph node measuring 1  cm image 89. PLEURA: There are no pleural effusion. HEART: Cardiac size is enlarged. There is no pericardial effusion. There is mild  tricuspid regurgitation into the hepatic veins. Moderate to severe calcific  coronary disease present. VASCULATURE/MEDIASTINUM: Main pulmonary artery is enlarged measuring 4 cm  suggesting chronic pulmonary hypertension. Small hiatal hernia present. LUNGS: Moderate groundglass opacities and airspace disease seen bilaterally  suggesting atypical pneumonia. The findings are typical in appearance for Covid  19 pneumonia.     AIRWAY: Patent    UPPER ABDOMEN: Unremarkable. OTHER: No acute or aggressive osseous abnormalities identified. _______________    Impression  No evidence of a pulmonary embolism or aortic dissection. Moderate groundglass opacities and airspace disease seen bilaterally suggesting  atypical pneumonia and the findings are typical in appearance for Covid 19  pneumonia. Mildly enlarged right hilar lymph node which may be reactive. Advise follow-up  to exclude any neoplastic or myeloproliferative process. 15 mm slightly complex right thyroid lobe nodule. Advise nonemergent thyroid  ultrasound. CXR reviewed by me:  XR 12/9 (Most Recent). Results from [place] encounter on 12/05/21    XR CHEST PORT    Narrative  HISTORY:  -Provided with order: Intubated; await patient to come to the ICU for first  chest x-ray  -Additional: None    Technique : AP PORTABLE CHEST    Comparison : 12/09/21    FINDINGS:    HEART AND MEDIASTINUM: Endotracheal tube tip estimated at 2.5 cm above arie. Enteric tube tip below left diaphragm out of field of view. External monitoring  wires and leads obscures visibility. LUNGS AND PLEURAL SPACES: Relatively stable bilateral parenchymal opacities,  with confluence in the left base silhouetting left diaphragm and costophrenic  angle. BONY THORAX AND SOFT TISSUES: No acute osseous abnormality. Impression  Stable diffuse bilateral airspace disease, with confluence in the left base +/-  pleural effusion.          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 (HCC) E08.21    E-coli UTI N39.0, B96.20    Class 3 severe obesity with body mass index (BMI) of 40.0 to 44.9 in adult ([facility]) E66.01, Z68.41    Acute respiratory failure with hypoxemia ([facility]) J96.01    Pneumonia due to severe acute respiratory syndrome coronavirus 2 (SARS-CoV-2) U07.1, J12.82    Stage 3a chronic kidney disease ([facility]) N18.31    Anemia D64.9       · Code status: Full code      RECOMMENDATIONS:   Respiratory: Patient with severe Covid pneumonia, and hypoxemia; patient presenting to ICU in ARDS state due to Covid inflammation. Mechanical ventilation VCV 18/400/55%/PEEP 8; ABG showed stable ventilation; PaO2/FiO2 ratio 110; plateau pressure 23. Chest x-ray reviewed from today[facility] shows adequate ET tube and OG tube position; persistent bilateral patchy opacities and groundglass densities; left retrocardiac densitycould be consolidation versus effusion; no pneumothorax noted. Continue ventilator support. Ventilator and sedation bundles reviewed. Sedationmidazolam and fentanyl infusion; follow RASS protocol. Patient currently not needing paralytic. Keep SPO2 >=88%. HOB 30 degree elevation while not proning. Aspiration precautions. CTA chest on admission reviewedno PE; severe bilateral pulmonary opacities and groundglass densities consistent with severe Covid pneumonia; no significant pleural effusions; right hilar adenopathy likely reactivewill need follow-up in outpatient; thyroid nodule will also need follow-up in outpatient. CVS: Stable hemodynamics; mild sinus bradycardia while on sedation. Blood pressure remained stable; hydralazine 10 mg 3 times a day. Ultrasound legsnegative for DVTs  Echocardiogramnormal LV function, and mild pulmonary hypertension; dilated left atrium; mild to moderate mitral regurgitation. proBNP mildly elevated; troponins were mildly elevated on admissionpeak troponin 0.07.   Patient on Coumadin at home for paroxysmal atrial fibrillation; INR within therapeutic rangeresume Coumadin therapy today. ID: Severe Covid pneumonia. Unvaccinated status. Respiratory viral panel was positive for SARS-CoV-2 PCR. Procalcitonin 0.16not elevated  CRP had come down to 7.8; ferritin elevated but coming down. Check CRP and ferritin tomorrow morning. D-dimer not reliable since patient has elevated INR on chronic Coumadin therapy. S/p 1 dose of Tocilizumab 12/7 morning. Steroidscurrent dose of dexamethasone 20 mg dailycomplete 5 days at this dose. CTA chest negative for PEs on admission. Ultrasound of the legs ordered. Patient not needing antibiotics; afebrile; WBC not elevated; procalcitonin not elevated0.08. Continue vitamin supplements. ID on case. Hematology/Oncology: Stable hemoglobin10.4; platelets slightly WAFESZ274 kcontinue monitoring. BNK6. 6 this morning; resume Coumadin [paroxysmal A. fib]. Monitor for any bleeding issues. Renal: Known CKD; s/p CTA chest during this admission. Sodium 150; creatinine going up 1.79; potassium 4.2; bicarb 22. I would consult nephrology. D5W increased to 75 ml per hour; increased free water 200 mL every 4 hours via OG tube. Monitor and replace electrolytes as needed. GI/: Enteral tube feedingNepro per nutrition recommendations. Enteral free water. PPI prophylaxis. LFTsstable- monitor. Endocrine: Monitor BS. Blood sugar elevated. Lantus 5 units nightly added. Sliding scale insulin. TSH 0.19likely sick euthyroid state; free T4 mildly increased; free T3 mildly decreased. Neurology: Intubated and sedatedlimited exam.  Skin/Wound: ICU nursing care. Electrolytes: Replace electrolytes per ICU electrolyte replacement protocol. IVF: Currently no maintenance IV fluids. Nutrition: Tube feedingNeproadvance to goal as tolerated. Prophylaxis: DVT Prophylaxis: Coumadinelevated INR. GI Prophylaxis: Protonix. Restraints: Wrist soft restraints for patient interfering with medical therapy/management and patient safety. Lines/Tubes: PIVs; midline planned  ETT: 12/7/2021   OGT/NGT: 12/7/2021   Montoya: 12/7/2021 (Medically necessary for strict input/output monitoring in critically ill patient, will remove it when not needed. Montoya bundle followed). Advance Directive/Palliative Care: consulted    Prognosis appears very poor in this patient with Covid pneumonia with severe hypoxemic respiratory failure and ARDS needing to be intubated now; mortality is very high; risk of multiorgan failure with complications such as refractory hypoxemia, encephalopathy, critical illness myopathy, acute renal failure needing dialysis, shock state needing pressors, prolonged ventilator support, risk of tracheostomy and chronic debility/bedbound state. Quality Care: PPI, DVT prophylaxis, HOB elevated, Infection control all reviewed and addressed. Care of plan d/w RN, RT. High complexity decision making was performed during the evaluation of this patient at high risk for decompensation with multiple organ involvement. Total critical care time spent rendering care exclusive of procedures/family discussion/coordination of care: 42 minutes.  [phone]. I have called and left voicemail for patient's  to call ICU for updates. Please note: Voice-recognition software may have been used to generate this report, which may have resulted in some phonetic-based errors in grammar and contents. Even though attempts were made to correct all the mistakes, some may have been missed, and remained in the body of the document.       Pito Matute MD  12/10/2021         Note  Patient with SARS-CoV-2 pneumonia with severe pneumonia and hypoxemic respiratory failure; patient was on high flow nasal cannula oxygen; patient is not vaccinated, and in the age group that is being infected with delta virus strain; the strain causes severe respiratory failure and complications reported in the [place] and worldwide; unfortunately, the prognosis is poor in unvaccinated patients, with a high risk of complications, multiorgan failure, chronic hypoxemia and respiratory failure, intubation, mechanical ventilation, thromboembolic disease risk in multiple organs, high risk for long Covid syndrome, and high risk for mortality. The CDC federal and state guidelines have emphasized repeatedly the importance of vaccination to prevent severe infection, mortality, disabilities, long Covid syndrome from Covid virus with several strains currently in the [place]. The treatment protocols are followed based on local hospital protocols, with guidance from centers such as [facility] protocols. [facility] is not part of any research protocol. The local hospital protocols have been guided by [facility] pharmacy department. Covid plasma is no longer considered standard of care in the [facility] due to lack of benefit in trials. Nebulization and CPAP therapies are not considered as part of the protocol in [facility] due to high risk of aerosolization, and high risks of spreading Covid infection to the healthcare staff. Dexamethasone considered standard of care in patients admitted with severe Covid pneumonia; variable dosing has been reported with this medication. ECMO and CRRT facilities not available at [facility]; patients with severe Covid pneumonia and respiratory failure are usually unstable for transportation when they are in critically ill state with a high risk of dying during transportation. REMDESIVIR-not recommended in patients with late presentation with severe respiratory failure     Tocilizumab anti-inflammatory medicationhas shown some benefit in patients with elevated inflammatory markers.     Ivermectin not considered standard of care in the [address] [place] [address],[address]; no significant randomized controlled studies done in the [facility] to suggest benefit of ivermectin in patients with COVID-19 with severe respiratory failure; risk of toxicity related to ivermectin outweighs any potential benefits based on consensus opinion in the [address]. This drug is not considered standard of care in THE [facility].

## 2021-12-10 NOTE — PROGRESS NOTES
0730-Received pt resting in bed with eyes closed, no sign of distress, vss on ventilator and sedation, will continue to monitor  1030-Pt status discussed with intensivist  1300-Pt remains stable on ventilator, no sign of distress  1600-vss, no sign of distress on ventilator and sedation, grandson updated on pt status via phone  1800-No sign of distress, vss   1925-Bedside and Verbal shift change report given to Marva Trejo (oncoming nurse) by Kiki Garces RN (offgoing nurse). Report included the following information SBAR, Kardex, Intake/Output, MAR, Recent Results and Cardiac Rhythm SR/SB.

## 2021-12-10 NOTE — PROGRESS NOTES
Nutrition Assessment     Type and Reason for Visit: Reassess    Nutrition Recommendations/Plan: Continue with TF to meet nutritional needs. Goal of Nepro at 40ml/hr. 12/10/21 0829   Enteral Nutrition   Feeding Route Orogastric   EN Formula Renal  (Nepro)   Schedule Continuous   Feeding Regimen Goal rate: Nepro @ 40ml/hr. Increase by 10ml Q6 until goal reached. Water Flushes 150ml Q6 (600ml)   Goal EN & Flush Order Provides Nepro @ 40ml/hr + D5% @ 50ml/hr + flushes provides:     1788kcals, 71g PRO, 207g CHO, 1238ml free water     Nutrition Assessment:  PMHx: Chronic A fib, chronic leg pain, DM with CKD, HTN. Patient admitted with COVID 19 PNA, a fib RVR. Remains intubated in ICU. Estimated Daily Nutrient Needs:  Energy (kcal):  2926-8320  Protein (g):  69-91       Fluid (ml/day):  0982-2006    Nutrition Related Findings:  Lab: Na 150, glucose -214, GFR est nonAA 28, BUN 69, creatinine 1.79. Med: zinc, protonix, melatonin, humalog, D5% @ 50ml/hr (60g CHO, 204kcals), vit d, vit c. +BM 12/7. Tube feeding started 12/9- last documentation shows Nepro @ 30ml/hr (12/10/21 @ 0800). Per MD this AM- feeding should be at goal today. Current Nutrition Therapies:  DIET NPO  ADULT TUBE FEEDING Orogastric; Renal; Delivery Method: Continuous; Continuous Initial Rate (mL/hr): 10; Continuous Advance Tube Feeding: Yes; Advancement Volume (mL/hr): 10; Advancement Frequency: Q 6 hours; Continuous Goal Rate (mL/hr): 40; Water. ..     Anthropometric Measures:  · Height:  5' 6\" (167.6 cm)  · Current Body Wt:  114.3 kg (251 lb 15.8 oz)  · BMI: 40.7    Nutrition Diagnosis:   · Inadequate oral intake related to impaired respiratory function as evidenced by NPO or clear liquid status due to medical condition, intubation, nutrition support-enteral nutrition    Nutrition Intervention:  Food and/or Nutrient Delivery: Continue tube feeding  Nutrition Education and Counseling: No recommendations at this time  Coordination of Nutrition Care: Continue to monitor while inpatient    Goals:  Continue to with tube feeding and increase until goal rate within the next 3-5 days. Nutrition Monitoring and Evaluation:   Behavioral-Environmental Outcomes: None identified  Food/Nutrient Intake Outcomes: Diet advancement/tolerance, Enteral nutrition intake/tolerance  Physical Signs/Symptoms Outcomes: Biochemical data, Skin, Weight, GI status, Nausea/vomiting    Discharge Planning:     Too soon to determine     Electronically signed by Wilbur Schaeffer RD on 12/10/2021 at 8:33 AM

## 2021-12-10 NOTE — CONSULTS
RENAL CONSULT  12/10/2021    Patient:  Mary Mccurdy  :  1950  Gender:  female  MRN #:  [id number]    Reason for Consult: Acute renal failure         Assessment:    Mary Mccurdy is a 66y.o. 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 She had CT abdomen and pelvis on 21 which showed non obstructing calculi in left renal pelvis . CTA chest was done on 21 - negative for PE   Serum creatinine was 1.27 mg/dl on  which improved to 1.18 mg/dl on 21 and start to rise after 4 days of contrast on 21  and now is 1.79 mg/dl on the day of consult 12/10 . There is no obvious obstruction in CT abdomen   There is no hypotensive episodes . No obvious nephrotoxic on board     Generally contrast induced nephropathy will be evident after 72 hours which might be the cause here vs hemodynamics related     # Acute renal failure   # Hypernatremia   #mild metabolic acidosis    Plan:    - continue Montoya cath, Intake and output recording   - will get urine electrolytes as indirect marker of intravascular volume and UPC   - Keep MAP around 65 mmHg  -No clinical and metabolic indication of dialysis   - Avoid further  Contrast, nephrotoxin and NSAIDS  - D5W 75 cc/hour for hypernatremia , target drop in serum sodium is 6-8 mmol/lt   - Disproportionate rise in BUN compared to creatinine is probably due to steroid and NG feed   - Dose all meds for current eGFR    History of Present Illness: Mary Mccurdy is a 66y.o. 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  She is also had a cough that started on  with some pink-tinged sputum worsening SOB  prompted her to come in  ER.   In ED she was found to have Hypoxia , tachycardia and hypotension . EKG showed A.fib with RVR , rapid COVID-19 was positive and admitted for further care . She has been on ICU on mechanical vent for covid pneumonia      She had CT abdomen and pelvis on 11/30/21 which showed non obstructing calculi in left renal pelvis . CTA chest was done on 12/5/21 - negative for PE   Serum creatinine was 1.27 mg/dl on 11/30 which improved to 1.18 mg/dl on 12/8/21 and start to rise after 4 days of contrast on 12/9/21  and now is 1.79 mg/dl on the day of consult 12/10 .     Past Medical History:   Diagnosis Date    [facility] ([facility] [address]     Anxiety     CAD (coronary artery disease)     Chronic kidney disease     Diabetes ([facility] [address]     borderline    High cholesterol     Hypertension     Kidney stones     Psychiatric disorder     anxiety     Past Surgical History:   Procedure Laterality Date    HX CHOLECYSTECTOMY      HX HEART CATHETERIZATION      HX HYSTERECTOMY      HX ORTHOPAEDIC      bilateral knee surgery    HX ORTHOPAEDIC      right elbow    HX UROLOGICAL      stent placement    PA CARDIAC SURG PROCEDURE UNLIST      stent x 1     Family History   Problem Relation Age of Onset    Breast Cancer Maternal Aunt      No Known Allergies  Current Facility-Administered Medications   Medication Dose Route Frequency Provider Last Rate Last Admin    insulin glargine (LANTUS) injection 5 Units  5 Units SubCUTAneous QHS Марина Patel MD        pantoprazole (PROTONIX) 40 mg in 0.9% sodium chloride 10 mL injection  40 mg IntraVENous DAILY Марина Patel MD   40 mg at 12/10/21 0856    dextrose 5% infusion  75 mL/hr IntraVENous CONTINUOUS Марина Patel MD 50 mL/hr at 12/09/21 1313 50 mL/hr at 12/09/21 1313    hydrALAZINE (APRESOLINE) tablet 10 mg  10 mg Oral TID Марина Patel MD   10 mg at 12/10/21 0856    dexamethasone (DECADRON) 4 mg/mL injection 20 mg  20 mg IntraVENous DAILY Chris Daniels MD   20 mg at 12/10/21 0856    [START ON 12/12/2021] dexamethasone (DECADRON) 4 mg/mL injection 10 mg  10 mg IntraVENous DAILY Teule-Hekima, Terie Landau, MD        midazolam (VERSED) injection 2 mg  2 mg IntraVENous Q3H PRN Gabe Burch MD   2 mg at 12/07/21 1500    fentaNYL citrate (PF) injection 50 mcg  50 mcg IntraVENous Q4H PRN Gabe Burch MD        chlorhexidine (PERIDEX) 0.12 % mouthwash 10 mL  10 mL Oral Q12H Gabe Burch MD   10 mL at 12/10/21 0856    fentaNYL (PF) 900 mcg/30 ml infusion soln  0-200 mcg/hr IntraVENous TITRATE Gabe Burch MD   Stopped at 12/09/21 1400    midazolam in normal saline (VERSED) 1 mg/mL infusion  2-10 mg/hr IntraVENous TITRATE Gabe Burch MD 3 mL/hr at 12/09/21 1400 3 mg/hr at 12/09/21 1400    insulin lispro (HUMALOG) injection   SubCUTAneous Q6H Gabe Burch MD   4 Units at 12/10/21 0518    melatonin (rapid dissolve) tablet 10 mg  10 mg Oral QHS Pérez Enamorado MD   10 mg at 12/09/21 2158    ascorbic acid (vitamin C) (VITAMIN C) tablet 500 mg  500 mg Oral BID Pérez Enamorado MD   500 mg at 12/10/21 0856    zinc sulfate (ZINCATE) 50 mg zinc (220 mg) capsule 1 Capsule  1 Capsule Oral DAILY Pérez Enamorado MD   1 Capsule at 12/10/21 0856    Warfarin - Pharmacy to Dose  1 Each Other Rx Dosing/Monitoring Freddie Pulido MD        HYDROcodone-acetaminophen [facility])  mg tablet 1 Tablet  1 Tablet Oral Q6H PRN Freddie Pulido MD   1 Tablet at 12/06/21 1646    isosorbide mononitrate ER (IMDUR) tablet 30 mg  30 mg Oral DAILY Freddie Pulido MD   30 mg at 12/07/21 0935    nitroglycerin (NITROSTAT) tablet 0.4 mg  0.4 mg SubLINGual Q5MIN PRN Freddie Pulido MD        sodium chloride (NS) flush 5-40 mL  5-40 mL IntraVENous Q8H Freddie Pulido MD   10 mL at 12/10/21 0600    sodium chloride (NS) flush 5-40 mL  5-40 mL IntraVENous PRN Freddie Pulido MD        acetaminophen (TYLENOL) tablet 650 mg  650 mg Oral Q6H PRN Freddie Pulido MD        Or    acetaminophen (TYLENOL) suppository 650 mg  650 mg Rectal Q6H PRN Zuri Couch MD        polyethylene glycol [facility]) packet 17 g  17 g Oral DAILY PRN Zuri Couch MD        ondansetron (ZOFRAN ODT) tablet 4 mg  4 mg Oral Q8H PRN Zuri Couch MD        Or    [facility]ron [facility] injection 4 mg  4 mg IntraVENous Q6H PRN Zuri Couch MD        cholecalciferol (VITAMIN D3) (1000 Units /25 mcg) tablet 2,000 Units  2,000 Units Oral DAILY Zuri Couch MD   2,000 Units at 12/10/21 0855    glucose chewable tablet 16 g  16 g Oral PRN Zuri Couch MD        glucagon [facility]) injection 1 mg  1 mg IntraMUSCular PRN Zuri Couch MD        dextrose (D50W) injection syrg 12.5-25 g  25-50 mL IntraVENous PRN Zuri Couch MD           Review of Symptoms:     Unable to obtained review of systems due to patient condition     Objective:    Visit Vitals  BP (!) 144/40   Pulse 60   Temp 98.4 °F (36.9 °C)   Resp 15   Ht 5' 6\" (1.676 m)   Wt 114.3 kg (252 lb)   SpO2 96%   BMI 40.67 kg/m²       Physical Exam:    Sedated on mechanical vent   HEENT: ET tube in place  no neck swelling  Lung: coarse breath sound with fine rales   Heart: s1s2 heard   Abdomen: soft, non distended   Ext: no edema   CNS- unconscious      Laboratory Data:    Lab Results   Component Value Date    BUN 69 (H) 12/10/2021    BUN 54 (H) 12/09/2021    BUN 36 (H) 12/08/2021     (H) 12/10/2021     (H) 12/09/2021     12/08/2021    CO2 22 12/10/2021    CO2 23 12/09/2021    CO2 21 12/08/2021     Lab Results   Component Value Date    WBC 7.7 12/10/2021    HGB 10.4 (L) 12/10/2021    HCT 32.8 (L) 12/10/2021     No components found for: CALCIUM, PHOSPHORUS, MAGNESIUM  Lab Results   Component Value Date    HDL 47 12/20/2020     No results found for: SPECIMENTYP, TURBIDITY, UGLU    Imaging Reveiwed:    CT abdomen/pelvis on 11/30/21: > Calculus within the left renal pelvis unchanged from prior examination,  measuring approximately 7 mm in size.  No evidence of hydronephrosis.     > No right-sided urolithiasis. > Urinary bladder unremarkable in CT appearance    CTA chest 12/5/21: - No evidence of a pulmonary embolism or aortic dissection.     Moderate groundglass opacities and airspace disease seen bilaterally suggesting  atypical pneumonia and the findings are typical in appearance for Covid 19  pneumonia.     Mildly enlarged right hilar lymph node which may be reactive. Advise follow-up  to exclude any neoplastic or myeloproliferative process.     15 mm slightly complex right thyroid lobe nodule. Advise nonemergent thyroid  ultrasound.            Discussed with ICU team     Approx 65minutes of critical care time spent in the direct evaluation and formulation of treatment plan of this high risk patient. The reason for providing this level of medical care was due a critical illness that impaired one or more vital organ systems such that there was a high probability of imminent or life threatening deterioration in the patients condition. This care involved high complexity decision making to assess, manipulate, and support vital system functions, to treat this degreee vital organ system failure and to prevent further life threatening deterioration of the patients condition.               Olya Lu MD   [facility]

## 2021-12-10 NOTE — DIABETES MGMT
Diabetes/ Glycemic Control Plan of Care  Recommendations:   Recommend to initiate basal insulin, suggest 5 units Lantus q 24 hours. Discussed with ICU MD.    Assessment: Patient now receiving full goal rate of tube feeds. POC blood sugars over the last 24 hours 179-218 mg/dL. TDD insulin 10 units Humalog. Recent Glucose Results:   Lab Results   Component Value Date/Time     (H) 12/10/2021 03:45 AM    GLUCPOC 218 (H) 12/10/2021 05:10 AM    GLUCPOC 214 (H) 12/09/2021 11:49 PM    GLUCPOC 179 (H) 12/09/2021 04:21 PM           Steroids:   Rx Glucocorticoids (24h ago, onward)             Start     Dose Route Frequency Ordered Stop    12/12/21 0900  dexamethasone (DECADRON) 4 mg/mL injection 10 mg         10 mg IV DAILY 12/07/21 0819 12/17/21 0859    12/07/21 0900  dexamethasone (DECADRON) 4 mg/mL injection 20 mg         20 mg IV DAILY 12/07/21 0819 12/12/21 0859              Within target range (70-180mg/dL): No  Current insulin orders: Humalog q 6  Total Daily Dose previous 24 hours = 10 units Humalog    Plan/Goals:   Blood glucose will be within target of 70 - 180 mg/dl within 72 hours  Reinforce dietary and medication compliance at home.        Education:  [] Refer to Diabetes Education Record                       [x] Education not indicated at this time     Flor Fowler  Glycemic Control Team  [phone]

## 2021-12-10 NOTE — PROGRESS NOTES
Bedside shift change report received from [facility]. Report included the following information SBAR, Kardex, Intake/Output, MAR, Recent Results, Med Rec Status and Cardiac Rhythm Sinus Mooney Sis and plan of care. Midline pulled out of left arm. Patient gown, linen changed. Zoltan medic in to place new midline. Bedside shift change report given to ALEKSANDRA Zavaleta RN. Report included the following information SBAR, Kardex, Intake/Output, MAR, Recent Results, Med Rec Status and Cardiac Rhythm Sinus César/NSR and plan of care.

## 2021-12-10 NOTE — PROGRESS NOTES
1930- Report and care received, assessment completed per flow sheet. Intubated, sedated, calm, NAD. ETT secured. Soft bilateral wrist restraints in place to protect integrity of lines/tubes, flow sheet in progress. 2345- Reassessment without change. 0400- Reassessment without change. 0510- Low temp. Noted, warming blanket applied.

## 2021-12-11 NOTE — PROGRESS NOTES
Hospitalist Progress Note    Patient: Derrell Gurrola MRN: [id number]  CSN: [id number]    YOB: 1950  Age: 66 y.o. Sex: female    DOA: 12/5/2021 LOS:  LOS: 6 days          Chief Complaint: Ac resp failure    Assessment/Plan     A 67 yo female with past medical history of chronic A fib on  Coumadin, chronic leg pain, anxiety, DM with CKD and HTN presents to the ED with 1 wk of Fatigue, Cough and SOB. Found to be hypoxic, COVID-19 PNA and A fib with RVR. Acute respiratory failure with hypoxemia: intubated now, sedated     Severe covid pneumonia: with resp failure, procalcitonin remains low    Acute renal failure in chronic kidney disease stage III : possible contrast induced nephropathy , creatinine improving, hypernatremia improving, strict I/Os, avoid further nephrotoxins     Anemia: Mild anemia mild thrombocytic cytopenia, continue to monitor  INR supratherapeutic greater than 5, vitamin K given x1 dose  Patient on chronic Coumadin therapy at home due to history of paroxysmal atrial fibrillation     Thrombocytopenia: Continue to monitor     Paroxysmal atrial fibrillation: Lovenox held because INR supratherapeutic greater than 5, was on coumadin at home, INR 2.6 today     She is at high risk for death given her unvaccinated COVID vaccine, comorbidities and the severity of her illness. E. Coli Urinary tract infection: Seen by ED on 11/30. Was on IV Rocephin course completed      A. Fib with RVR: monitor     DM type 2 with CKD: Monitor glucose and BG.  SSI      HTN: hydralazine PRN to manage blood pressure     Thyroid nodule per CT: outpatient f/u     GI/DVT prophylaxis ordered     CODE STATUS: Full      Disposition :  Patient Active Problem List   Diagnosis Code    Hypertension I10    Paroxysmal A-fib ([facility] [address] I48.0    Pneumonia due to COVID-19 virus U07.1, J12.82    Thrombocytopenia (HCC) D69.6    Hypoxia R09.02    DM due to underlying condition with diabetic nephropathy (HCC) E08.21  E-coli UTI N39.0, B96.20    Class 3 severe obesity with body mass index (BMI) of 40.0 to 44.9 in adult ([facility]) E66.01, Z68.41    Acute respiratory failure with hypoxemia ([facility]) J96.01    Pneumonia due to severe acute respiratory syndrome coronavirus 2 (SARS-CoV-2) U07.1, J12.82    Stage 3a chronic kidney disease ([facility]) N18.31    Anemia D64.9       Subjective:    pt resting in bed with eyes closed, no sign of distress, vss on ventilator and sedation  Several BMs today     Review of systems:    UTO due to sedated status    Vital signs/Intake and Output:  Visit Vitals  /62   Pulse (!) 56   Temp 98.3 °F (36.8 °C)   Resp 19   Ht 5' 6\" (1.676 m)   Wt 86.6 kg (190 lb 14.7 oz)   SpO2 97%   BMI 30.81 kg/m²     Current Shift:  12/11 0701 - 12/11 1900  In: 1042.5 [I.V.:462.5]  Out: -   Last three shifts:  12/09 1901 - 12/11 0700  In: 4282.9 [I.V.:2202.9]  Out: 1200 [Urine:1200]    Exam:    General:elderly OW sedate WF intubated  CVS:Regular rate and rhythm, no M/R/G, S1/S2 heard, no thrill  Lungs:Clear to auscultation bilaterally, no wheezes, rhonchi, or rales  Abdomen: Soft, Nontender, No distention  Extremities: No C/C/E, pulses palpable 2+  Neuro:sedated                Labs: Results:       Chemistry Recent Labs     12/11/21  0420 12/10/21  0345 12/09/21  0518   * 214* 153*   * 150* 148*   K 4.3 4.2 4.2   * 118* 118*   CO2 24 22 23   BUN 73* 69* 54*   CREA 1.45* 1.79* 1.51*   CA 8.8 8.8 8.8   AGAP 7 10 7   BUCR 50* 39* 36*   AP 74 63 63   TP 5.7* 6.0* 6.2*   ALB 2.2* 2.2* 2.2*   GLOB 3.5 3.8 4.0   AGRAT 0.6* 0.6* 0.6*      CBC w/Diff Recent Labs     12/11/21  0420 12/10/21  0345 12/09/21  0518   WBC 8.3 7.7 7.8   RBC 3.21* 3.26* 3.32*   HGB 10.4* 10.4* 11.0*   HCT 32.1* 32.8* 33.4*   PLT 92* 113* 102*   GRANS 89* 91* 92*   LYMPH 3* 4* 4*   EOS 0 0 0      Cardiac Enzymes No results for input(s): CPK, CKND1, MANE in the last 72 hours.     No lab exists for component: CKRMB, TROIP   Coagulation Recent Labs     12/11/21  0420 12/10/21  0345   PTP 23.5* 27.1*   INR 2.2* 2.6*       Lipid Panel Lab Results   Component Value Date/Time    Cholesterol, total 208 (H) 12/20/2020 03:16 PM    HDL Cholesterol 47 12/20/2020 03:16 PM    LDL, calculated 128.6 (H) 12/20/2020 03:16 PM    VLDL, calculated 32.4 12/20/2020 03:16 PM    Triglyceride 162 (H) 12/20/2020 03:16 PM    CHOL/HDL Ratio 4.4 12/20/2020 03:16 PM      BNP No results for input(s): BNPP in the last 72 hours.    Liver Enzymes Recent Labs     12/11/21 0420   TP 5.7*   ALB 2.2*   AP 74      Thyroid Studies Lab Results   Component Value Date/Time    TSH 0.19 (L) 12/08/2021 03:00 AM        Procedures/imaging: see electronic medical records for all procedures/Xrays and details which were not copied into this note but were reviewed prior to creation of Aayush Nesbitt MD

## 2021-12-11 NOTE — PROGRESS NOTES
RENAL CONSULT PROGRESS NOTE   2021    Patient:  Vanessa Topete  :  1950  Gender:  female  MRN #:  [id number]    Reason for Consult: Acute renal failure         Assessment:    Vanessa Player is a 66y.o. 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 She had CT abdomen and pelvis on 21 which showed non obstructing calculi in left renal pelvis . CTA chest was done on 21 - negative for PE   Serum creatinine was 1.27 mg/dl on  which improved to 1.18 mg/dl on 21 and start to rise after 4 days of contrast on 21  and now is 1.79 mg/dl on the day of consult 12/10 . There is no obvious obstruction in CT abdomen   There is no hypotensive episodes . No obvious nephrotoxic on board     Generally contrast induced nephropathy will be evident after 72 hours which might be the cause here vs hemodynamics related     # Acute renal failure- Improving    # Hypernatremia - Improving   #mild metabolic acidosis    Plan:    - Urine sodium is 13 mmol/lt  and urine chloride is < 10 mmol/lt which is consistent with pre-renal picture , which is generally seen on contrast exposure as well . As renal function and sodium level  is improving on D5W will continue the same for today   - continue Montoya cath, Intake and output recording   - Keep MAP around 65 mmHg  -No clinical and metabolic indication of dialysis   - Avoid further  Contrast, nephrotoxin and NSAIDS   - Disproportionate rise in BUN compared to creatinine is probably due to steroid and NG feed   - Dose all meds for current eGFR        Intake/Output Summary (Last 24 hours) at 2021 1338  Last data filed at 2021 1300  Gross per 24 hour   Intake 3436.35 ml   Output 700 ml   Net 2736.35 ml         Review of Symptoms:     Unable to obtained review of systems due to patient condition   Overnight events reviewed  BP stable making decent rune output     Objective:    Visit Vitals  BP (!) 142/67   Pulse (!) 55   Temp 98.3 °F (36.8 °C)   Resp 18   Ht 5' 6\" (1.676 m)   Wt 86.6 kg (190 lb 14.7 oz)   SpO2 97%   BMI 30.81 kg/m²       Physical Exam:    Sedated on mechanical vent   HEENT: ET tube in place  no neck swelling  Lung: coarse breath sound  Heart: s1s2 heard   Abdomen: soft, non distended   Ext: no edema   CNS- unconscious      Laboratory Data:    Lab Results   Component Value Date    BUN 73 (H) 12/11/2021    BUN 69 (H) 12/10/2021    BUN 54 (H) 12/09/2021     (H) 12/11/2021     (H) 12/10/2021     (H) 12/09/2021    CO2 24 12/11/2021    CO2 22 12/10/2021    CO2 23 12/09/2021     Lab Results   Component Value Date    WBC 8.3 12/11/2021    HGB 10.4 (L) 12/11/2021    HCT 32.1 (L) 12/11/2021     No components found for: CALCIUM, PHOSPHORUS, MAGNESIUM  Lab Results   Component Value Date    HDL 47 12/20/2020     No results found for: SPECIMENTYP, TURBIDITY, UGLU    Imaging Reveiwed:    CT abdomen/pelvis on 11/30/21: > Calculus within the left renal pelvis unchanged from prior examination,  measuring approximately 7 mm in size. No evidence of hydronephrosis.     > No right-sided urolithiasis. > Urinary bladder unremarkable in CT appearance    CTA chest 12/5/21: - No evidence of a pulmonary embolism or aortic dissection.     Moderate groundglass opacities and airspace disease seen bilaterally suggesting  atypical pneumonia and the findings are typical in appearance for Covid 19  pneumonia.     Mildly enlarged right hilar lymph node which may be reactive. Advise follow-up  to exclude any neoplastic or myeloproliferative process.     15 mm slightly complex right thyroid lobe nodule. Advise nonemergent thyroid  ultrasound.            Discussed with ICU team     Approx 32minutes of critical care time spent in the direct evaluation and formulation of treatment plan of this high risk patient.  The reason for providing this level of medical care was due a critical illness that impaired one or more vital organ systems such that there was a high probability of imminent or life threatening deterioration in the patients condition. This care involved high complexity decision making to assess, manipulate, and support vital system functions, to treat this degreee vital organ system failure and to prevent further life threatening deterioration of the patients condition.               Gwendolyn Bernal MD   [facility]

## 2021-12-11 NOTE — PROGRESS NOTES
Pharmacy Dosing Services:    Consult for Warfarin Dosing by Pharmacy by Dr. Brigitte Councilman provided for this 66 y.o.  female , for indication of Atrial Fibrillation. Day of Therapy 7  Dose to achieve an INR goal of 2-3    Order entered for  Warfarin  4 (mg) ordered to be given today at 18:00. Significant drug interactions: last dose of azithromycin 12/5  LABS PT/INR   PT/INR Lab Results   Component Value Date/Time    INR 2.2 (H) 12/11/2021 04:20 AM      Platelets Lab Results   Component Value Date/Time    PLATELET 92 (L) 51/16/8640 04:20 AM      H/H     Lab Results   Component Value Date/Time    HGB 10.4 (L) 12/11/2021 04:20 AM        Warfarin Administrations (last 168 hours)       Date/Time Action Medication Dose    12/10/21 1716 Given    warfarin (COUMADIN) tablet 2 mg 2 mg    12/06/21 1816 Given    warfarin (COUMADIN) tablet 4 mg 4 mg    12/05/21 1959 Given    warfarin (COUMADIN) tablet 5 mg 5 mg            Pharmacy to follow daily and will provide subsequent Warfarin dosing based on clinical status.   [place]HER [place] [id number] lnsebouqmck [phone]

## 2021-12-11 NOTE — PROGRESS NOTES
1910: Report received from DENILSON Saxena RN. Assumed care of pt at this time. 2000: Patient remains intubated and sedated, resting comfortably, eyes opened to voice. HR is 40s-60s. OG tube infusing Nephro @40ml/hr with Q4 200ml water flushes. Montoya cath draining yellow/clear urine. Restraints in place. Mouth care and Drea care completed. No signs of distress. 0000: Reassessment with no changes. 0400: Reassessment with no changes. 8860: Shift change report given to DENILSON Saxena RN (oncoming nurse) by Morgan BERNARD RN (offgoing nurse). Report included the following information SBAR, Kardex and MAR.

## 2021-12-11 NOTE — PROGRESS NOTES
0715-Received pt resting in bed with eyes closed, vss on ventilator and sedation, will continue to monitor  1000-Pt status discussed with intensivist, grand daughter updated on pt status  1200-Pt had medium bowel movement, pad changed and darek care performed, tolerated well  1400-Pt remains stable on ventilator  1600-Pt daughter down from [place], updated on pt status and visited pt from outside window, calm, appropriate and appreciative  1800-Large bowel movement, pericare performed and pad changed, tolerated well  1924-Bedside and Verbal shift change report given to Daniel Hernandes (oncoming nurse) by Ash Aguilera RN (offgoing nurse).  Report included the following information SBAR, Kardex, Intake/Output, MAR, Recent Results and Cardiac Rhythm SB.

## 2021-12-11 NOTE — PROGRESS NOTES
[facility]  Pulmonary, Critical Care, and Sleep Medicine    Name: Jovan Cardoaz MRN: [id number]   : 1950 Hospital: [facility] [place]    Date: 2021  Room: [address] [place] [address] Note                                              Consult requesting physician: Dr. Sanjay Cali  Reason for Consult: Severe Covid pneumonia with respiratory failure      Subjective/History of Present Illness:     Patient is a 66 y.o. female with PMHx significant for chronic A. fib on Coumadin therapy, diabetes mellitus, chronic kidney disease, hypertension, thyroid nodule was admitted to the hospital on 2021 with Covid infection. She has not been vaccinated for Covid infection. Patient progressed to worsening respiratory failure and hypoxemia. During admission, patient had declined intubation. Today, her  reported CODE STATUS and wanted patient to be intubated. Patient underwent COVID-19 intubation in the telemetry unit, and subsequently transferred to the ICU.    2021  Patient in ICU. Unfortunately, patient's  is now in the ICU as well on ventilator support with severe Covid pneumonia. Patient remains on ventilator support. No acute issues overnight. No significant respiratory secretions. Telemetrymild sinus bradycardia. Blood pressurestable. Afebrile. Urine output700 mL overnight. Review of Systems:  Limited due to patient condition    Patient came to ER on  with right flank pain, and right basal pneumonia; was sent home with antibiotic therapyAugmentin and doxycycline. Urine culture ansensitive E. coli.       No Known Allergies   Past Medical History:   Diagnosis Date    A-fib [facility] [address]     Anxiety     CAD (coronary artery disease)     Chronic kidney disease     Diabetes ([facility] [address]     borderline    High cholesterol     Hypertension     Kidney stones     Psychiatric disorder     anxiety      Past Surgical History:   Procedure Laterality Date    HX CHOLECYSTECTOMY      HX HEART CATHETERIZATION      HX HYSTERECTOMY      HX ORTHOPAEDIC      bilateral knee surgery    HX ORTHOPAEDIC      right elbow    HX UROLOGICAL      stent placement    MO CARDIAC SURG PROCEDURE UNLIST      stent x 1      Social History     Tobacco Use    Smoking status: Never Smoker    Smokeless tobacco: Never Used   Substance Use Topics    Alcohol use: No      Family History   Problem Relation Age of Onset    Breast Cancer Maternal Aunt       Prior to Admission medications    Medication Sig Start Date End Date Taking? Authorizing Provider   allopurinoL (ZYLOPRIM) 300 mg tablet Take 300 mg by mouth daily. Provider, Historical   torsemide (DEMADEX) 100 mg tablet Take 50 mg by mouth daily. Provider, Historical   HYDROcodone-acetaminophen (NORCO)  mg tablet Take 1 Tab by mouth every eight (8) hours as needed for Pain. Provider, Historical   metoprolol succinate (TOPROL-XL) 100 mg tablet Take 150 mg by mouth daily. Provider, Historical   warfarin (Coumadin) 2 mg tablet Take 4 mg by mouth daily. Provider, Historical   isosorbide mononitrate ER (IMDUR) 30 mg tablet Take 30 mg by mouth daily. Provider, Historical   magnesium oxide (MAG-OX) 400 mg tablet Take 400 mg by mouth daily. Provider, Historical   pantoprazole (PROTONIX) 40 mg tablet Take 40 mg by mouth daily. Provider, Historical   ergocalciferol (Vitamin D2) 1,250 mcg (50,000 unit) capsule Take 50,000 Units by mouth. Provider, Historical   docusate sodium (Colace) 100 mg capsule Take 100 mg by mouth two (2) times a day. Provider, Historical   nitroglycerin (NITROSTAT) 0.4 mg SL tablet 0.4 mg by SubLINGual route every five (5) minutes as needed for Chest Pain. Up to 3 doses. Provider, Historical   hydrALAZINE (APRESOLINE) 50 mg tablet Take 25 mg by mouth three (3) times daily. Layla Alex MD   aspirin delayed-release 81 mg tablet Take  by mouth daily.     Layla Alex MD   folic acid (FOLVITE) 1 mg tablet Take  by mouth daily. Layla Alex MD   potassium chloride (K-DUR, KLOR-CON) 20 mEq tablet Take  by mouth two (2) times a day.     Layla Alex MD     Current Facility-Administered Medications   Medication Dose Route Frequency    insulin glargine (LANTUS) injection 5 Units  5 Units SubCUTAneous QHS    pantoprazole (PROTONIX) 40 mg in 0.9% sodium chloride 10 mL injection  40 mg IntraVENous DAILY    dextrose 5% infusion  75 mL/hr IntraVENous CONTINUOUS    hydrALAZINE (APRESOLINE) tablet 10 mg  10 mg Oral TID    [START ON 2021] dexamethasone (DECADRON) 4 mg/mL injection 10 mg  10 mg IntraVENous DAILY    chlorhexidine (PERIDEX) 0.12 % mouthwash 10 mL  10 mL Oral Q12H    fentaNYL (PF) 900 mcg/30 ml infusion soln  0-200 mcg/hr IntraVENous TITRATE    midazolam in normal saline (VERSED) 1 mg/mL infusion  2-10 mg/hr IntraVENous TITRATE    insulin lispro (HUMALOG) injection   SubCUTAneous Q6H    melatonin (rapid dissolve) tablet 10 mg  10 mg Oral QHS    ascorbic acid (vitamin C) (VITAMIN C) tablet 500 mg  500 mg Oral BID    zinc sulfate (ZINCATE) 50 mg zinc (220 mg) capsule 1 Capsule  1 Capsule Oral DAILY    Warfarin - Pharmacy to Dose  1 Each Other Rx Dosing/Monitoring    isosorbide mononitrate ER (IMDUR) tablet 30 mg  30 mg Oral DAILY    sodium chloride (NS) flush 5-40 mL  5-40 mL IntraVENous Q8H    cholecalciferol (VITAMIN D3) (1000 Units /25 mcg) tablet 2,000 Units  2,000 Units Oral DAILY         Objective:   Vital Signs:    Visit Vitals  BP (!) 151/53   Pulse (!) 54   Temp 99.3 °F (37.4 °C)   Resp 18   Ht 5' 6\" (1.676 m)   Wt 86.6 kg (190 lb 14.7 oz)   SpO2 97%   BMI 30.81 kg/m²       O2 Device: Endotracheal tube, Ventilator   O2 Flow Rate (L/min): 40 l/min   Temp (24hrs), Av.8 °F (36.6 °C), Min:96.4 °F (35.8 °C), Max:99.3 °F (37.4 °C)       Intake/Output:   Last shift:      701 - 1900  In: 317.5 [I.V.:87.5]  Out: -     Last 3 shifts:  1901 - 12/11 0700  In: 4282.9 [I.V.:2202.9]  Out: 1200 [Urine:1200]      Intake/Output Summary (Last 24 hours) at 12/11/2021 0938  Last data filed at 12/11/2021 0800  Gross per 24 hour   Intake 3131.35 ml   Output 700 ml   Net 2431.35 ml       Last 3 Recorded Weights in this Encounter    12/05/21 1013 12/07/21 1827 12/11/21 0650   Weight: 114.3 kg (252 lb) 114.3 kg (252 lb) 86.6 kg (190 lb 14.7 oz)       ABG:    Lab Results   Component Value Date/Time    PHI 7.37 12/11/2021 05:51 AM    PCO2I 42.4 12/11/2021 05:51 AM    PO2I 107 (H) 12/11/2021 05:51 AM    HCO3I 24.4 12/11/2021 05:51 AM    FIO2I 55 12/11/2021 05:51 AM     Ventilator Mode: Assist control  Respiratory Rate  Back-Up Rate: 18  Insp Time (sec): 1.16 sec  I:E Ratio: 1:1.87  Ventilator Volumes  Vt Set (ml): 400 ml  Vt Exhaled (Machine Breath) (ml): 562 ml  Ve Observed (l/min): 8.5 l/min  Ventilator Pressures  PIP Observed (cm H2O): 19 cm H2O  Plateau Pressure (cm H2O): 24 cm H2O  MAP (cm H2O): 12  PEEP/VENT (cm H2O): 8 cm H20  Auto PEEP Observed (cm H2O): 0 cm H2O       Physical Exam: Limitations in exam due to full PPE requirements. Patient remains intubated and sedated; arousable; appears comfortable; acyanotic  HEENT: pupils not dilated, reactive, no scleral jaundice, moist oral mucosa, no nasal drainage; neck supple  Neck: No adenopathy or thyroid swelling  Orotracheal tubeno significant respiratory secretions or bloody aspirates noted. Orogastric tubetube feed being given.   CVS: S1 and S2 with no murmur; JVD not elevated; telemetrymild sinus bradycardia; blood pressure stable  RS: Symmetrical breath sounds; air entry is moderate bilaterally; diffuse bilateral crackles; no wheezing or rhonchi; not tachypneic or in distress while on vent support     Abd: Soft, not distended, not tender, no guarding or rigidity; bowel sounds present; no hepatosplenomegaly  Neuro: Intubated and sedated; limited exam  Extrm: No peripheral leg edema; no focal swelling or clubbing  Skin: no rash  Lymphatic: no cervical or supraclavicular adenopathy  Vasc: DPs palpable both feet  MS: No joint swelling      Data:       Recent Results (from the past 24 hour(s))   GLUCOSE, POC    Collection Time: 12/10/21 11:38 AM   Result Value Ref Range    Glucose (POC) 233 (H) 70 - 110 mg/dL   GLUCOSE, POC    Collection Time: 12/10/21  5:29 PM   Result Value Ref Range    Glucose (POC) 295 (H) 70 - 110 mg/dL   SODIUM, UR, RANDOM    Collection Time: 12/10/21  5:45 PM   Result Value Ref Range    Sodium,urine random 13 (L) 20 - 110 MMOL/L   CHLORIDE, URINE RANDOM    Collection Time: 12/10/21  5:45 PM   Result Value Ref Range    Chloride,urine random <10 (L) 55 - 125 MMOL/L   PROTEIN/CREATININE RATIO, URINE    Collection Time: 12/10/21  5:45 PM   Result Value Ref Range    Protein, urine random 36 (H) <11.9 mg/dL    Creatinine, urine 62.00 30 - 125 mg/dL    Protein/Creat. urine Ratio 0.6     GLUCOSE, POC    Collection Time: 12/10/21 11:12 PM   Result Value Ref Range    Glucose (POC) 279 (H) 70 - 110 mg/dL   PROTHROMBIN TIME + INR    Collection Time: 12/11/21  4:20 AM   Result Value Ref Range    Prothrombin time 23.5 (H) 11.5 - 15.2 sec    INR 2.2 (H) 0.8 - 1.2     CBC WITH AUTOMATED DIFF    Collection Time: 12/11/21  4:20 AM   Result Value Ref Range    WBC 8.3 4.6 - 13.2 K/uL    RBC 3.21 (L) 4.20 - 5.30 M/uL    HGB 10.4 (L) 12.0 - 16.0 g/dL    HCT 32.1 (L) 35.0 - 45.0 %    .0 78.0 - 100.0 FL    MCH 32.4 24.0 - 34.0 PG    MCHC 32.4 31.0 - 37.0 g/dL    RDW 14.4 11.6 - 14.5 %    PLATELET 92 (L) 982 - 420 K/uL    MPV 12.2 (H) 9.2 - 11.8 FL    NRBC 0.2 (H) 0  WBC    ABSOLUTE NRBC 0.02 (H) 0.00 - 0.01 K/uL    NEUTROPHILS 89 (H) 40 - 73 %    LYMPHOCYTES 3 (L) 21 - 52 %    MONOCYTES 5 3 - 10 %    EOSINOPHILS 0 0 - 5 %    BASOPHILS 0 0 - 2 %    IMMATURE GRANULOCYTES 2 (H) 0.0 - 0.5 %    ABS. NEUTROPHILS 7.4 1.8 - 8.0 K/UL    ABS. LYMPHOCYTES 0.3 (L) 0.9 - 3.6 K/UL    ABS.  MONOCYTES 0.4 0.05 - 1.2 K/UL ABS. EOSINOPHILS 0.0 0.0 - 0.4 K/UL    ABS. BASOPHILS 0.0 0.0 - 0.1 K/UL    ABS. IMM. GRANS. 0.2 (H) 0.00 - 0.04 K/UL    DF AUTOMATED     METABOLIC PANEL, COMPREHENSIVE    Collection Time: 12/11/21  4:20 AM   Result Value Ref Range    Sodium 147 (H) 136 - 145 mmol/L    Potassium 4.3 3.5 - 5.5 mmol/L    Chloride 116 (H) 100 - 111 mmol/L    CO2 24 21 - 32 mmol/L    Anion gap 7 3.0 - 18 mmol/L    Glucose 251 (H) 74 - 99 mg/dL    BUN 73 (H) 7.0 - 18 MG/DL    Creatinine 1.45 (H) 0.6 - 1.3 MG/DL    BUN/Creatinine ratio 50 (H) 12 - 20      GFR est AA 43 (L) >60 ml/min/1.73m2    GFR est non-AA 35 (L) >60 ml/min/1.73m2    Calcium 8.8 8.5 - 10.1 MG/DL    Bilirubin, total 0.5 0.2 - 1.0 MG/DL    ALT (SGPT) 89 (H) 13 - 56 U/L    AST (SGOT) 68 (H) 10 - 38 U/L    Alk. phosphatase 74 45 - 117 U/L    Protein, total 5.7 (L) 6.4 - 8.2 g/dL    Albumin 2.2 (L) 3.4 - 5.0 g/dL    Globulin 3.5 2.0 - 4.0 g/dL    A-G Ratio 0.6 (L) 0.8 - 1.7     BLOOD GAS, ARTERIAL POC    Collection Time: 12/11/21  5:51 AM   Result Value Ref Range    Device: ADULT VENT      FIO2 (POC) 55 %    pH (POC) 7.37 7.35 - 7.45      pCO2 (POC) 42.4 35.0 - 45.0 MMHG    pO2 (POC) 107 (H) 80 - 100 MMHG    HCO3 (POC) 24.4 22 - 26 MMOL/L    sO2 (POC) 98.1 (H) 92 - 97 %    Base deficit (POC) 1.2 mmol/L    Mode ASSIST CONTROL      Tidal volume 400 ml    Set Rate 18 bpm    PEEP/CPAP (POC) 8 cmH2O    Mean Airway Pressure 14 cmH2O    PIP (POC) 26      Allens test (POC) NOT APPLICABLE      Inspiratory Time 1.16 sec    Total resp. rate 18      Site LEFT RADIAL      Patient temp.  97.7      Specimen type (POC) ARTERIAL      Performed by Roma LOCKE POC    Collection Time: 12/11/21  5:59 AM   Result Value Ref Range    Glucose (POC) 256 (H) 70 - 110 mg/dL         Chemistry Recent Labs     12/11/21  0420 12/10/21  0345 12/09/21  0518   * 214* 153*   * 150* 148*   K 4.3 4.2 4.2   * 118* 118*   CO2 24 22 23   BUN 73* 69* 54*   CREA 1.45* 1.79* 1.51* CA 8.8 8.8 8.8   AGAP 7 10 7   BUCR 50* 39* 36*   AP 74 63 63   TP 5.7* 6.0* 6.2*   ALB 2.2* 2.2* 2.2*   GLOB 3.5 3.8 4.0   AGRAT 0.6* 0.6* 0.6*        Lactic Acid Lactic acid   Date Value Ref Range Status   11/11/2014 0.9 0.4 - 2.0 MMOL/L Final     No results for input(s): LAC in the last 72 hours. Liver Enzymes Protein, total   Date Value Ref Range Status   12/11/2021 5.7 (L) 6.4 - 8.2 g/dL Final     Albumin   Date Value Ref Range Status   12/11/2021 2.2 (L) 3.4 - 5.0 g/dL Final     Globulin   Date Value Ref Range Status   12/11/2021 3.5 2.0 - 4.0 g/dL Final     A-G Ratio   Date Value Ref Range Status   12/11/2021 0.6 (L) 0.8 - 1.7   Final     Alk.  phosphatase   Date Value Ref Range Status   12/11/2021 74 45 - 117 U/L Final     Recent Labs     12/11/21  0420 12/10/21  0345 12/09/21  0518   TP 5.7* 6.0* 6.2*   ALB 2.2* 2.2* 2.2*   GLOB 3.5 3.8 4.0   AGRAT 0.6* 0.6* 0.6*   AP 74 63 63        CBC w/Diff Recent Labs     12/11/21  0420 12/10/21  0345 12/09/21  0518   WBC 8.3 7.7 7.8   RBC 3.21* 3.26* 3.32*   HGB 10.4* 10.4* 11.0*   HCT 32.1* 32.8* 33.4*   PLT 92* 113* 102*   GRANS 89* 91* 92*   LYMPH 3* 4* 4*   EOS 0 0 0        Cardiac Enzymes No results found for: CPK, CK, CKMMB, CKMB, RCK3, CKMBT, CKNDX, CKND1, MANE, TROPT, TROIQ, BANDAR, TROPT, TNIPOC, BNP, BNPP     BNP No results found for: BNP, BNPP, XBNPT     Coagulation Recent Labs     12/11/21  0420 12/10/21  0345 12/09/21  0518   PTP 23.5* 27.1* 42.1*   INR 2.2* 2.6* 4.6*         Thyroid  Lab Results   Component Value Date/Time    TSH 0.19 (L) 12/08/2021 03:00 AM       No results found for: T4     Urinalysis Lab Results   Component Value Date/Time    Color YELLOW 12/05/2021 03:02 PM    Appearance CLEAR 12/05/2021 03:02 PM    Specific gravity 1.014 12/05/2021 03:02 PM    pH (UA) 5.0 12/05/2021 03:02 PM    Protein 100 (A) 12/05/2021 03:02 PM    Glucose Negative 12/05/2021 03:02 PM    Ketone Negative 12/05/2021 03:02 PM    Bilirubin Negative 12/05/2021 03:02 PM    Urobilinogen 0.2 12/05/2021 03:02 PM    Nitrites Negative 12/05/2021 03:02 PM    Leukocyte Esterase TRACE (A) 12/05/2021 03:02 PM    Epithelial cells 2+ 12/05/2021 03:02 PM    Bacteria FEW (A) 12/05/2021 03:02 PM    WBC 4 to 10 12/05/2021 03:02 PM    RBC 0 to 3 12/05/2021 03:02 PM          Culture data during this hospitalization. All Micro Results     Procedure Component Value Units Date/Time    CULTURE, BLOOD [[id number]] Collected: 12/05/21 1106    Order Status: Completed Specimen: Blood Updated: 12/11/21 0729     Special Requests: NO SPECIAL REQUESTS        Culture result: NO GROWTH 6 DAYS       CULTURE, BLOOD [[id number]] Collected: 12/05/21 1106    Order Status: Completed Specimen: Blood Updated: 12/11/21 0729     Special Requests: NO SPECIAL REQUESTS        Culture result: NO GROWTH 6 DAYS       STREP Allyne Runner, URINE [[id number]] Collected: 12/08/21 2051    Order Status: Completed Specimen: Urine, random Updated: 12/09/21 1024     Strep pneumo Ag, urine Negative       LEGIONELLA PNEUMOPHILA AG, URINE [[id number]] Collected: 12/08/21 2051    Order Status: Completed Specimen: Urine, random Updated: 12/09/21 1024     Legionella Ag, urine Negative       LEGIONELLA PNEUMOPHILA AG, URINE [[id number]] Collected: 12/05/21 1730    Order Status: Canceled Specimen: Urine     STREP Allyne Runner, URINE [[id number]] Collected: 12/05/21 1730    Order Status: Canceled Specimen: Urine     RESPIRATORY VIRUS PANEL W/COVID-19, PCR [[id number]]  (Abnormal) Collected: 12/05/21 1110    Order Status: Completed Specimen: Nasopharyngeal Updated: 12/05/21 1633     Adenovirus Not detected        Coronavirus 229E Not detected        Coronavirus HKU1 Not detected        Coronavirus CVNL63 Not detected        Coronavirus OC43 Not detected        SARS-CoV-2, PCR Detected        Comment: CALLED TO AND CORRECTLY REPEATED BY:  DR Rui Zazueta [facility] ER ON [id number] AT 1626 HRS TO 1396.           Metapneumovirus Not detected        Rhinovirus and Enterovirus Not detected        Influenza A Not detected        Influenza A, subtype H1 Not detected        Influenza A, subtype H3 Not detected        INFLUENZA A H1N1 PCR Not detected        Influenza B Not detected        Parainfluenza 1 Not detected        Parainfluenza 2 Not detected        Parainfluenza 3 Not detected        Parainfluenza virus 4 Not detected        RSV by PCR Not detected        B. parapertussis, PCR Not detected        Bordetella pertussis - PCR Not detected        Chlamydophila pneumoniae DNA, QL, PCR Not detected        Mycoplasma pneumoniae DNA, QL, PCR Not detected       COVID-19 RAPID TEST [[id number]]  (Abnormal) Collected: 12/05/21 1109    Order Status: Completed Specimen: Nasopharyngeal Updated: 12/05/21 1136     Specimen source Nasopharyngeal        COVID-19 rapid test Detected        Comment:      The specimen is POSITIVE for SARS-CoV-2, the novel coronavirus associated with COVID-19. This test has been authorized by the FDA under an Emergency Use Authorization (EUA) for use by authorized laboratories. Fact sheet for Healthcare Providers: WineMeNowdate.co.nz  Fact sheet for Patients: WineMeNowdate[employer]co[employer]nz       Methodology: Isothermal Nucleic Acid Amplification  CALLED TO AND CORRECTLY REPEATED BY:  Jackeline Cadena RN ER, TO JAF AT 1136 12/5/2021                LE Doppler 12/7/21 Interpretation Summary  · No evidence of deep vein thrombosis in the right lower extremity. · No evidence of deep vein thrombosis in the left lower extremity. ECHO 12/7/21 Interpretation Summary  Result status: Final result  · Left Ventricle: Normal cavity size, wall thickness and systolic function (ejection fraction normal). The estimated EF is 55 - 60%. There is moderate (grade 2) left ventricular diastolic dysfunction E/E' ratio = 15.88. Wall Scoring:  The left ventricular wall motion is normal.  · Right Atrium: Mildly dilated right atrium. · Left Atrium: Dilated left atrium. · Mitral Valve: No stenosis. Mitral valve non-specific thickening. Mild mitral annular calcification. Mild to moderate regurgitation. · Pulmonary Artery: Pulmonary arterial systolic pressure (PASP) is 41 mmHg. Pulmonary hypertension found to be mild. · IVC/Hepatic Veins: Mechanically ventilated; cannot use inferior caval vein diameter to estimate central venous pressure. Images report reviewed by me:  CT 12/5 (Most Recent)  Results from [place] encounter on 12/05/21    CTA CHEST W OR W WO CONT    Narrative  EXAM: CTA chest    INDICATION: Rule out PE , Covid positive    COMPARISON: None    TECHNIQUE: Axial CT imaging from the thoracic inlet through the diaphragm with  intravenous contrast. Coronal and sagittal MIP reformats were generated. One or  more dose reduction techniques were used on this CT: automated exposure control,  adjustment of the mAs and/or kVp according to patient size, and iterative  reconstruction techniques. The specific techniques used on this CT exam have  been documented in the patient's electronic medical record. Digital Imaging and  Communications in Medicine (DICOM) format image data are available to  nonaffiliated external healthcare facilities or entities on a secure, media  free, reciprocally searchable basis with patient authorization for at least a  12-month period after this study. _______________    FINDINGS:    EXAM QUALITY: Overall exam quality is satisfactory. Pulmonary arterial  enhancement is adequate with adequate breath hold and no significant artifact. PULMONARY ARTERIES: No evidence of pulmonary embolism. THYROID: There is a 15 mm right thyroid lobe nodule which appears complex. Advise nonemergent thyroid ultrasound. LYMPH Nodes: There is a mildly enlarged right infrahilar lymph node measuring 1  cm image 89. PLEURA: There are no pleural effusion. HEART: Cardiac size is enlarged.  There is no pericardial effusion. There is mild  tricuspid regurgitation into the hepatic veins. Moderate to severe calcific  coronary disease present. VASCULATURE/MEDIASTINUM: Main pulmonary artery is enlarged measuring 4 cm  suggesting chronic pulmonary hypertension. Small hiatal hernia present. LUNGS: Moderate groundglass opacities and airspace disease seen bilaterally  suggesting atypical pneumonia. The findings are typical in appearance for Covid  19 pneumonia. AIRWAY: Patent    UPPER ABDOMEN: Unremarkable. OTHER: No acute or aggressive osseous abnormalities identified. _______________    Impression  No evidence of a pulmonary embolism or aortic dissection. Moderate groundglass opacities and airspace disease seen bilaterally suggesting  atypical pneumonia and the findings are typical in appearance for Covid 19  pneumonia. Mildly enlarged right hilar lymph node which may be reactive. Advise follow-up  to exclude any neoplastic or myeloproliferative process. 15 mm slightly complex right thyroid lobe nodule. Advise nonemergent thyroid  ultrasound. CXR reviewed by me:  XR 12/11 (Most Recent). Results from [place] encounter on 12/05/21    XR CHEST PORT    Narrative  CLINICAL: Intubated. COMPARISON: December 10, 2021. A portable view of the chest from 0453 hours:    ET tube 3 cm above arie. OG tube remains in place. Interstitial and airspace  densities again noted. No pneumothorax. .    Impression  Allowing for differences in technique there is likely little interval change         IMPRESSION:   · Acute respiratory failure with hypoxemia  · Severe Covid pneumonia  · Anemia  · Thrombocytopenia  · Chronic kidney disease stage III  · Paroxysmal atrial fibrillation  ·   Patient Active Problem List   Diagnosis Code    Hypertension I10    Paroxysmal A-fib ([facility] [address] I48.0    Pneumonia due to COVID-19 virus U07.1, J12.82    Thrombocytopenia (HCC) D69.6    Hypoxia R09.02    DM due to underlying condition with diabetic nephropathy ([facility] [address] E08.21    E-coli UTI N39.0, B96.20    Class 3 severe obesity with body mass index (BMI) of 40.0 to 44.9 in adult ([facility]) E66.01, Z68.41    Acute respiratory failure with hypoxemia ([facility]) J96.01    Pneumonia due to severe acute respiratory syndrome coronavirus 2 (SARS-CoV-2) U07.1, J12.82    Stage 3a chronic kidney disease ([facility]) N18.31    Anemia D64.9       · Code status: Full code      RECOMMENDATIONS:   Respiratory: Patient with severe Covid pneumonia, and hypoxemia; patient presenting to ICU in ARDS state due to Covid inflammation. VCV mode of ventilation; FiO2 down to 45%; PEEP 8. Chest x-ray shows good ET tube and OG tube position; bilateral interstitial and groundglass infiltrates, along with a left retrocardiac consolidations noted. No pneumothorax. Continue ventilator and sedation bundles. Follow RASS protocol. Sedationmidazolam 2 mg/h; currently off fentanyl infusion. Patient currently not needing paralytic. Keep SPO2 >=88%. HOB 30 degree elevation while not proning. Aspiration precautions. CTA chest on admission no PE; severe bilateral pulmonary opacities and groundglass densities consistent with severe Covid pneumonia; no significant pleural effusions; right hilar adenopathy likely reactivewill need follow-up in outpatient; thyroid nodule will also need follow-up in outpatient. CVS: Blood pressure remained stable; mild sinus bradycardia while on sedationmonitor. Blood pressure controlled with hydralazine 10 mg 3 times a day. Ultrasound legsnegative for DVTs  Echocardiogramnormal LV function, and mild pulmonary hypertension; dilated left atrium; mild to moderate mitral regurgitation. proBNP mildly elevated; troponins were mildly elevated on admissionpeak troponin 0.07. Patient on Coumadin at home for paroxysmal atrial fibrillation; continue Coumadin therapy. ID: Severe Covid pneumonia. Unvaccinated status.   Respiratory viral panel was positive for SARS-CoV-2 PCR. Procalcitonin 0.16not elevated  CRP had come down to 7.8; ferritin elevated but coming down. Check CRP and ferritin tomorrow morning. D-dimer not reliable since patient has elevated INR on chronic Coumadin therapy. S/p 1 dose of Tocilizumab 12/7 morning. Steroidscurrent dose of dexamethasone 20 mg dailycomplete 5 days at this dose. CTA chest negative for PEs on admission. Ultrasound of the legs ordered. Patient not needing antibiotics; afebrile; WBC not elevated; procalcitonin not elevated0.08. Continue vitamin supplements. ID on case. Hematology/Oncology: Hemoglobin stable; platelets remain low but no significant drop92k today. No active bleeding issues. YSABLE9. 2; continue Coumadin therapy [paroxysmal A. fib]. Monitor for any bleeding issues. Renal: Known CKD; s/p CTA chest during this admission. Sodium improved147; creatinine has come down to 1.45. Potassium stable. D5W75 ml per hour; also on enteral free water. Monitor and replace electrolytes as needed. GI/:   PPI prophylaxis. LFTs monitor. Endocrine: Blood sugar remains elevated; increase Lantus insulin 10 units nightly. Continue sliding scale insulin. Target blood sugar 140180. Patient on steroids for severe Covid pneumonia. TSH 0.19likely sick euthyroid state; free T4 mildly increased; free T3 mildly decreased. Neurology: Intubated and sedatedlimited exam.  Skin/Wound: ICU nursing care. Electrolytes: Replace electrolytes per ICU electrolyte replacement protocol. IVF: D5W 75 mL/h; free water via OG tube. Nutrition: Tube feedingNeprotolerating well. Bonne Major Prophylaxis: DVT Prophylaxis: Coumadin. GI Prophylaxis: Protonix. Restraints: Wrist soft restraints for patient interfering with medical therapy/management and patient safety.    Lines/Tubes: PIVs; midline planned  ETT: 12/7/2021   OGT/NGT: 12/7/2021   Montoya: 12/7/2021 (Medically necessary for strict input/output monitoring in critically ill patient, will remove it when not needed. Montoya bundle followed). Advance Directive/Palliative Care: consulted    Prognosis appears very poor in this patient with Covid pneumonia with severe hypoxemic respiratory failure and ARDS needing to be intubated now; mortality is very high; risk of multiorgan failure with complications such as refractory hypoxemia, encephalopathy, critical illness myopathy, acute renal failure needing dialysis, shock state needing pressors, prolonged ventilator support, risk of tracheostomy and chronic debility/bedbound state. Quality Care: PPI, DVT prophylaxis, HOB elevated, Infection control all reviewed and addressed. Care of plan d/w RN, RT. High complexity decision making was performed during the evaluation of this patient at high risk for decompensation with multiple organ involvement. Total critical care time spent rendering care exclusive of procedures/family discussion/coordination of care: 40 minutes. Quita Ng) [address] [facility]     [phone]      Unfortunately, patient's  is now in ICU with Covid pneumonia and on ventilator support. Update grandson later today.  Claudia Stinson MD    AddendumI have updated patient's grandson in detail about patient's medical condition; patient currently being sedated and on ventilator support; Covid pneumonia management discussed. Please note: Voice-recognition software may have been used to generate this report, which may have resulted in some phonetic-based errors in grammar and contents. Even though attempts were made to correct all the mistakes, some may have been missed, and remained in the body of the document.       Ryan Hogan MD  12/11/2021         Note  Patient with SARS-CoV-2 pneumonia with severe pneumonia and hypoxemic respiratory failure; patient was on high flow nasal cannula oxygen; patient is not vaccinated, and in the age group that is being infected with delta virus strain; the strain causes severe respiratory failure and complications reported in the [place] and worldwide; unfortunately, the prognosis is poor in unvaccinated patients, with a high risk of complications, multiorgan failure, chronic hypoxemia and respiratory failure, intubation, mechanical ventilation, thromboembolic disease risk in multiple organs, high risk for long Covid syndrome, and high risk for mortality. The CDC federal and state guidelines have emphasized repeatedly the importance of vaccination to prevent severe infection, mortality, disabilities, long Covid syndrome from Covid virus with several strains currently in the [place]. The treatment protocols are followed based on local hospital protocols, with guidance from centers such as [facility] protocols. [facility] is not part of any research protocol. The local hospital protocols have been guided by [facility] pharmacy department. Covid plasma is no longer considered standard of care in the [facility] due to lack of benefit in trials. Nebulization and CPAP therapies are not considered as part of the protocol in [facility] due to high risk of aerosolization, and high risks of spreading Covid infection to the healthcare staff. Dexamethasone considered standard of care in patients admitted with severe Covid pneumonia; variable dosing has been reported with this medication. ECMO and CRRT facilities not available at [facility]; patients with severe Covid pneumonia and respiratory failure are usually unstable for transportation when they are in critically ill state with a high risk of dying during transportation. REMDESIVIR-not recommended in patients with late presentation with severe respiratory failure     Tocilizumab anti-inflammatory medicationhas shown some benefit in patients with elevated inflammatory markers.     Ivermectin not considered standard of care in the [address] [place] [address],[address]; no significant randomized controlled studies done in the [place] to suggest benefit of ivermectin in patients with COVID-19 with severe respiratory failure; risk of toxicity related to ivermectin outweighs any potential benefits based on consensus opinion in the [address] [place] [address] Rd,[address]. This drug is not considered standard of care in [facility].

## 2021-12-12 NOTE — PROGRESS NOTES
Pulmonary/critical care    Discussed with Dr. Reynahematology  Acute drop in platelets concerning for sepsisrecommending antibiotics  Would start Zosyn empirically    Roel Pulido MD

## 2021-12-12 NOTE — PROGRESS NOTES
RENAL CONSULT PROGRESS NOTE   2021    Patient:  Amarilys Evans  :  1950  Gender:  female  MRN #:  [id number]    Reason for Consult: Acute renal failure         Assessment:    Amarilys Evans is a 66y.o. 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 She had CT abdomen and pelvis on 21 which showed non obstructing calculi in left renal pelvis . CTA chest was done on 21 - negative for PE   Serum creatinine was 1.27 mg/dl on  which improved to 1.18 mg/dl on 21 and start to rise after 4 days of contrast on 21  and now is 1.79 mg/dl on the day of consult 12/10 . There is no obvious obstruction in CT abdomen   There is no hypotensive episodes . No obvious nephrotoxic on board     Generally contrast induced nephropathy will be evident after 72 hours which might be the cause here vs hemodynamics related     # Acute renal failure- Improving    # Hypernatremia - Improving   #mild metabolic acidosis- Improved     Plan:    - Urine sodium is 13 mmol/lt  and urine chloride is < 10 mmol/lt which is consistent with pre-renal picture , which is generally seen on contrast exposure as well . - Decent urine output overnight , renal function and sodium level improved   - continue Montoya cath, Intake and output recording   - Keep MAP around 65 mmHg  -No clinical and metabolic indication of dialysis   - Avoid further  Contrast, nephrotoxin and NSAIDS   - Disproportionate rise in BUN compared to creatinine is probably due to steroid and NG feed   - Dose all meds for current eGFR  - rest of the medical management per primary team     As renal function and hypernatremia improved. I will sign off.  Please call uf if you have any questions and concern       Intake/Output Summary (Last 24 hours) at 2021 1000  Last data filed at 2021 0400  Gross per 24 hour   Intake 2130 ml   Output 1875 ml   Net 255 ml         Review of Symptoms:     Unable to obtained review of systems due to patient condition   Overnight events reviewed  BP stable making decent rune output     Objective:    Visit Vitals  BP (!) 140/44   Pulse (!) 42   Temp 99.1 °F (37.3 °C)   Resp 19   Ht 5' 6\" (1.676 m)   Wt 86.6 kg (190 lb 14.7 oz)   SpO2 96%   BMI 30.81 kg/m²       Physical Exam:    Sedated on mechanical vent   HEENT: ET tube in place  no neck swellin  Abdomen: non distended   Ext: no edema   CNS- unconscious      Laboratory Data:    Lab Results   Component Value Date    BUN 69 (H) 12/12/2021    BUN 73 (H) 12/11/2021    BUN 69 (H) 12/10/2021     12/12/2021     (H) 12/11/2021     (H) 12/10/2021    CO2 24 12/12/2021    CO2 24 12/11/2021    CO2 22 12/10/2021     Lab Results   Component Value Date    WBC 9.0 12/12/2021    HGB 10.4 (L) 12/12/2021    HCT 31.3 (L) 12/12/2021     No components found for: CALCIUM, PHOSPHORUS, MAGNESIUM  Lab Results   Component Value Date    HDL 47 12/20/2020     No results found for: SPECIMENTYP, TURBIDITY, UGLU    Imaging Reveiwed:    CT abdomen/pelvis on 11/30/21: > Calculus within the left renal pelvis unchanged from prior examination,  measuring approximately 7 mm in size. No evidence of hydronephrosis.     > No right-sided urolithiasis. > Urinary bladder unremarkable in CT appearance    CTA chest 12/5/21: - No evidence of a pulmonary embolism or aortic dissection.     Moderate groundglass opacities and airspace disease seen bilaterally suggesting  atypical pneumonia and the findings are typical in appearance for Covid 19  pneumonia.     Mildly enlarged right hilar lymph node which may be reactive. Advise follow-up  to exclude any neoplastic or myeloproliferative process.     15 mm slightly complex right thyroid lobe nodule.  Advise nonemergent thyroid  ultrasound.       Discussed with ICU team       Subhash Stephen MD   [facility]

## 2021-12-12 NOTE — PROGRESS NOTES
[facility] Zosyn (Piperacillin/Tazobactam) Extended Infusion    Taryn Davidson, a 66 y.o. yo female, has been converted to an extended infusion of Zosyn while in the intensive care unit. A loading dose of 3.375 or 4.5 gm will be given over 30 minutes depending on indication. Extended infusions will begin 4 hours after the initial dose if CrCl  >/= 20 ml/min or 8 hours after the initial dose if CrCl < 20 ml/min. Extended infusions will run over 4 hours (240 minutes).     Recent Labs     12/12/21  0522 12/11/21  0420 12/10/21  0345   CREA 1.15 1.45* 1.79*     Ht Readings from Last 1 Encounters:   12/10/21 167.6 cm (66\")     Wt Readings from Last 1 Encounters:   12/11/21 86.6 kg (190 lb 14.7 oz)       CrCl : Serum creatinine: 1.15 mg/dL 12/12/21 0522  Estimated creatinine clearance: 49 mL/min    Renal adjustment of extended infusion of Zosyn  4.5 gm every 8 hours for CrCl >/= 20 ml/min

## 2021-12-12 NOTE — PROGRESS NOTES
Pharmacy Dosing Services:     Consult for Warfarin Dosing by Pharmacy by Dr. Allegra Laguna provided for this 66 y.o.  female , for indication of Atrial Fibrillation. Day of Therapy 8  Dose to achieve an INR goal of 2-3    Warfarin to be held today per Dr. Cordon July. LABS PT/INR   PT/INR Lab Results   Component Value Date/Time    INR 2.1 (H) 12/12/2021 05:22 AM      Platelets Lab Results   Component Value Date/Time    PLATELET 55 (L) 39/05/3137 05:22 AM      H/H     Lab Results   Component Value Date/Time    HGB 10.4 (L) 12/12/2021 05:22 AM        Warfarin Administrations (last 168 hours)       Date/Time Action Medication Dose    12/11/21 1706 Given    warfarin (COUMADIN) tablet 4 mg 4 mg    12/10/21 1716 Given    warfarin (COUMADIN) tablet 2 mg 2 mg    12/06/21 1816 Given    warfarin (COUMADIN) tablet 4 mg 4 mg    12/05/21 1959 Given    warfarin (COUMADIN) tablet 5 mg 5 mg            Pharmacy to follow daily and will provide subsequent Warfarin dosing based on clinical status.   Prieto Lange, PHARMD)  Contact information

## 2021-12-12 NOTE — PROGRESS NOTES
[facility]ists  Pulmonary, Critical Care, and Sleep Medicine    Name: Mary Gavin MRN: [id number]   : 1950 Hospital: [facility] [place]    Date: 2021  Room: [address] [place] [address] Note                                              Consult requesting physician: Dr. Italo Guzman  Reason for Consult: Severe Covid pneumonia with respiratory failure      Subjective/History of Present Illness:     Patient is a 66 y.o. female with PMHx significant for chronic A. fib on Coumadin therapy, diabetes mellitus, chronic kidney disease, hypertension, thyroid nodule was admitted to the hospital on 2021 with Covid infection. She has not been vaccinated for Covid infection. Patient progressed to worsening respiratory failure and hypoxemia. During admission, patient had declined intubation. Today, her  reported CODE STATUS and wanted patient to be intubated. Patient underwent COVID-19 intubation in the telemetry unit, and subsequently transferred to the ICU.    2021  Patient in ICU. Unfortunately, patient's  is now in the ICU as well on ventilator support with severe Covid pneumonia. Patient remains on ventilator support. No reports of significant respiratory secretions are bloody aspirates. Mild sinus bradycardia while on sedation, with normal blood pressure. Afebrile; urine output1.8 L yesterday. Dripsfentanyl, midazolam, D5W. Review of Systems:  Limited due to patient condition    Patient came to ER on  with right flank pain, and right basal pneumonia; was sent home with antibiotic therapyAugmentin and doxycycline. Urine culture ansensitive E. coli.       No Known Allergies   Past Medical History:   Diagnosis Date    A-fib ([facility] [address]     Anxiety     CAD (coronary artery disease)     Chronic kidney disease     Diabetes ([facility] [address]     borderline    High cholesterol     Hypertension     Kidney stones     Psychiatric disorder anxiety      Past Surgical History:   Procedure Laterality Date    HX CHOLECYSTECTOMY      HX HEART CATHETERIZATION      HX HYSTERECTOMY      HX ORTHOPAEDIC      bilateral knee surgery    HX ORTHOPAEDIC      right elbow    HX UROLOGICAL      stent placement    IA CARDIAC SURG PROCEDURE UNLIST      stent x 1      Social History     Tobacco Use    Smoking status: Never Smoker    Smokeless tobacco: Never Used   Substance Use Topics    Alcohol use: No      Family History   Problem Relation Age of Onset    Breast Cancer Maternal Aunt       Prior to Admission medications    Medication Sig Start Date End Date Taking? Authorizing Provider   allopurinoL (ZYLOPRIM) 300 mg tablet Take 300 mg by mouth daily. Provider, Historical   torsemide (DEMADEX) 100 mg tablet Take 50 mg by mouth daily. Provider, Historical   HYDROcodone-acetaminophen (NORCO)  mg tablet Take 1 Tab by mouth every eight (8) hours as needed for Pain. Provider, Historical   metoprolol succinate (TOPROL-XL) 100 mg tablet Take 150 mg by mouth daily. Provider, Historical   warfarin (Coumadin) 2 mg tablet Take 4 mg by mouth daily. Provider, Historical   isosorbide mononitrate ER (IMDUR) 30 mg tablet Take 30 mg by mouth daily. Provider, Historical   magnesium oxide (MAG-OX) 400 mg tablet Take 400 mg by mouth daily. Provider, Historical   pantoprazole (PROTONIX) 40 mg tablet Take 40 mg by mouth daily. Provider, Historical   ergocalciferol (Vitamin D2) 1,250 mcg (50,000 unit) capsule Take 50,000 Units by mouth. Provider, Historical   docusate sodium (Colace) 100 mg capsule Take 100 mg by mouth two (2) times a day. Provider, Historical   nitroglycerin (NITROSTAT) 0.4 mg SL tablet 0.4 mg by SubLINGual route every five (5) minutes as needed for Chest Pain. Up to 3 doses. Provider, Historical   hydrALAZINE (APRESOLINE) 50 mg tablet Take 25 mg by mouth three (3) times daily.     Other, MD Layla   aspirin delayed-release 81 mg tablet Take  by mouth daily. Layla Alex MD   folic acid (FOLVITE) 1 mg tablet Take  by mouth daily. Layla Alex MD   potassium chloride (K-DUR, KLOR-CON) 20 mEq tablet Take  by mouth two (2) times a day.     Layla Alex MD     Current Facility-Administered Medications   Medication Dose Route Frequency    insulin glargine (LANTUS) injection 10 Units  10 Units SubCUTAneous QHS    pantoprazole (PROTONIX) 40 mg in 0.9% sodium chloride 10 mL injection  40 mg IntraVENous DAILY    dextrose 5% infusion  75 mL/hr IntraVENous CONTINUOUS    hydrALAZINE (APRESOLINE) tablet 10 mg  10 mg Oral TID    dexamethasone (DECADRON) 4 mg/mL injection 10 mg  10 mg IntraVENous DAILY    chlorhexidine (PERIDEX) 0.12 % mouthwash 10 mL  10 mL Oral Q12H    fentaNYL (PF) 900 mcg/30 ml infusion soln  0-200 mcg/hr IntraVENous TITRATE    midazolam in normal saline (VERSED) 1 mg/mL infusion  2-10 mg/hr IntraVENous TITRATE    insulin lispro (HUMALOG) injection   SubCUTAneous Q6H    melatonin (rapid dissolve) tablet 10 mg  10 mg Oral QHS    ascorbic acid (vitamin C) (VITAMIN C) tablet 500 mg  500 mg Oral BID    zinc sulfate (ZINCATE) 50 mg zinc (220 mg) capsule 1 Capsule  1 Capsule Oral DAILY    Warfarin - Pharmacy to Dose  1 Each Other Rx Dosing/Monitoring    isosorbide mononitrate ER (IMDUR) tablet 30 mg  30 mg Oral DAILY    sodium chloride (NS) flush 5-40 mL  5-40 mL IntraVENous Q8H    cholecalciferol (VITAMIN D3) (1000 Units /25 mcg) tablet 2,000 Units  2,000 Units Oral DAILY         Objective:   Vital Signs:    Visit Vitals  BP (!) 147/49   Pulse (!) 46   Temp 96.8 °F (36 °C)   Resp 18   Ht 5' 6\" (1.676 m)   Wt 86.6 kg (190 lb 14.7 oz)   SpO2 98%   BMI 30.81 kg/m²       O2 Device: Endotracheal tube, Ventilator   O2 Flow Rate (L/min): 40 l/min   Temp (24hrs), Av.1 °F (36.7 °C), Min:96.8 °F (36 °C), Max:99.1 °F (37.3 °C)       Intake/Output:   Last shift:      701 -  1900  In: 200   Out: 375 [Urine:375]    Last 3 shifts: 12/10 1901 - 12/12 0700  In: 4226.4 [I.V.:2266.4]  Out: 9933 [Urine:2575]      Intake/Output Summary (Last 24 hours) at 12/12/2021 1148  Last data filed at 12/12/2021 3425  Gross per 24 hour   Intake 2300 ml   Output 2250 ml   Net 50 ml       Last 3 Recorded Weights in this Encounter    12/05/21 1013 12/07/21 1827 12/11/21 0650   Weight: 114.3 kg (252 lb) 114.3 kg (252 lb) 86.6 kg (190 lb 14.7 oz)       ABG:    Lab Results   Component Value Date/Time    PHI 7.40 12/12/2021 05:49 AM    PCO2I 38.9 12/12/2021 05:49 AM    PO2I 69 (L) 12/12/2021 05:49 AM    HCO3I 23.7 12/12/2021 05:49 AM    FIO2I 45 12/12/2021 05:49 AM     Ventilator Mode: Assist control, VC+  Respiratory Rate  Back-Up Rate: 18  Insp Time (sec): 1.16 sec  I:E Ratio: 1:1.87  Ventilator Volumes  Vt Set (ml): 400 ml  Vt Exhaled (Machine Breath) (ml): 414 ml  Ve Observed (l/min): 8.09 l/min  Ventilator Pressures  PIP Observed (cm H2O): 23 cm H2O  Plateau Pressure (cm H2O): 21 cm H2O  MAP (cm H2O): 12  PEEP/VENT (cm H2O): 6 cm H20 (decreased)  Auto PEEP Observed (cm H2O): 0 cm H2O       Physical Exam: Limitations in exam due to full PPE requirements.   Patient remains intubated and sedated; arousable; appears comfortable; acyanotic  HEENT: pupils not dilated, reactive, no scleral jaundice, moist oral mucosa, no nasal drainage; neck supple  Neck: No adenopathy or thyroid swelling  Orotracheal tubeno bloody aspirates or respiratory secretions noted  Orogastric tube tube feed present  CVS: Normal heart sounds; S1 and S2 with no murmur; telemetrysinus bradycardia; JVD not elevated   RS: Moderate air entry bilaterally; bilateral diffuse crackles in the lateral chest; no wheezing or rhonchi; breath sounds are symmetrical; no tachypneia or distress while on ventilator support  Abd: Soft, no tenderness, no distention, no guarding or rigidity; bowel sounds present; no hepatosplenomegaly  Neuro: Intubated and sedated; limited exam  Extrm: No peripheral leg edema; no focal swelling or clubbing  Skin: no rash  Lymphatic: no cervical or supraclavicular adenopathy  Vasc: DPs palpable both feet  MS: No joint swelling      Data:       Recent Results (from the past 24 hour(s))   GLUCOSE, POC    Collection Time: 12/11/21 12:11 PM   Result Value Ref Range    Glucose (POC) 260 (H) 70 - 110 mg/dL   GLUCOSE, POC    Collection Time: 12/11/21  5:15 PM   Result Value Ref Range    Glucose (POC) 249 (H) 70 - 110 mg/dL   GLUCOSE, POC    Collection Time: 12/11/21 11:40 PM   Result Value Ref Range    Glucose (POC) 256 (H) 70 - 110 mg/dL   PROTHROMBIN TIME + INR    Collection Time: 12/12/21  5:22 AM   Result Value Ref Range    Prothrombin time 22.8 (H) 11.5 - 15.2 sec    INR 2.1 (H) 0.8 - 1.2     CBC WITH AUTOMATED DIFF    Collection Time: 12/12/21  5:22 AM   Result Value Ref Range    WBC 9.0 4.6 - 13.2 K/uL    RBC 3.20 (L) 4.20 - 5.30 M/uL    HGB 10.4 (L) 12.0 - 16.0 g/dL    HCT 31.3 (L) 35.0 - 45.0 %    MCV 97.8 78.0 - 100.0 FL    MCH 32.5 24.0 - 34.0 PG    MCHC 33.2 31.0 - 37.0 g/dL    RDW 14.0 11.6 - 14.5 %    PLATELET 55 (L) 721 - 420 K/uL    MPV 11.4 9.2 - 11.8 FL    NRBC 0.0 0  WBC    ABSOLUTE NRBC 0.00 0.00 - 0.01 K/uL    NEUTROPHILS 89 (H) 40 - 73 %    LYMPHOCYTES 3 (L) 21 - 52 %    MONOCYTES 5 3 - 10 %    EOSINOPHILS 0 0 - 5 %    BASOPHILS 0 0 - 2 %    IMMATURE GRANULOCYTES 3 (H) 0.0 - 0.5 %    ABS. NEUTROPHILS 8.0 1.8 - 8.0 K/UL    ABS. LYMPHOCYTES 0.3 (L) 0.9 - 3.6 K/UL    ABS. MONOCYTES 0.5 0.05 - 1.2 K/UL    ABS. EOSINOPHILS 0.0 0.0 - 0.4 K/UL    ABS. BASOPHILS 0.0 0.0 - 0.1 K/UL    ABS. IMM.  GRANS. 0.2 (H) 0.00 - 0.04 K/UL    DF AUTOMATED     METABOLIC PANEL, COMPREHENSIVE    Collection Time: 12/12/21  5:22 AM   Result Value Ref Range    Sodium 143 136 - 145 mmol/L    Potassium 4.3 3.5 - 5.5 mmol/L    Chloride 113 (H) 100 - 111 mmol/L    CO2 24 21 - 32 mmol/L    Anion gap 6 3.0 - 18 mmol/L    Glucose 226 (H) 74 - 99 mg/dL    BUN 69 (H) 7.0 - 18 MG/DL    Creatinine 1.15 0.6 - 1.3 MG/DL    BUN/Creatinine ratio 60 (H) 12 - 20      GFR est AA 56 (L) >60 ml/min/1.73m2    GFR est non-AA 46 (L) >60 ml/min/1.73m2    Calcium 8.9 8.5 - 10.1 MG/DL    Bilirubin, total 0.5 0.2 - 1.0 MG/DL    ALT (SGPT) 74 (H) 13 - 56 U/L    AST (SGOT) 32 10 - 38 U/L    Alk. phosphatase 81 45 - 117 U/L    Protein, total 5.4 (L) 6.4 - 8.2 g/dL    Albumin 2.0 (L) 3.4 - 5.0 g/dL    Globulin 3.4 2.0 - 4.0 g/dL    A-G Ratio 0.6 (L) 0.8 - 1.7     GLUCOSE, POC    Collection Time: 12/12/21  5:27 AM   Result Value Ref Range    Glucose (POC) 227 (H) 70 - 110 mg/dL   BLOOD GAS, ARTERIAL POC    Collection Time: 12/12/21  5:49 AM   Result Value Ref Range    Device: ADULT VENT      FIO2 (POC) 45 %    pH (POC) 7.40 7.35 - 7.45      pCO2 (POC) 38.9 35.0 - 45.0 MMHG    pO2 (POC) 69 (L) 80 - 100 MMHG    HCO3 (POC) 23.7 22 - 26 MMOL/L    sO2 (POC) 93.2 92 - 97 %    Base deficit (POC) 0.9 mmol/L    Mode ASSIST CONTROL      Tidal volume 400 ml    Set Rate 18 bpm    PEEP/CPAP (POC) 8 cmH2O    Mean Airway Pressure 13 cmH2O    PIP (POC) 20      Allens test (POC) NOT APPLICABLE      Inspiratory Time 1.16 sec    Total resp. rate 18      Site LEFT RADIAL      Patient temp. 99.1      Specimen type (POC) ARTERIAL      Performed by Eunice Snyder          Chemistry Recent Labs     12/12/21  0522 12/11/21  0420 12/10/21  0345   * 251* 214*    147* 150*   K 4.3 4.3 4.2   * 116* 118*   CO2 24 24 22   BUN 69* 73* 69*   CREA 1.15 1.45* 1.79*   CA 8.9 8.8 8.8   AGAP 6 7 10   BUCR 60* 50* 39*   AP 81 74 63   TP 5.4* 5.7* 6.0*   ALB 2.0* 2.2* 2.2*   GLOB 3.4 3.5 3.8   AGRAT 0.6* 0.6* 0.6*        Lactic Acid Lactic acid   Date Value Ref Range Status   11/11/2014 0.9 0.4 - 2.0 MMOL/L Final     No results for input(s): LAC in the last 72 hours.      Liver Enzymes Protein, total   Date Value Ref Range Status   12/12/2021 5.4 (L) 6.4 - 8.2 g/dL Final     Albumin   Date Value Ref Range Status 12/12/2021 2.0 (L) 3.4 - 5.0 g/dL Final     Globulin   Date Value Ref Range Status   12/12/2021 3.4 2.0 - 4.0 g/dL Final     A-G Ratio   Date Value Ref Range Status   12/12/2021 0.6 (L) 0.8 - 1.7   Final     Alk. phosphatase   Date Value Ref Range Status   12/12/2021 81 45 - 117 U/L Final     Recent Labs     12/12/21  0522 12/11/21  0420 12/10/21  0345   TP 5.4* 5.7* 6.0*   ALB 2.0* 2.2* 2.2*   GLOB 3.4 3.5 3.8   AGRAT 0.6* 0.6* 0.6*   AP 81 74 63        CBC w/Diff Recent Labs     12/12/21  0522 12/11/21  0420 12/10/21  0345   WBC 9.0 8.3 7.7   RBC 3.20* 3.21* 3.26*   HGB 10.4* 10.4* 10.4*   HCT 31.3* 32.1* 32.8*   PLT 55* 92* 113*   GRANS 89* 89* 91*   LYMPH 3* 3* 4*   EOS 0 0 0        Cardiac Enzymes No results found for: CPK, CK, CKMMB, CKMB, RCK3, CKMBT, CKNDX, CKND1, MANE, TROPT, TROIQ, BANDAR, TROPT, TNIPOC, BNP, BNPP     BNP No results found for: BNP, BNPP, XBNPT     Coagulation Recent Labs     12/12/21  0522 12/11/21  0420 12/10/21  0345   PTP 22.8* 23.5* 27.1*   INR 2.1* 2.2* 2.6*         Thyroid  Lab Results   Component Value Date/Time    TSH 0.19 (L) 12/08/2021 03:00 AM       No results found for: T4     Urinalysis Lab Results   Component Value Date/Time    Color YELLOW 12/05/2021 03:02 PM    Appearance CLEAR 12/05/2021 03:02 PM    Specific gravity 1.014 12/05/2021 03:02 PM    pH (UA) 5.0 12/05/2021 03:02 PM    Protein 100 (A) 12/05/2021 03:02 PM    Glucose Negative 12/05/2021 03:02 PM    Ketone Negative 12/05/2021 03:02 PM    Bilirubin Negative 12/05/2021 03:02 PM    Urobilinogen 0.2 12/05/2021 03:02 PM    Nitrites Negative 12/05/2021 03:02 PM    Leukocyte Esterase TRACE (A) 12/05/2021 03:02 PM    Epithelial cells 2+ 12/05/2021 03:02 PM    Bacteria FEW (A) 12/05/2021 03:02 PM    WBC 4 to 10 12/05/2021 03:02 PM    RBC 0 to 3 12/05/2021 03:02 PM          Culture data during this hospitalization.    All Micro Results     Procedure Component Value Units Date/Time    CULTURE, BLOOD [[id number]] Collected: 12/05/21 1106    Order Status: Completed Specimen: Blood Updated: 12/11/21 0729     Special Requests: NO SPECIAL REQUESTS        Culture result: NO GROWTH 6 DAYS       CULTURE, BLOOD [[id number]] Collected: 12/05/21 1106    Order Status: Completed Specimen: Blood Updated: 12/11/21 0729     Special Requests: NO SPECIAL REQUESTS        Culture result: NO GROWTH 6 DAYS       STREP Charity Dryer, URINE [[id number]] Collected: 12/08/21 2051    Order Status: Completed Specimen: Urine, random Updated: 12/09/21 1024     Strep pneumo Ag, urine Negative       LEGIONELLA PNEUMOPHILA AG, URINE [[id number]] Collected: 12/08/21 2051    Order Status: Completed Specimen: Urine, random Updated: 12/09/21 1024     Legionella Ag, urine Negative       LEGIONELLA PNEUMOPHILA AG, URINE [[id number]] Collected: 12/05/21 1730    Order Status: Canceled Specimen: Urine     STREP Charity Dryer, URINE [[id number]] Collected: 12/05/21 1730    Order Status: Canceled Specimen: Urine     RESPIRATORY VIRUS PANEL W/COVID-19, PCR [[id number]]  (Abnormal) Collected: 12/05/21 1110    Order Status: Completed Specimen: Nasopharyngeal Updated: 12/05/21 1633     Adenovirus Not detected        Coronavirus 229E Not detected        Coronavirus HKU1 Not detected        Coronavirus CVNL63 Not detected        Coronavirus OC43 Not detected        SARS-CoV-2, PCR Detected        Comment: CALLED TO AND CORRECTLY REPEATED BY:  DR Lucille Mchugh [facility] ER ON [id number] AT 1626 HRS TO 1396.           Metapneumovirus Not detected        Rhinovirus and Enterovirus Not detected        Influenza A Not detected        Influenza A, subtype H1 Not detected        Influenza A, subtype H3 Not detected        INFLUENZA A H1N1 PCR Not detected        Influenza B Not detected        Parainfluenza 1 Not detected        Parainfluenza 2 Not detected        Parainfluenza 3 Not detected        Parainfluenza virus 4 Not detected        RSV by PCR Not detected        B. parapertussis, PCR Not detected Bordetella pertussis - PCR Not detected        Chlamydophila pneumoniae DNA, QL, PCR Not detected        Mycoplasma pneumoniae DNA, QL, PCR Not detected       COVID-19 RAPID TEST [[id number]]  (Abnormal) Collected: 12/05/21 1109    Order Status: Completed Specimen: Nasopharyngeal Updated: 12/05/21 1136     Specimen source Nasopharyngeal        COVID-19 rapid test Detected        Comment:      The specimen is POSITIVE for SARS-CoV-2, the novel coronavirus associated with COVID-19. This test has been authorized by the FDA under an Emergency Use Authorization (EUA) for use by authorized laboratories. Fact sheet for Healthcare Providers: [employer]  Fact sheet for Patients: [employer]       Methodology: Isothermal Nucleic Acid Amplification  CALLED TO AND CORRECTLY REPEATED BY:  Jossie Rodriguez RN ER, TO SHAEF AT 1136 12/5/2021                LE Doppler 12/7/21 Interpretation Summary  · No evidence of deep vein thrombosis in the right lower extremity. · No evidence of deep vein thrombosis in the left lower extremity. ECHO 12/7/21 Interpretation Summary  Result status: Final result  · Left Ventricle: Normal cavity size, wall thickness and systolic function (ejection fraction normal). The estimated EF is 55 - 60%. There is moderate (grade 2) left ventricular diastolic dysfunction E/E' ratio = 15.88. Wall Scoring: The left ventricular wall motion is normal.  · Right Atrium: Mildly dilated right atrium. · Left Atrium: Dilated left atrium. · Mitral Valve: No stenosis. Mitral valve non-specific thickening. Mild mitral annular calcification. Mild to moderate regurgitation. · Pulmonary Artery: Pulmonary arterial systolic pressure (PASP) is 41 mmHg. Pulmonary hypertension found to be mild. · IVC/Hepatic Veins: Mechanically ventilated; cannot use inferior caval vein diameter to estimate central venous pressure.        Images report reviewed by me:  CT 12/5 (Most Recent)  Results from [place] encounter on 12/05/21    CTA CHEST W OR W WO CONT    Narrative  EXAM: CTA chest    INDICATION: Rule out PE , Covid positive    COMPARISON: None    TECHNIQUE: Axial CT imaging from the thoracic inlet through the diaphragm with  intravenous contrast. Coronal and sagittal MIP reformats were generated. One or  more dose reduction techniques were used on this CT: automated exposure control,  adjustment of the mAs and/or kVp according to patient size, and iterative  reconstruction techniques. The specific techniques used on this CT exam have  been documented in the patient's electronic medical record. Digital Imaging and  Communications in Medicine (DICOM) format image data are available to  nonaffiliated external healthcare facilities or entities on a secure, media  free, reciprocally searchable basis with patient authorization for at least a  12-month period after this study. _______________    FINDINGS:    EXAM QUALITY: Overall exam quality is satisfactory. Pulmonary arterial  enhancement is adequate with adequate breath hold and no significant artifact. PULMONARY ARTERIES: No evidence of pulmonary embolism. THYROID: There is a 15 mm right thyroid lobe nodule which appears complex. Advise nonemergent thyroid ultrasound. LYMPH Nodes: There is a mildly enlarged right infrahilar lymph node measuring 1  cm image 89. PLEURA: There are no pleural effusion. HEART: Cardiac size is enlarged. There is no pericardial effusion. There is mild  tricuspid regurgitation into the hepatic veins. Moderate to severe calcific  coronary disease present. VASCULATURE/MEDIASTINUM: Main pulmonary artery is enlarged measuring 4 cm  suggesting chronic pulmonary hypertension. Small hiatal hernia present. LUNGS: Moderate groundglass opacities and airspace disease seen bilaterally  suggesting atypical pneumonia.  The findings are typical in appearance for Covid  19 pneumonia. AIRWAY: Patent    UPPER ABDOMEN: Unremarkable. OTHER: No acute or aggressive osseous abnormalities identified. _______________    Impression  No evidence of a pulmonary embolism or aortic dissection. Moderate groundglass opacities and airspace disease seen bilaterally suggesting  atypical pneumonia and the findings are typical in appearance for Covid 19  pneumonia. Mildly enlarged right hilar lymph node which may be reactive. Advise follow-up  to exclude any neoplastic or myeloproliferative process. 15 mm slightly complex right thyroid lobe nodule. Advise nonemergent thyroid  ultrasound. CXR reviewed by me:  XR 12/12 (Most Recent). Results from Hospital Encounter encounter on 12/05/21    XR CHEST PORT    Narrative  EXAM: CHEST RADIOGRAPH    CLINICAL INDICATION/HISTORY: Intubated; await patient to come to the ICU for  first chest x-ray  -Additional: None    COMPARISON: 12/11/2021    TECHNIQUE: Portable frontal view of the chest    _______________    FINDINGS:    SUPPORT DEVICES: ET tube and NG tube unchanged, both in satisfactory position. HEART AND MEDIASTINUM: Significantly rotated to the left. There is mild to  moderate cardiomegaly which is unchanged. LUNGS AND PLEURAL SPACES: Extensive bilateral airspace infiltrates are again  seen showing no significant change in the one-day interval.    BONY THORAX AND SOFT TISSUES: Unremarkable.    _______________    Impression  No change compared with yesterday. Extensive bilateral pneumonia again noted.          IMPRESSION:   · Acute respiratory failure with hypoxemia  · Severe Covid pneumonia  · Anemia  · Thrombocytopenia  · Chronic kidney disease stage III  · Paroxysmal atrial fibrillation  ·   Patient Active Problem List   Diagnosis Code    Hypertension I10    Paroxysmal A-fib ([facility] [address] I48.0    Pneumonia due to COVID-19 virus U07.1, J12.82    Thrombocytopenia (HCC) D69.6    Hypoxia R09.02    DM due to underlying condition with diabetic nephropathy ([facility]) E08.21    E-coli UTI N39.0, B96.20    Class 3 severe obesity with body mass index (BMI) of 40.0 to 44.9 in adult ([facility]) E66.01, Z68.41    Acute respiratory failure with hypoxemia ([facility]) J96.01    Pneumonia due to severe acute respiratory syndrome coronavirus 2 (SARS-CoV-2) U07.1, J12.82    Stage 3a chronic kidney disease ([facility]) N18.31    Anemia D64.9       · Code status: Full code      RECOMMENDATIONS:   Respiratory: Patient with severe Covid pneumonia, and hypoxemia; patient presenting to ICU in ARDS state due to Covid inflammation. Mechanical ventilationVCV mode; FiO2 45%; PEEP down to 6; plateau pressure 21; lung volume protection strategy. Chest x-ray from today reviewed imagesET tube and OG tube in good position; persistent bilateral diffuse interstitial and groundglass infiltrates; left retrocardiac consolidation persist; no pneumothorax or pleural effusions. Continue vent and sedation bundles. Sedationmidazolam and fentanyl infusion; mild sinus bradycardia with normal blood pressure; hold fentanyl drip if needed, if bradycardia worsens. Patient currently not needing paralytic. Keep SPO2 >=88%. HOB 30 degree elevation while not proning. Aspiration precautions. CTA chest on admission no PE; severe bilateral pulmonary opacities and groundglass densities consistent with severe Covid pneumonia; no significant pleural effusions; right hilar adenopathy likely reactivewill need follow-up in outpatient; thyroid nodule will also need follow-up in outpatient. CVS: Bradycardia due to sedationcontinue to monitor. Blood pressure remained stable; hydralazine 10 mg 3 times a day. Ultrasound legsnegative for DVTs  Echocardiogramnormal LV function, and mild pulmonary hypertension; dilated left atrium; mild to moderate mitral regurgitation. proBNP mildly elevated; troponins were mildly elevated on admissionpeak troponin 0.07.   Patient on Coumadin at home for paroxysmal atrial fibrillation. ID: Severe Covid pneumonia. Unvaccinated status. Respiratory viral panel was positive for SARS-CoV-2 PCR. Procalcitonin <0.05not elevated  CRP had come down to 2.1; ferritin elevated but coming down-918. D-dimer not reliable since patient has elevated INR on chronic Coumadin therapy. S/p 1 dose of Tocilizumab 12/7 morning. Steroidss/p 20 mg dose daily; weaned down to 6 mg daily. CTA chest negative for PEs on admission. Ultrasound of the legs ordered. Patient not needing antibiotics; afebrile; WBC not elevated; procalcitonin not elevated0.08. Continue vitamin supplements. ID on case. Hematology/Oncology: Stable hemoglobin10.4; platelets dropped to 55K; INR 2.1on Coumadin. Thrombocytopenia likely related to Covid related sepsis. Check fibrinogen and D-dimer. Hold Coumadin today. Monitor for any bleeding issues. Consult hematologyDr. Sen Cole. Renal: Known CKD; s/p CTA chest during this admission. Good urine output; creatinine improved to 1.15; sodium normal.  Continue fluids per nephrology. Monitor and replace electrolytes as needed. GI/: Tube feeding. PPI prophylaxis. LFTsimproved. Endocrine: Blood sugars mildly elevatedcontinue Lantus insulin10 units nightly. Sliding scale insulin. Target blood sugar 140180. Patient on steroids for severe Covid pneumoniasteroids being weaned. TSH 0.19likely sick euthyroid state; free T4 mildly increased; free T3 mildly decreased. Neurology: Intubated and sedatedlimited exam.  Skin/Wound: ICU nursing care. Electrolytes: Replace electrolytes per ICU electrolyte replacement protocol. IVF: D5W 50 mL/h; free water via OG tube. Nutrition: Tube feedingNeprotolerating well. Blaise Leyva Prophylaxis: DVT Prophylaxis: Coumadin. GI Prophylaxis: Protonix. Restraints: Wrist soft restraints for patient interfering with medical therapy/management and patient safety.    Lines/Tubes: PIVs; midline planned  ETT: 12/7/2021 OGT/NGT: 12/7/2021   Montoya: 12/7/2021 (Medically necessary for strict input/output monitoring in critically ill patient, will remove it when not needed. Montoya bundle followed). Advance Directive/Palliative Care: consulted    Prognosis appears very poor in this patient with Covid pneumonia with severe hypoxemic respiratory failure and ARDS needing to be intubated now; mortality is very high; risk of multiorgan failure with complications such as refractory hypoxemia, encephalopathy, critical illness myopathy, acute renal failure needing dialysis, shock state needing pressors, prolonged ventilator support, risk of tracheostomy and chronic debility/bedbound state. Quality Care: PPI, DVT prophylaxis, HOB elevated, Infection control all reviewed and addressed. Care of plan d/w RN, RT. High complexity decision making was performed during the evaluation of this patient at high risk for decompensation with multiple organ involvement. Total critical care time spent rendering care exclusive of procedures/family discussion/coordination of care: 39 minutes. Maria Ines Ng) [address] [facility]     [phone]      Unfortunately, patient's  is now in ICU with Covid pneumonia and on ventilator support. Update grandson on 12/11. Do not inform patient of her  medical condition per family request.            Please note: Voice-recognition software may have been used to generate this report, which may have resulted in some phonetic-based errors in grammar and contents. Even though attempts were made to correct all the mistakes, some may have been missed, and remained in the body of the document.       Leona Padilla MD  12/12/2021         Note  Patient with SARS-CoV-2 pneumonia with severe pneumonia and hypoxemic respiratory failure; patient was on high flow nasal cannula oxygen; patient is not vaccinated, and in the age group that is being infected with delta virus strain; the strain causes severe respiratory failure and complications reported in the [place] and worldwide; unfortunately, the prognosis is poor in unvaccinated patients, with a high risk of complications, multiorgan failure, chronic hypoxemia and respiratory failure, intubation, mechanical ventilation, thromboembolic disease risk in multiple organs, high risk for long Covid syndrome, and high risk for mortality. The CDC federal and state guidelines have emphasized repeatedly the importance of vaccination to prevent severe infection, mortality, disabilities, long Covid syndrome from Covid virus with several strains currently in the [place]. The treatment protocols are followed based on local hospital protocols, with guidance from centers such as [facility] protocols. [facility] is not part of any research protocol. The local hospital protocols have been guided by [facility] pharmacy department. Covid plasma is no longer considered standard of care in the [facility] due to lack of benefit in trials. Nebulization and CPAP therapies are not considered as part of the protocol in [facility] due to high risk of aerosolization, and high risks of spreading Covid infection to the healthcare staff. Dexamethasone considered standard of care in patients admitted with severe Covid pneumonia; variable dosing has been reported with this medication. ECMO and CRRT facilities not available at THE [facility]; patients with severe Covid pneumonia and respiratory failure are usually unstable for transportation when they are in critically ill state with a high risk of dying during transportation. REMDESIVIR-not recommended in patients with late presentation with severe respiratory failure     Tocilizumab anti-inflammatory medicationhas shown some benefit in patients with elevated inflammatory markers.     Ivermectin not considered standard of care in the [address] [place] [address] Rd[address]; no significant randomized controlled studies done in the [facility] to suggest benefit of ivermectin in patients with COVID-19 with severe respiratory failure; risk of toxicity related to ivermectin outweighs any potential benefits based on consensus opinion in the [address]. This drug is not considered standard of care in THE [facility].

## 2021-12-12 NOTE — PROGRESS NOTES
Pt's grandson, Anamaria Ventura, called requesting pt's cell phone. Spoke w/charge nurse. Instructed to approve this w/pt's next of kin. Deborah Bran, pts son and legal next of kin, authorizes for Anamaria Ventura to  pt's cell phone. Karen Tinsley instructed to bring photo id when picking up belongings.

## 2021-12-12 NOTE — CONSULTS
LOGO HERE    [facility]  Phone: [phone]  Paging : Uche Harrington) [phone] [facility]) [phone]  [place]) [phone] [facility]) [phone]     Hematology / Oncology Consult Note  Due to positive COVID and open airway, I did not physically examine the patient, chart reviewed extensively. Impression: Thrombocytopenia, acute drop, likely due to sepsis. Anemia is at baseline  Acute respiratory failure with hypoxia, ARDS due to COVID-19  -currently intubated, on Azithromycin and Ceftriaxone. Chronic anticoagulation for A-fib, on hold. Plan:   CBC daily, trend fibrinogen an INR, hold anticoagulation. Consider broaden antibiotics coverage concerning possible worsening infection. Supportive care, transfuse plt if <10, or <20 if actively bleeding. In case of bleeding, may give FFP. I have discussed the plan with Dr. Drea Bhandari. Reason for Consultation:  Jacob Garcia is a 66 y.o. female who I've been asked to consult for thrombocytopenia. HPI:   A 67 yo female with past medical history of chronic A fib on  Coumadin, chronic leg pain, anxiety, DM with CKD and HTN, who was admitted through ED on 12/5/2021 for COVID pneumonia. She was intubated today and transferred to ICU. She is noted to have worsening thrombocytopenia. Her baseline platelets count fluctuated between , today 55, yesterday was 92. Baseline anemia with Hgb ~10.5 appears to be stable. No heparin exposure, Warfarin is on hold, today INR 2.1, fibrinogen 402, D-dimer 2.59.      Past Medical History:   Diagnosis Date    A-fib [facility])     Anxiety     CAD (coronary artery disease)     Chronic kidney disease     Diabetes ([facility] [address]     borderline    High cholesterol     Hypertension     Kidney stones     Psychiatric disorder     anxiety     Past Surgical History:   Procedure Laterality Date    HX CHOLECYSTECTOMY      HX HEART CATHETERIZATION      HX HYSTERECTOMY      HX ORTHOPAEDIC bilateral knee surgery    HX ORTHOPAEDIC      right elbow    HX UROLOGICAL      stent placement    PA CARDIAC SURG PROCEDURE UNLIST      stent x 1     @socr@  Family History   Problem Relation Age of Onset    Breast Cancer Maternal Aunt      No Known Allergies    Home Medications:   [unfilled]    Hospital Medications:     Current Facility-Administered Medications   Medication Dose Route Frequency Provider Last Rate Last Admin    [START ON 12/13/2021] dexamethasone (DECADRON) 4 mg/mL injection 6 mg  6 mg IntraVENous DAILY Alison Tanner MD        Hold Warfarin (12/12/2021) - Low Platelets  1 Each Other ONCE Alison Tanner MD        insulin glargine (LANTUS) injection 10 Units  10 Units SubCUTAneous QHS Alison Tanner MD   10 Units at 12/11/21 2105    pantoprazole (PROTONIX) 40 mg in 0.9% sodium chloride 10 mL injection  40 mg IntraVENous DAILY Alison Tanner MD   40 mg at 12/12/21 0946    dextrose 5% infusion  50 mL/hr IntraVENous CONTINUOUS Alison Tanner MD 50 mL/hr at 12/12/21 1250 50 mL/hr at 12/12/21 1250    hydrALAZINE (APRESOLINE) tablet 10 mg  10 mg Oral TID Alison Tanner MD   10 mg at 12/12/21 0946    midazolam (VERSED) injection 2 mg  2 mg IntraVENous Q3H PRN Alison Tanner MD   2 mg at 12/07/21 1500    fentaNYL citrate (PF) injection 50 mcg  50 mcg IntraVENous Q4H PRN Alison Tanner MD        chlorhexidine (PERIDEX) 0.12 % mouthwash 10 mL  10 mL Oral Q12H Alison Tanner MD   10 mL at 12/12/21 0946    fentaNYL (PF) 900 mcg/30 ml infusion soln  0-200 mcg/hr IntraVENous TITRATE Alison Tanner MD   Stopped at 12/09/21 1400    midazolam in normal saline (VERSED) 1 mg/mL infusion  2-10 mg/hr IntraVENous TITRATE Alison Tanner MD 2 mL/hr at 12/10/21 2307 2 mg/hr at 12/10/21 2307    insulin lispro (HUMALOG) injection   SubCUTAneous Q6H Alison Tanner MD   3 Units at 12/12/21 1200    melatonin (rapid dissolve) tablet 10 mg  10 mg Oral QHS Bailey Enamorado MD   10 mg at 12/11/21 2104    ascorbic acid (vitamin C) (VITAMIN C) tablet 500 mg  500 mg Oral BID Bailey Enamorado MD   500 mg at 12/12/21 0946    zinc sulfate (ZINCATE) 50 mg zinc (220 mg) capsule 1 Capsule  1 Capsule Oral DAILY Bailey Enamorado MD   1 Capsule at 12/12/21 0946    Warfarin - Pharmacy to Dose  1 Each Other Rx Dosing/Monitoring Kymberly Valdivia MD        HYDROcodone-acetaminophen [facility])  mg tablet 1 Tablet  1 Tablet Oral Q6H PRN Kymberly Valdivia MD   1 Tablet at 12/06/21 1646    isosorbide mononitrate ER (IMDUR) tablet 30 mg  30 mg Oral DAILY Kymberly Valdivia MD   30 mg at 12/07/21 0935    nitroglycerin (NITROSTAT) tablet 0.4 mg  0.4 mg SubLINGual Q5MIN PRN Kymberly Valdivia MD        sodium chloride (NS) flush 5-40 mL  5-40 mL IntraVENous Q8H Kymberly Valdivia MD   10 mL at 12/12/21 1400    sodium chloride (NS) flush 5-40 mL  5-40 mL IntraVENous PRN Kymberly Valdivia MD        acetaminophen (TYLENOL) tablet 650 mg  650 mg Oral Q6H PRN Kymberly Valdivia MD        Or   [facility] acetaminophen (TYLENOL) suppository 650 mg  650 mg Rectal Q6H PRN Kymberly Valdivia MD        polyethylene glycol (MIRALAX) packet 17 g  17 g Oral DAILY PRN Kymberly Valdivia MD        ondansetron (ZOFRAN ODT) tablet 4 mg  4 mg Oral Q8H PRN Kymberly Valdivia MD        Or    ondansetron [facility]) injection 4 mg  4 mg IntraVENous Q6H PRN Kymberly Valdivia MD        cholecalciferol (VITAMIN D3) (1000 Units /25 mcg) tablet 2,000 Units  2,000 Units Oral DAILY Kymberly Valdivia MD   2,000 Units at 12/12/21 0946    glucose chewable tablet 16 g  16 g Oral PRN Kymberly Valdivia MD        glucagon [facility]) injection 1 mg  1 mg IntraMUSCular PRN Kymberly Valdivia MD        dextrose (D50W) injection syrg 12.5-25 g  25-50 mL IntraVENous PRN Kymberly Valdivia MD           Visit Vitals  BP (!) 120/47   Pulse (!) 54   Temp 97 °F (36.1 °C)   Resp 18   Ht 5' 6\" (1.676 m)   Wt 86.6 kg (190 lb 14.7 oz)   SpO2 96%   BMI 30.81 kg/m²       Temp (24hrs), Av.7 °F (36.5 °C), Min:96.8 °F (36 °C), Max:99.1 °F (37.3 °C)    Labs:  Recent Labs     12/12/21  0522 12/11/21  0420 12/10/21  034   WBC 9.0 8.3 7.7   RBC 3.20* 3.21* 3.26*   HCT 31.3* 32.1* 32.8*   MCV 97.8 100.0 100.6*   MCH 32.5 32.4 31.9   MCHC 33.2 32.4 31.7   RDW 14.0 14.4 14.6*       Recent Labs     12/12/21  0522 12/11/21  0420 12/10/21  034   CO2 24 24 22   BUN 69* 73* 69*           Cinda Garcia MD MD  [facility]  Cell: [phone]

## 2021-12-12 NOTE — PROGRESS NOTES
Assumed pt care approx 0730. Assessments performed, see flow sheet. Pt assessed. Pt intubated, lightly sedated. Pt able to open eyes to voice and weakly squeeze hands when asked. Pt remains in bilat wrist restraints. Oral and darek care performed. No distress noted. Pt reassessed, no changes  noted. Oral and darek care performed. Pt repositioned, no distress noted. Pt reassessed, no changes noted. Oral and darek care performed. No distress noted.     No distress noted, vss.

## 2021-12-12 NOTE — PROGRESS NOTES
1910: Report received from DENILSON Novak RN. Assumed care of pt at this time.     2000: Patient remains intubated and sedated on 2mg/hr of Versed, resting comfortably, eyes opened to touch. HR is 40s-60s, Sinus [place]. OG tube infusing Nephro @40ml/hr with Q4 200ml water flushes. Montoya cath draining yellow/clear urine. Restraints in place. Mouth care and Drea care completed. No signs of distress.      0000: Reassessment, no changes. 0400: Patient bathed -- incontinence care completed.

## 2021-12-13 NOTE — PROGRESS NOTES
Hospitalist Progress Note    Patient: Katherine Lazaro MRN: [id number]  CSN: [id number]    YOB: 1950  Age: 66 y.o. Sex: female    DOA: 12/5/2021 LOS:  LOS: 8 days          Chief Complaint: Ac resp failure    Assessment/Plan     A 67 yo female with past medical history of chronic A fib on  Coumadin, chronic leg pain, anxiety, DM with CKD and HTN presents to the ED with 1 wk of Fatigue, Cough and SOB. Found to be hypoxic, COVID-19 PNA and A fib with RVR. Thrombocytopenia with abrupt drop in plts 113>92>55 which is concerning for worsening infection -  Heme/onc consulted   Follow and tranfuse if <10 or <20 if actively bleeding   abx coverage broadened   Stop AC    Acute respiratory failure with hypoxemia: intubated now, sedated     Severe covid pneumonia: with resp failure, procalcitonin remains low    Acute renal failure in chronic kidney disease stage III : possible contrast induced nephropathy , creatinine improving, hypernatremia improving, strict I/Os, avoid further nephrotoxins     Anemia: Mild anemia mild thrombocytic cytopenia, continue to monitor  INR supratherapeutic greater than 5, vitamin K given x1 dose  Patient on chronic Coumadin therapy at home due to history of paroxysmal atrial fibrillation     Thrombocytopenia: Continue to monitor     Paroxysmal atrial fibrillation: Lovenox held because INR supratherapeutic greater than 5, was on coumadin at home, INR 2.6 today     She is at high risk for death given her unvaccinated COVID vaccine, comorbidities and the severity of her illness. E. Coli Urinary tract infection: Seen by ED on 11/30. Was on IV Rocephin course completed      A. Fib with RVR: monitor     DM type 2 with CKD: Monitor glucose and BG.  SSI      HTN: hydralazine PRN to manage blood pressure     Thyroid nodule per CT: outpatient f/u     GI/DVT prophylaxis ordered     CODE STATUS: Full      Disposition :  Patient Active Problem List   Diagnosis Code    Hypertension I10    Paroxysmal A-fib ([facility] [address] I48.0    Pneumonia due to COVID-19 virus U07.1, J12.82    Thrombocytopenia (HCC) D69.6    Hypoxia R09.02    DM due to underlying condition with diabetic nephropathy ([facility]) E08.21    E-coli UTI N39.0, B96.20    Class 3 severe obesity with body mass index (BMI) of 40.0 to 44.9 in adult ([facility]) E66.01, Z68.41    Acute respiratory failure with hypoxemia ([facility]) J96.01    Pneumonia due to severe acute respiratory syndrome coronavirus 2 (SARS-CoV-2) U07.1, J12.82    Stage 3a chronic kidney disease ([facility]) N18.31    Anemia D64.9       Subjective:    pt resting in bed with eyes closed, no sign of distress, vss on ventilator and sedation    Review of systems:    UTO due to sedated status    Vital signs/Intake and Output:  Visit Vitals  BP (!) 128/40   Pulse (!) 45   Temp 97 °F (36.1 °C)   Resp 18   Ht 5' 6\" (1.676 m)   Wt 86.6 kg (190 lb 14.7 oz)   SpO2 93%   BMI 30.81 kg/m²     Current Shift:  12/12 1901 - 12/13 0700  In: 700 [I.V.:240]  Out: 200 [Urine:200]  Last three shifts:  12/11 0701 - 12/12 1900  In: 3257.5 [I.V.:1327.5]  Out: 2801 [Urine:2800]    Exam: seen thru glass    General:elderly OW sedate WF intubated  CVS:Regular rate and rhythm  Lungs: vented, 02 sat 97%  Neuro:sedated                Labs: Results:       Chemistry Recent Labs     12/12/21  0522 12/11/21  0420 12/10/21  0345   * 251* 214*    147* 150*   K 4.3 4.3 4.2   * 116* 118*   CO2 24 24 22   BUN 69* 73* 69*   CREA 1.15 1.45* 1.79*   CA 8.9 8.8 8.8   AGAP 6 7 10   BUCR 60* 50* 39*   AP 81 74 63   TP 5.4* 5.7* 6.0*   ALB 2.0* 2.2* 2.2*   GLOB 3.4 3.5 3.8   AGRAT 0.6* 0.6* 0.6*      CBC w/Diff Recent Labs     12/12/21  0522 12/11/21  0420 12/10/21  0345   WBC 9.0 8.3 7.7   RBC 3.20* 3.21* 3.26*   HGB 10.4* 10.4* 10.4*   HCT 31.3* 32.1* 32.8*   PLT 55* 92* 113*   GRANS 89* 89* 91*   LYMPH 3* 3* 4*   EOS 0 0 0      Cardiac Enzymes No results for input(s): CPK, CKND1, MANE in the last 72 hours.     No lab exists for component: CKRMB, TROIP   Coagulation Recent Labs     12/12/21 0522 12/11/21  0420   PTP 22.8* 23.5*   INR 2.1* 2.2*       Lipid Panel Lab Results   Component Value Date/Time    Cholesterol, total 208 (H) 12/20/2020 03:16 PM    HDL Cholesterol 47 12/20/2020 03:16 PM    LDL, calculated 128.6 (H) 12/20/2020 03:16 PM    VLDL, calculated 32.4 12/20/2020 03:16 PM    Triglyceride 162 (H) 12/20/2020 03:16 PM    CHOL/HDL Ratio 4.4 12/20/2020 03:16 PM      BNP No results for input(s): BNPP in the last 72 hours.    Liver Enzymes Recent Labs     12/12/21 0522   TP 5.4*   ALB 2.0*   AP 81      Thyroid Studies Lab Results   Component Value Date/Time    TSH 0.19 (L) 12/08/2021 03:00 AM        Procedures/imaging: see electronic medical records for all procedures/Xrays and details which were not copied into this note but were reviewed prior to creation of Ivan Madrigal MD

## 2021-12-13 NOTE — PROGRESS NOTES
CM notes patient remains intubated in the ICU. Patient not stable enough to transfer out of acute care setting at this time. CM to continue to monitor and remain available. CM notes that patients PO2 vent settings changed this morning at 1018; Fio2 60%, PEEP increased to 8. CM left message for patient's granddaughter Vikki Salcedo.

## 2021-12-13 NOTE — PROGRESS NOTES
SECONDARY TO PO2 49.6 WITH SAT 87% ON 45%/PEEP 6, INCREASED FIO2 TO 60% AND PEEP TO 8.  DR. Hossein Perdomo AWARE. CHANGES WERE MADE @ 3093.

## 2021-12-13 NOTE — PROGRESS NOTES
Nutrition Assessment     Type and Reason for Visit: Reassess    Nutrition Recommendations/Plan: Continue with TF at goal rate to meet nutritional needs. 12/13/21 0856   Enteral Nutrition   Feeding Route Orogastric   EN Formula Renal  (Nepro)   Schedule Continuous   Feeding Regimen Nepro @ 40ml/r (goal rate). Water Flushes 200ml Q4 (1200ml)   Current EN & Flush Order Provides Nepro @ 40ml/hr + D5% @ 50ml/hr + flushes provides:     1788kcals, 71g PRO, 207g CHO, 1838ml free water     Nutrition Assessment:  PMHx: Chronic A fib, chronic leg pain, DM with CKD, HTN. Patient admitted with COVID 19 PNA, a fib RVR. Intubated in ICU. Estimated Daily Nutrient Needs:  Energy (kcal):  1653  Protein (g):  69       Fluid (ml/day):  5785    Nutrition Related Findings:  Labs reviewed. Med: zinc, protonix, melatonin, humalog, lantus, D5% @ 50ml/hr (60g CHO, 204 kcals), vit d, vit c. +BM 12/12. TF infusing ith Renal (Nepro) @ 40ml/hr with 0ml gastric residuals. Pt at goal rate. Current Nutrition Therapies:  DIET NPO  ADULT TUBE FEEDING Orogastric; Renal; Delivery Method: Continuous; Continuous Initial Rate (mL/hr): 10; Continuous Advance Tube Feeding: Yes; Advancement Volume (mL/hr): 10; Advancement Frequency: Q 6 hours; Continuous Goal Rate (mL/hr): 40; Water. ..     Anthropometric Measures:  · Height:  5' 6\" (167.6 cm)  · Current Body Wt:  86.6 kg (190 lb 14.7 oz) (12/12/21)  · BMI: 30.8    Nutrition Diagnosis:   · Inadequate oral intake related to impaired respiratory function as evidenced by intubation, nutrition support-enteral nutrition, NPO or clear liquid status due to medical condition    Nutrition Intervention:  Food and/or Nutrient Delivery: Continue oral nutrition supplement  Nutrition Education and Counseling: No recommendations at this time  Coordination of Nutrition Care: Continue to monitor while inpatient, Interdisciplinary rounds    Goals:  Continue with tube feeding at goal rate to meet nutritional needs throughout the next 4-5 days       Nutrition Monitoring and Evaluation:   Behavioral-Environmental Outcomes: None identified  Food/Nutrient Intake Outcomes: Enteral nutrition intake/tolerance, Diet advancement/tolerance  Physical Signs/Symptoms Outcomes: Biochemical data, Skin, Weight, GI status, Nausea/vomiting    Discharge Planning:     Too soon to determine     Electronically signed by Ruth Espinoza RD on 12/13/2021 at 8:58 AM

## 2021-12-13 NOTE — PROGRESS NOTES
conducted a follow up consultation and spiritual assessment for Taryn Davidson, who is a 66 y.o.,female. Patient has two adult sons - Aldo White and Janeth Pandey and two grandchildren - Tana Lewis and Jacklyn. The patient is intubated and sedated. She does not have an Advance Medical Directive. Without one the  would be the legal next of kin to make decisions. However the  is intubated in the next room. Therefore the sons are her legal next of kin and should be turned to for consents. Also, the family has together decided to have granddaughter Tana Lewis be the one point of contact for patient and her  and then pass it on to the rest of the family. Continued the relationship of care and support. Offered prayer on patients behalf. Chart reviewed. Patient was reviewed in ICU Interdisciplinary Rounds. Chaplains will continue to follow and will provide pastoral care as needed or requested.  recommends bedside caregivers page the  on duty if patient shows signs of acute spiritual or emotional distress. [address] [place], [place].    Board Certified   [phone] - Office

## 2021-12-13 NOTE — PROGRESS NOTES
[facility]  A Division of [address] [place] [address])                                                                                                                      Ventura Burks MD  Office #: - Option # [phone]  Fax #: [phone]  Date of Admission: 12/5/2021Date of Note: 12/13/2021  Reason for FU: Evaluation and antibiotic management of Severe COVID 19. Current Antimicrobials:    Prior Antimicrobials: Actrema- X1 12/7  Dexamethasone 12/7 to date( 20mg)   Augmentin and doxycycline PO on 11/30 to 12/5  Ceftriaxone/ Zithromax 12/05 to 12/6       Assessment- ID related:  --------------------------------------------------------------------------  Critically ill patient, post Actrema, high risk for infection:   · Thrombocytopenia- driven by possible sepsis? Plat down to 55K-> CXR remains with diffuse infiltrate, no change. · Acute renal failure: Improved  · Severe COVID 19 infection  · Viral PNA- BL. Extensive on CT chest  · Acute resp failure- Hypoxic 2/2 above  · Intubated 12/7  · E.coli bacteruia 10/30  · Hx of septic shock with prolonged intubation 6 yrs ago- [facility]  COVId rapid test/RVP PCR + 12/5  Strep/leg urine ag: negative  BCX 12/5L NGSF  procal X4-LN-> last done 12/11 <0.05  CRP 14->-> -> 0.5  ferretin 1104-> -> 836  CPK/trop-OK  LD-512  Hepatitis C ab/B sa negative, Quantiron TB-Indeterminate 12/7    Other Medical Issues- Mx per respective team:  ·   · Hypernatremia-resolved  · Acute renal failure-improved  · Morbidly obese BMI 40  · Hx of thrombocytoepnia- chronic. Etiology unknown. · A fib on AC- INR 5  · L renal stone- non obstructing on CT 11/30  · CKD  · DMT2  · HTN/HLD  · Hx of CHF per pt     Recommendation for ID issues I am following:  ------------------------------------------------------------------------------    Further drop on Plat, suspected sepsis by Heme, started on emperic Zosyn.  Will order Blood culture( in case uncovered pathogen  -> steroid/dexamethasone and AC/Keri coctails per primary team  --> no need to add Vanco at this point, unless decline with  hypotension, requiring pressors  -> monitor labs/biomarkes daily-> so far biomarkers declining  DW ICU MD/nurse                      -> will repeat in am     Subjective: Intubated/ unresponsive/sedated  Not on pressor  Remains on same Vent etting- PEEP of 8, FIO2 55%  Acute decline in her plat- to 55K, heme note reviewed, suggested possible sepsis. WBC 10K, Cr imporved 1.04  Notes/ labs reviewed  CXR this am remains with diffuse opacity-> Unchanged  TTE with EF 55-60%, no severe valvular pathology         HPI:  Kylie Arnold is a 66 y.o. WHITE/NON- with PMH as listed above. Non vaccinated due to her medical conditions and her Restoration per pt. She has been feeling sick since 2-3 days prior to Nov 30, when she came to ED with right flank pain and abd pain. No Fever or chills, she is SOB and had dry cough as always from her CHF no new changes. COVID tested neg,. CT done showed some infiltrate on lung and  UA and Urine culture done and sent out with Aumgentin and Doxycycline for treatment of possible PNA. Returned to ED on 12/5 with worsened SOB, hypoxia and tachycardia. COVID 19 test + and CT chest with extensive infiltrate B/L. Her  tested + for COVID 19 as well. She was aon 5L oxygen and on dexamethasone. She was placed on CTX and Zithromax as well. Chest not very tight per pt at time of exam. She asks if we can treat her with Ivermectin. She denies abd pain/N/V/chest pain. Denies severe ext pain. Was intubated X1 month 6 yrs ago- post septic shock. Hx of TB exposure- unknown. Runs a [employer].        Active Hospital Problems    Diagnosis Date Noted    Pneumonia due to severe acute respiratory syndrome coronavirus 2 (SARS-CoV-2) 12/07/2021    Stage 3a chronic kidney disease ([facility] 12/07/2021    Anemia 12/07/2021    Acute respiratory failure with hypoxemia ([facility] 12/06/2021    Pneumonia due to COVID-19 virus 12/05/2021    Thrombocytopenia ([facility] 12/05/2021    Hypoxia 12/05/2021    DM due to underlying condition with diabetic nephropathy ([facility] 12/05/2021    E-coli UTI 12/05/2021    Class 3 severe obesity with body mass index (BMI) of 40.0 to 44.9 in adult [facility]) 12/05/2021    Paroxysmal A-fib ([facility]     Hypertension      Past Medical History:   Diagnosis Date    A-fib ([facility]     Anxiety     CAD (coronary artery disease)     Chronic kidney disease     Diabetes ([facility]     borderline    High cholesterol     Hypertension     Kidney stones     Psychiatric disorder     anxiety     Past Surgical History:   Procedure Laterality Date    HX CHOLECYSTECTOMY      HX HEART CATHETERIZATION      HX HYSTERECTOMY      HX ORTHOPAEDIC      bilateral knee surgery    HX ORTHOPAEDIC      right elbow    HX UROLOGICAL      stent placement    IN CARDIAC SURG PROCEDURE UNLIST      stent x 1     Family History   Problem Relation Age of Onset    Breast Cancer Maternal Aunt      Social History     Socioeconomic History    Marital status:      Spouse name: Not on file    Number of children: Not on file    Years of education: Not on file    Highest education level: Not on file   Occupational History    Not on file   Tobacco Use    Smoking status: Never Smoker    Smokeless tobacco: Never Used   Substance and Sexual Activity    Alcohol use: No    Drug use: No    Sexual activity: Not on file   Other Topics Concern    Not on file   Social History Narrative    Not on file     Social Determinants of Health     Financial Resource Strain:     Difficulty of Paying Living Expenses: Not on file   Food Insecurity:     Worried About Running Out of Food in the Last Year: Not on file    Sylvain of Food in the Last Year: Not on file   Transportation Needs:     Lack of Transportation (Medical): Not on file    Lack of Transportation (Non-Medical):  Not on file Physical Activity:     Days of Exercise per Week: Not on file    Minutes of Exercise per Session: Not on file   Stress:     Feeling of Stress : Not on file   Social Connections:     Frequency of Communication with Friends and Family: Not on file    Frequency of Social Gatherings with Friends and Family: Not on file    Attends Muslim Services: Not on file    Active Member of [address] [place] [address] or Organizations: Not on file    Attends Club or Organization Meetings: Not on file    Marital Status: Not on file   Intimate Partner Violence:     Fear of Current or Ex-Partner: Not on file    Emotionally Abused: Not on file    Physically Abused: Not on file    Sexually Abused: Not on file   Housing Stability:     Unable to Pay for Housing in the Last Year: Not on file    Number of [place] in the Last Year: Not on file    Unstable Housing in the Last Year: Not on file       Allergies:  Patient has no known allergies.      Medications:  Current Facility-Administered Medications   Medication Dose Route Frequency    furosemide (LASIX) injection 20 mg  20 mg IntraVENous BID    dexamethasone (DECADRON) 4 mg/mL injection 6 mg  6 mg IntraVENous DAILY    piperacillin-tazobactam (ZOSYN) 4.5 g in 0.9% sodium chloride (MBP/ADV) 100 mL MBP  4.5 g IntraVENous Q8H    insulin glargine (LANTUS) injection 10 Units  10 Units SubCUTAneous QHS    pantoprazole (PROTONIX) 40 mg in 0.9% sodium chloride 10 mL injection  40 mg IntraVENous DAILY    dextrose 5% infusion  50 mL/hr IntraVENous CONTINUOUS    hydrALAZINE (APRESOLINE) tablet 10 mg  10 mg Oral TID    midazolam (VERSED) injection 2 mg  2 mg IntraVENous Q3H PRN    fentaNYL citrate (PF) injection 50 mcg  50 mcg IntraVENous Q4H PRN    chlorhexidine (PERIDEX) 0.12 % mouthwash 10 mL  10 mL Oral Q12H    fentaNYL (PF) 900 mcg/30 ml infusion soln  0-200 mcg/hr IntraVENous TITRATE    midazolam in normal saline (VERSED) 1 mg/mL infusion  2-10 mg/hr IntraVENous TITRATE    insulin lispro (HUMALOG) injection   SubCUTAneous Q6H    melatonin (rapid dissolve) tablet 10 mg  10 mg Oral QHS    ascorbic acid (vitamin C) (VITAMIN C) tablet 500 mg  500 mg Oral BID    zinc sulfate (ZINCATE) 50 mg zinc (220 mg) capsule 1 Capsule  1 Capsule Oral DAILY    Warfarin - Pharmacy to Dose  1 Each Other Rx Dosing/Monitoring    HYDROcodone-acetaminophen (NORCO)  mg tablet 1 Tablet  1 Tablet Oral Q6H PRN    isosorbide mononitrate ER (IMDUR) tablet 30 mg  30 mg Oral DAILY    nitroglycerin (NITROSTAT) tablet 0.4 mg  0.4 mg SubLINGual Q5MIN PRN    sodium chloride (NS) flush 5-40 mL  5-40 mL IntraVENous Q8H    sodium chloride (NS) flush 5-40 mL  5-40 mL IntraVENous PRN    acetaminophen (TYLENOL) tablet 650 mg  650 mg Oral Q6H PRN    Or    acetaminophen (TYLENOL) suppository 650 mg  650 mg Rectal Q6H PRN    polyethylene glycol (MIRALAX) packet 17 g  17 g Oral DAILY PRN    ondansetron (ZOFRAN ODT) tablet 4 mg  4 mg Oral Q8H PRN    Or    ondansetron (ZOFRAN) injection 4 mg  4 mg IntraVENous Q6H PRN    cholecalciferol (VITAMIN D3) (1000 Units /25 mcg) tablet 2,000 Units  2,000 Units Oral DAILY    glucose chewable tablet 16 g  16 g Oral PRN    glucagon (GLUCAGEN) injection 1 mg  1 mg IntraMUSCular PRN    dextrose (D50W) injection syrg 12.5-25 g  25-50 mL IntraVENous PRN      Physical Exam:    Temp (24hrs), Av.1 °F (36.2 °C), Min:96.4 °F (35.8 °C), Max:97.7 °F (36.5 °C)    Visit Vitals  BP (!) 123/52   Pulse (!) 51   Temp 97.3 °F (36.3 °C)   Resp 18   Ht 5' 6\" (1.676 m)   Wt 86.6 kg (190 lb 14.7 oz)   SpO2 95%   BMI 30.81 kg/m²        GEN: WD Morbidly obese, intubated and lying supine      HEENT: Unicteric. Estil Garcia ET/Oral tube in place  CHEST:  CTA anteroirly  CVS:RRR  ABD: Obese, fecal management in place  GIRISH: Montoya in place  EXT: No apparent swelling or redness on UE/LE joints. Skin: Dry and intact. No rash, no redness.  Bruising by right PIV site  CNS:Intubated/ non responsive Microbiology  All Micro Results     Procedure Component Value Units Date/Time    CULTURE, BLOOD [[id number]] Collected: 12/05/21 1106    Order Status: Completed Specimen: Blood Updated: 12/11/21 0729     Special Requests: NO SPECIAL REQUESTS        Culture result: NO GROWTH 6 DAYS       CULTURE, BLOOD [[id number]] Collected: 12/05/21 1106    Order Status: Completed Specimen: Blood Updated: 12/11/21 0729     Special Requests: NO SPECIAL REQUESTS        Culture result: NO GROWTH 6 DAYS       STREP Reba Keane, URINE [[id number]] Collected: 12/08/21 2051    Order Status: Completed Specimen: Urine, random Updated: 12/09/21 1024     Strep pneumo Ag, urine Negative       LEGIONELLA PNEUMOPHILA AG, URINE [[id number]] Collected: 12/08/21 2051    Order Status: Completed Specimen: Urine, random Updated: 12/09/21 1024     Legionella Ag, urine Negative       LEGIONELLA PNEUMOPHILA AG, URINE [[id number]] Collected: 12/05/21 1730    Order Status: Canceled Specimen: Urine     STREP Reba Keane, URINE [[id number]] Collected: 12/05/21 1730    Order Status: Canceled Specimen: Urine     RESPIRATORY VIRUS PANEL W/COVID-19, PCR [[id number]]  (Abnormal) Collected: 12/05/21 1110    Order Status: Completed Specimen: Nasopharyngeal Updated: 12/05/21 1633     Adenovirus Not detected        Coronavirus 229E Not detected        Coronavirus HKU1 Not detected        Coronavirus CVNL63 Not detected        Coronavirus OC43 Not detected        SARS-CoV-2, PCR Detected        Comment: CALLED TO AND CORRECTLY REPEATED BY:  DR Rahul Hernandez [facility] ER ON [id number] AT 1626 HRS TO 1396.           Jorge Garcia Not detected        Rhinovirus and Enterovirus Not detected        Influenza A Not detected        Influenza A, subtype H1 Not detected        Influenza A, subtype H3 Not detected        INFLUENZA A H1N1 PCR Not detected        Influenza B Not detected        Parainfluenza 1 Not detected        Parainfluenza 2 Not detected        Parainfluenza 3 Not detected Parainfluenza virus 4 Not detected        RSV by PCR Not detected        B. parapertussis, PCR Not detected        Bordetella pertussis - PCR Not detected        Chlamydophila pneumoniae DNA, QL, PCR Not detected        Mycoplasma pneumoniae DNA, QL, PCR Not detected       COVID-19 RAPID TEST [[id number]]  (Abnormal) Collected: 12/05/21 1109    Order Status: Completed Specimen: Nasopharyngeal Updated: 12/05/21 1136     Specimen source Nasopharyngeal        COVID-19 rapid test Detected        Comment:      The specimen is POSITIVE for SARS-CoV-2, the novel coronavirus associated with COVID-19. This test has been authorized by the FDA under an Emergency Use Authorization (EUA) for use by authorized laboratories.         Fact sheet for Healthcare Providers: ConventionUpdate.co.nz  Fact sheet for Patients: ConventionUpdate.co.nz       Methodology: Isothermal Nucleic Acid Amplification  CALLED TO AND CORRECTLY REPEATED BY:  Makenna Polk RN ER, TO SHAEF AT 1136 12/5/2021                Lab results:    Chemistry  Recent Labs     12/13/21  0640 12/12/21  0522 12/11/21  0420   * 226* 251*    143 147*   K 4.6 4.3 4.3    113* 116*   CO2 23 24 24   BUN 60* 69* 73*   CREA 1.04 1.15 1.45*   CA 8.6 8.9 8.8   AGAP 6 6 7   BUCR 58* 60* 50*   AP 93 81 74   TP 5.3* 5.4* 5.7*   ALB 2.0* 2.0* 2.2*   GLOB 3.3 3.4 3.5   AGRAT 0.6* 0.6* 0.6*       CBC w/ Diff  Recent Labs     12/13/21  1000 12/12/21  0522 12/11/21  0420   WBC 10.3 9.0 8.3   RBC 3.35* 3.20* 3.21*   HGB 10.8* 10.4* 10.4*   HCT 32.7* 31.3* 32.1*   PLT 48* 55* 92*   GRANS  --  89* 89*   LYMPH  --  3* 3*   EOS  --  0 0       Imaging: report posted below as per radiologist     CTA chest 12/5       No evidence of a pulmonary embolism or aortic dissection.     Moderate groundglass opacities and airspace disease seen bilaterally suggesting  atypical pneumonia and the findings are typical in appearance for Covid 19  pneumonia.     Mildly enlarged right hilar lymph node which may be reactive. Advise follow-up  to exclude any neoplastic or myeloproliferative process.     15 mm slightly complex right thyroid lobe nodule.  Advise nonemergent thyroid  ultrasound.

## 2021-12-13 NOTE — PROGRESS NOTES
1910: Report received from MITZI Miller RN. Assumed care of pt at this time.     2000: Patient remains intubated and sedated on 2mg/hr of Versed, resting comfortably, eyes opened to touch. HR is 40s-60s, Sinus César. OG tube infusing Nephro @40ml/hr with Q4 200ml water flushes. Montoya cath draining yellow/clear urine. Restraints in place. Mouth care and Drea care completed. No signs of distress. 0000: Reassessment with no changes. 0400: Reassessment with no changes. [id number] Common hugger applied. 0700: Multiple attempts completed by Respiratory and Lab to draw blood for morning labs and ABG -- all unsuccessful.      0724: Patient has had multiple watery stools -- talked to Dr. Adry Adorno, orders received. 0725: Shift change report given to MITZI Miller RN (oncoming nurse) by Maurisio BERNARD RN (offgoing nurse). Report included the following information SBAR, Kardex and MAR.

## 2021-12-13 NOTE — CONSULTS
[id number] [facility] 54: [phone]  [facility]: [address] [place] [address] Way: [phone]    Patient Name: Jovan Cardoza  YOB: 1950    Date of Initial Consult: 12/13/2021   Reason for Consult: goals of care   Requesting Provider: Dr Devika Lion   Primary Care Physician: Carline Reynoso DO      SUMMARY:   Jovan Cardoza is a 66y.o. year old with a past history of chronic A fib on  Coumadin, chronic leg pain, anxiety, DM with CKD and HTN  who was admitted on 12/5/2021 from home  with a diagnosis of COVID 19 pneumonia and acute respiratory failure  Current medical issues leading to Palliative Medicine involvement include: 66year old female who presented with COVID 19 pneumonia who is now orally intubated. Palliative Medicine is consulted for goals of care discussions. PALLIATIVE DIAGNOSES:   1. Goals of care   2. COVID 19 pneumonia   3. Acute respiratory failure   4. Obesity        PLAN:   1. Goals of care seen along Ms Yosef Garcia NP patient seen behind glass doors due to COVID 19 pandemic to preserve PPE. She is orally intubated and not able to participate her goals of care decisions. Her  is her legal next of kin , however he is hospitalized with COVID 23 and currently intubated. There is no AMD her legal medical decision makers are a majority of her children. The family has decided to have grand ree Mares be point of contact. Rachel Mares can bring medical information back to family however she can not make medical decisions. Medical update given to Rachel Vishnu. Goals of care remain full code with full interventions. 2. COVID 19 pneumonia; ID and pulmonology on board and managing on IVAB  3. Acute resp failure currently orally intubated with PEEP of 8 FIO2 of 70%   4. Obesity class 3 BMI 40.0 to 44.9   5. Initial consult note routed to primary continuity provider  6.  Communicated plan of care with: Palliative IDT, grand daughter     Patient/Health Care Proxy Stated Goals: Prolong life      TREATMENT PREFERENCES:   Code Status: Full Code    Advance Care Planning:  [] [facility] Interdisciplinary Team has updated the ACP Navigator with [address] and Patient Capacity    Primary Decision [facility] ([address]):   Primary Decision Maker: Shey Elaine - [phone]   ELMER of children are secondary medical decision makers  Medical Interventions: Full interventions           Other:  As far as possible, the palliative care team has discussed with patient / health care proxy about goals of care / treatment preferences for patient. HISTORY:     History obtained from: chart     CHIEF COMPLAINT: COVID 19 pneumonia     HPI/SUBJECTIVE:    The patient is:   [] Verbal and participatory  [x] Non-participatory due to:  Orally intubated and sedated   Please see summary     Clinical Pain Assessment (nonverbal scale for nonverbal patients): Clinical Pain Assessment  Severity: 0     Activity (Movement): Lying quietly, normal position    Duration: for how long has pt been experiencing pain (e.g., 2 days, 1 month, years)  Frequency: how often pain is an issue (e.g., several times per day, once every few days, constant)     FUNCTIONAL ASSESSMENT:     Palliative Performance Scale (PPS):  PPS: 30    ECOG  ECOG Status : Completely disabled     PSYCHOSOCIAL/SPIRITUAL SCREENING:      Any spiritual / Yarsanism concerns: unable to assess for patient   [] Yes /  [] No    Caregiver Burnout:  [] Yes /  [] No /  [x] No Caregiver Present      Anticipatory grief assessment: unable to assess for patient   [] Normal  / [] Maladaptive        REVIEW OF SYSTEMS:     Positive and pertinent negative findings in ROS are noted above in HPI. The following systems were [] reviewed / [x] unable to be reviewed as noted in HPI  Other findings are noted below.   Systems: constitutional, ears/nose/mouth/throat, respiratory, gastrointestinal, genitourinary, musculoskeletal, integumentary, neurologic, psychiatric, endocrine. Positive findings noted below. Modified ESAS Completed by: provider           Pain: 0           Dyspnea: 0           Stool Occurrence(s): 1        PHYSICAL EXAM:     Wt Readings from Last 3 Encounters:   12/12/21 86.6 kg (190 lb 14.7 oz)   11/30/21 114.3 kg (252 lb)   07/19/21 108 kg (238 lb)     Blood pressure (!) 134/54, pulse (!) 55, temperature 98.2 °F (36.8 °C), resp. rate 20, height 5' 6\" (1.676 m), weight 86.6 kg (190 lb 14.7 oz), SpO2 97 %.   Pain:  Pain Scale 1: FLACC  Pain Intensity 1: 0     Pain Location 1: Leg  Pain Orientation 1: Lower     Pain Intervention(s) 1: Position, Other (comment) (informed physician)  Last bowel movement: x 5 12/12/2021     Constitutional: ill appearing orally intubated and sedated female who is lying in bed in NAD   Eyes: closed   ENMT: orally intubated   Cardiovascular: RRR   Respiratory: ventilator supported   Gastrointestinal: soft   Skin: warm, dry  Neurologic: sedated        HISTORY:     Active Problems:    Hypertension ()      Paroxysmal A-fib (HCC) ()      Pneumonia due to COVID-19 virus (12/5/2021)      Thrombocytopenia ([address] (12/5/2021)      Hypoxia (12/5/2021)      DM due to underlying condition with diabetic nephropathy ([address] (12/5/2021)      E-coli UTI (12/5/2021)      Class 3 severe obesity with body mass index (BMI) of 40.0 to 44.9 in adult [facility]) (12/5/2021)      Acute respiratory failure with hypoxemia ([address] (12/6/2021)      Pneumonia due to severe acute respiratory syndrome coronavirus 2 (SARS-CoV-2) (12/7/2021)      Stage 3a chronic kidney disease ([address] (12/7/2021)      Anemia (12/7/2021)      Past Medical History:   Diagnosis Date    A-fib ([address]     Anxiety     CAD (coronary artery disease)     Chronic kidney disease     Diabetes ([address]     borderline    High cholesterol     Hypertension     Kidney stones     Psychiatric disorder     anxiety      Past Surgical History:   Procedure Laterality Date    HX CHOLECYSTECTOMY      HX HEART CATHETERIZATION      HX HYSTERECTOMY      HX ORTHOPAEDIC      bilateral knee surgery    HX ORTHOPAEDIC      right elbow    HX UROLOGICAL      stent placement    [place] CARDIAC SURG PROCEDURE UNLIST      stent x 1      Family History   Problem Relation Age of Onset    Breast Cancer Maternal Aunt      History reviewed, no pertinent family history.   Social History     Tobacco Use    Smoking status: Never Smoker    Smokeless tobacco: Never Used   Substance Use Topics    Alcohol use: No     No Known Allergies   Current Facility-Administered Medications   Medication Dose Route Frequency    furosemide (LASIX) injection 20 mg  20 mg IntraVENous BID    Warfarin - HOLD Warfarin today    Other ONCE    dexamethasone (DECADRON) 4 mg/mL injection 6 mg  6 mg IntraVENous DAILY    piperacillin-tazobactam (ZOSYN) 4.5 g in 0.9% sodium chloride (MBP/ADV) 100 mL MBP  4.5 g IntraVENous Q8H    insulin glargine (LANTUS) injection 10 Units  10 Units SubCUTAneous QHS    pantoprazole (PROTONIX) 40 mg in 0.9% sodium chloride 10 mL injection  40 mg IntraVENous DAILY    dextrose 5% infusion  50 mL/hr IntraVENous CONTINUOUS    hydrALAZINE (APRESOLINE) tablet 10 mg  10 mg Oral TID    midazolam (VERSED) injection 2 mg  2 mg IntraVENous Q3H PRN    fentaNYL citrate (PF) injection 50 mcg  50 mcg IntraVENous Q4H PRN    chlorhexidine (PERIDEX) 0.12 % mouthwash 10 mL  10 mL Oral Q12H    fentaNYL (PF) 900 mcg/30 ml infusion soln  0-200 mcg/hr IntraVENous TITRATE    midazolam in normal saline (VERSED) 1 mg/mL infusion  2-10 mg/hr IntraVENous TITRATE    insulin lispro (HUMALOG) injection   SubCUTAneous Q6H    melatonin (rapid dissolve) tablet 10 mg  10 mg Oral QHS    ascorbic acid (vitamin C) (VITAMIN C) tablet 500 mg  500 mg Oral BID    zinc sulfate (ZINCATE) 50 mg zinc (220 mg) capsule 1 Capsule  1 Capsule Oral DAILY    Warfarin - Pharmacy to Dose  1 Each Other Rx Dosing/Monitoring    HYDROcodone-acetaminophen (NORCO)  mg tablet 1 Tablet  1 Tablet Oral Q6H PRN    isosorbide mononitrate ER (IMDUR) tablet 30 mg  30 mg Oral DAILY    nitroglycerin (NITROSTAT) tablet 0.4 mg  0.4 mg SubLINGual Q5MIN PRN    sodium chloride (NS) flush 5-40 mL  5-40 mL IntraVENous Q8H    sodium chloride (NS) flush 5-40 mL  5-40 mL IntraVENous PRN    acetaminophen (TYLENOL) tablet 650 mg  650 mg Oral Q6H PRN    Or    acetaminophen (TYLENOL) suppository 650 mg  650 mg Rectal Q6H PRN    polyethylene glycol (MIRALAX) packet 17 g  17 g Oral DAILY PRN    ondansetron (ZOFRAN ODT) tablet 4 mg  4 mg Oral Q8H PRN    Or    ondansetron (ZOFRAN) injection 4 mg  4 mg IntraVENous Q6H PRN    cholecalciferol (VITAMIN D3) (1000 Units /25 mcg) tablet 2,000 Units  2,000 Units Oral DAILY    glucose chewable tablet 16 g  16 g Oral PRN    glucagon (GLUCAGEN) injection 1 mg  1 mg IntraMUSCular PRN    dextrose (D50W) injection syrg 12.5-25 g  25-50 mL IntraVENous PRN        LAB AND IMAGING FINDINGS:     Lab Results   Component Value Date/Time    WBC 10.3 12/13/2021 10:00 AM    HGB 10.8 (L) 12/13/2021 10:00 AM    PLATELET 48 (L) 08/06/6725 10:00 AM     Lab Results   Component Value Date/Time    Sodium 140 12/13/2021 06:40 AM    Potassium 4.6 12/13/2021 06:40 AM    Chloride 111 12/13/2021 06:40 AM    CO2 23 12/13/2021 06:40 AM    BUN 60 (H) 12/13/2021 06:40 AM    Creatinine 1.04 12/13/2021 06:40 AM    Calcium 8.6 12/13/2021 06:40 AM    Magnesium 2.0 12/05/2021 11:08 AM    Phosphorus 2.5 11/13/2014 04:06 AM      Lab Results   Component Value Date/Time    Alk.  phosphatase 93 12/13/2021 06:40 AM    Protein, total 5.3 (L) 12/13/2021 06:40 AM    Albumin 2.0 (L) 12/13/2021 06:40 AM    Globulin 3.3 12/13/2021 06:40 AM     Lab Results   Component Value Date/Time    INR 2.3 (H) 12/13/2021 10:00 AM    Prothrombin time 24.5 (H) 12/13/2021 10:00 AM    aPTT 67.2 (H) 12/06/2021 06:05 AM      Lab Results   Component Value Date/Time    Ferritin 836 (H) 12/13/2021 06:44 AM      No results found for: PH, PCO2, PO2  No components found for: [place]   Lab Results   Component Value Date/Time    CK 63 12/13/2021 11:50 AM    CK - MB <1.0 12/13/2021 11:50 AM              Total time: 30 minutes   Counseling / coordination time, spent as noted above:   > 50% counseling / coordination: yes with grand daughter Zenia Gould     Prolonged service was provided for  []30 min   []75 min in face to face time in the presence of the patient, spent as noted above.   Time Start:   Time End:

## 2021-12-13 NOTE — PROGRESS NOTES
Hospitalist Progress Note    Patient: Brina Watson MRN: [id number]  CSN: [id number]    YOB: 1950  Age: 66 y.o. Sex: female    DOA: 12/5/2021 LOS:  LOS: 8 days          Chief Complaint: Ac resp failure    Assessment/Plan     A 65 yo female with past medical history of chronic A fib on  Coumadin, chronic leg pain, anxiety, DM with CKD and HTN presents to the ED with 1 wk of Fatigue, Cough and SOB. Found to be hypoxic, COVID-19 PNA and A fib with RVR. Thrombocytopenia platelet numbers are dropping from 113 to 92 to 55, with concern that this represents worsening infection -  Hematology oncology consulted  Hold anticoagulation  Transfuse platelets if drops below 10 or below 20 with active bleeding  Continue to trend platelets INR and fibrinogen  Blood cultures added today     Acute respiratory failure with hypoxemia: intubated now, sedated  FiO2 and oxygen concentration increased today     Severe covid pneumonia: with resp failure, procalcitonin remains low    Acute renal failure in chronic kidney disease stage III : possible contrast induced nephropathy , creatinine improving, hypernatremia improving, strict I/Os, avoid further nephrotoxins     Anemia: Mild anemia mild thrombocytic cytopenia, continue to monitor  INR supratherapeutic greater than 5, vitamin K given x1 dose  Patient on chronic Coumadin therapy at home due to history of paroxysmal atrial fibrillation     Thrombocytopenia: Continue to monitor     Paroxysmal atrial fibrillation: Lovenox held because INR supratherapeutic greater than 5, was on coumadin at home, INR 2.6 today     She is at high risk for death given her unvaccinated COVID vaccine, comorbidities and the severity of her illness. E. Coli Urinary tract infection: Seen by ED on 11/30. Was on IV Rocephin course completed      A. Fib with RVR: monitor     DM type 2 with CKD: Monitor glucose and BG.  SSI      HTN: hydralazine PRN to manage blood pressure     Thyroid nodule per CT: outpatient f/u     GI/DVT prophylaxis ordered     CODE STATUS: Full      Disposition :  Patient Active Problem List   Diagnosis Code    Hypertension I10    Paroxysmal A-fib ([facility] I48.0    Pneumonia due to COVID-19 virus U07.1, J12.82    Thrombocytopenia (HCC) D69.6    Hypoxia R09.02    DM due to underlying condition with diabetic nephropathy ([facility]) E08.21    E-coli UTI N39.0, B96.20    Class 3 severe obesity with body mass index (BMI) of 40.0 to 44.9 in adult ([facility]) E66.01, Z68.41    Acute respiratory failure with hypoxemia ([facility]) J96.01    Pneumonia due to severe acute respiratory syndrome coronavirus 2 (SARS-CoV-2) U07.1, J12.82    Stage 3a chronic kidney disease ([facility]) N18.31    Anemia D64.9       Subjective:    pt resting in bed with eyes closed, no sign of distress, vss on ventilator and sedation  Oxygen saturation 99%    Review of systems:    UTO due to sedated status    Vital signs/Intake and Output:  Visit Vitals  /64   Pulse (!) 58   Temp (!) 96.4 °F (35.8 °C)   Resp 21   Ht 5' 6\" (1.676 m)   Wt 86.6 kg (190 lb 14.7 oz)   SpO2 (!) 79%   BMI 30.81 kg/m²     Current Shift:  No intake/output data recorded.   Last three shifts:  12/11 1901 - 12/13 0700  In: 2825 [I.V.:1105]  Out: 3651 [Urine:3650]    Exam: Patient seen through the glass to minimize risk of transmission of COVID-19 to patients who do not have a COVID-19 infection    General:elderly OW sedate WF intubated  CVS:Regular rate and rhythm on monitor  Lungs: Vented, O2 saturation 97-98%  Neuro:sedated                Labs: Results:       Chemistry Recent Labs     12/12/21  0522 12/11/21  0420   * 251*    147*   K 4.3 4.3   * 116*   CO2 24 24   BUN 69* 73*   CREA 1.15 1.45*   CA 8.9 8.8   AGAP 6 7   BUCR 60* 50*   AP 81 74   TP 5.4* 5.7*   ALB 2.0* 2.2*   GLOB 3.4 3.5   AGRAT 0.6* 0.6*      CBC w/Diff Recent Labs     12/12/21  0522 12/11/21  0420   WBC 9.0 8.3   RBC 3.20* 3.21*   HGB 10.4* 10.4*   HCT 31.3* 32.1*   PLT 55* 92*   GRANS 89* 89*   LYMPH 3* 3*   EOS 0 0      Cardiac Enzymes No results for input(s): CPK, CKND1, MANE in the last 72 hours. No lab exists for component: CKRMB, TROIP   Coagulation Recent Labs     12/12/21  0522 12/11/21  0420   PTP 22.8* 23.5*   INR 2.1* 2.2*       Lipid Panel Lab Results   Component Value Date/Time    Cholesterol, total 208 (H) 12/20/2020 03:16 PM    HDL Cholesterol 47 12/20/2020 03:16 PM    LDL, calculated 128.6 (H) 12/20/2020 03:16 PM    VLDL, calculated 32.4 12/20/2020 03:16 PM    Triglyceride 162 (H) 12/20/2020 03:16 PM    CHOL/HDL Ratio 4.4 12/20/2020 03:16 PM      BNP No results for input(s): BNPP in the last 72 hours.    Liver Enzymes Recent Labs     12/12/21 0522   TP 5.4*   ALB 2.0*   AP 81      Thyroid Studies Lab Results   Component Value Date/Time    TSH 0.19 (L) 12/08/2021 03:00 AM        Procedures/imaging: see electronic medical records for all procedures/Xrays and details which were not copied into this note but were reviewed prior to creation of Clemencia Morales MD

## 2021-12-13 NOTE — PROGRESS NOTES
Pharmacy Dosing Services: Warfarin     Consult for Warfarin Dosing by Pharmacy by Dr. Talita Luna provided for this 66 y.o.  female , for indication of Atrial Fibrillation. Day of Therapy:continuation of  home med  Dose to achieve an INR goal of 2-3    Order entered to HOLD Warfarin today due to low platelets ( 48 today ) per Dr. Magdiel Ramirez     PT/INR Lab Results   Component Value Date/Time    INR 2.3 (H) 12/13/2021 10:00 AM      Platelets Lab Results   Component Value Date/Time    PLATELET 48 (L) 08/96/1540 10:00 AM      H/H     Lab Results   Component Value Date/Time    HGB 10.8 (L) 12/13/2021 10:00 AM        Warfarin Administrations (last 168 hours)       Date/Time Action Medication Dose    12/11/21 1706 Given    warfarin (COUMADIN) tablet 4 mg 4 mg    12/10/21 1716 Given    warfarin (COUMADIN) tablet 2 mg 2 mg    12/06/21 1816 Given    warfarin (COUMADIN) tablet 4 mg 4 mg          Pharmacy to follow daily and will provide subsequent Warfarin dosing based on clinical status.   Demetrius Blanco, [address] [facility])  Contact information [phone]

## 2021-12-13 NOTE — PROGRESS NOTES
[facility]  Pulmonary, Critical Care, and Sleep Medicine    Name: Barron Montaño MRN: [id number]   : 1950 Hospital: [facility] [place]    Date: 2021  Room: [address] [place] [address] Note                                              Consult requesting physician: Dr. Vinay Hobbs  Reason for Consult: Severe Covid pneumonia with respiratory failure      Subjective/History of Present Illness:     Patient is a 66 y.o. female with PMHx significant for chronic A. fib on Coumadin therapy, diabetes mellitus, chronic kidney disease, hypertension, thyroid nodule was admitted to the hospital on 2021 with Covid infection. She has not been vaccinated for Covid infection. Patient progressed to worsening respiratory failure and hypoxemia. During admission, patient had declined intubation. Today, her  reported CODE STATUS and wanted patient to be intubated. Patient underwent COVID-19 intubation in the telemetry unit, and subsequently transferred to the ICU.    2021  Patient in ICU. Intubated  sedated no improvement in hypoxemic respiratory failure had to increase PEEP to 8 and FiO2 to 60% today. Does alarm and fentanyl drip. Oxygenation not improving the same. Worsening thrombocytopenia patient is still off Coumadin discussed with hematology [facility] Pandel pending. As per son patient apparently does have at home documentation about DNR/DNI with trying to get it from family and clarify patient CODE STATUS. Palliative care on board    Review of Systems:  Limited due to patient condition    Patient came to ER on  with right flank pain, and right basal pneumonia; was sent home with antibiotic therapyAugmentin and doxycycline. Urine culture ansensitive E. coli.       No Known Allergies   Past Medical History:   Diagnosis Date    A-fib ([facility] [address]     Anxiety     CAD (coronary artery disease)     Chronic kidney disease     Diabetes ([facility] [address]     borderline  High cholesterol     Hypertension     Kidney stones     Psychiatric disorder     anxiety      Past Surgical History:   Procedure Laterality Date    HX CHOLECYSTECTOMY      HX HEART CATHETERIZATION      HX HYSTERECTOMY      HX ORTHOPAEDIC      bilateral knee surgery    HX ORTHOPAEDIC      right elbow    HX UROLOGICAL      stent placement    AL CARDIAC SURG PROCEDURE UNLIST      stent x 1      Social History     Tobacco Use    Smoking status: Never Smoker    Smokeless tobacco: Never Used   Substance Use Topics    Alcohol use: No      Family History   Problem Relation Age of Onset    Breast Cancer Maternal Aunt       Prior to Admission medications    Medication Sig Start Date End Date Taking? Authorizing Provider   allopurinoL (ZYLOPRIM) 300 mg tablet Take 300 mg by mouth daily. Provider, Historical   torsemide (DEMADEX) 100 mg tablet Take 50 mg by mouth daily. Provider, Historical   HYDROcodone-acetaminophen (NORCO)  mg tablet Take 1 Tab by mouth every eight (8) hours as needed for Pain. Provider, Historical   metoprolol succinate (TOPROL-XL) 100 mg tablet Take 150 mg by mouth daily. Provider, Historical   warfarin (Coumadin) 2 mg tablet Take 4 mg by mouth daily. Provider, Historical   isosorbide mononitrate ER (IMDUR) 30 mg tablet Take 30 mg by mouth daily. Provider, Historical   magnesium oxide (MAG-OX) 400 mg tablet Take 400 mg by mouth daily. Provider, Historical   pantoprazole (PROTONIX) 40 mg tablet Take 40 mg by mouth daily. Provider, Historical   ergocalciferol (Vitamin D2) 1,250 mcg (50,000 unit) capsule Take 50,000 Units by mouth. Provider, Historical   docusate sodium (Colace) 100 mg capsule Take 100 mg by mouth two (2) times a day. Provider, Historical   nitroglycerin (NITROSTAT) 0.4 mg SL tablet 0.4 mg by SubLINGual route every five (5) minutes as needed for Chest Pain. Up to 3 doses.     Provider, Historical   hydrALAZINE (APRESOLINE) 50 mg tablet Take 25 mg by mouth three (3) times daily. Layla Alex MD   aspirin delayed-release 81 mg tablet Take  by mouth daily. Layla Alex MD   folic acid (FOLVITE) 1 mg tablet Take  by mouth daily. Layla Alex MD   potassium chloride (K-DUR, KLOR-CON) 20 mEq tablet Take  by mouth two (2) times a day.     Layla Alex MD     Current Facility-Administered Medications   Medication Dose Route Frequency    furosemide (LASIX) injection 20 mg  20 mg IntraVENous BID    Warfarin - HOLD Warfarin today    Other ONCE    dexamethasone (DECADRON) 4 mg/mL injection 6 mg  6 mg IntraVENous DAILY    piperacillin-tazobactam (ZOSYN) 4.5 g in 0.9% sodium chloride (MBP/ADV) 100 mL MBP  4.5 g IntraVENous Q8H    insulin glargine (LANTUS) injection 10 Units  10 Units SubCUTAneous QHS    pantoprazole (PROTONIX) 40 mg in 0.9% sodium chloride 10 mL injection  40 mg IntraVENous DAILY    dextrose 5% infusion  50 mL/hr IntraVENous CONTINUOUS    hydrALAZINE (APRESOLINE) tablet 10 mg  10 mg Oral TID    chlorhexidine (PERIDEX) 0.12 % mouthwash 10 mL  10 mL Oral Q12H    fentaNYL (PF) 900 mcg/30 ml infusion soln  0-200 mcg/hr IntraVENous TITRATE    midazolam in normal saline (VERSED) 1 mg/mL infusion  2-10 mg/hr IntraVENous TITRATE    insulin lispro (HUMALOG) injection   SubCUTAneous Q6H    melatonin (rapid dissolve) tablet 10 mg  10 mg Oral QHS    ascorbic acid (vitamin C) (VITAMIN C) tablet 500 mg  500 mg Oral BID    zinc sulfate (ZINCATE) 50 mg zinc (220 mg) capsule 1 Capsule  1 Capsule Oral DAILY    Warfarin - Pharmacy to Dose  1 Each Other Rx Dosing/Monitoring    isosorbide mononitrate ER (IMDUR) tablet 30 mg  30 mg Oral DAILY    sodium chloride (NS) flush 5-40 mL  5-40 mL IntraVENous Q8H    cholecalciferol (VITAMIN D3) (1000 Units /25 mcg) tablet 2,000 Units  2,000 Units Oral DAILY         Objective:   Vital Signs:    Visit Vitals  BP (!) 134/54   Pulse (!) 55   Temp 98.2 °F (36.8 °C)   Resp 20   Ht 5' 6\" (1.676 m)   Wt 86.6 kg (190 lb 14.7 oz)   SpO2 97%   BMI 30.81 kg/m²       O2 Device: Endotracheal tube   O2 Flow Rate (L/min): 40 l/min   Temp (24hrs), Av.3 °F (36.3 °C), Min:96.4 °F (35.8 °C), Max:98.4 °F (36.9 °C)       Intake/Output:   Last shift:      701 - 1900  In: 400   Out: 400 [Urine:400]    Last 3 shifts: 1901 -  07  In: 9266 [I.V.:1105]  Out: 5153 [Urine:3650]      Intake/Output Summary (Last 24 hours) at 2021 1546  Last data filed at 2021 1230  Gross per 24 hour   Intake 1560 ml   Output 1600 ml   Net -40 ml       Last 3 Recorded Weights in this Encounter    21 1827 21 0650 21 1910   Weight: 114.3 kg (252 lb) 86.6 kg (190 lb 14.7 oz) 86.6 kg (190 lb 14.7 oz)       ABG:    Lab Results   Component Value Date/Time    PHI 7.46 (H) 2021 10:18 AM    PCO2I 33.9 (L) 2021 10:18 AM    PO2I 50 (L) 2021 10:18 AM    HCO3I 24.0 2021 10:18 AM    FIO2I 45 2021 10:18 AM     Ventilator Mode: Assist control, VC+  Respiratory Rate  Back-Up Rate: 18  Insp Time (sec): 1.16 sec  I:E Ratio: 1:1.87  Ventilator Volumes  Vt Set (ml): 400 ml  Vt Exhaled (Machine Breath) (ml): 402 ml  Ve Observed (l/min): 7.94 l/min  Ventilator Pressures  PIP Observed (cm H2O): 25 cm H2O  Plateau Pressure (cm H2O): 22 cm H2O  MAP (cm H2O): 15  PEEP/VENT (cm H2O): 8 cm H20  Auto PEEP Observed (cm H2O): 0 cm H2O       Physical Exam: Limitations in exam due to full PPE requirements. Patient remains intubated and sedated; arousable;    HEENT: pupils not dilated, reactive, no scleral jaundice, moist oral mucosa, no nasal drainage; neck supple  Neck: No adenopathy or thyroid swelling  OGno bloody aspirates or respiratory secretions noted  Orogastric tube tube feeding   CVS: Normal heart sounds; S1 and S2 with no murmur; telemetrysinus bradycardia; JVD not elevated   RS: Moderate air entry bilaterally; mild crckles at the bsaes ; no wheezing or rhonchi; breath sounds are symmetrical; no tachypneia or distress while on ventilator support  Abd: Soft, no tenderness, no distention, no guarding or rigidity; bowel sounds present; no hepatosplenomegaly  Neuro: Intubated and sedated; limited exam  Extrm: No peripheral leg edema; no focal swelling or clubbing  Skin: no rash  Lymphatic: no cervical or supraclavicular adenopathy  Vasc: DPs palpable both feet  MS: No joint swelling      Data:       Recent Results (from the past 24 hour(s))   GLUCOSE, POC    Collection Time: 12/12/21  5:07 PM   Result Value Ref Range    Glucose (POC) 196 (H) 70 - 110 mg/dL   GLUCOSE, POC    Collection Time: 12/13/21 12:29 AM   Result Value Ref Range    Glucose (POC) 176 (H) 70 - 110 mg/dL   GLUCOSE, POC    Collection Time: 12/13/21  5:57 AM   Result Value Ref Range    Glucose (POC) 161 (H) 70 - 118 mg/dL   METABOLIC PANEL, COMPREHENSIVE    Collection Time: 12/13/21  6:40 AM   Result Value Ref Range    Sodium 140 136 - 145 mmol/L    Potassium 4.6 3.5 - 5.5 mmol/L    Chloride 111 100 - 111 mmol/L    CO2 23 21 - 32 mmol/L    Anion gap 6 3.0 - 18 mmol/L    Glucose 141 (H) 74 - 99 mg/dL    BUN 60 (H) 7.0 - 18 MG/DL    Creatinine 1.04 0.6 - 1.3 MG/DL    BUN/Creatinine ratio 58 (H) 12 - 20      GFR est AA >60 >60 ml/min/1.73m2    GFR est non-AA 52 (L) >60 ml/min/1.73m2    Calcium 8.6 8.5 - 10.1 MG/DL    Bilirubin, total 0.8 0.2 - 1.0 MG/DL    ALT (SGPT) 60 (H) 13 - 56 U/L    AST (SGOT) 39 (H) 10 - 38 U/L    Alk.  phosphatase 93 45 - 117 U/L    Protein, total 5.3 (L) 6.4 - 8.2 g/dL    Albumin 2.0 (L) 3.4 - 5.0 g/dL    Globulin 3.3 2.0 - 4.0 g/dL    A-G Ratio 0.6 (L) 0.8 - 1.7     C REACTIVE PROTEIN, QT    Collection Time: 12/13/21  6:44 AM   Result Value Ref Range    C-Reactive protein 0.5 (H) 0 - 0.3 mg/dL   FERRITIN    Collection Time: 12/13/21  6:44 AM   Result Value Ref Range    Ferritin 836 (H) 8 - 388 NG/ML   PROTHROMBIN TIME + INR    Collection Time: 12/13/21 10:00 AM   Result Value Ref Range    Prothrombin time 24.5 (H) 11.5 - 15.2 sec    INR 2.3 (H) 0.8 - 1.2     CBC W/O DIFF    Collection Time: 12/13/21 10:00 AM   Result Value Ref Range    WBC 10.3 4.6 - 13.2 K/uL    RBC 3.35 (L) 4.20 - 5.30 M/uL    HGB 10.8 (L) 12.0 - 16.0 g/dL    HCT 32.7 (L) 35.0 - 45.0 %    MCV 97.6 78.0 - 100.0 FL    MCH 32.2 24.0 - 34.0 PG    MCHC 33.0 31.0 - 37.0 g/dL    RDW 13.6 11.6 - 14.5 %    PLATELET 48 (L) 683 - 420 K/uL    MPV 13.0 (H) 9.2 - 11.8 FL    NRBC 0.0 0  WBC    ABSOLUTE NRBC 0.00 0.00 - 0.01 K/uL   FIBRINOGEN    Collection Time: 12/13/21 10:00 AM   Result Value Ref Range    Fibrinogen 376 210 - 451 mg/dL   D DIMER    Collection Time: 12/13/21 10:00 AM   Result Value Ref Range    D DIMER 2.27 (H) <0.46 ug/ml(FEU)   BLOOD GAS, ARTERIAL POC    Collection Time: 12/13/21 10:18 AM   Result Value Ref Range    Device: ADULT VENT      FIO2 (POC) 45 %    pH (POC) 7.46 (H) 7.35 - 7.45      pCO2 (POC) 33.9 (L) 35.0 - 45.0 MMHG    pO2 (POC) 50 (L) 80 - 100 MMHG    HCO3 (POC) 24.0 22 - 26 MMOL/L    sO2 (POC) 87.2 (L) 92 - 97 %    Base excess (POC) 0.6 mmol/L    Mode ASSIST CONTROL      Tidal volume 400 ml    Set Rate 20 bpm    PEEP/CPAP (POC) 6 cmH2O    Allens test (POC) Positive      Site LEFT RADIAL      Specimen type (POC) ARTERIAL      Performed by Waqas Leal    GLUCOSE, POC    Collection Time: 12/13/21 11:36 AM   Result Value Ref Range    Glucose (POC) 133 (H) 70 - 110 mg/dL   CARDIAC PANEL,(CK, CKMB & TROPONIN)    Collection Time: 12/13/21 11:50 AM   Result Value Ref Range    CK - MB <1.0 <3.6 ng/ml    CK-MB Index  0.0 - 4.0 %     CALCULATION NOT PERFORMED WHEN RESULT IS BELOW LINEAR LIMIT    CK 63 26 - 192 U/L    Troponin-High Sensitivity 31 0 - 54 ng/L         Chemistry Recent Labs     12/13/21  0640 12/12/21  0522 12/11/21  0420   * 226* 251*    143 147*   K 4.6 4.3 4.3    113* 116*   CO2 23 24 24   BUN 60* 69* 73*   CREA 1.04 1.15 1.45*   CA 8.6 8.9 8.8   AGAP 6 6 7   BUCR 58* 60* 50*   AP 93 81 74   TP 5.3* 5.4* 5.7*   ALB 2.0* 2.0* 2.2*   GLOB 3.3 3.4 3.5   AGRAT 0.6* 0.6* 0.6*        Lactic Acid Lactic acid   Date Value Ref Range Status   11/11/2014 0.9 0.4 - 2.0 MMOL/L Final     No results for input(s): LAC in the last 72 hours. Liver Enzymes Protein, total   Date Value Ref Range Status   12/13/2021 5.3 (L) 6.4 - 8.2 g/dL Final     Albumin   Date Value Ref Range Status   12/13/2021 2.0 (L) 3.4 - 5.0 g/dL Final     Globulin   Date Value Ref Range Status   12/13/2021 3.3 2.0 - 4.0 g/dL Final     A-G Ratio   Date Value Ref Range Status   12/13/2021 0.6 (L) 0.8 - 1.7   Final     Alk.  phosphatase   Date Value Ref Range Status   12/13/2021 93 45 - 117 U/L Final     Recent Labs     12/13/21  0640 12/12/21  0522 12/11/21  0420   TP 5.3* 5.4* 5.7*   ALB 2.0* 2.0* 2.2*   GLOB 3.3 3.4 3.5   AGRAT 0.6* 0.6* 0.6*   AP 93 81 74        CBC w/Diff Recent Labs     12/13/21  1000 12/12/21  0522 12/11/21  0420   WBC 10.3 9.0 8.3   RBC 3.35* 3.20* 3.21*   HGB 10.8* 10.4* 10.4*   HCT 32.7* 31.3* 32.1*   PLT 48* 55* 92*   GRANS  --  89* 89*   LYMPH  --  3* 3*   EOS  --  0 0        Cardiac Enzymes Lab Results   Component Value Date/Time    CPK 63 12/13/2021 11:50 AM    CKMB <1.0 12/13/2021 11:50 AM    CKND1  12/13/2021 11:50 AM     CALCULATION NOT PERFORMED WHEN RESULT IS BELOW LINEAR LIMIT        BNP No results found for: BNP, BNPP, XBNPT     Coagulation Recent Labs     12/13/21  1000 12/12/21  0522 12/11/21  0420   PTP 24.5* 22.8* 23.5*   INR 2.3* 2.1* 2.2*         Thyroid  Lab Results   Component Value Date/Time    TSH 0.19 (L) 12/08/2021 03:00 AM       No results found for: T4     Urinalysis Lab Results   Component Value Date/Time    Color YELLOW 12/05/2021 03:02 PM    Appearance CLEAR 12/05/2021 03:02 PM    Specific gravity 1.014 12/05/2021 03:02 PM    pH (UA) 5.0 12/05/2021 03:02 PM    Protein 100 (A) 12/05/2021 03:02 PM    Glucose Negative 12/05/2021 03:02 PM    Ketone Negative 12/05/2021 03:02 PM    Bilirubin Negative 12/05/2021 03:02 PM    Urobilinogen 0.2 12/05/2021 03:02 PM    Nitrites Negative 12/05/2021 03:02 PM    Leukocyte Esterase TRACE (A) 12/05/2021 03:02 PM    Epithelial cells 2+ 12/05/2021 03:02 PM    Bacteria FEW (A) 12/05/2021 03:02 PM    WBC 4 to 10 12/05/2021 03:02 PM    RBC 0 to 3 12/05/2021 03:02 PM          Culture data during this hospitalization.    All Micro Results     Procedure Component Value Units Date/Time    CULTURE, BLOOD [[id number]] Collected: 12/13/21 1150    Order Status: Completed Specimen: Blood Updated: 12/13/21 1226    CULTURE, BLOOD [[id number]] Collected: 12/05/21 1106    Order Status: Completed Specimen: Blood Updated: 12/11/21 0729     Special Requests: NO SPECIAL REQUESTS        Culture result: NO GROWTH 6 DAYS       CULTURE, BLOOD [[id number]] Collected: 12/05/21 1106    Order Status: Completed Specimen: Blood Updated: 12/11/21 0729     Special Requests: NO SPECIAL REQUESTS        Culture result: NO GROWTH 6 DAYS       STREP PNEUMO AG, URINE [[id number]] Collected: 12/08/21 2051    Order Status: Completed Specimen: Urine, random Updated: 12/09/21 1024     Strep pneumo Ag, urine Negative       LEGIONELLA PNEUMOPHILA AG, URINE [[id number]] Collected: 12/08/21 2051    Order Status: Completed Specimen: Urine, random Updated: 12/09/21 1024     Legionella Ag, urine Negative       LEGIONELLA PNEUMOPHILA AG, URINE [[id number]] Collected: 12/05/21 1730    Order Status: Canceled Specimen: Urine     STREP Larina Loveless, URINE [[id number]] Collected: 12/05/21 1730    Order Status: Canceled Specimen: Urine     RESPIRATORY VIRUS PANEL W/COVID-19, PCR [[id number]]  (Abnormal) Collected: 12/05/21 1110    Order Status: Completed Specimen: Nasopharyngeal Updated: 12/05/21 1633     Adenovirus Not detected        Coronavirus 229E Not detected        Coronavirus HKU1 Not detected        Coronavirus CVNL63 Not detected        Coronavirus OC43 Not detected        SARS-CoV-2, PCR Detected        Comment: CALLED TO AND CORRECTLY REPEATED BY:  DR Martin Massed [facility] ER ON [id number] AT 1626 HRS TO 1396. Metapneumovirus Not detected        Rhinovirus and Enterovirus Not detected        Influenza A Not detected        Influenza A, subtype H1 Not detected        Influenza A, subtype H3 Not detected        INFLUENZA A H1N1 PCR Not detected        Influenza B Not detected        Parainfluenza 1 Not detected        Parainfluenza 2 Not detected        Parainfluenza 3 Not detected        Parainfluenza virus 4 Not detected        RSV by PCR Not detected        B. parapertussis, PCR Not detected        Bordetella pertussis - PCR Not detected        Chlamydophila pneumoniae DNA, QL, PCR Not detected        Mycoplasma pneumoniae DNA, QL, PCR Not detected       COVID-19 RAPID TEST [[id number]]  (Abnormal) Collected: 12/05/21 1109    Order Status: Completed Specimen: Nasopharyngeal Updated: 12/05/21 1136     Specimen source Nasopharyngeal        COVID-19 rapid test Detected        Comment:      The specimen is POSITIVE for SARS-CoV-2, the novel coronavirus associated with COVID-19. This test has been authorized by the FDA under an Emergency Use Authorization (EUA) for use by authorized laboratories. Fact sheet for Healthcare Providers: ConventionUpdate.co.nz  Fact sheet for Patients: ConventionUpdate.co.nz       Methodology: Isothermal Nucleic Acid Amplification  CALLED TO AND CORRECTLY REPEATED BY:  Guido Beasley RN ER, TO JAF AT 1136 12/5/2021                LE Doppler 12/7/21 Interpretation Summary  · No evidence of deep vein thrombosis in the right lower extremity. · No evidence of deep vein thrombosis in the left lower extremity. ECHO 12/7/21 Interpretation Summary  Result status: Final result  · Left Ventricle: Normal cavity size, wall thickness and systolic function (ejection fraction normal). The estimated EF is 55 - 60%.  There is moderate (grade 2) left ventricular diastolic dysfunction E/E' ratio = 15.88. Wall Scoring: The left ventricular wall motion is normal.  · Right Atrium: Mildly dilated right atrium. · Left Atrium: Dilated left atrium. · Mitral Valve: No stenosis. Mitral valve non-specific thickening. Mild mitral annular calcification. Mild to moderate regurgitation. · Pulmonary Artery: Pulmonary arterial systolic pressure (PASP) is 41 mmHg. Pulmonary hypertension found to be mild. · IVC/Hepatic Veins: Mechanically ventilated; cannot use inferior caval vein diameter to estimate central venous pressure. Images report reviewed by me:  CT 12/5 (Most Recent)  Results from [place] encounter on 12/05/21    CTA CHEST W OR W WO CONT    Narrative  EXAM: CTA chest    INDICATION: Rule out PE , Covid positive    COMPARISON: None    TECHNIQUE: Axial CT imaging from the thoracic inlet through the diaphragm with  intravenous contrast. Coronal and sagittal MIP reformats were generated. One or  more dose reduction techniques were used on this CT: automated exposure control,  adjustment of the mAs and/or kVp according to patient size, and iterative  reconstruction techniques. The specific techniques used on this CT exam have  been documented in the patient's electronic medical record. Digital Imaging and  Communications in Medicine (DICOM) format image data are available to  nonaffiliated external healthcare facilities or entities on a secure, media  free, reciprocally searchable basis with patient authorization for at least a  12-month period after this study. _______________    FINDINGS:    EXAM QUALITY: Overall exam quality is satisfactory. Pulmonary arterial  enhancement is adequate with adequate breath hold and no significant artifact. PULMONARY ARTERIES: No evidence of pulmonary embolism. THYROID: There is a 15 mm right thyroid lobe nodule which appears complex. Advise nonemergent thyroid ultrasound. LYMPH Nodes:  There is a mildly enlarged right infrahilar lymph node measuring 1  cm image 89. PLEURA: There are no pleural effusion. HEART: Cardiac size is enlarged. There is no pericardial effusion. There is mild  tricuspid regurgitation into the hepatic veins. Moderate to severe calcific  coronary disease present. VASCULATURE/MEDIASTINUM: Main pulmonary artery is enlarged measuring 4 cm  suggesting chronic pulmonary hypertension. Small hiatal hernia present. LUNGS: Moderate groundglass opacities and airspace disease seen bilaterally  suggesting atypical pneumonia. The findings are typical in appearance for Covid  19 pneumonia. AIRWAY: Patent    UPPER ABDOMEN: Unremarkable. OTHER: No acute or aggressive osseous abnormalities identified. _______________    Impression  No evidence of a pulmonary embolism or aortic dissection. Moderate groundglass opacities and airspace disease seen bilaterally suggesting  atypical pneumonia and the findings are typical in appearance for Covid 19  pneumonia. Mildly enlarged right hilar lymph node which may be reactive. Advise follow-up  to exclude any neoplastic or myeloproliferative process. 15 mm slightly complex right thyroid lobe nodule. Advise nonemergent thyroid  ultrasound. CXR reviewed by me:  XR 12/12 (Most Recent). Results from Hospital Encounter encounter on 12/05/21    XR CHEST PORT    Narrative  EXAM: XR CHEST PORT    CLINICAL INDICATION/HISTORY: Intubated; await patient to come to the ICU for  first chest x-ray  -Additional: None    COMPARISON: One day prior    TECHNIQUE: Portable frontal view of the chest    _______________    FINDINGS:    SUPPORT DEVICES: Endotracheal and enteric tubes are unchanged. HEART AND MEDIASTINUM: Stable cardiomediastinal silhouette. .    LUNGS AND PLEURAL SPACES: Bilateral parenchymal opacities and left basilar dense  consolidation, unchanged.  No pneumothorax.    _______________    Impression  Bilateral parenchymal opacities and left basilar dense consolidation, unchanged. IMPRESSION:   · Acute respiratory failure with hypoxemia  · Severe Covid pneumonia  · Anemia  · Thrombocytopenia  · Chronic kidney disease stage III  · Paroxysmal atrial fibrillation  ·   Patient Active Problem List   Diagnosis Code    Hypertension I10    Paroxysmal A-fib ([facility] [address] I48.0    Pneumonia due to COVID-19 virus U07.1, J12.82    Thrombocytopenia ([facility]) D69.6    Hypoxia R09.02    DM due to underlying condition with diabetic nephropathy ([facility]) E08.21    E-coli UTI N39.0, B96.20    Class 3 severe obesity with body mass index (BMI) of 40.0 to 44.9 in adult ([facility]) E66.01, Z68.41    Acute respiratory failure with hypoxemia ([facility]) J96.01    Pneumonia due to severe acute respiratory syndrome coronavirus 2 (SARS-CoV-2) U07.1, J12.82    Stage 3a chronic kidney disease ([facility]) N18.31    Anemia D64.9       · Code status: Full code      RECOMMENDATIONS:   Respiratory: Patient with severe Covid pneumonia, and hypoxemia; patient presenting to ICU in ARDS state due to Covid inflammation. Mechanical ventilationVCV mode; FiO2 60%; PEEP down to 8; plateau pressure 21; lung volume protection strategy. 7.45/33/63/93    Diuresis PRN  Chest x-ray from today reviewed imagesET tube and OG tube in good position; persistent bilateral diffuse interstitial and groundglass infiltrates; left retrocardiac consolidation persist; no pneumothorax or pleural effusions. Continue vent and sedation bundles. Sedationmidazolam and fentanyl infusion; mild sinus bradycardia with normal blood pressure; hold fentanyl drip if needed, if bradycardia worsens. Patient currently not needing paralytic. Keep SPO2 >=88%. HOB 30 degree elevation while not proning. Aspiration precautions.   CTA chest on admission no PE; severe bilateral pulmonary opacities and groundglass densities consistent with severe Covid pneumonia; no significant pleural effusions; right hilar adenopathy likely reactivewill need follow-up in outpatient; thyroid nodule will also need follow-up in outpatient. CVS:BP stable   CHF low dose lasix  Follow BNP trop stable   Bradycardia improved  Blood pressure remained stable; hydralazine 10 mg 3 times a day. Ultrasound legsnegative for DVTs  Echocardiogramnormal LV function, and mild pulmonary hypertension; dilated left atrium; mild to moderate mitral regurgitation. proBNP mildly elevated; troponins were mildly elevated on admissionpeak troponin 0.07. Patient on Coumadin at home for paroxysmal atrial fibrillation but on hold today because of low [place]leets   ID: Severe Covid pneumonia. Unvaccinated status. Zosyn added for possible sepsis   Respiratory viral panel was positive for SARS-CoV-2 PCR. Procalcitonin <0.05not elevated  CRP had come down to 2.1; ferritin elevated but coming down-918. D-dimer not reliable since patient has elevated INR on chronic Coumadin therapy. S/p 1 dose of Tocilizumab 12/7 morning. Steroidss/p 20 mg dose daily; weaned down to 6 mg daily. CTA chest negative for PEs on admission. Ultrasound of the legs ordered. Patient not needing antibiotics; afebrile; WBC not elevated; procalcitonin not elevated0.08. Continue vitamin supplements. ID on case. Hematology/Oncology: Stable hemoglobin10.4; platelets dropped to 55K; INR 2.1on Coumadin. Thrombocytopenia likely related to Covid related sepsis. Check fibrinogen and D-dimer. worseing plt today still hold coumadin HIT pending   Hold Coumadin again today   HIT panel send   pvl pending  Hematology consulted   Monitor for any bleeding issues. Consult hematologyDr. Marla Aquino. Renal: Known CKD; s/p CTA chest during this admission. Good urine output; creatinine improved to 1.15; sodium normal.  Continue fluids per nephrology. Monitor and replace electrolytes as needed. GI/: Tube feeding. PPI prophylaxis. LFTsimproved. Endocrine: Blood sugars mildly elevatedcontinue Lantus insulin10 units nightly.   Sliding scale insulin. Target blood sugar 140180. Patient on steroids for severe Covid pneumoniasteroids being weaned. TSH 0.19likely sick euthyroid state; free T4 mildly increased; free T3 mildly decreased. Neurology: Intubated and sedatedlimited exam.  Skin/Wound: ICU nursing care. Electrolytes: Replace electrolytes per ICU electrolyte replacement protocol. IVF: D5W 50 mL/h; free water via OG tube. Nutrition: Tube feedingNeprotolerating well. Simon Gutter Prophylaxis: DVT Prophylaxis: Coumadin. GI Prophylaxis: Protonix. Restraints: Wrist soft restraints for patient interfering with medical therapy/management and patient safety. Lines/Tubes: PIVs; midline planned  ETT: 12/7/2021   OGT/NGT: 12/7/2021   Montoya: 12/7/2021 (Medically necessary for strict input/output monitoring in critically ill patient, will remove it when not needed. Montoya bundle followed). Advance Directive/Palliative Care: consulted    Prognosis appears very poor in this patient with Covid pneumonia with severe hypoxemic respiratory failure and ARDS needing to be intubated now; mortality is very high; risk of multiorgan failure with complications such as refractory hypoxemia, encephalopathy, critical illness myopathy, acute renal failure needing dialysis, shock state needing pressors, prolonged ventilator support, risk of tracheostomy and chronic debility/bedbound state. Quality Care: PPI, DVT prophylaxis, HOB elevated, Infection control all reviewed and addressed. Care of plan d/w RN, RT. High complexity decision making was performed during the evaluation of this patient at high risk for decompensation with multiple organ involvement. Total critical care time spent rendering care exclusive of procedures/family discussion/coordination of care: 40 minutes. Regina Ng) [id number] [facility]     [phone]      Unfortunately, patient's  is now in ICU with Covid pneumonia and on ventilator support. Update grandson on 12/11.   Do not inform patient of her  medical condition per family request.            Please note: Voice-recognition software may have been used to generate this report, which may have resulted in some phonetic-based errors in grammar and contents. Even though attempts were made to correct all the mistakes, some may have been missed, and remained in the body of the document. Francisco Wilson MD  12/13/2021         Note  Patient with SARS-CoV-2 pneumonia with severe pneumonia and hypoxemic respiratory failure; patient was on high flow nasal cannula oxygen; patient is not vaccinated, and in the age group that is being infected with delta virus strain; the strain causes severe respiratory failure and complications reported in the [place] and worldwide; unfortunately, the prognosis is poor in unvaccinated patients, with a high risk of complications, multiorgan failure, chronic hypoxemia and respiratory failure, intubation, mechanical ventilation, thromboembolic disease risk in multiple organs, high risk for long Covid syndrome, and high risk for mortality. The CDC federal and state guidelines have emphasized repeatedly the importance of vaccination to prevent severe infection, mortality, disabilities, long Covid syndrome from Covid virus with several strains currently in the [place]. The treatment protocols are followed based on local hospital protocols, with guidance from centers such as [facility] protocols. [facility] is not part of any research protocol. The local hospital protocols have been guided by [facility] pharmacy department. Covid plasma is no longer considered standard of care in the [facility] due to lack of benefit in trials. Nebulization and CPAP therapies are not considered as part of the protocol in [facility] due to high risk of aerosolization, and high risks of spreading Covid infection to the healthcare staff.   Dexamethasone considered standard of care in patients admitted with severe Covid pneumonia; variable dosing has been reported with this medication. ECMO and CRRT facilities not available at [facility]; patients with severe Covid pneumonia and respiratory failure are usually unstable for transportation when they are in critically ill state with a high risk of dying during transportation. REMDESIVIR-not recommended in patients with late presentation with severe respiratory failure     Tocilizumab anti-inflammatory medicationhas shown some benefit in patients with elevated inflammatory markers. Ivermectin not considered standard of care in the [address]; no significant randomized controlled studies done in the [facility] to suggest benefit of ivermectin in patients with COVID-19 with severe respiratory failure; risk of toxicity related to ivermectin outweighs any potential benefits based on consensus opinion in the [address]. This drug is not considered standard of care in [facility].

## 2021-12-13 NOTE — PROGRESS NOTES
Hospitalist Progress Note    Patient: Kylie Arnold MRN: [id number]  CSN: [id number]    YOB: 1950  Age: 66 y.o. Sex: female    DOA: 12/5/2021 LOS:  LOS: 8 days          Chief Complaint: Ac resp failure    Assessment/Plan     A 67 yo female with past medical history of chronic A fib on  Coumadin, chronic leg pain, anxiety, DM with CKD and HTN presents to the ED with 1 wk of Fatigue, Cough and SOB. Found to be hypoxic, COVID-19 PNA and A fib with RVR. Thrombocytopenia platelet numbers are dropping from 113 to 92 to 55, with concern that this represents worsening infection -  Hematology oncology consulted  Hold anticoagulation  Transfuse platelets if drops below 10 or below 20 with active bleeding  Continue to trend platelets INR and fibrinogen    Acute respiratory failure with hypoxemia: intubated now, sedated     Severe covid pneumonia: with resp failure, procalcitonin remains low    Acute renal failure in chronic kidney disease stage III : possible contrast induced nephropathy , creatinine improving, hypernatremia improving, strict I/Os, avoid further nephrotoxins     Anemia: Mild anemia mild thrombocytic cytopenia, continue to monitor  INR supratherapeutic greater than 5, vitamin K given x1 dose  Patient on chronic Coumadin therapy at home due to history of paroxysmal atrial fibrillation     Thrombocytopenia: Continue to monitor     Paroxysmal atrial fibrillation: Lovenox held because INR supratherapeutic greater than 5, was on coumadin at home, INR 2.6 today     She is at high risk for death given her unvaccinated COVID vaccine, comorbidities and the severity of her illness. E. Coli Urinary tract infection: Seen by ED on 11/30. Was on IV Rocephin course completed      A. Fib with RVR: monitor     DM type 2 with CKD: Monitor glucose and BG.  SSI      HTN: hydralazine PRN to manage blood pressure     Thyroid nodule per CT: outpatient f/u     GI/DVT prophylaxis ordered     CODE STATUS: Full      Disposition :  Patient Active Problem List   Diagnosis Code    Hypertension I10    Paroxysmal A-fib ([facility] [address] I48.0    Pneumonia due to COVID-19 virus U07.1, J12.82    Thrombocytopenia ([facility]) D69.6    Hypoxia R09.02    DM due to underlying condition with diabetic nephropathy ([facility]) E08.21    E-coli UTI N39.0, B96.20    Class 3 severe obesity with body mass index (BMI) of 40.0 to 44.9 in adult ([facility]) E66.01, Z68.41    Acute respiratory failure with hypoxemia ([facility]) J96.01    Pneumonia due to severe acute respiratory syndrome coronavirus 2 (SARS-CoV-2) U07.1, J12.82    Stage 3a chronic kidney disease ([facility]) N18.31    Anemia D64.9       Subjective:    pt resting in bed with eyes closed, no sign of distress, vss on ventilator and sedation  Several BMs today     Review of systems:    UTO due to sedated status    Vital signs/Intake and Output:  Visit Vitals  BP (!) 128/40   Pulse (!) 45   Temp 97 °F (36.1 °C)   Resp 18   Ht 5' 6\" (1.676 m)   Wt 86.6 kg (190 lb 14.7 oz)   SpO2 93%   BMI 30.81 kg/m²     Current Shift:  12/12 1901 - 12/13 0700  In: 700 [I.V.:240]  Out: 200 [Urine:200]  Last three shifts:  12/11 0701 - 12/12 1900  In: 3257.5 [I.V.:1327.5]  Out: 2801 [Urine:2800]    Exam: Patient seen through the glass to minimize risk of transmission of COVID-19 to patients who do not have a COVID-19 infection    General:elderly OW sedate WF intubated  CVS:Regular rate and rhythm on monitor  Lungs: Vented, O2 saturation 97-98%  Neuro:sedated                Labs: Results:       Chemistry Recent Labs     12/12/21  0522 12/11/21  0420 12/10/21  0345   * 251* 214*    147* 150*   K 4.3 4.3 4.2   * 116* 118*   CO2 24 24 22   BUN 69* 73* 69*   CREA 1.15 1.45* 1.79*   CA 8.9 8.8 8.8   AGAP 6 7 10   BUCR 60* 50* 39*   AP 81 74 63   TP 5.4* 5.7* 6.0*   ALB 2.0* 2.2* 2.2*   GLOB 3.4 3.5 3.8   AGRAT 0.6* 0.6* 0.6*      CBC w/Diff Recent Labs     12/12/21  0522 12/11/21  0420 12/10/21  0345   WBC 9.0 8.3 7.7   RBC 3.20* 3.21* 3.26*   HGB 10.4* 10.4* 10.4*   HCT 31.3* 32.1* 32.8*   PLT 55* 92* 113*   GRANS 89* 89* 91*   LYMPH 3* 3* 4*   EOS 0 0 0      Cardiac Enzymes No results for input(s): CPK, CKND1, MANE in the last 72 hours. No lab exists for component: CKRMB, TROIP   Coagulation Recent Labs     12/12/21 0522 12/11/21  0420   PTP 22.8* 23.5*   INR 2.1* 2.2*       Lipid Panel Lab Results   Component Value Date/Time    Cholesterol, total 208 (H) 12/20/2020 03:16 PM    HDL Cholesterol 47 12/20/2020 03:16 PM    LDL, calculated 128.6 (H) 12/20/2020 03:16 PM    VLDL, calculated 32.4 12/20/2020 03:16 PM    Triglyceride 162 (H) 12/20/2020 03:16 PM    CHOL/HDL Ratio 4.4 12/20/2020 03:16 PM      BNP No results for input(s): BNPP in the last 72 hours.    Liver Enzymes Recent Labs     12/12/21 0522   TP 5.4*   ALB 2.0*   AP 81      Thyroid Studies Lab Results   Component Value Date/Time    TSH 0.19 (L) 12/08/2021 03:00 AM        Procedures/imaging: see electronic medical records for all procedures/Xrays and details which were not copied into this note but were reviewed prior to creation of Cheri Lira MD

## 2021-12-13 NOTE — PROGRESS NOTES
Assumed pt care approx 0730. Assessments performed, see flow sheet. Pt remains intubated, lightly sedated w/bilat wrist restraints in place. Pt assessed w/oral and darek care performed. Fms placed, pt tolerated well. No distress noted. Pt reassessed w/oral care performed. Tube feed set changed. No changes or distress noted. Pt reassessed, no changes noted. Oral care performed. No distress noted. Pt resting w/o distress.  No distress noted, vss.

## 2021-12-14 NOTE — PROGRESS NOTES
Hospitalist Progress Note    Patient: Tosin Jacinto MRN: [id number]  CSN: [id number]    YOB: 1950  Age: 66 y.o. Sex: female    DOA: 12/5/2021 LOS:  LOS: 9 days                Assessment/Plan     Patient Active Problem List   Diagnosis Code    Hypertension I10    Paroxysmal A-fib ([facility] [address] I48.0    Pneumonia due to COVID-19 virus U07.1, J12.82    Thrombocytopenia ([facility]) D69.6    Hypoxia R09.02    DM due to underlying condition with diabetic nephropathy ([facility]) E08.21    E-coli UTI N39.0, B96.20    Class 3 severe obesity with body mass index (BMI) of 40.0 to 44.9 in adult ([facility]) E66.01, Z68.41    Acute respiratory failure with hypoxemia ([facility]) J96.01    Pneumonia due to severe acute respiratory syndrome coronavirus 2 (SARS-CoV-2) U07.1, J12.82    Stage 3a chronic kidney disease ([facility]) N18.31    Anemia D64.9        Chief complaint :  SOB, resp failure  65 yo female with past medical history of chronic A fib on  Coumadin, chronic leg pain, anxiety, DM with CKD and HTN presents to the ED with 1 wk of Fatigue, Cough and SOB. Found to be hypoxic, COVID-19 PNA and A fib with RVR. CRITICAL CARE PLAN    Resp -   Acute resp failure with hypoxia -  Secondary to COVID 19  See vent orders, VAP bundle. HOB>30 degrees. Bronchodilators. Daily CXR. Weaning and SBT per pulmonary. ID -   COVID 19 pneumonia  Vitamin/antioxidant cocktail  dexamethasone. actemra x 1  ANTIBIOTICS zosyn. Follow blood cultures    CVS - Monitor HD. BP controlled  Echo with normal LVF   PAF - rate controlled  Anticoagulation with coumadin on hold due to thrombocytopenia    Heme/onc - Follow H&H, plts. INR. Thrombocytopenia - likely from sepsis, monitor platelets,   Heme/onc consulted. Renal - Trend BUN, Cr, follow I/O, ratliff in place. Check and replace Mg, K, phos. PA - pre renal, monitor renal function    Endocrine -  Follow FSG  DM - on lantus, SSI    Neuro/ Pain/ Sedation -  Sedation bundle. GI - NPO for now.  OG tube, tube feeding. Prophylaxis - DVT: SCDs, GI: protonix    [id number]  35 minutes of critical care time spent in the direct evaluation and treatment of this high risk patient. The reason for providing this level of medical care for this critically ill patient was due a critical illness that impaired one or more vital organ systems such that there was a high probability of imminent or life threatening deterioration in the patients condition. This care involved high complexity decision making to assess, manipulate, and support vital system functions, to treat this degreee vital organ system failure and to prevent further life threatening deterioration of the patients condition. Disposition : TBD    Physical Exam:  General: As above    HEENT: NC, Atraumatic. PERRLA, anicteric sclerae. Lungs: CTA Bilaterally. No Wheezing/Rhonchi/Rales. Heart:  S1 S2,  No murmur, No Rubs, No Gallops  Abdomen: Soft, Non distended, Non tender.  +Bowel sounds,   Extremities: No c/c/e             Vital signs/Intake and Output:  Visit Vitals  BP (!) 117/46   Pulse (!) 46   Temp 97.5 °F (36.4 °C)   Resp 19   Ht 5' 6\" (1.676 m)   Wt 86.6 kg (190 lb 14.7 oz)   SpO2 99%   BMI 30.81 kg/m²     Current Shift:  No intake/output data recorded.   Last three shifts:  12/12 1901 - 12/14 0700  In: 3917.6 [I.V.:1917.6]  Out: 3700 [Urine:3700]            Labs: Results:       Chemistry Recent Labs     12/14/21  0500 12/13/21  0640 12/12/21  0522   * 141* 226*    140 143   K 3.7 4.6 4.3    111 113*   CO2 27 23 24   BUN 67* 60* 69*   CREA 1.50* 1.04 1.15   CA 8.2* 8.6 8.9   AGAP 7 6 6   BUCR 45* 58* 60*   AP 89 93 81   TP 4.8* 5.3* 5.4*   ALB 1.9* 2.0* 2.0*   GLOB 2.9 3.3 3.4   AGRAT 0.7* 0.6* 0.6*      CBC w/Diff Recent Labs     12/14/21  0500 12/13/21  1000 12/12/21  0522   WBC 9.8 10.3 9.0   RBC 3.25* 3.35* 3.20*   HGB 10.5* 10.8* 10.4*   HCT 31.3* 32.7* 31.3*   PLT 28* 48* 55*   GRANS 91*  --  89*   LYMPH 3*  --  3* EOS 0  --  0      Cardiac Enzymes Recent Labs     12/13/21  1150   CPK 63   CKND1 CALCULATION NOT PERFORMED WHEN RESULT IS BELOW LINEAR LIMIT      Coagulation Recent Labs     12/14/21  0500 12/13/21  1000   PTP 22.4* 24.5*   INR 2.1* 2.3*       Lipid Panel Lab Results   Component Value Date/Time    Cholesterol, total 208 (H) 12/20/2020 03:16 PM    HDL Cholesterol 47 12/20/2020 03:16 PM    LDL, calculated 128.6 (H) 12/20/2020 03:16 PM    VLDL, calculated 32.4 12/20/2020 03:16 PM    Triglyceride 162 (H) 12/20/2020 03:16 PM    CHOL/HDL Ratio 4.4 12/20/2020 03:16 PM      BNP No results for input(s): BNPP in the last 72 hours.    Liver Enzymes Recent Labs     12/14/21  0500   TP 4.8*   ALB 1.9*   AP 89      Thyroid Studies Lab Results   Component Value Date/Time    TSH 0.19 (L) 12/08/2021 03:00 AM        Procedures/imaging: see electronic medical records for all procedures/Xrays and details which were not copied into this note but were reviewed prior to creation of Plan

## 2021-12-14 NOTE — PROGRESS NOTES
[facility]ists  Pulmonary, Critical Care, and Sleep Medicine    Name: Jacob Garcia MRN: [id number]   : 1950 Hospital: [facility] [place]    Date: 2021  Room: [address] [place] [address] Note                                              Consult requesting physician: Dr. Clark Zambrano  Reason for Consult: Severe Covid pneumonia with respiratory failure      Subjective/History of Present Illness:     Patient is a 66 y.o. female with PMHx significant for chronic A. fib on Coumadin therapy, diabetes mellitus, chronic kidney disease, hypertension, thyroid nodule was admitted to the hospital on 2021 with Covid infection. She has not been vaccinated for Covid infection. Patient progressed to worsening respiratory failure and hypoxemia. During admission, patient had declined intubation. Today, her  reported CODE STATUS and wanted patient to be intubated. Patient underwent COVID-19 intubation in the telemetry unit, and subsequently transferred to the ICU.    2021  Patient in ICU. Intubated  sedated no improvement in hypoxemic respiratory failure had to increase PEEP to 8 and FiO2 to 60% today. Does alarm and fentanyl drip. Oxygenation not improving the same. Worsening thrombocytopenia patient is still off Coumadin discussed with hematology [facility] Pandel pending. As per son patient apparently does have at home documentation about DNR/DNI with trying to get it from family and clarify patient CODE STATUS. Palliative care on board    Review of Systems:  Limited due to patient condition    Patient came to ER on  with right flank pain, and right basal pneumonia; was sent home with antibiotic therapyAugmentin and doxycycline. Urine culture ansensitive E. coli.       No Known Allergies   Past Medical History:   Diagnosis Date    [facility]fib ([facility] [address]     Anxiety     CAD (coronary artery disease)     Chronic kidney disease     Diabetes ([facility] [address]     borderline  High cholesterol     Hypertension     Kidney stones     Psychiatric disorder     anxiety      Past Surgical History:   Procedure Laterality Date    HX CHOLECYSTECTOMY      HX HEART CATHETERIZATION      HX HYSTERECTOMY      HX ORTHOPAEDIC      bilateral knee surgery    HX ORTHOPAEDIC      right elbow    HX UROLOGICAL      stent placement    FL CARDIAC SURG PROCEDURE UNLIST      stent x 1      Social History     Tobacco Use    Smoking status: Never Smoker    Smokeless tobacco: Never Used   Substance Use Topics    Alcohol use: No      Family History   Problem Relation Age of Onset    Breast Cancer Maternal Aunt       Prior to Admission medications    Medication Sig Start Date End Date Taking? Authorizing Provider   allopurinoL (ZYLOPRIM) 300 mg tablet Take 300 mg by mouth daily. Provider, Historical   torsemide (DEMADEX) 100 mg tablet Take 50 mg by mouth daily. Provider, Historical   HYDROcodone-acetaminophen (NORCO)  mg tablet Take 1 Tab by mouth every eight (8) hours as needed for Pain. Provider, Historical   metoprolol succinate (TOPROL-XL) 100 mg tablet Take 150 mg by mouth daily. Provider, Historical   warfarin (Coumadin) 2 mg tablet Take 4 mg by mouth daily. Provider, Historical   isosorbide mononitrate ER (IMDUR) 30 mg tablet Take 30 mg by mouth daily. Provider, Historical   magnesium oxide (MAG-OX) 400 mg tablet Take 400 mg by mouth daily. Provider, Historical   pantoprazole (PROTONIX) 40 mg tablet Take 40 mg by mouth daily. Provider, Historical   ergocalciferol (Vitamin D2) 1,250 mcg (50,000 unit) capsule Take 50,000 Units by mouth. Provider, Historical   docusate sodium (Colace) 100 mg capsule Take 100 mg by mouth two (2) times a day. Provider, Historical   nitroglycerin (NITROSTAT) 0.4 mg SL tablet 0.4 mg by SubLINGual route every five (5) minutes as needed for Chest Pain. Up to 3 doses.     Provider, Historical   hydrALAZINE (APRESOLINE) 50 mg tablet Take 25 mg by mouth three (3) times daily. Layla Alex MD   aspirin delayed-release 81 mg tablet Take  by mouth daily. Layla Alex MD   folic acid (FOLVITE) 1 mg tablet Take  by mouth daily. Layla Alex MD   potassium chloride (K-DUR, KLOR-CON) 20 mEq tablet Take  by mouth two (2) times a day.     Layla Alex MD     Current Facility-Administered Medications   Medication Dose Route Frequency    nystatin (MYCOSTATIN) 100,000 unit/gram powder   Topical BID    piperacillin-tazobactam (ZOSYN) 3.375 g in 0.9% sodium chloride (MBP/ADV) 100 mL MBP  3.375 g IntraVENous Q8H    Warfarin - HOLD Warfarin today    Other ONCE    furosemide (LASIX) injection 20 mg  20 mg IntraVENous BID    dexamethasone (DECADRON) 4 mg/mL injection 6 mg  6 mg IntraVENous DAILY    insulin glargine (LANTUS) injection 10 Units  10 Units SubCUTAneous QHS    pantoprazole (PROTONIX) 40 mg in 0.9% sodium chloride 10 mL injection  40 mg IntraVENous DAILY    [Held by provider] dextrose 5% infusion  50 mL/hr IntraVENous CONTINUOUS    chlorhexidine (PERIDEX) 0.12 % mouthwash 10 mL  10 mL Oral Q12H    fentaNYL (PF) 900 mcg/30 ml infusion soln  0-200 mcg/hr IntraVENous TITRATE    midazolam in normal saline (VERSED) 1 mg/mL infusion  2-10 mg/hr IntraVENous TITRATE    insulin lispro (HUMALOG) injection   SubCUTAneous Q6H    melatonin (rapid dissolve) tablet 10 mg  10 mg Oral QHS    ascorbic acid (vitamin C) (VITAMIN C) tablet 500 mg  500 mg Oral BID    zinc sulfate (ZINCATE) 50 mg zinc (220 mg) capsule 1 Capsule  1 Capsule Oral DAILY    Warfarin - Pharmacy to Dose  1 Each Other Rx Dosing/Monitoring    isosorbide mononitrate ER (IMDUR) tablet 30 mg  30 mg Oral DAILY    sodium chloride (NS) flush 5-40 mL  5-40 mL IntraVENous Q8H    cholecalciferol (VITAMIN D3) (1000 Units /25 mcg) tablet 2,000 Units  2,000 Units Oral DAILY         Objective:   Vital Signs:    Visit Vitals  BP (!) 139/49   Pulse (!) 58   Temp 98.2 °F (36.8 °C)   Resp 22 Ht 5' 6\" (1.676 m)   Wt 86.6 kg (190 lb 14.7 oz)   SpO2 95%   BMI 30.81 kg/m²       O2 Device: Endotracheal tube, Ventilator   O2 Flow Rate (L/min): 40 l/min   Temp (24hrs), Av.7 °F (36.5 °C), Min:97 °F (36.1 °C), Max:98.2 °F (36.8 °C)       Intake/Output:   Last shift:      701 - 1900  In: 400   Out: 550 [Urine:550]    Last 3 shifts: 1901 -  07  In: 3917.6 [I.V.:1917.6]  Out: 3700 [Urine:3700]      Intake/Output Summary (Last 24 hours) at 2021 1544  Last data filed at 2021 1207  Gross per 24 hour   Intake 2957.56 ml   Output 3000 ml   Net -42.44 ml       Last 3 Recorded Weights in this Encounter    21 1827 21 0650 21 191   Weight: 114.3 kg (252 lb) 86.6 kg (190 lb 14.7 oz) 86.6 kg (190 lb 14.7 oz)       ABG:    Lab Results   Component Value Date/Time    PHI 7.45 2021 06:04 AM    PCO2I 33.3 (L) 2021 06:04 AM    PO2I 63 (L) 2021 06:04 AM    HCO3I 23.4 2021 06:04 AM    FIO2I 60 2021 06:04 AM     Ventilator Mode: Assist control  Respiratory Rate  Back-Up Rate: 18  Insp Time (sec): 1.16 sec  I:E Ratio: 1:1.87  Ventilator Volumes  Vt Set (ml): 400 ml  Vt Exhaled (Machine Breath) (ml): 418 ml  Ve Observed (l/min): 9.2 l/min  Ventilator Pressures  PIP Observed (cm H2O): 25 cm H2O  Plateau Pressure (cm H2O): 24 cm H2O  MAP (cm H2O): 15  PEEP/VENT (cm H2O): 8 cm H20  Auto PEEP Observed (cm H2O): 0 cm H2O       Physical Exam: Limitations in exam due to full PPE requirements. Patient remains intubated and sedated; arousable;    HEENT: pupils not dilated, reactive, no scleral jaundice, moist oral mucosa, no nasal drainage; neck supple  Neck: No adenopathy or thyroid swelling  OGno bloody aspirates or respiratory secretions noted  Orogastric tube tube feeding   CVS: Normal heart sounds; S1 and S2 with no murmur; telemetrysinus bradycardia; JVD not elevated   RS: Moderate air entry bilaterally; mild crckles at the bsaes ; no wheezing or rhonchi; breath sounds are symmetrical; no tachypneia or distress while on ventilator support  Abd: Soft, no tenderness, no distention, no guarding or rigidity; bowel sounds present; no hepatosplenomegaly  Neuro: Intubated and sedated; limited exam  Extrm: No peripheral leg edema; no focal swelling or clubbing  Skin: no rash  Lymphatic: no cervical or supraclavicular adenopathy  Vasc: DPs palpable both feet  MS: No joint swelling      Data:       Recent Results (from the past 24 hour(s))   GLUCOSE, POC    Collection Time: 12/13/21  4:27 PM   Result Value Ref Range    Glucose (POC) 187 (H) 70 - 110 mg/dL   GLUCOSE, POC    Collection Time: 12/13/21 11:34 PM   Result Value Ref Range    Glucose (POC) 172 (H) 70 - 110 mg/dL   PROTHROMBIN TIME + INR    Collection Time: 12/14/21  5:00 AM   Result Value Ref Range    Prothrombin time 22.4 (H) 11.5 - 15.2 sec    INR 2.1 (H) 0.8 - 1.2     CBC WITH AUTOMATED DIFF    Collection Time: 12/14/21  5:00 AM   Result Value Ref Range    WBC 9.8 4.6 - 13.2 K/uL    RBC 3.25 (L) 4.20 - 5.30 M/uL    HGB 10.5 (L) 12.0 - 16.0 g/dL    HCT 31.3 (L) 35.0 - 45.0 %    MCV 96.3 78.0 - 100.0 FL    MCH 32.3 24.0 - 34.0 PG    MCHC 33.5 31.0 - 37.0 g/dL    RDW 13.4 11.6 - 14.5 %    PLATELET 28 (LL) 337 - 420 K/uL    MPV 12.5 (H) 9.2 - 11.8 FL    NRBC 0.2 (H) 0  WBC    ABSOLUTE NRBC 0.02 (H) 0.00 - 0.01 K/uL    NEUTROPHILS 91 (H) 40 - 73 %    LYMPHOCYTES 3 (L) 21 - 52 %    MONOCYTES 3 3 - 10 %    EOSINOPHILS 0 0 - 5 %    BASOPHILS 0 0 - 2 %    IMMATURE GRANULOCYTES 2 (H) 0.0 - 0.5 %    ABS. NEUTROPHILS 8.9 (H) 1.8 - 8.0 K/UL    ABS. LYMPHOCYTES 0.3 (L) 0.9 - 3.6 K/UL    ABS. MONOCYTES 0.3 0.05 - 1.2 K/UL    ABS. EOSINOPHILS 0.0 0.0 - 0.4 K/UL    ABS. BASOPHILS 0.0 0.0 - 0.1 K/UL    ABS. IMM.  GRANS. 0.2 (H) 0.00 - 0.04 K/UL    DF AUTOMATED     METABOLIC PANEL, COMPREHENSIVE    Collection Time: 12/14/21  5:00 AM   Result Value Ref Range    Sodium 140 136 - 145 mmol/L    Potassium 3.7 3.5 - 5.5 mmol/L    Chloride 106 100 - 111 mmol/L    CO2 27 21 - 32 mmol/L    Anion gap 7 3.0 - 18 mmol/L    Glucose 162 (H) 74 - 99 mg/dL    BUN 67 (H) 7.0 - 18 MG/DL    Creatinine 1.50 (H) 0.6 - 1.3 MG/DL    BUN/Creatinine ratio 45 (H) 12 - 20      GFR est AA 41 (L) >60 ml/min/1.73m2    GFR est non-AA 34 (L) >60 ml/min/1.73m2    Calcium 8.2 (L) 8.5 - 10.1 MG/DL    Bilirubin, total 0.7 0.2 - 1.0 MG/DL    ALT (SGPT) 50 13 - 56 U/L    AST (SGOT) 23 10 - 38 U/L    Alk. phosphatase 89 45 - 117 U/L    Protein, total 4.8 (L) 6.4 - 8.2 g/dL    Albumin 1.9 (L) 3.4 - 5.0 g/dL    Globulin 2.9 2.0 - 4.0 g/dL    A-G Ratio 0.7 (L) 0.8 - 1.7     FIBRINOGEN    Collection Time: 12/14/21  5:00 AM   Result Value Ref Range    Fibrinogen 318 210 - 451 mg/dL   D DIMER    Collection Time: 12/14/21  5:00 AM   Result Value Ref Range    D DIMER 1.96 (H) <0.46 ug/ml(FEU)   NT-PRO BNP    Collection Time: 12/14/21  5:00 AM   Result Value Ref Range    NT pro- 0 - 900 PG/ML   GLUCOSE, POC    Collection Time: 12/14/21  5:15 AM   Result Value Ref Range    Glucose (POC) 133 (H) 70 - 110 mg/dL   BLOOD GAS, ARTERIAL POC    Collection Time: 12/14/21  6:04 AM   Result Value Ref Range    Device: ADULT VENT      FIO2 (POC) 60 %    pH (POC) 7.45 7.35 - 7.45      pCO2 (POC) 33.3 (L) 35.0 - 45.0 MMHG    pO2 (POC) 63 (L) 80 - 100 MMHG    HCO3 (POC) 23.4 22 - 26 MMOL/L    sO2 (POC) 93.4 92 - 97 %    Base deficit (POC) 0.1 mmol/L    Mode ASSIST CONTROL      Tidal volume 400 ml    Set Rate 18 bpm    PEEP/CPAP (POC) 8 cmH2O    Johns test (POC) NOT APPLICABLE      Site RIGHT RADIAL      Patient temp.  98      Specimen type (POC) ARTERIAL      Performed by MARGARET Latham    Collection Time: 12/14/21 11:35 AM   Result Value Ref Range    Glucose (POC) 120 (H) 70 - 110 mg/dL         Chemistry Recent Labs     12/14/21  0500 12/13/21  0640 12/12/21  0522   * 141* 226*    140 143   K 3.7 4.6 4.3    111 113*   CO2 27 23 24   BUN 67* 60* 69*   CREA 1.50* 1.04 1.15   CA 8.2* 8.6 8.9   AGAP 7 6 6   BUCR 45* 58* 60*   AP 89 93 81   TP 4.8* 5.3* 5.4*   ALB 1.9* 2.0* 2.0*   GLOB 2.9 3.3 3.4   AGRAT 0.7* 0.6* 0.6*        Lactic Acid Lactic acid   Date Value Ref Range Status   11/11/2014 0.9 0.4 - 2.0 MMOL/L Final     No results for input(s): LAC in the last 72 hours. Liver Enzymes Protein, total   Date Value Ref Range Status   12/14/2021 4.8 (L) 6.4 - 8.2 g/dL Final     Albumin   Date Value Ref Range Status   12/14/2021 1.9 (L) 3.4 - 5.0 g/dL Final     Globulin   Date Value Ref Range Status   12/14/2021 2.9 2.0 - 4.0 g/dL Final     A-G Ratio   Date Value Ref Range Status   12/14/2021 0.7 (L) 0.8 - 1.7   Final     Alk.  phosphatase   Date Value Ref Range Status   12/14/2021 89 45 - 117 U/L Final     Recent Labs     12/14/21  0500 12/13/21  0640 12/12/21  0522   TP 4.8* 5.3* 5.4*   ALB 1.9* 2.0* 2.0*   GLOB 2.9 3.3 3.4   AGRAT 0.7* 0.6* 0.6*   AP 89 93 81        CBC w/Diff Recent Labs     12/14/21  0500 12/13/21  1000 12/12/21  0522   WBC 9.8 10.3 9.0   RBC 3.25* 3.35* 3.20*   HGB 10.5* 10.8* 10.4*   HCT 31.3* 32.7* 31.3*   PLT 28* 48* 55*   GRANS 91*  --  89*   LYMPH 3*  --  3*   EOS 0  --  0        Cardiac Enzymes No results found for: CPK, CK, CKMMB, CKMB, RCK3, CKMBT, CKNDX, CKND1, MANE, TROPT, TROIQ, BANDAR, TROPT, TNIPOC, BNP, BNPP     BNP No results found for: BNP, BNPP, XBNPT     Coagulation Recent Labs     12/14/21  0500 12/13/21  1000 12/12/21  0522   PTP 22.4* 24.5* 22.8*   INR 2.1* 2.3* 2.1*         Thyroid  Lab Results   Component Value Date/Time    TSH 0.19 (L) 12/08/2021 03:00 AM       No results found for: T4     Urinalysis Lab Results   Component Value Date/Time    Color YELLOW 12/05/2021 03:02 PM    Appearance CLEAR 12/05/2021 03:02 PM    Specific gravity 1.014 12/05/2021 03:02 PM    pH (UA) 5.0 12/05/2021 03:02 PM    Protein 100 (A) 12/05/2021 03:02 PM    Glucose Negative 12/05/2021 03:02 PM    Ketone Negative 12/05/2021 03:02 PM Bilirubin Negative 12/05/2021 03:02 PM    Urobilinogen 0.2 12/05/2021 03:02 PM    Nitrites Negative 12/05/2021 03:02 PM    Leukocyte Esterase TRACE (A) 12/05/2021 03:02 PM    Epithelial cells 2+ 12/05/2021 03:02 PM    Bacteria FEW (A) 12/05/2021 03:02 PM    WBC 4 to 10 12/05/2021 03:02 PM    RBC 0 to 3 12/05/2021 03:02 PM          Culture data during this hospitalization.    All Micro Results     Procedure Component Value Units Date/Time    CULTURE, BLOOD [[id number]] Collected: 12/13/21 1150    Order Status: Completed Specimen: Blood Updated: 12/14/21 0725     Special Requests: NO SPECIAL REQUESTS        Culture result: NO GROWTH AFTER 18 HOURS       CULTURE, BLOOD [[id number]] Collected: 12/05/21 1106    Order Status: Completed Specimen: Blood Updated: 12/11/21 0729     Special Requests: NO SPECIAL REQUESTS        Culture result: NO GROWTH 6 DAYS       CULTURE, BLOOD [[id number]] Collected: 12/05/21 1106    Order Status: Completed Specimen: Blood Updated: 12/11/21 0729     Special Requests: NO SPECIAL REQUESTS        Culture result: NO GROWTH 6 DAYS       STREP Jose Ramon Spikes, URINE [[id number]] Collected: 12/08/21 2051    Order Status: Completed Specimen: Urine, random Updated: 12/09/21 1024     Strep pneumo Ag, urine Negative       LEGIONELLA PNEUMOPHILA AG, URINE [[id number]] Collected: 12/08/21 2051    Order Status: Completed Specimen: Urine, random Updated: 12/09/21 1024     Legionella Ag, urine Negative       LEGIONELLA PNEUMOPHILA AG, URINE [[id number]] Collected: 12/05/21 1730    Order Status: Canceled Specimen: Urine     STREP Jose Raomn Spikes, URINE [[id number]] Collected: 12/05/21 1730    Order Status: Canceled Specimen: Urine     RESPIRATORY VIRUS PANEL W/COVID-19, PCR [[id number]]  (Abnormal) Collected: 12/05/21 1110    Order Status: Completed Specimen: Nasopharyngeal Updated: 12/05/21 1633     Adenovirus Not detected        Coronavirus 229E Not detected        Coronavirus HKU1 Not detected        Coronavirus CVNL63 Not detected        Coronavirus OC43 Not detected        SARS-CoV-2, PCR Detected        Comment: CALLED TO AND CORRECTLY REPEATED BY:  DR Cristina South [facility] ER ON 05DEC21 AT 1626 HRS TO 1396. Metapneumovirus Not detected        Rhinovirus and Enterovirus Not detected        Influenza A Not detected        Influenza A, subtype H1 Not detected        Influenza A, subtype H3 Not detected        INFLUENZA A H1N1 PCR Not detected        Influenza B Not detected        Parainfluenza 1 Not detected        Parainfluenza 2 Not detected        Parainfluenza 3 Not detected        Parainfluenza virus 4 Not detected        RSV by PCR Not detected        B. parapertussis, PCR Not detected        Bordetella pertussis - PCR Not detected        Chlamydophila pneumoniae DNA, QL, PCR Not detected        Mycoplasma pneumoniae DNA, QL, PCR Not detected       COVID-19 RAPID TEST [[id number]]  (Abnormal) Collected: 12/05/21 1109    Order Status: Completed Specimen: Nasopharyngeal Updated: 12/05/21 1136     Specimen source Nasopharyngeal        COVID-19 rapid test Detected        Comment:      The specimen is POSITIVE for SARS-CoV-2, the novel coronavirus associated with COVID-19. This test has been authorized by the FDA under an Emergency Use Authorization (EUA) for use by authorized laboratories. Fact sheet for Healthcare Providers: ConventionUpdate.co.nz  Fact sheet for Patients: ConventionUpdate.co.nz       Methodology: Isothermal Nucleic Acid Amplification  CALLED TO AND CORRECTLY REPEATED BY:  Regulo cShmitz RN ER, TO JAF AT 1136 12/5/2021                LE Doppler 12/7/21 Interpretation Summary  · No evidence of deep vein thrombosis in the right lower extremity. · No evidence of deep vein thrombosis in the left lower extremity.         ECHO 12/7/21 Interpretation Summary  Result status: Final result  · Left Ventricle: Normal cavity size, wall thickness and systolic function (ejection fraction normal). The estimated EF is 55 - 60%. There is moderate (grade 2) left ventricular diastolic dysfunction E/E' ratio = 15.88. Wall Scoring: The left ventricular wall motion is normal.  · Right Atrium: Mildly dilated right atrium. · Left Atrium: Dilated left atrium. · Mitral Valve: No stenosis. Mitral valve non-specific thickening. Mild mitral annular calcification. Mild to moderate regurgitation. · Pulmonary Artery: Pulmonary arterial systolic pressure (PASP) is 41 mmHg. Pulmonary hypertension found to be mild. · IVC/Hepatic Veins: Mechanically ventilated; cannot use inferior caval vein diameter to estimate central venous pressure. Images report reviewed by me:  CT 12/5 (Most Recent)  Results from [place] encounter on 12/05/21    CTA CHEST W OR W WO CONT    Narrative  EXAM: CTA chest    INDICATION: Rule out PE , Covid positive    COMPARISON: None    TECHNIQUE: Axial CT imaging from the thoracic inlet through the diaphragm with  intravenous contrast. Coronal and sagittal MIP reformats were generated. One or  more dose reduction techniques were used on this CT: automated exposure control,  adjustment of the mAs and/or kVp according to patient size, and iterative  reconstruction techniques. The specific techniques used on this CT exam have  been documented in the patient's electronic medical record. Digital Imaging and  Communications in Medicine (DICOM) format image data are available to  nonaffiliated external healthcare facilities or entities on a secure, media  free, reciprocally searchable basis with patient authorization for at least a  12-month period after this study. _______________    FINDINGS:    EXAM QUALITY: Overall exam quality is satisfactory. Pulmonary arterial  enhancement is adequate with adequate breath hold and no significant artifact. PULMONARY ARTERIES: No evidence of pulmonary embolism.     THYROID: There is a 15 mm right thyroid lobe nodule which appears complex. Advise nonemergent thyroid ultrasound. LYMPH Nodes: There is a mildly enlarged right infrahilar lymph node measuring 1  cm image 89. PLEURA: There are no pleural effusion. HEART: Cardiac size is enlarged. There is no pericardial effusion. There is mild  tricuspid regurgitation into the hepatic veins. Moderate to severe calcific  coronary disease present. VASCULATURE/MEDIASTINUM: Main pulmonary artery is enlarged measuring 4 cm  suggesting chronic pulmonary hypertension. Small hiatal hernia present. LUNGS: Moderate groundglass opacities and airspace disease seen bilaterally  suggesting atypical pneumonia. The findings are typical in appearance for Covid  19 pneumonia. AIRWAY: Patent    UPPER ABDOMEN: Unremarkable. OTHER: No acute or aggressive osseous abnormalities identified. _______________    Impression  No evidence of a pulmonary embolism or aortic dissection. Moderate groundglass opacities and airspace disease seen bilaterally suggesting  atypical pneumonia and the findings are typical in appearance for Covid 19  pneumonia. Mildly enlarged right hilar lymph node which may be reactive. Advise follow-up  to exclude any neoplastic or myeloproliferative process. 15 mm slightly complex right thyroid lobe nodule. Advise nonemergent thyroid  ultrasound. CXR reviewed by me:  XR 12/12 (Most Recent). Results from Hospital Encounter encounter on 12/05/21    XR CHEST PORT    Narrative  EXAM: XR CHEST PORT    CLINICAL INDICATION/HISTORY: Intubated; await patient to come to the ICU for  first chest x-ray  -Additional: None    COMPARISON: One day prior    TECHNIQUE: Portable frontal view of the chest    _______________    FINDINGS:    SUPPORT DEVICES: Endotracheal and enteric tubes unchanged. HEART AND MEDIASTINUM: Cardiomediastinal silhouette within normal limits.     LUNGS AND PLEURAL SPACES: Bilateral parenchymal/interstitial opacities with  lower lung predominance, unchanged. No large effusion or pneumothorax.    _______________    Impression  Bilateral parenchymal/interstitial opacities unchanged. IMPRESSION:   · Acute respiratory failure with hypoxemia  · Severe Covid pneumonia  · Anemia  · Thrombocytopenia  · Chronic kidney disease stage III  · Paroxysmal atrial fibrillation  ·   Patient Active Problem List   Diagnosis Code    Hypertension I10    Paroxysmal A-fib ([facility] [address] I48.0    Pneumonia due to COVID-19 virus U07.1, J12.82    Thrombocytopenia ([facility]) D69.6    Hypoxia R09.02    DM due to underlying condition with diabetic nephropathy ([facility]) E08.21    E-coli UTI N39.0, B96.20    Class 3 severe obesity with body mass index (BMI) of 40.0 to 44.9 in adult ([facility]) E66.01, Z68.41    Acute respiratory failure with hypoxemia ([facility]) J96.01    Pneumonia due to severe acute respiratory syndrome coronavirus 2 (SARS-CoV-2) U07.1, J12.82    Stage 3a chronic kidney disease ([facility]) N18.31    Anemia D64.9       · Code status: Full code      RECOMMENDATIONS:   Respiratory: Patient with severe Covid pneumonia, and hypoxemia; patient presenting to ICU in ARDS state due to Covid inflammation. Mechanical ventilationVCV mode; FiO2 60%; PEEP down to 8; plateau pressure 21; lung volume protection strategy. 7.45/33/63/93    Diuresis PRN  Chest x-ray from today reviewed imagesET tube and OG tube in good position; persistent bilateral diffuse interstitial and groundglass infiltrates; left retrocardiac consolidation persist; no pneumothorax or pleural effusions. Continue vent and sedation bundles. Sedationmidazolam and fentanyl infusion; mild sinus bradycardia with normal blood pressure; hold fentanyl drip if needed, if bradycardia worsens. Patient currently not needing paralytic. Keep SPO2 >=88%. HOB 30 degree elevation while not proning. Aspiration precautions.   CTA chest on admission no PE; severe bilateral pulmonary opacities and groundglass densities consistent with severe Covid pneumonia; no significant pleural effusions; right hilar adenopathy likely reactivewill need follow-up in outpatient; thyroid nodule will also need follow-up in outpatient. CVS:BP stable   CHF low dose lasix  Follow BNP trop stable   Bradycardia improved  Blood pressure remained stable; hydralazine 10 mg 3 times a day. Ultrasound legsnegative for DVTs  Echocardiogramnormal LV function, and mild pulmonary hypertension; dilated left atrium; mild to moderate mitral regurgitation. proBNP mildly elevated; troponins were mildly elevated on admissionpeak troponin 0.07. Patient on Coumadin at home for paroxysmal atrial fibrillation but on hold today because of low platleets   ID: Severe Covid pneumonia. Unvaccinated status. Zosyn added for possible sepsis   Respiratory viral panel was positive for SARS-CoV-2 PCR. Procalcitonin <0.05not elevated  CRP had come down to 2.1; ferritin elevated but coming down-918. D-dimer not reliable since patient has elevated INR on chronic Coumadin therapy. S/p 1 dose of Tocilizumab 12/7 morning. Steroidss/p 20 mg dose daily; weaned down to 6 mg daily. CTA chest negative for PEs on admission. Ultrasound of the legs ordered. Patient not needing antibiotics; afebrile; WBC not elevated; procalcitonin not elevated0.08. Continue vitamin supplements. ID on case. Hematology/Oncology: Stable hemoglobin10.4; platelets dropped to 55K; INR 2.1on Coumadin. Thrombocytopenia likely related to Covid related sepsis. Check fibrinogen and D-dimer. worseing plt today still hold coumadin HIT pending   Hold Coumadin again today   HIT panel send   pvl pending  Hematology consulted   Monitor for any bleeding issues. Consult hematologyDr. Erin Ramirez. Renal: Known CKD; s/p CTA chest during this admission. Good urine output; creatinine improved to 1.15; sodium normal.  Continue fluids per nephrology. Monitor and replace electrolytes as needed.   GI/: Tube feeding. PPI prophylaxis. LFTsimproved. Endocrine: Blood sugars mildly elevatedcontinue Lantus insulin10 units nightly. Sliding scale insulin. Target blood sugar 140180. Patient on steroids for severe Covid pneumoniasteroids being weaned. TSH 0.19likely sick euthyroid state; free T4 mildly increased; free T3 mildly decreased. Neurology: Intubated and sedatedlimited exam.  Skin/Wound: ICU nursing care. Electrolytes: Replace electrolytes per ICU electrolyte replacement protocol. IVF: D5W 50 mL/h; free water via OG tube. Nutrition: Tube feedingNeprotolerating well. Julisa Rast Prophylaxis: DVT Prophylaxis: Coumadin. GI Prophylaxis: Protonix. Restraints: Wrist soft restraints for patient interfering with medical therapy/management and patient safety. Lines/Tubes: PIVs; midline planned  ETT: 12/7/2021   OGT/NGT: 12/7/2021   Montoya: 12/7/2021 (Medically necessary for strict input/output monitoring in critically ill patient, will remove it when not needed. Montoya bundle followed). Advance Directive/Palliative Care: consulted    Prognosis appears very poor in this patient with Covid pneumonia with severe hypoxemic respiratory failure and ARDS needing to be intubated now; mortality is very high; risk of multiorgan failure with complications such as refractory hypoxemia, encephalopathy, critical illness myopathy, acute renal failure needing dialysis, shock state needing pressors, prolonged ventilator support, risk of tracheostomy and chronic debility/bedbound state. Quality Care: PPI, DVT prophylaxis, HOB elevated, Infection control all reviewed and addressed. Care of plan d/w RN, RT. High complexity decision making was performed during the evaluation of this patient at high risk for decompensation with multiple organ involvement. Total critical care time spent rendering care exclusive of procedures/family discussion/coordination of care: 40 minutes.     Augusta Ng) [address] [facility]     [phone]    Unfortunately, patient's  is now in ICU with Covid pneumonia and on ventilator support. Update grandson on 12/11. Do not inform patient of her  medical condition per family request.            Please note: Voice-recognition software may have been used to generate this report, which may have resulted in some phonetic-based errors in grammar and contents. Even though attempts were made to correct all the mistakes, some may have been missed, and remained in the body of the document. Gal De Luna MD  12/14/2021         Note  Patient with SARS-CoV-2 pneumonia with severe pneumonia and hypoxemic respiratory failure; patient was on high flow nasal cannula oxygen; patient is not vaccinated, and in the age group that is being infected with delta virus strain; the strain causes severe respiratory failure and complications reported in the [place] and worldwide; unfortunately, the prognosis is poor in unvaccinated patients, with a high risk of complications, multiorgan failure, chronic hypoxemia and respiratory failure, intubation, mechanical ventilation, thromboembolic disease risk in multiple organs, high risk for long Covid syndrome, and high risk for mortality. The CDC federal and state guidelines have emphasized repeatedly the importance of vaccination to prevent severe infection, mortality, disabilities, long Covid syndrome from Covid virus with several strains currently in the [place]. The treatment protocols are followed based on local hospital protocols, with guidance from centers such as [facility] protocols. [facility] is not part of any research protocol. The local hospital protocols have been guided by [facility] pharmacy department. Covid plasma is no longer considered standard of care in the [facility] due to lack of benefit in trials.   Nebulization and CPAP therapies are not considered as part of the protocol in [facility] due to high risk of aerosolization, and high risks of spreading Covid infection to the healthcare staff. Dexamethasone considered standard of care in patients admitted with severe Covid pneumonia; variable dosing has been reported with this medication. ECMO and CRRT facilities not available at [facility]; patients with severe Covid pneumonia and respiratory failure are usually unstable for transportation when they are in critically ill state with a high risk of dying during transportation. REMDESIVIR-not recommended in patients with late presentation with severe respiratory failure     Tocilizumab anti-inflammatory medicationhas shown some benefit in patients with elevated inflammatory markers. Ivermectin not considered standard of care in the [address] [facility] [address]; no significant randomized controlled studies done in the [facility] to suggest benefit of ivermectin in patients with COVID-19 with severe respiratory failure; risk of toxicity related to ivermectin outweighs any potential benefits based on consensus opinion in the [address] [facility]. This drug is not considered standard of care in [facility].

## 2021-12-14 NOTE — PROGRESS NOTES
Assumed pt care approx 0730. Assessments performed, see flow sheet. Pt assessed, remains intubated/lightly sedated w/bilat wrist restraints in place. Oral and darek care performed. Pt wiped down w/chg wipes, gown and linen changed. No distress noted. Pt reassessed, oral care performed. No distress noted. Spoke w/ concerning pt's bp trends. Md aware, states she will adjust bp meds. Pt reassessed, no changes or distress noted. Spoke w/ again concerning bp trends. Md aware, states she will order a pressor. Pressor started once obtained, versed increased for restlessness/agitation.

## 2021-12-14 NOTE — PROGRESS NOTES
Discussion with Dr. Justin Hernandez and pt's two sons and granddaughter - legal advance medical directive documents received, copied, and placed in chart. No CPR or defibrillation.

## 2021-12-14 NOTE — PROGRESS NOTES
1910 : Bedside and Verbal shift change report given to Cayla Petit RN (oncoming nurse) by MITZI Vera RN (offgoing nurse). Report included the following information SBAR, Kardex, Intake/Output, MAR and Recent Results. 2000 : Patient resting in bed, VSS, NAD. Intubated and lightly sedated. Lung sounds clear and diminished bilaterally. Tolerating tube feed @ goal rate without gastric residual. Montoya cath and FMS intact and draining. Bilateral soft wrist restraints maintained to protect integrity of LDAs and medical equipment. 0000 : Reassessment. No change to previous assessment. 0400 : Reassessment. No change to previous assessment. 80 : MD notified of critical PLT level of 28. No signs of bleeding at this time. Patient pending HIT panel and anticoagulants on hold. No additional orders received. 0720 : Bedside and Verbal shift change report given to MITZI Diallo (oncoming nurse) by Cayla Petit RN  (offgoing nurse). Report included the following information SBAR, Kardex, Intake/Output, MAR and Recent Results.

## 2021-12-14 NOTE — PROGRESS NOTES
Palliative Medicine     Ms. Back was seen behind glass doors to preserve PPE during COVID 19 pandemic. She is orally intubated and sedated. Unfortunately, her spouse is also hospitalized with COVID 19 and intubated. Ms. Sena Dowd does not have an Advance Medical Directive on file and is not able to complete one. Per the [place] decision makers, in the absence of an AMD, healthcare decisions would fall to her next of kin. Her spouse is not able to engage in this process so Healthcare decisions will fall to a consensus of her 3 adult children (listed below). Fabircio Art, the granddaughter is designated as point person, however medical decision will need to be a consensus of her 3 adult children. Call placed to Ms. Fabricio Art at [phone] to determine all family members. She was able to provide the following information. Kim Bakari son of patient - eldest       Fiorella Friend son of patient. Works as a truckdriver               Fabricio Art - granddaughter of patient and daughter of Fiorella Friend              - Acting as spokes person for family     Kareen Casiano - daughter of patient. Lives in [place]- grandson of patient and son of Phyllis Cade Team will continue to follow.       Santhosh MARSHALLN, RN, Merged with [facility]  Palliative Medicine Inpatient RN  Palliative [facility] Line: [phone]

## 2021-12-14 NOTE — PROGRESS NOTES
Family came for meetings CPR or defibrillation as per previous DNr/DNI  futher  discussions tomorrow   RADHA Pederson MD

## 2021-12-14 NOTE — PROGRESS NOTES
Pharmacy Dosing Services: Warfarin     Consult for Warfarin Dosing by Pharmacy by Dr. James Martinez provided for this 66 y.o.  female , for indication of Atrial Fibrillation. Day of Therapy: continuation of home med  Dose to achieve an INR goal of 2-3    Order entered to HOLD Warfarin today due to low platelets ( 28 today ) per Dr. Medeiros Daily     PT/INR Lab Results   Component Value Date/Time    INR 2.1 (H) 12/14/2021 05:00 AM      Platelets Lab Results   Component Value Date/Time    PLATELET 28 (LL) 71/63/9054 05:00 AM      H/H     Lab Results   Component Value Date/Time    HGB 10.5 (L) 12/14/2021 05:00 AM        Warfarin Administrations (last 168 hours)       Date/Time Action Medication Dose    12/11/21 1706 Given    warfarin (COUMADIN) tablet 4 mg 4 mg    12/10/21 1716 Given    warfarin (COUMADIN) tablet 2 mg 2 mg          Pharmacy to follow daily and will provide subsequent Warfarin dosing based on clinical status.   Buffy Medina, [facility])  Contact information [phone]

## 2021-12-14 NOTE — PROGRESS NOTES
Platelets continue to decline, likely consumptive versus medication, continue to hold coumadin transfuse platelets if <16906 or bleeding.     JCP

## 2021-12-14 NOTE — PROGRESS NOTES
[facility]  A Division of [address] [place] [address])                                                                                                                      Jeramie Genao MD  Office #: - Option [phone]  Fax #: [phone]  Date of Admission: 12/5/2021Date of Note: 12/14/2021  Reason for FU: Evaluation and antibiotic management of Severe COVID 19. Current Antimicrobials:    Prior Antimicrobials:  Zosyn 12/12 to date    Actrema- X1 12/7  Dexamethasone 12/7 to date( 20mg)   Augmentin and doxycycline PO on 11/30 to 12/5  Ceftriaxone/ Zithromax 12/05 to 12/6       Assessment- ID related:  --------------------------------------------------------------------------  Critically ill patient, post Actrema, high risk for infection:   · Thrombocytopenia- driven by possible sepsis? Plat down to 55K-> 28K CXR remains with diffuse infiltrate, no change. · Acute renal failure: Improved  · Severe COVID 19 infection  · Viral PNA- BL. Extensive on CT chest  · Acute resp failure- Hypoxic 2/2 above  · Intubated 12/7  · E.coli bacteruia 10/30  · Hx of septic shock with prolonged intubation 6 yrs ago- [facility]  COVId rapid test/RVP PCR + 12/5  Strep/leg urine ag: negative  BCX 12/5L NGSF  procal X4-LN-> last done 12/11 <0.05  CRP 14->-> -> 0.5  ferretin 1104-> -> 836  CPK/trop-OK  LD-512  Hepatitis C ab/B sa negative, Quantiron TB-Indeterminate 12/7    Other Medical Issues- Mx per respective team:  ·   · Hypernatremia-resolved  · Acute renal failure-improved  · Morbidly obese BMI 40  · Hx of thrombocytoepnia- chronic. Etiology unknown. · A fib on AC- INR 5  · L renal stone- non obstructing on CT 11/30  · CKD  · DMT2  · HTN/HLD  · Hx of CHF per pt     Recommendation for ID issues I am following:  ------------------------------------------------------------------------------    Cont Zosyn.    SOFIA BCX from 12/13  -> steroid/dexamethasone and AC/Keri coctails per primary team  Biomarkers trending down  -> monitor labs  DW ICU MD/nurse                      -     Subjective: Intubated/ unresponsive/sedated with 2 drugs  Not on pressor  Remains on same Vent etting- PEEP of 8, FIO2 up 60%  Acute decline in her plat- to 55K yesterday, today 28K- she is emperically covered with Zosyn. WBC 9.8K, Cr up to 1.5  Notes/ labs reviewed  CXR this am remains with diffuse opacity-> Unchanged  TTE with EF 55-60%, no severe valvular pathology         HPI:  Shaun Lerma is a 66 y.o. WHITE/NON- with PMH as listed above. Non vaccinated due to her medical conditions and her Restorationist per pt. She has been feeling sick since 2-3 days prior to Nov 30, when she came to ED with right flank pain and abd pain. No Fever or chills, she is SOB and had dry cough as always from her CHF no new changes. COVID tested neg,. CT done showed some infiltrate on lung and  UA and Urine culture done and sent out with Aumgentin and Doxycycline for treatment of possible PNA. Returned to ED on 12/5 with worsened SOB, hypoxia and tachycardia. COVID 19 test + and CT chest with extensive infiltrate B/L. Her  tested + for COVID 19 as well. She was aon 5L oxygen and on dexamethasone. She was placed on CTX and Zithromax as well. Chest not very tight per pt at time of exam. She asks if we can treat her with Ivermectin. She denies abd pain/N/V/chest pain. Denies severe ext pain. Was intubated X1 month 6 yrs ago- post septic shock. Hx of TB exposure- unknown. Runs a [employer].        Active Hospital Problems    Diagnosis Date Noted    Pneumonia due to severe acute respiratory syndrome coronavirus 2 (SARS-CoV-2) 12/07/2021    Stage 3a chronic kidney disease ([facility] 12/07/2021    Anemia 12/07/2021    Acute respiratory failure with hypoxemia ([facility] 12/06/2021    Pneumonia due to COVID-19 virus 12/05/2021    Thrombocytopenia ([facility] 12/05/2021    Hypoxia 12/05/2021    DM due to underlying condition with diabetic nephropathy ([facility] 12/05/2021    E-coli UTI 12/05/2021    Class 3 severe obesity with body mass index (BMI) of 40.0 to 44.9 in adult [facility]) 12/05/2021    Paroxysmal A-fib ([facility]     Hypertension      Past Medical History:   Diagnosis Date    A-fib ([facility]     Anxiety     CAD (coronary artery disease)     Chronic kidney disease     Diabetes ([facility]     borderline    High cholesterol     Hypertension     Kidney stones     Psychiatric disorder     anxiety     Past Surgical History:   Procedure Laterality Date    HX CHOLECYSTECTOMY      HX HEART CATHETERIZATION      HX HYSTERECTOMY      HX ORTHOPAEDIC      bilateral knee surgery    HX ORTHOPAEDIC      right elbow    HX UROLOGICAL      stent placement    [place] CARDIAC SURG PROCEDURE UNLIST      stent x 1     Family History   Problem Relation Age of Onset    Breast Cancer Maternal Aunt      Social History     Socioeconomic History    Marital status:      Spouse name: Not on file    Number of children: Not on file    Years of education: Not on file    Highest education level: Not on file   Occupational History    Not on file   Tobacco Use    Smoking status: Never Smoker    Smokeless tobacco: Never Used   Substance and Sexual Activity    Alcohol use: No    Drug use: No    Sexual activity: Not on file   Other Topics Concern    Not on file   Social History Narrative    Not on file     Social Determinants of Health     Financial Resource Strain:     Difficulty of Paying Living Expenses: Not on file   Food Insecurity:     Worried About Running Out of Food in the Last Year: Not on file    Sylvain of Food in the Last Year: Not on file   Transportation Needs:     Lack of Transportation (Medical): Not on file    Lack of Transportation (Non-Medical):  Not on file   Physical Activity:     Days of Exercise per Week: Not on file    Minutes of Exercise per Session: Not on file   Stress:     Feeling of Stress : Not on file   Social Connections:     Frequency of Communication with Friends and Family: Not on file    Frequency of Social Gatherings with Friends and Family: Not on file    Attends Sabianism Services: Not on file    Active Member of Clubs or Organizations: Not on file    Attends Club or Organization Meetings: Not on file    Marital Status: Not on file   Intimate Partner Violence:     Fear of Current or Ex-Partner: Not on file    Emotionally Abused: Not on file    Physically Abused: Not on file    Sexually Abused: Not on file   Housing Stability:     Unable to Pay for Housing in the Last Year: Not on file    Number of [place] in the Last Year: Not on file    Unstable Housing in the Last Year: Not on file       Allergies:  Patient has no known allergies.      Medications:  Current Facility-Administered Medications   Medication Dose Route Frequency    furosemide (LASIX) injection 20 mg  20 mg IntraVENous BID    dexamethasone (DECADRON) 4 mg/mL injection 6 mg  6 mg IntraVENous DAILY    piperacillin-tazobactam (ZOSYN) 4.5 g in 0.9% sodium chloride (MBP/ADV) 100 mL MBP  4.5 g IntraVENous Q8H    insulin glargine (LANTUS) injection 10 Units  10 Units SubCUTAneous QHS    pantoprazole (PROTONIX) 40 mg in 0.9% sodium chloride 10 mL injection  40 mg IntraVENous DAILY    dextrose 5% infusion  50 mL/hr IntraVENous CONTINUOUS    hydrALAZINE (APRESOLINE) tablet 10 mg  10 mg Oral TID    midazolam (VERSED) injection 2 mg  2 mg IntraVENous Q3H PRN    fentaNYL citrate (PF) injection 50 mcg  50 mcg IntraVENous Q4H PRN    chlorhexidine (PERIDEX) 0.12 % mouthwash 10 mL  10 mL Oral Q12H    fentaNYL (PF) 900 mcg/30 ml infusion soln  0-200 mcg/hr IntraVENous TITRATE    midazolam in normal saline (VERSED) 1 mg/mL infusion  2-10 mg/hr IntraVENous TITRATE    insulin lispro (HUMALOG) injection   SubCUTAneous Q6H    melatonin (rapid dissolve) tablet 10 mg  10 mg Oral QHS    ascorbic acid (vitamin C) (VITAMIN C) tablet 500 mg  500 mg Oral BID    zinc sulfate (ZINCATE) 50 mg zinc (220 mg) capsule 1 Capsule  1 Capsule Oral DAILY    Warfarin - Pharmacy to Dose  1 Each Other Rx Dosing/Monitoring    HYDROcodone-acetaminophen (NORCO)  mg tablet 1 Tablet  1 Tablet Oral Q6H PRN    isosorbide mononitrate ER (IMDUR) tablet 30 mg  30 mg Oral DAILY    nitroglycerin (NITROSTAT) tablet 0.4 mg  0.4 mg SubLINGual Q5MIN PRN    sodium chloride (NS) flush 5-40 mL  5-40 mL IntraVENous Q8H    sodium chloride (NS) flush 5-40 mL  5-40 mL IntraVENous PRN    acetaminophen (TYLENOL) tablet 650 mg  650 mg Oral Q6H PRN    Or    acetaminophen (TYLENOL) suppository 650 mg  650 mg Rectal Q6H PRN    polyethylene glycol (MIRALAX) packet 17 g  17 g Oral DAILY PRN    ondansetron (ZOFRAN ODT) tablet 4 mg  4 mg Oral Q8H PRN    Or    ondansetron (ZOFRAN) injection 4 mg  4 mg IntraVENous Q6H PRN    cholecalciferol (VITAMIN D3) (1000 Units /25 mcg) tablet 2,000 Units  2,000 Units Oral DAILY    glucose chewable tablet 16 g  16 g Oral PRN    glucagon (GLUCAGEN) injection 1 mg  1 mg IntraMUSCular PRN    dextrose (D50W) injection syrg 12.5-25 g  25-50 mL IntraVENous PRN      Physical Exam:    Temp (24hrs), Av.8 °F (36.6 °C), Min:97.2 °F (36.2 °C), Max:98.4 °F (36.9 °C)    Visit Vitals  BP (!) 117/46   Pulse (!) 46   Temp 97.5 °F (36.4 °C)   Resp 19   Ht 5' 6\" (1.676 m)   Wt 86.6 kg (190 lb 14.7 oz)   SpO2 99%   BMI 30.81 kg/m²        GEN: WD Morbidly obese, intubated and lying supine      HEENT: Unicteric. Pinky Angles ET/Oral tube in place  CHEST:  CTA anteroirly  CVS:RRR  ABD: Obese, fecal management in place  GIRISH: Montoya in place  EXT: No apparent swelling or redness on UE/LE joints. Skin: Dry and intact. No rash, no redness.  Bruising by right PIV site  CNS:Intubated/ non responsive     Microbiology  All Micro Results     Procedure Component Value Units Date/Time    CULTURE, BLOOD [[id number]] Collected: 21 1150    Order Status: Completed Specimen: Blood Updated: 12/14/21 0725     Special Requests: NO SPECIAL REQUESTS        Culture result: NO GROWTH AFTER 18 HOURS       CULTURE, BLOOD [[id number]] Collected: 12/05/21 1106    Order Status: Completed Specimen: Blood Updated: 12/11/21 0729     Special Requests: NO SPECIAL REQUESTS        Culture result: NO GROWTH 6 DAYS       CULTURE, BLOOD [[id number]] Collected: 12/05/21 1106    Order Status: Completed Specimen: Blood Updated: 12/11/21 0729     Special Requests: NO SPECIAL REQUESTS        Culture result: NO GROWTH 6 DAYS       STREP PNEUMO AG, URINE [[id number]] Collected: 12/08/21 2051    Order Status: Completed Specimen: Urine, random Updated: 12/09/21 1024     Strep pneumo Ag, urine Negative       LEGIONELLA PNEUMOPHILA AG, URINE [[id number]] Collected: 12/08/21 2051    Order Status: Completed Specimen: Urine, random Updated: 12/09/21 1024     Legionella Ag, urine Negative       LEGIONELLA PNEUMOPHILA AG, URINE [[id number]] Collected: 12/05/21 1730    Order Status: Canceled Specimen: Urine     STREP Jose Ramon Spikes, URINE [[id number]] Collected: 12/05/21 1730    Order Status: Canceled Specimen: Urine     RESPIRATORY VIRUS PANEL W/COVID-19, PCR [[id number]]  (Abnormal) Collected: 12/05/21 1110    Order Status: Completed Specimen: Nasopharyngeal Updated: 12/05/21 1633     Adenovirus Not detected        Coronavirus 229E Not detected        Coronavirus HKU1 Not detected        Coronavirus CVNL63 Not detected        Coronavirus OC43 Not detected        SARS-CoV-2, PCR Detected        Comment: CALLED TO AND CORRECTLY REPEATED BY:  DR Chase Robb [facility] ER ON [id number] AT 1626 HRS TO 1396.           Metapneumovirus Not detected        Rhinovirus and Enterovirus Not detected        Influenza A Not detected        Influenza A, subtype H1 Not detected        Influenza A, subtype H3 Not detected        INFLUENZA A H1N1 PCR Not detected        Influenza B Not detected        Parainfluenza 1 Not detected        Parainfluenza 2 Not detected Parainfluenza 3 Not detected        Parainfluenza virus 4 Not detected        RSV by PCR Not detected        B. parapertussis, PCR Not detected        Bordetella pertussis - PCR Not detected        Chlamydophila pneumoniae DNA, QL, PCR Not detected        Mycoplasma pneumoniae DNA, QL, PCR Not detected       COVID-19 RAPID TEST [[id number]]  (Abnormal) Collected: 12/05/21 1109    Order Status: Completed Specimen: Nasopharyngeal Updated: 12/05/21 1136     Specimen source Nasopharyngeal        COVID-19 rapid test Detected        Comment:      The specimen is POSITIVE for SARS-CoV-2, the novel coronavirus associated with COVID-19. This test has been authorized by the FDA under an Emergency Use Authorization (EUA) for use by authorized laboratories.         Fact sheet for Healthcare Providers: ConventionUpdate.co.nz  Fact sheet for Patients: ConventionUpdate.co.nz       Methodology: Isothermal Nucleic Acid Amplification  CALLED TO AND CORRECTLY REPEATED BY:  Aly Diaz RN ER, TO JAF AT 1136 12/5/2021                Lab results:    Chemistry  Recent Labs     12/14/21  0500 12/13/21  0640 12/12/21  0522   * 141* 226*    140 143   K 3.7 4.6 4.3    111 113*   CO2 27 23 24   BUN 67* 60* 69*   CREA 1.50* 1.04 1.15   CA 8.2* 8.6 8.9   AGAP 7 6 6   BUCR 45* 58* 60*   AP 89 93 81   TP 4.8* 5.3* 5.4*   ALB 1.9* 2.0* 2.0*   GLOB 2.9 3.3 3.4   AGRAT 0.7* 0.6* 0.6*       CBC w/ Diff  Recent Labs     12/14/21  0500 12/13/21  1000 12/12/21  0522   WBC 9.8 10.3 9.0   RBC 3.25* 3.35* 3.20*   HGB 10.5* 10.8* 10.4*   HCT 31.3* 32.7* 31.3*   PLT 28* 48* 55*   GRANS 91*  --  89*   LYMPH 3*  --  3*   EOS 0  --  0       Imaging: report posted below as per radiologist     CTA chest 12/5       No evidence of a pulmonary embolism or aortic dissection.     Moderate groundglass opacities and airspace disease seen bilaterally suggesting  atypical pneumonia and the findings are typical in appearance for Covid 19  pneumonia.     Mildly enlarged right hilar lymph node which may be reactive. Advise follow-up  to exclude any neoplastic or myeloproliferative process.     15 mm slightly complex right thyroid lobe nodule.  Advise nonemergent thyroid  ultrasound.

## 2021-12-15 NOTE — PROGRESS NOTES
1900 Bedside and Verbal shift change report given to JONY Goode (oncoming nurse) by MITZI Moreno RN (offgoing nurse). Report included the following information SBAR, Kardex, Intake/Output and Recent Results. 1930 Shift assessment completed, see flowsheet. Patient remains intubated, sedated, and restrained for patient safety. Augustus turned off at this time. Sputum pink tinged. Critical care continues. 2214 Patient appears uncomfortable and is agitated. Tachypneic, RR 40's, biting ETT, O2 sats 88%. Medicated with prn Fentanyl.

## 2021-12-15 NOTE — PROGRESS NOTES
[address] [facility] 54: [phone] ([id number])  [facility]: [address] [place] [address] Way: [phone]    Patient Name: Fiona Inman  YOB: 1950    Date of Follow-up Visit: 12/15/2021   Reason for Consult: goals of care   Requesting Provider: Dr Maximiano Siemens   Primary Care Physician: Jian Ramos DO      SUMMARY:   Fiona Inman is a 66y.o. year old with a past history of chronic A fib on  Coumadin, chronic leg pain, anxiety, DM with CKD and HTN  who was admitted on 12/5/2021 from home  with a diagnosis of COVID 19 pneumonia and acute respiratory failure  Current medical issues leading to Palliative Medicine involvement include: 66year old female who presented with COVID 19 pneumonia who is now orally intubated. Palliative Medicine is consulted for goals of care discussions. 12/15/2021:  Lying in bed, orally intubated and sedated. Vent day 8     PALLIATIVE DIAGNOSES:   1. Encounter for palliative care/Goals of care   2. COVID 19 pneumonia   3. Acute respiratory failure   4. Obesity        PLAN:   12/15/2021:  Seen through glass door of ICU to prevent disease exposure due to COVID-19. Patient is orally intubated and sedated. AMD on file reflects primary MPOA as spouse, Damaris Ott, who is unfortunately also hospitalized with COVID-19 and intubated. There is no secondary MPOA. According to the [facility] [employer], in this instance, legal decision making falls to a majority of the patient's 3 adult children, Laestrellacolleen Jeremias, Alexandre Gorman and Nate Francisco. Family meeting held today with the following family members:                [place] of patient - eldest,  and his spouse.                Baxter Alpers of patient - on facetime call   katherine Stack - granddaughter of patient and daughter of Alexandre Gorman- present in room               Nate Francisco- daughter of patient who lives in [place] - on phone     Also present were this Marisabel Richardson RN, Dr RADHA Medeiros Daily and [employer] for spiritual support. Medical update provided by Dr. Christi Gaines including patient's overall poor prognosis renal and cardiac status, platelet issues (today down to 18) and respiratory status including high vent settings and oxygen requirements. Reviewed AMD on file and Ms. Back's wishes with family which include the wish for DNR/DNI and to not be maintained on machines long term. All 3 children expressed understanding and agreement with these wishes and confirmed DNR/DNI status. Code status updated last night by ICU attending (appreciate assistance). Discussed with family that the ETT is only temporary in the short term and that plans will need to be made for removal, specifically the options of compassionate extubation or tracheostomy and continued ventilatory support. Discussed that patient is not a candidate for a tracheostomy at this time due to high vent settings. Family inquired about compassionate extubation and what that looks like. Explained the process of compassionate extubation including the use of medications to help with breathing, restlessness and pain and a less aggressive oxygen delivery system. Family members were appropriately tearful and daughter, Lender Councilman, was heard crying on the phone. Compassionate listening and emotional support provided. Family requested to be able to see patient via window outside. Family wishes to discuss privately and talk together this evening about all of the information provided and consider next steps. Palliative medicine phone number provided for any questions or needs. Goals of care are DNR in the event of cardiac arrest.    Please see below for previous conversations with the palliative medicine team:    1. Goals of care seen along Ms Goldie Linder NP patient seen behind glass doors due to COVID 19 pandemic to preserve PPE.  She is orally intubated and not able to participate her goals of care decisions. Her  is her legal next of kin , however he is hospitalized with COVID 23 and currently intubated. There is no AMD her legal medical decision makers are a majority of her children. The family has decided to have grand daughter Niranjan Rachel be point of contact. Niranjan Rachel can bring medical information back to family however she can not make medical decisions. Medical update given to Niranjan Rachel. Goals of care remain full code with full interventions. 2. COVID 19 pneumonia; ID and pulmonology on board and managing on IVAB  3. Acute resp failure currently orally intubated with PEEP of 8 FIO2 of 70%   4. Obesity class 3 BMI 40.0 to 44.9   5. Initial consult note routed to primary continuity provider  6. Communicated plan of care with: Palliative IDT, grand daughter     Patient/Health Care Proxy Stated Goals: Prolong life      TREATMENT PREFERENCES:   Code Status: DNR    Advance Care Planning:  [] [facility] Interdisciplinary Team has updated the ACP Navigator with [address] and Patient Capacity    Primary Decision [facility] ([address]):   Primary Decision MakerGlenford Alpers  [phone]   [facility] of children are secondary medical decision makers  Medical Interventions: Full interventions           Other:  As far as possible, the palliative care team has discussed with patient / health care proxy about goals of care / treatment preferences for patient.      HISTORY:     History obtained from: chart     CHIEF COMPLAINT: COVID 19 pneumonia     HPI/SUBJECTIVE:    The patient is:   [] Verbal and participatory  [x] Non-participatory due to:  Orally intubated and sedated   Please see summary     Clinical Pain Assessment (nonverbal scale for nonverbal patients): Clinical Pain Assessment  Severity: 0     Activity (Movement): Lying quietly, normal position    Duration: for how long has pt been experiencing pain (e.g., 2 days, 1 month, years)  Frequency: how often pain is an issue (e.g., several times per day, once every few days, constant)     FUNCTIONAL ASSESSMENT:     Palliative Performance Scale (PPS):  PPS: 30    ECOG  ECOG Status : Completely disabled     PSYCHOSOCIAL/SPIRITUAL SCREENING:      Any spiritual / Hinduism concerns: unable to assess for patient   [] Yes /  [] No    Caregiver Burnout:  [] Yes /  [] No /  [x] No Caregiver Present      Anticipatory grief assessment: unable to assess for patient   [] Normal  / [] Maladaptive        REVIEW OF SYSTEMS:     Positive and pertinent negative findings in ROS are noted above in HPI. The following systems were [] reviewed / [x] unable to be reviewed as noted in HPI  Other findings are noted below. Systems: constitutional, ears/nose/mouth/throat, respiratory, gastrointestinal, genitourinary, musculoskeletal, integumentary, neurologic, psychiatric, endocrine. Positive findings noted below. Modified ESAS Completed by: provider           Pain: 0           Dyspnea: 0           Stool Occurrence(s): 1        PHYSICAL EXAM:     Wt Readings from Last 3 Encounters:   12/12/21 86.6 kg (190 lb 14.7 oz)   11/30/21 114.3 kg (252 lb)   07/19/21 108 kg (238 lb)     Blood pressure (!) 126/40, pulse (!) 59, temperature 98.2 °F (36.8 °C), resp. rate 20, height 5' 6\" (1.676 m), weight 86.6 kg (190 lb 14.7 oz), SpO2 95 %.   Pain:  Pain Scale 1: FLACC  Pain Intensity 1: 0     Pain Location 1: Leg  Pain Orientation 1: Lower     Pain Intervention(s) 1: Repositioned  Last bowel movement: none recorded    Constitutional: lying in bed, orally intubated and sedated, vent day 8, in NAD   Eyes: closed   ENMT: orally intubated   Cardiovascular: RRR   Respiratory: ventilator supported   Gastrointestinal: soft   Skin: warm, dry  Neurologic: sedated        HISTORY:     Active Problems:    Hypertension ()      Paroxysmal A-fib (HCC) ()      Pneumonia due to COVID-19 virus (12/5/2021)      Thrombocytopenia ([facility] (12/5/2021)      Hypoxia (12/5/2021)      DM due to underlying condition with diabetic nephropathy ([facility] (12/5/2021)      E-coli UTI (12/5/2021)      Class 3 severe obesity with body mass index (BMI) of 40.0 to 44.9 in adult [facility]) (12/5/2021)      Acute respiratory failure with hypoxemia ([facility] (12/6/2021)      Pneumonia due to severe acute respiratory syndrome coronavirus 2 (SARS-CoV-2) (12/7/2021)      Stage 3a chronic kidney disease ([facility] (12/7/2021)      Anemia (12/7/2021)      Past Medical History:   Diagnosis Date    A-fib ([facility]     Anxiety     CAD (coronary artery disease)     Chronic kidney disease     Diabetes ([facility]     borderline    High cholesterol     Hypertension     Kidney stones     Psychiatric disorder     anxiety      Past Surgical History:   Procedure Laterality Date    HX CHOLECYSTECTOMY      HX HEART CATHETERIZATION      HX HYSTERECTOMY      HX ORTHOPAEDIC      bilateral knee surgery    HX ORTHOPAEDIC      right elbow    HX UROLOGICAL      stent placement    [place] CARDIAC SURG PROCEDURE UNLIST      stent x 1      Family History   Problem Relation Age of Onset    Breast Cancer Maternal Aunt      History reviewed, no pertinent family history.   Social History     Tobacco Use    Smoking status: Never Smoker    Smokeless tobacco: Never Used   Substance Use Topics    Alcohol use: No     No Known Allergies   Current Facility-Administered Medications   Medication Dose Route Frequency    Warfarin - Hold warfarin today 12-15-21   Other ONCE    nystatin (MYCOSTATIN) 100,000 unit/gram powder   Topical BID    piperacillin-tazobactam (ZOSYN) 3.375 g in 0.9% sodium chloride (MBP/ADV) 100 mL MBP  3.375 g IntraVENous Q8H    PHENYLephrine (SHANIKA-SYNEPHRINE) 30 mg in 0.9% sodium chloride 250 mL infusion   mcg/min IntraVENous TITRATE    [Held by provider] furosemide (LASIX) injection 20 mg  20 mg IntraVENous BID    dexamethasone (DECADRON) 4 mg/mL injection 6 mg  6 mg IntraVENous DAILY    insulin glargine (LANTUS) injection 10 Units  10 Units SubCUTAneous QHS    pantoprazole (PROTONIX) 40 mg in 0.9% sodium chloride 10 mL injection  40 mg IntraVENous DAILY    [Held by provider] dextrose 5% infusion  50 mL/hr IntraVENous CONTINUOUS    midazolam (VERSED) injection 2 mg  2 mg IntraVENous Q3H PRN    fentaNYL citrate (PF) injection 50 mcg  50 mcg IntraVENous Q4H PRN    chlorhexidine (PERIDEX) 0.12 % mouthwash 10 mL  10 mL Oral Q12H    fentaNYL (PF) 900 mcg/30 ml infusion soln  0-200 mcg/hr IntraVENous TITRATE    midazolam in normal saline (VERSED) 1 mg/mL infusion  2-10 mg/hr IntraVENous TITRATE    insulin lispro (HUMALOG) injection   SubCUTAneous Q6H    melatonin (rapid dissolve) tablet 10 mg  10 mg Oral QHS    ascorbic acid (vitamin C) (VITAMIN C) tablet 500 mg  500 mg Oral BID    zinc sulfate (ZINCATE) 50 mg zinc (220 mg) capsule 1 Capsule  1 Capsule Oral DAILY    Warfarin - Pharmacy to Dose  1 Each Other Rx Dosing/Monitoring    HYDROcodone-acetaminophen (NORCO)  mg tablet 1 Tablet  1 Tablet Oral Q6H PRN    isosorbide mononitrate ER (IMDUR) tablet 30 mg  30 mg Oral DAILY    nitroglycerin (NITROSTAT) tablet 0.4 mg  0.4 mg SubLINGual Q5MIN PRN    sodium chloride (NS) flush 5-40 mL  5-40 mL IntraVENous Q8H    sodium chloride (NS) flush 5-40 mL  5-40 mL IntraVENous PRN    acetaminophen (TYLENOL) tablet 650 mg  650 mg Oral Q6H PRN    Or    acetaminophen (TYLENOL) suppository 650 mg  650 mg Rectal Q6H PRN    polyethylene glycol (MIRALAX) packet 17 g  17 g Oral DAILY PRN    ondansetron (ZOFRAN ODT) tablet 4 mg  4 mg Oral Q8H PRN    Or    ondansetron (ZOFRAN) injection 4 mg  4 mg IntraVENous Q6H PRN    cholecalciferol (VITAMIN D3) (1000 Units /25 mcg) tablet 2,000 Units  2,000 Units Oral DAILY    glucose chewable tablet 16 g  16 g Oral PRN    glucagon (GLUCAGEN) injection 1 mg  1 mg IntraMUSCular PRN    dextrose (D50W) injection syrg 12.5-25 g  25-50 mL IntraVENous PRN        LAB AND IMAGING FINDINGS:     Lab Results   Component Value Date/Time    WBC 10.0 12/15/2021 06:35 AM    HGB 10.7 (L) 12/15/2021 06:35 AM    PLATELET 18 (LL) 38/60/5915 06:35 AM     Lab Results   Component Value Date/Time    Sodium 139 12/15/2021 06:35 AM    Potassium 4.3 12/15/2021 06:35 AM    Chloride 105 12/15/2021 06:35 AM    CO2 27 12/15/2021 06:35 AM    BUN 68 (H) 12/15/2021 06:35 AM    Creatinine 1.67 (H) 12/15/2021 06:35 AM    Calcium 8.2 (L) 12/15/2021 06:35 AM    Magnesium 2.0 12/05/2021 11:08 AM    Phosphorus 2.5 11/13/2014 04:06 AM      Lab Results   Component Value Date/Time    Alk. phosphatase 92 12/15/2021 06:35 AM    Protein, total 5.0 (L) 12/15/2021 06:35 AM    Albumin 2.0 (L) 12/15/2021 06:35 AM    Globulin 3.0 12/15/2021 06:35 AM     Lab Results   Component Value Date/Time    INR 1.5 (H) 12/15/2021 06:35 AM    Prothrombin time 17.8 (H) 12/15/2021 06:35 AM    aPTT 67.2 (H) 12/06/2021 06:05 AM      Lab Results   Component Value Date/Time    Ferritin 836 (H) 12/13/2021 06:44 AM      No results found for: PH, PCO2, PO2  No components found for: [place]   Lab Results   Component Value Date/Time    CK 63 12/13/2021 11:50 AM    CK - MB <1.0 12/13/2021 11:50 AM              Total time: 35 minutes      > 50% counseling / coordination: yes, family, nursing, MD     Prolonged service was provided for  []30 min   []75 min in face to face time in the presence of the patient, spent as noted above.   Time Start:   Time End:

## 2021-12-15 NOTE — PROGRESS NOTES
CM notes patient remains vented in ICU on Covid isolation. Patient not stable enough to transfer out of an acute care setting. CM to continue to monitor and remain available. As appropriate. Care Management Interventions  PCP Verified by CM:  Yes  Transition of Care Consult (CM Consult): Discharge Planning  Health Maintenance Reviewed: Yes  Support Systems: Spouse/Significant Other  Confirm Follow Up Transport: Family  The Plan for Transition of Care is Related to the Following Treatment Goals : HH and physician follow up vs SNF  The Patient and/or Patient Representative was Provided with a Choice of Provider and Agrees with the Discharge Plan?: Yes  Name of the Patient Representative Who was Provided with a Choice of Provider and Agrees with the Discharge Plan: pt  Freedom of Choice List was Provided with Basic Dialogue that Supports the Patient's Individualized Plan of Care/Goals, Treatment Preferences and Shares the Quality Data Associated with the Providers?: Yes  Discharge Location  Discharge Placement:  [facility] with physician f/u and possible home O2 vesus SNF)

## 2021-12-15 NOTE — PROGRESS NOTES
Assumed pt care approx 0730. Assessments performed, see flow sheet. Pt assessed. Pt remains intubated/lightly sedated w/bilat wrist restraints in place. Oral and darek care performed, pt repositioned. Pt opens eyes to voice and when requested. Will lightly squeeze hands and nods head seemingly appropriately to y/n questions. Pt denies pain, no distress noted. Pt reassessed, oral care performed. No changes noted. Blinds opened for family window visit. Pt reassessed, oral care performed. No changes noted. Blinds closed. Vss.     No distress noted, vss.

## 2021-12-15 NOTE — PROGRESS NOTES
Problem: Airway Clearance - Ineffective  Goal: Achieve or maintain patent airway  Outcome: Progressing Towards Goal     Problem: Gas Exchange - Impaired  Goal: Absence of hypoxia  Outcome: Progressing Towards Goal  Goal: Promote optimal lung function  Outcome: Progressing Towards Goal     Problem: Breathing Pattern - Ineffective  Goal: Ability to achieve and maintain a regular respiratory rate  Outcome: Progressing Towards Goal     Problem: Body Temperature -  Risk of, Imbalanced  Goal: Ability to maintain a body temperature within defined limits  Outcome: Progressing Towards Goal  Goal: Will regain or maintain usual level of consciousness  Outcome: Progressing Towards Goal  Goal: Complications related to the disease process, condition or treatment will be avoided or minimized  Outcome: Progressing Towards Goal     Problem: Isolation Precautions - Risk of Spread of Infection  Goal: Prevent transmission of infectious organism to others  Outcome: Progressing Towards Goal     Problem: Nutrition Deficits  Goal: Optimize nutrtional status  Outcome: Progressing Towards Goal     Problem: Risk for Fluid Volume Deficit  Goal: Maintain normal heart rhythm  Outcome: Progressing Towards Goal  Goal: Maintain absence of muscle cramping  Outcome: Progressing Towards Goal  Goal: Maintain normal serum potassium, sodium, calcium, phosphorus, and pH  Outcome: Progressing Towards Goal     Problem: Loneliness or Risk for Loneliness  Goal: Demonstrate positive use of time alone when socialization is not possible  Outcome: Progressing Towards Goal     Problem: Fatigue  Goal: Verbalize increase energy and improved vitality  Outcome: Progressing Towards Goal     Problem: Patient Education: Go to Patient Education Activity  Goal: Patient/Family Education  Outcome: Progressing Towards Goal     Problem: Falls - Risk of  Goal: *Absence of Falls  Description: Document Estephanie Fall Risk and appropriate interventions in the flowsheet.   Outcome: Progressing Towards Goal  Note: Fall Risk Interventions:  Mobility Interventions: Bed/chair exit alarm, Communicate number of staff needed for ambulation/transfer, Mechanical lift, Strengthening exercises (ROM-active/passive)    Mentation Interventions: Adequate sleep, hydration, pain control, Bed/chair exit alarm, Door open when patient unattended, More frequent rounding, Reorient patient, Room close to nurse's station, Toileting rounds, Update white board, Evaluate medications/consider consulting pharmacy    Medication Interventions: Bed/chair exit alarm, Evaluate medications/consider consulting pharmacy    Elimination Interventions: Bed/chair exit alarm, Patient to call for help with toileting needs, Toileting schedule/hourly rounds              Problem: Patient Education: Go to Patient Education Activity  Goal: Patient/Family Education  Outcome: Progressing Towards Goal     Problem: Pressure Injury - Risk of  Goal: *Prevention of pressure injury  Description: Document Ru Scale and appropriate interventions in the flowsheet. Outcome: Progressing Towards Goal  Note: Pressure Injury Interventions:  Sensory Interventions: Assess changes in LOC, Assess need for specialty bed, Avoid rigorous massage over bony prominences, Check visual cues for pain, Keep linens dry and wrinkle-free, Minimize linen layers, Pressure redistribution bed/mattress (bed type), Turn and reposition approx.  every two hours (pillows and wedges if needed)    Moisture Interventions: Absorbent underpads, Apply protective barrier, creams and emollients, Assess need for specialty bed, Check for incontinence Q2 hours and as needed, Internal/External fecal devices, Internal/External urinary devices, Limit adult briefs, Minimize layers    Activity Interventions: Assess need for specialty bed, Pressure redistribution bed/mattress(bed type)    Mobility Interventions: Assess need for specialty bed, Float heels, HOB 30 degrees or less, Pressure redistribution bed/mattress (bed type), Turn and reposition approx.  every two hours(pillow and wedges)    Nutrition Interventions: Document food/fluid/supplement intake, Offer support with meals,snacks and hydration    Friction and Shear Interventions: Apply protective barrier, creams and emollients, Foam dressings/transparent film/skin sealants, HOB 30 degrees or less, Lift sheet, Minimize layers, Transferring/repositioning devices                Problem: Patient Education: Go to Patient Education Activity  Goal: Patient/Family Education  Outcome: Progressing Towards Goal     Problem: Ventilator Management  Goal: *Adequate oxygenation and ventilation  Outcome: Progressing Towards Goal  Goal: *Patient maintains clear airway/free of aspiration  Outcome: Progressing Towards Goal  Goal: *Absence of infection signs and symptoms  Outcome: Progressing Towards Goal  Goal: *Normal spontaneous ventilation  Outcome: Progressing Towards Goal     Problem: Patient Education: Go to Patient Education Activity  Goal: Patient/Family Education  Outcome: Progressing Towards Goal     Problem: Non-Violent Restraints  Goal: Removal from restraints as soon as assessed to be safe  Outcome: Progressing Towards Goal  Goal: No harm/injury to patient while restraints in use  Outcome: Progressing Towards Goal  Goal: Patient's dignity will be maintained  Outcome: Progressing Towards Goal  Goal: Patient Interventions  Outcome: Progressing Towards Goal

## 2021-12-15 NOTE — PROGRESS NOTES
[facility]  Pulmonary, Critical Care, and Sleep Medicine    Name: Mary Mccurdy MRN: [id number]   : 1950 Hospital: [facility] [place]    Date: 12/15/2021  Room: [address] [place] [address] Note                                              Consult requesting physician: Dr. Juliana Patel  Reason for Consult: Severe Covid pneumonia with respiratory failure      Subjective/History of Present Illness:     Patient is a 66 y.o. female with PMHx significant for chronic A. fib on Coumadin therapy, diabetes mellitus, chronic kidney disease, hypertension, thyroid nodule was admitted to the hospital on 2021 with Covid infection. She has not been vaccinated for Covid infection. Patient progressed to worsening respiratory failure and hypoxemia. During admission, patient had declined intubation. Today, her  reported CODE STATUS and wanted patient to be intubated. Patient underwent COVID-19 intubation in the telemetry unit, and subsequently transferred to the ICU.    12/15/2021  Patient in ICU. Intubated  sedated no improvement in hypoxemic respiratory failure had to increase PEEP to 8 and FiO2 to 60% today. Family meeting one last night with me ,  today second with palliative and me   Now DNR  Severe thrombocytopenia not improving  PA  On low dose sedation periods of following commands     Review of Systems:  Limited due to patient condition    Patient came to ER on  with right flank pain, and right basal pneumonia; was sent home with antibiotic therapyAugmentin and doxycycline. Urine culture ansensitive E. coli.       No Known Allergies   Past Medical History:   Diagnosis Date    A-fib ([facility] [address]     Anxiety     CAD (coronary artery disease)     Chronic kidney disease     Diabetes ([facility] [address]     borderline    High cholesterol     Hypertension     Kidney stones     Psychiatric disorder     anxiety      Past Surgical History:   Procedure Laterality Date    HX CHOLECYSTECTOMY      HX HEART CATHETERIZATION      HX HYSTERECTOMY      HX ORTHOPAEDIC      bilateral knee surgery    HX ORTHOPAEDIC      right elbow    HX UROLOGICAL      stent placement    [place] CARDIAC SURG PROCEDURE UNLIST      stent x 1      Social History     Tobacco Use    Smoking status: Never Smoker    Smokeless tobacco: Never Used   Substance Use Topics    Alcohol use: No      Family History   Problem Relation Age of Onset    Breast Cancer Maternal Aunt       Prior to Admission medications    Medication Sig Start Date End Date Taking? Authorizing Provider   allopurinoL (ZYLOPRIM) 300 mg tablet Take 300 mg by mouth daily. Provider, Historical   torsemide (DEMADEX) 100 mg tablet Take 50 mg by mouth daily. Provider, Historical   HYDROcodone-acetaminophen (NORCO)  mg tablet Take 1 Tab by mouth every eight (8) hours as needed for Pain. Provider, Historical   metoprolol succinate (TOPROL-XL) 100 mg tablet Take 150 mg by mouth daily. Provider, Historical   warfarin (Coumadin) 2 mg tablet Take 4 mg by mouth daily. Provider, Historical   isosorbide mononitrate ER (IMDUR) 30 mg tablet Take 30 mg by mouth daily. Provider, Historical   magnesium oxide (MAG-OX) 400 mg tablet Take 400 mg by mouth daily. Provider, Historical   pantoprazole (PROTONIX) 40 mg tablet Take 40 mg by mouth daily. Provider, Historical   ergocalciferol (Vitamin D2) 1,250 mcg (50,000 unit) capsule Take 50,000 Units by mouth. Provider, Historical   docusate sodium (Colace) 100 mg capsule Take 100 mg by mouth two (2) times a day. Provider, Historical   nitroglycerin (NITROSTAT) 0.4 mg SL tablet 0.4 mg by SubLINGual route every five (5) minutes as needed for Chest Pain. Up to 3 doses. Provider, Historical   hydrALAZINE (APRESOLINE) 50 mg tablet Take 25 mg by mouth three (3) times daily. Layla Alex MD   aspirin delayed-release 81 mg tablet Take  by mouth daily.     Layla Alex MD   folic acid (FOLVITE) 1 mg tablet Take  by mouth daily. Isai, MD Layla   potassium chloride (K-DUR, KLOR-CON) 20 mEq tablet Take  by mouth two (2) times a day.     Layla Alex MD     Current Facility-Administered Medications   Medication Dose Route Frequency    Warfarin - Hold warfarin today 12-15-21   Other ONCE    nystatin (MYCOSTATIN) 100,000 unit/gram powder   Topical BID    piperacillin-tazobactam (ZOSYN) 3.375 g in 0.9% sodium chloride (MBP/ADV) 100 mL MBP  3.375 g IntraVENous Q8H    PHENYLephrine (SHANIKA-SYNEPHRINE) 30 mg in 0.9% sodium chloride 250 mL infusion   mcg/min IntraVENous TITRATE    [Held by provider] furosemide (LASIX) injection 20 mg  20 mg IntraVENous BID    dexamethasone (DECADRON) 4 mg/mL injection 6 mg  6 mg IntraVENous DAILY    insulin glargine (LANTUS) injection 10 Units  10 Units SubCUTAneous QHS    pantoprazole (PROTONIX) 40 mg in 0.9% sodium chloride 10 mL injection  40 mg IntraVENous DAILY    [Held by provider] dextrose 5% infusion  50 mL/hr IntraVENous CONTINUOUS    chlorhexidine (PERIDEX) 0.12 % mouthwash 10 mL  10 mL Oral Q12H    fentaNYL (PF) 900 mcg/30 ml infusion soln  0-200 mcg/hr IntraVENous TITRATE    midazolam in normal saline (VERSED) 1 mg/mL infusion  2-10 mg/hr IntraVENous TITRATE    insulin lispro (HUMALOG) injection   SubCUTAneous Q6H    melatonin (rapid dissolve) tablet 10 mg  10 mg Oral QHS    ascorbic acid (vitamin C) (VITAMIN C) tablet 500 mg  500 mg Oral BID    zinc sulfate (ZINCATE) 50 mg zinc (220 mg) capsule 1 Capsule  1 Capsule Oral DAILY    Warfarin - Pharmacy to Dose  1 Each Other Rx Dosing/Monitoring    isosorbide mononitrate ER (IMDUR) tablet 30 mg  30 mg Oral DAILY    sodium chloride (NS) flush 5-40 mL  5-40 mL IntraVENous Q8H    cholecalciferol (VITAMIN D3) (1000 Units /25 mcg) tablet 2,000 Units  2,000 Units Oral DAILY         Objective:   Vital Signs:    Visit Vitals  BP (!) 126/40   Pulse (!) 59   Temp 98.2 °F (36.8 °C)   Resp 20   Ht 5' 6\" (1.676 m)   Wt 86.6 kg (190 lb 14.7 oz)   SpO2 95%   BMI 30.81 kg/m²       O2 Device: Endotracheal tube,Ventilator   O2 Flow Rate (L/min): 40 l/min   Temp (24hrs), Av.5 °F (36.4 °C), Min:90.1 °F (32.3 °C), Max:98.3 °F (36.8 °C)       Intake/Output:   Last shift:      12/15 07 - 12/15 1900  In: 200   Out: 200 [Urine:200]    Last 3 shifts: 1901 - 12/15 07  In: 4746.2 [I.V.:2486.2]  Out: 0401 [Urine:4100; Drains:350]      Intake/Output Summary (Last 24 hours) at 12/15/2021 1546  Last data filed at 12/15/2021 1226  Gross per 24 hour   Intake 2188.6 ml   Output 2350 ml   Net -161.4 ml       Last 3 Recorded Weights in this Encounter    21 1827 21 0650 21 1910   Weight: 114.3 kg (252 lb) 86.6 kg (190 lb 14.7 oz) 86.6 kg (190 lb 14.7 oz)       ABG:    Lab Results   Component Value Date/Time    PHI 7.45 12/15/2021 05:24 AM    PCO2I 37.8 12/15/2021 05:24 AM    PO2I 66 (L) 12/15/2021 05:24 AM    HCO3I 26.2 (H) 12/15/2021 05:24 AM    FIO2I 60 12/15/2021 05:24 AM     Ventilator Mode: Assist control  Respiratory Rate  Back-Up Rate: 18  Insp Time (sec): 1.16 sec  I:E Ratio: 1:1.87  Ventilator Volumes  Vt Set (ml): 400 ml  Vt Exhaled (Machine Breath) (ml): 414 ml  Ve Observed (l/min): 7.76 l/min  Ventilator Pressures  PIP Observed (cm H2O): 22 cm H2O  Plateau Pressure (cm H2O): 25 cm H2O  MAP (cm H2O): 13  PEEP/VENT (cm H2O): 10 cm H20 (increased to 10)  Auto PEEP Observed (cm H2O): 0 cm H2O       Physical Exam: Limitations in exam due to full PPE requirements. Patient remains intubated and sedated; arousable;    HEENT: pupils not dilated, reactive, no scleral jaundice, moist oral mucosa, no nasal drainage; neck supple  Neck: No adenopathy or thyroid swelling  OGno bloody aspirates or respiratory secretions noted  Orogastric tube tube feeding   CVS: Normal heart sounds; S1 and S2 with no murmur; telemetrysinus bradycardia; JVD not elevated   RS: Moderate air entry bilaterally; mild crckles at the bsaes ; no wheezing or rhonchi; breath sounds are symmetrical; no tachypneia or distress while on ventilator support  Abd: Soft, no tenderness, no distention, no guarding or rigidity; bowel sounds present; no hepatosplenomegaly  Neuro: Intubated and sedated; limited exam  Extrm: No peripheral leg edema; no focal swelling or clubbing  Skin: no rash  Lymphatic: no cervical or supraclavicular adenopathy  Vasc: DPs palpable both feet  MS: No joint swelling      Data:       Recent Results (from the past 24 hour(s))   GLUCOSE, POC    Collection Time: 12/14/21  5:27 PM   Result Value Ref Range    Glucose (POC) 172 (H) 70 - 110 mg/dL   GLUCOSE, POC    Collection Time: 12/14/21 11:58 PM   Result Value Ref Range    Glucose (POC) 136 (H) 70 - 110 mg/dL   BLOOD GAS, ARTERIAL POC    Collection Time: 12/15/21  5:24 AM   Result Value Ref Range    Device: ADULT VENT      FIO2 (POC) 60 %    pH (POC) 7.45 7.35 - 7.45      pCO2 (POC) 37.8 35.0 - 45.0 MMHG    pO2 (POC) 66 (L) 80 - 100 MMHG    HCO3 (POC) 26.2 (H) 22 - 26 MMOL/L    sO2 (POC) 93.7 92 - 97 %    Base excess (POC) 2.1 mmol/L    Mode ASSIST CONTROL      Tidal volume 400 ml    Set Rate 18 bpm    PEEP/CPAP (POC) 8 cmH2O    Mean Airway Pressure 13 cmH2O    PIP (POC) 23      Allens test (POC) NOT APPLICABLE      Inspiratory Time 1.16 sec    Total resp.  rate 19      Site RIGHT RADIAL      Specimen type (POC) ARTERIAL      Performed by Roma LOCKE, POC    Collection Time: 12/15/21  5:27 AM   Result Value Ref Range    Glucose (POC) 144 (H) 70 - 110 mg/dL   PROTHROMBIN TIME + INR    Collection Time: 12/15/21  6:35 AM   Result Value Ref Range    Prothrombin time 17.8 (H) 11.5 - 15.2 sec    INR 1.5 (H) 0.8 - 1.2     D DIMER    Collection Time: 12/15/21  6:35 AM   Result Value Ref Range    D DIMER 2.39 (H) <0.46 ug/ml(FEU)   CBC WITH AUTOMATED DIFF    Collection Time: 12/15/21  6:35 AM   Result Value Ref Range    WBC 10.0 4.6 - 13.2 K/uL    RBC 3.32 (L) 4.20 - 5.30 M/uL    HGB 10.7 (L) 12.0 - 16.0 g/dL    HCT 31.7 (L) 35.0 - 45.0 %    MCV 95.5 78.0 - 100.0 FL    MCH 32.2 24.0 - 34.0 PG    MCHC 33.8 31.0 - 37.0 g/dL    RDW 13.6 11.6 - 14.5 %    PLATELET 18 (LL) 167 - 420 K/uL    MPV 12.4 (H) 9.2 - 11.8 FL    NRBC 0.0 0  WBC    ABSOLUTE NRBC 0.00 0.00 - 0.01 K/uL    NEUTROPHILS 91 (H) 40 - 73 %    LYMPHOCYTES 3 (L) 21 - 52 %    MONOCYTES 3 3 - 10 %    EOSINOPHILS 1 0 - 5 %    BASOPHILS 0 0 - 2 %    IMMATURE GRANULOCYTES 2 (H) 0.0 - 0.5 %    ABS. NEUTROPHILS 9.1 (H) 1.8 - 8.0 K/UL    ABS. LYMPHOCYTES 0.3 (L) 0.9 - 3.6 K/UL    ABS. MONOCYTES 0.3 0.05 - 1.2 K/UL    ABS. EOSINOPHILS 0.1 0.0 - 0.4 K/UL    ABS. BASOPHILS 0.0 0.0 - 0.1 K/UL    ABS. IMM. GRANS. 0.2 (H) 0.00 - 0.04 K/UL    DF AUTOMATED     METABOLIC PANEL, COMPREHENSIVE    Collection Time: 12/15/21  6:35 AM   Result Value Ref Range    Sodium 139 136 - 145 mmol/L    Potassium 4.3 3.5 - 5.5 mmol/L    Chloride 105 100 - 111 mmol/L    CO2 27 21 - 32 mmol/L    Anion gap 7 3.0 - 18 mmol/L    Glucose 135 (H) 74 - 99 mg/dL    BUN 68 (H) 7.0 - 18 MG/DL    Creatinine 1.67 (H) 0.6 - 1.3 MG/DL    BUN/Creatinine ratio 41 (H) 12 - 20      GFR est AA 37 (L) >60 ml/min/1.73m2    GFR est non-AA 30 (L) >60 ml/min/1.73m2    Calcium 8.2 (L) 8.5 - 10.1 MG/DL    Bilirubin, total 0.7 0.2 - 1.0 MG/DL    ALT (SGPT) 40 13 - 56 U/L    AST (SGOT) 28 10 - 38 U/L    Alk.  phosphatase 92 45 - 117 U/L    Protein, total 5.0 (L) 6.4 - 8.2 g/dL    Albumin 2.0 (L) 3.4 - 5.0 g/dL    Globulin 3.0 2.0 - 4.0 g/dL    A-G Ratio 0.7 (L) 0.8 - 1.7     GLUCOSE, POC    Collection Time: 12/15/21 11:54 AM   Result Value Ref Range    Glucose (POC) 165 (H) 70 - 110 mg/dL         Chemistry Recent Labs     12/15/21  0635 12/14/21  0500 12/13/21  0640   * 162* 141*    140 140   K 4.3 3.7 4.6    106 111   CO2 27 27 23   BUN 68* 67* 60*   CREA 1.67* 1.50* 1.04   CA 8.2* 8.2* 8.6   AGAP 7 7 6   BUCR 41* 45* 58*   AP 92 89 93   TP 5.0* 4.8* 5.3*   ALB 2. 0* 1.9* 2.0*   GLOB 3.0 2.9 3.3   AGRAT 0.7* 0.7* 0.6*        Lactic Acid Lactic acid   Date Value Ref Range Status   11/11/2014 0.9 0.4 - 2.0 MMOL/L Final     No results for input(s): LAC in the last 72 hours. Liver Enzymes Protein, total   Date Value Ref Range Status   12/15/2021 5.0 (L) 6.4 - 8.2 g/dL Final     Albumin   Date Value Ref Range Status   12/15/2021 2.0 (L) 3.4 - 5.0 g/dL Final     Globulin   Date Value Ref Range Status   12/15/2021 3.0 2.0 - 4.0 g/dL Final     A-G Ratio   Date Value Ref Range Status   12/15/2021 0.7 (L) 0.8 - 1.7   Final     Alk.  phosphatase   Date Value Ref Range Status   12/15/2021 92 45 - 117 U/L Final     Recent Labs     12/15/21  0635 12/14/21  0500 12/13/21  0640   TP 5.0* 4.8* 5.3*   ALB 2.0* 1.9* 2.0*   GLOB 3.0 2.9 3.3   AGRAT 0.7* 0.7* 0.6*   AP 92 89 93        CBC w/Diff Recent Labs     12/15/21  0635 12/14/21  0500 12/13/21  1000   WBC 10.0 9.8 10.3   RBC 3.32* 3.25* 3.35*   HGB 10.7* 10.5* 10.8*   HCT 31.7* 31.3* 32.7*   PLT 18* 28* 48*   GRANS 91* 91*  --    LYMPH 3* 3*  --    EOS 1 0  --         Cardiac Enzymes No results found for: CPK, CK, CKMMB, CKMB, RCK3, CKMBT, CKNDX, CKND1, MANE, TROPT, TROIQ, BANDAR, TROPT, TNIPOC, BNP, BNPP     BNP No results found for: BNP, BNPP, XBNPT     Coagulation Recent Labs     12/15/21  0635 12/14/21  0500 12/13/21  1000   PTP 17.8* 22.4* 24.5*   INR 1.5* 2.1* 2.3*         Thyroid  Lab Results   Component Value Date/Time    TSH 0.19 (L) 12/08/2021 03:00 AM       No results found for: T4     Urinalysis Lab Results   Component Value Date/Time    Color YELLOW 12/05/2021 03:02 PM    Appearance CLEAR 12/05/2021 03:02 PM    Specific gravity 1.014 12/05/2021 03:02 PM    pH (UA) 5.0 12/05/2021 03:02 PM    Protein 100 (A) 12/05/2021 03:02 PM    Glucose Negative 12/05/2021 03:02 PM    Ketone Negative 12/05/2021 03:02 PM    Bilirubin Negative 12/05/2021 03:02 PM    Urobilinogen 0.2 12/05/2021 03:02 PM    Nitrites Negative 12/05/2021 03:02 PM Leukocyte Esterase TRACE (A) 12/05/2021 03:02 PM    Epithelial cells 2+ 12/05/2021 03:02 PM    Bacteria FEW (A) 12/05/2021 03:02 PM    WBC 4 to 10 12/05/2021 03:02 PM    RBC 0 to 3 12/05/2021 03:02 PM          Culture data during this hospitalization.    All Micro Results     Procedure Component Value Units Date/Time    CULTURE, BLOOD [[id number]] Collected: 12/13/21 1150    Order Status: Completed Specimen: Blood Updated: 12/15/21 0824     Special Requests: NO SPECIAL REQUESTS        Culture result: NO GROWTH 2 DAYS       CULTURE, BLOOD [[id number]] Collected: 12/05/21 1106    Order Status: Completed Specimen: Blood Updated: 12/11/21 0729     Special Requests: NO SPECIAL REQUESTS        Culture result: NO GROWTH 6 DAYS       CULTURE, BLOOD [[id number]] Collected: 12/05/21 1106    Order Status: Completed Specimen: Blood Updated: 12/11/21 0729     Special Requests: NO SPECIAL REQUESTS        Culture result: NO GROWTH 6 DAYS       STREP PNEUMO AG, URINE [[id number]] Collected: 12/08/21 2051    Order Status: Completed Specimen: Urine, random Updated: 12/09/21 1024     Strep pneumo Ag, urine Negative       LEGIONELLA PNEUMOPHILA AG, URINE [[id number]] Collected: 12/08/21 2051    Order Status: Completed Specimen: Urine, random Updated: 12/09/21 1024     Legionella Ag, urine Negative       LEGIONELLA PNEUMOPHILA AG, URINE [[id number]] Collected: 12/05/21 1730    Order Status: Canceled Specimen: Urine     STREP Antelmo Shay, URINE [[id number]] Collected: 12/05/21 1730    Order Status: Canceled Specimen: Urine     RESPIRATORY VIRUS PANEL W/COVID-19, PCR [[id number]]  (Abnormal) Collected: 12/05/21 1110    Order Status: Completed Specimen: Nasopharyngeal Updated: 12/05/21 1633     Adenovirus Not detected        Coronavirus 229E Not detected        Coronavirus HKU1 Not detected        Coronavirus CVNL63 Not detected        Coronavirus OC43 Not detected        SARS-CoV-2, PCR Detected        Comment: CALLED TO AND CORRECTLY REPEATED BY:  DR Guerita Holden [facility] ER ON [id number] AT 1626 HRS TO 1396. Metapneumovirus Not detected        Rhinovirus and Enterovirus Not detected        Influenza A Not detected        Influenza A, subtype H1 Not detected        Influenza A, subtype H3 Not detected        INFLUENZA A H1N1 PCR Not detected        Influenza B Not detected        Parainfluenza 1 Not detected        Parainfluenza 2 Not detected        Parainfluenza 3 Not detected        Parainfluenza virus 4 Not detected        RSV by PCR Not detected        B. parapertussis, PCR Not detected        Bordetella pertussis - PCR Not detected        Chlamydophila pneumoniae DNA, QL, PCR Not detected        Mycoplasma pneumoniae DNA, QL, PCR Not detected       COVID-19 RAPID TEST [[id number]]  (Abnormal) Collected: 12/05/21 1109    Order Status: Completed Specimen: Nasopharyngeal Updated: 12/05/21 1136     Specimen source Nasopharyngeal        COVID-19 rapid test Detected        Comment:      The specimen is POSITIVE for SARS-CoV-2, the novel coronavirus associated with COVID-19. This test has been authorized by the FDA under an Emergency Use Authorization (EUA) for use by authorized laboratories. Fact sheet for Healthcare Providers: ConventionUpdate.co.nz  Fact sheet for Patients: ConventionUpdate.co.nz       Methodology: Isothermal Nucleic Acid Amplification  CALLED TO AND CORRECTLY REPEATED BY:  Roxanne Griffin RN ER, TO JAF AT 1136 12/5/2021                LE Doppler 12/7/21 Interpretation Summary  · No evidence of deep vein thrombosis in the right lower extremity. · No evidence of deep vein thrombosis in the left lower extremity. ECHO 12/7/21 Interpretation Summary  Result status: Final result  · Left Ventricle: Normal cavity size, wall thickness and systolic function (ejection fraction normal). The estimated EF is 55 - 60%.  There is moderate (grade 2) left ventricular diastolic dysfunction E/E' ratio = 15.88. Wall Scoring: The left ventricular wall motion is normal.  · Right Atrium: Mildly dilated right atrium. · Left Atrium: Dilated left atrium. · Mitral Valve: No stenosis. Mitral valve non-specific thickening. Mild mitral annular calcification. Mild to moderate regurgitation. · Pulmonary Artery: Pulmonary arterial systolic pressure (PASP) is 41 mmHg. Pulmonary hypertension found to be mild. · IVC/Hepatic Veins: Mechanically ventilated; cannot use inferior caval vein diameter to estimate central venous pressure. Images report reviewed by me:  CT 12/5 (Most Recent)  Results from [place] encounter on 12/05/21    CTA CHEST W OR W WO CONT    Narrative  EXAM: CTA chest    INDICATION: Rule out PE , Covid positive    COMPARISON: None    TECHNIQUE: Axial CT imaging from the thoracic inlet through the diaphragm with  intravenous contrast. Coronal and sagittal MIP reformats were generated. One or  more dose reduction techniques were used on this CT: automated exposure control,  adjustment of the mAs and/or kVp according to patient size, and iterative  reconstruction techniques. The specific techniques used on this CT exam have  been documented in the patient's electronic medical record. Digital Imaging and  Communications in Medicine (DICOM) format image data are available to  nonaffiliated external healthcare facilities or entities on a secure, media  free, reciprocally searchable basis with patient authorization for at least a  12-month period after this study. _______________    FINDINGS:    EXAM QUALITY: Overall exam quality is satisfactory. Pulmonary arterial  enhancement is adequate with adequate breath hold and no significant artifact. PULMONARY ARTERIES: No evidence of pulmonary embolism. THYROID: There is a 15 mm right thyroid lobe nodule which appears complex. Advise nonemergent thyroid ultrasound. LYMPH Nodes:  There is a mildly enlarged right infrahilar lymph node measuring 1  cm image 89. PLEURA: There are no pleural effusion. HEART: Cardiac size is enlarged. There is no pericardial effusion. There is mild  tricuspid regurgitation into the hepatic veins. Moderate to severe calcific  coronary disease present. VASCULATURE/MEDIASTINUM: Main pulmonary artery is enlarged measuring 4 cm  suggesting chronic pulmonary hypertension. Small hiatal hernia present. LUNGS: Moderate groundglass opacities and airspace disease seen bilaterally  suggesting atypical pneumonia. The findings are typical in appearance for Covid  19 pneumonia. AIRWAY: Patent    UPPER ABDOMEN: Unremarkable. OTHER: No acute or aggressive osseous abnormalities identified. _______________    Impression  No evidence of a pulmonary embolism or aortic dissection. Moderate groundglass opacities and airspace disease seen bilaterally suggesting  atypical pneumonia and the findings are typical in appearance for Covid 19  pneumonia. Mildly enlarged right hilar lymph node which may be reactive. Advise follow-up  to exclude any neoplastic or myeloproliferative process. 15 mm slightly complex right thyroid lobe nodule. Advise nonemergent thyroid  ultrasound. CXR reviewed by me:  XR 12/12 (Most Recent). Results from Hospital Encounter encounter on 12/05/21    XR CHEST PORT    Narrative  EXAM: XR CHEST PORT    CLINICAL INDICATION/HISTORY: Respiratory failure  -Additional: None    COMPARISON: One day prior    TECHNIQUE: Portable frontal view of the chest    _______________    FINDINGS:    SUPPORT DEVICES: Endotracheal tube and nasogastric tube both project in stable  position as visualized, with tip of the NG tube is below the diaphragm,  collimated from view. HEART AND MEDIASTINUM: Cardiac size and mediastinal contours both remain stable. LUNGS AND PLEURAL SPACES: Airspace and interstitial opacities across the mid to  lower lung zones again noted.  No evidence of pneumothorax or definite effusion. BONY THORAX AND SOFT TISSUES: Unremarkable.    _______________    Impression  1. Support devices in stable/appropriate position as visualized. 2. Combination of interstitial and airspace disease appearing essentially  unchanged from prior study. IMPRESSION:   · Acute respiratory failure with hypoxemia  · Severe Covid pneumonia  · Anemia  · Thrombocytopenia  · Chronic kidney disease stage III  · Paroxysmal atrial fibrillation  ·   Patient Active Problem List   Diagnosis Code    Hypertension I10    Paroxysmal A-fib ([facility] [address] I48.0    Pneumonia due to COVID-19 virus U07.1, J12.82    Thrombocytopenia ([facility]) D69.6    Hypoxia R09.02    DM due to underlying condition with diabetic nephropathy ([facility]) E08.21    E-coli UTI N39.0, B96.20    Class 3 severe obesity with body mass index (BMI) of 40.0 to 44.9 in adult ([facility]) E66.01, Z68.41    Acute respiratory failure with hypoxemia ([facility]) J96.01    Pneumonia due to severe acute respiratory syndrome coronavirus 2 (SARS-CoV-2) U07.1, J12.82    Stage 3a chronic kidney disease ([facility]) N18.31    Anemia D64.9       · Code status: Full code      RECOMMENDATIONS:   Respiratory: Patient with severe Covid pneumonia, and hypoxemia; patient presenting to ICU in ARDS state due to Covid inflammation. Day 8 on mechanical ventilation   Mechanical ventilationVCV mode; FiO2 60%; PEEP down to 8; plateau pressure 21; lung volume protection strategy. 7.45/37/66/93  /60%/PEEP 8 no significant change  Chest x-ray from today reviewed imagesET tube and OG tube in good position; persistent bilateral diffuse interstitial and groundglass infiltrates; left retrocardiac consolidation persist; no pneumothorax or pleural effusions. Continue vent and sedation bundles. Sedationmidazolam and fentanyl infusion; mild sinus bradycardia with normal blood pressure; hold fentanyl drip if needed, if bradycardia worsens.    Patient currently not needing paralytic. CXR  IMPRESSION  1. Support devices in stable/appropriate position as visualized. 2. Combination of interstitial and airspace disease appearing essentially    unchanged from prior study. Keep SPO2 >=88%. HOB 30 degree elevation while not proning. Aspiration precautions. CTA chest on admission no PE; severe bilateral pulmonary opacities and groundglass densities consistent with severe Covid pneumonia; no significant pleural effusions; right hilar adenopathy likely reactivewill need follow-up in outpatient; thyroid nodule will also need follow-up in outpatient. CVS:BP borderline  BP meds and lasix now on hold   Bradycardia improved  Blood pressure remained stable; hydralazine 10 mg 3 times a day. Ultrasound legsnegative for DVTs  Echocardiogramnormal LV function, and mild pulmonary hypertension; dilated left atrium; mild to moderate mitral regurgitation. proBNP mildly elevated; troponins were mildly elevated on admissionpeak troponin 0.07. Patient on Coumadin at home for paroxysmal atrial fibrillation but on hold today because of low platleets   ID: Severe Covid pneumonia. Unvaccinated status. Zosyn added for possible sepsis   Respiratory viral panel was positive for SARS-CoV-2 PCR. Procalcitonin <0.05not elevated  CRP elevated ferritin elevated   D-dimer not reliable since patient has elevated INR on chronic Coumadin therapy. S/p 1 dose of Tocilizumab 12/7 morning. Steroidss/p 20 mg dose daily; weaned down to 6 mg daily. CTA chest negative for PEs on admission. Ultrasound of the legs ordered. Patient not needing antibiotics; afebrile; WBC not elevated; procalcitonin not elevated0.08. Continue vitamin supplements. ID on case. Hematology/Oncology: Stable hemoglobin10.7; platelets dropped to 18K; INR mildldy elevated now off coumadin on hold, spoke to hematology no heparin HIT pending   Thrombocytopenia likely related to Covid related sepsis.   Check fibrinogen and D-dimer. worseing plt today still hold coumadin HIT pending   PVL 12/14/2021  · No evidence of deep vein thrombosis in the right lower extremity veins assessed. · No evidence of deep vein thrombosis in the left lower extremity veins assessed  Hold Coumadin again today   HIT panel send   pvl pending  Hematology consulted   Monitor for any bleeding issues. Consult hematologyDr. Anaya Laureano. Renal: Known CKD; s/p CTA chest during this admission. Good urine output; creatinine improved to 1.15; sodium normal.  Continue fluids per nephrology. Monitor and replace electrolytes as needed. GI/: Tube feeding. PPI prophylaxis. LFTsimproved. Endocrine: Blood sugars mildly elevatedcontinue Lantus insulin10 units nightly. Sliding scale insulin. Target blood sugar 140180. Patient on steroids for severe Covid pneumoniasteroids being weaned. TSH 0.19likely sick euthyroid state; free T4 mildly increased; free T3 mildly decreased. Neurology: Intubated and sedatedlimited exam.  Skin/Wound: ICU nursing care. Electrolytes: Replace electrolytes per ICU electrolyte replacement protocol. IVF: D5W 50 mL/h; free water via OG tube. Nutrition: Tube feedingNeprotolerating well. Josephine Copping Prophylaxis: DVT Prophylaxis: Coumadin. GI Prophylaxis: Protonix. Restraints: Wrist soft restraints for patient interfering with medical therapy/management and patient safety. Lines/Tubes: PIVs; midline planned  ETT: 12/7/2021   OGT/NGT: 12/7/2021   Montoya: 12/7/2021 (Medically necessary for strict input/output monitoring in critically ill patient, will remove it when not needed. Montoya bundle followed).   Advance Directive/Palliative Care: consulted    Prognosis appears very poor in this patient with Covid pneumonia with severe hypoxemic respiratory failure and ARDS needing to be intubated now; mortality is very high; risk of multiorgan failure with complications such as refractory hypoxemia, encephalopathy, critical illness myopathy, acute renal failure needing dialysis, shock state needing pressors, prolonged ventilator support, risk of tracheostomy and chronic debility/bedbound state. Quality Care: PPI, DVT prophylaxis, HOB elevated, Infection control all reviewed and addressed. Care of plan d/w RN, RT. High complexity decision making was performed during the evaluation of this patient at high risk for decompensation with multiple organ involvement. Total critical care time spent rendering care exclusive of procedures/family discussion/coordination of care: 40 minutes. Lance Meigs [place]) [address] [facility]     [phone]      Unfortunately, patient's  is now in ICU with Covid pneumonia and on ventilator support. Update grandson on 12/11. Do not inform patient of her  medical condition per family request.            Please note: Voice-recognition software may have been used to generate this report, which may have resulted in some phonetic-based errors in grammar and contents. Even though attempts were made to correct all the mistakes, some may have been missed, and remained in the body of the document. Johnnie Sargent MD  12/15/2021         Note  Patient with SARS-CoV-2 pneumonia with severe pneumonia and hypoxemic respiratory failure; patient was on high flow nasal cannula oxygen; patient is not vaccinated, and in the age group that is being infected with delta virus strain; the strain causes severe respiratory failure and complications reported in the [place] and worldwide; unfortunately, the prognosis is poor in unvaccinated patients, with a high risk of complications, multiorgan failure, chronic hypoxemia and respiratory failure, intubation, mechanical ventilation, thromboembolic disease risk in multiple organs, high risk for long Covid syndrome, and high risk for mortality.   The CDC federal and state guidelines have emphasized repeatedly the importance of vaccination to prevent severe infection, mortality, disabilities, long Covid syndrome from Covid virus with several strains currently in the [place]. The treatment protocols are followed based on local hospital protocols, with guidance from centers such as [facility] protocols. [facility] is not part of any research protocol. The local hospital protocols have been guided by [facility] pharmacy department. Covid plasma is no longer considered standard of care in the [facility] due to lack of benefit in trials. Nebulization and CPAP therapies are not considered as part of the protocol in [facility] due to high risk of aerosolization, and high risks of spreading Covid infection to the healthcare staff. Dexamethasone considered standard of care in patients admitted with severe Covid pneumonia; variable dosing has been reported with this medication. ECMO and CRRT facilities not available at [facility]; patients with severe Covid pneumonia and respiratory failure are usually unstable for transportation when they are in critically ill state with a high risk of dying during transportation. REMDESIVIR-not recommended in patients with late presentation with severe respiratory failure     Tocilizumab anti-inflammatory medicationhas shown some benefit in patients with elevated inflammatory markers. Ivermectin not considered standard of care in the [address]; no significant randomized controlled studies done in the [facility] to suggest benefit of ivermectin in patients with COVID-19 with severe respiratory failure; risk of toxicity related to ivermectin outweighs any potential benefits based on consensus opinion in the [address]. This drug is not considered standard of care in [facility].

## 2021-12-15 NOTE — PROGRESS NOTES
Pharmacy Dosing Services:     Consult for Warfarin Dosing by Pharmacy by Dr. Unique Hernandez provided for this 66 y.o.  female , for indication of Atrial Fibrillation. Dose to achieve an INR goal of 2-3    Order entered to HOLD Warfarin today due to low platelets ( 18 today) per Dr Kylee Gusman. LABS    PT/INR Lab Results   Component Value Date/Time    INR 1.5 (H) 12/15/2021 06:35 AM      Platelets Lab Results   Component Value Date/Time    PLATELET 18 (LL) 64/[birth year]/1933 06:35 AM      H/H     Lab Results   Component Value Date/Time    HGB 10.7 (L) 12/15/2021 06:35 AM        Warfarin Administrations (last 168 hours)       Date/Time Action Medication Dose    12/11/21 1706 Given    warfarin (COUMADIN) tablet 4 mg 4 mg    12/10/21 1716 Given    warfarin (COUMADIN) tablet 2 mg 2 mg            Pharmacy to follow daily and will provide subsequent Warfarin dosing based on clinical status.   Brittny Cole, [facility]    Contact information    [phone]

## 2021-12-15 NOTE — PROGRESS NOTES
Hospitalist Progress Note    Patient: Susan Kurtz MRN: [id number]  CSN: [id number]    YOB: 1950  Age: 66 y.o. 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 ([facility]) E08.21    E-coli UTI N39.0, B96.20    Class 3 severe obesity with body mass index (BMI) of 40.0 to 44.9 in adult ([facility]) E66.01, Z68.41    Acute respiratory failure with hypoxemia ([facility]) J96.01    Pneumonia due to severe acute respiratory syndrome coronavirus 2 (SARS-CoV-2) U07.1, J12.82    Stage 3a chronic kidney disease ([facility]) N18.31    Anemia D64.9    Encounter for palliative care Z51.5        Chief complaint :  SOB, resp failure  65 yo female with past medical history of chronic A fib on  Coumadin, chronic leg pain, anxiety, DM with CKD and HTN presents to the ED with 1 wk of Fatigue, Cough and SOB. Found to be hypoxic, COVID-19 PNA and A fib with RVR. CRITICAL CARE PLAN    Resp -   Acute resp failure with hypoxia -  Secondary to COVID 19  See vent orders, VAP bundle. HOB>30 degrees. Bronchodilators. Daily CXR. Weaning and SBT per pulmonary. ID -   COVID 19 pneumonia  Vitamin/antioxidant cocktail  dexamethasone. actemra x 1  ANTIBIOTICS stopped, procalcitonin normal.       CVS - Monitor HD. BP controlled  Echo with normal LVF   PAF - rate controlled  Anticoagulation with coumadin on hold due to thrombocytopenia    Heme/onc - Follow H&H, plts. INR. Thrombocytopenia - likely from sepsis/COVID - 19, monitor platelets,   Heme/onc following. Renal - Trend BUN, Cr, follow I/O, ratliff in place. Check and replace Mg, K, phos.   PA - pre renal, monitor renal function    Endocrine -  Follow FSG  DM - on lantus, SSI    Neuro/ Pain/ Sedation -  Sedation bundle. GI - NPO for now. OG tube, tube feeding. Prophylaxis - DVT: SCDs, GI: protonix. Palliative care meeting with family    [id number]  35 minutes of critical care time spent in the direct evaluation and treatment of this high risk patient. The reason for providing this level of medical care for this critically ill patient was due a critical illness that impaired one or more vital organ systems such that there was a high probability of imminent or life threatening deterioration in the patients condition. This care involved high complexity decision making to assess, manipulate, and support vital system functions, to treat this degreee vital organ system failure and to prevent further life threatening deterioration of the patients condition. Disposition : TBD    Physical Exam:  General: As above    HEENT: NC, Atraumatic. PERRLA, anicteric sclerae. Lungs: CTA Bilaterally. No Wheezing/Rhonchi/Rales.   Heart:  S1 S2,  No murmur, No Rubs, No Gallops  Abdomen: Soft, Non distended, Non tender.  +Bowel sounds,   Extremities: No c/c/e             Vital signs/Intake and Output:  Visit Vitals  BP (!) 112/38   Pulse (!) 58   Temp 98.4 °F (36.9 °C)   Resp 20   Ht 5' 6\" (1.676 m)   Wt 86.6 kg (190 lb 14.7 oz)   SpO2 96%   BMI 30.81 kg/m²     Current Shift:  12/15 0701 - 12/15 1900  In: 200   Out: 200 [Urine:200]  Last three shifts:  12/13 1901 - 12/15 0700  In: 4746.2 [I.V.:2486.2]  Out: 4450 [Urine:4100; Drains:350]            Labs: Results:       Chemistry Recent Labs     12/15/21  0635 12/14/21  0500 12/13/21  0640   * 162* 141*    140 140   K 4.3 3.7 4.6    106 111   CO2 27 27 23   BUN 68* 67* 60*   CREA 1.67* 1.50* 1.04   CA 8.2* 8.2* 8.6   AGAP 7 7 6   BUCR 41* 45* 58*   AP 92 89 93   TP 5.0* 4.8* 5.3*   ALB 2.0* 1.9* 2.0*   GLOB 3.0 2.9 3.3   AGRAT 0.7* 0.7* 0.6*      CBC w/Diff Recent Labs     12/15/21  0635 12/14/21  0500 12/13/21  1000   WBC 10.0 9.8 10.3 RBC 3.32* 3.25* 3.35*   HGB 10.7* 10.5* 10.8*   HCT 31.7* 31.3* 32.7*   PLT 18* 28* 48*   GRANS 91* 91*  --    LYMPH 3* 3*  --    EOS 1 0  --       Cardiac Enzymes Recent Labs     12/13/21  1150   CPK 63   CKND1 CALCULATION NOT PERFORMED WHEN RESULT IS BELOW LINEAR LIMIT      Coagulation Recent Labs     12/15/21  0635 12/14/21  0500   PTP 17.8* 22.4*   INR 1.5* 2.1*       Lipid Panel Lab Results   Component Value Date/Time    Cholesterol, total 208 (H) 12/20/2020 03:16 PM    HDL Cholesterol 47 12/20/2020 03:16 PM    LDL, calculated 128.6 (H) 12/20/2020 03:16 PM    VLDL, calculated 32.4 12/20/2020 03:16 PM    Triglyceride 162 (H) 12/20/2020 03:16 PM    CHOL/HDL Ratio 4.4 12/20/2020 03:16 PM      BNP No results for input(s): BNPP in the last 72 hours.    Liver Enzymes Recent Labs     12/15/21  0635   TP 5.0*   ALB 2.0*   AP 92      Thyroid Studies Lab Results   Component Value Date/Time    TSH 0.19 (L) 12/08/2021 03:00 AM        Procedures/imaging: see electronic medical records for all procedures/Xrays and details which were not copied into this note but were reviewed prior to creation of Plan

## 2021-12-15 NOTE — PROGRESS NOTES
[facility]  A Division of [address] [place] [address])                                                                                                                      Nabil Devlin MD  Office #: - Option # [phone]  Fax #: [phone]  Date of Admission: 12/5/2021Date of Note: 12/15/2021  Reason for FU: Evaluation and antibiotic management of Severe COVID 19. Current Antimicrobials:    Prior Antimicrobials:  Zosyn 12/12 to date # D3    Actrema- X1 12/7  Dexamethasone 12/7 to date( 20mg)   Augmentin and doxycycline PO on 11/30 to 12/5  Ceftriaxone/ Zithromax 12/05 to 12/6       Assessment- ID related:  --------------------------------------------------------------------------  Critically ill patient with guarded prognosis,  post Actrema, high risk f super- infection:   · Thrombocytopenia- driven by possible Sepsis? Likely viral related  Plat down to 55K-> _> 18 CXR remains with diffuse infiltrate, no change. · Acute renal failure:   · Severe COVID 19 infection  · Viral PNA- BL. Extensive on CT chest  · Acute resp failure- Hypoxic 2/2 above  · Intubated 12/7  · E.coli bacteruia 10/30  · Hx of septic shock with prolonged intubation 6 yrs ago- [facility]  COVId rapid test/RVP PCR + 12/5  Strep/leg urine ag: negative  BCX 12/5 NGSF  BCX 12/13- NGSF( post Zosyn)  procal X4-LN-> last done 12/11 <0.05  CRP 14->-> -> 0.5  ferretin 1104-> -> 836  CPK/trop-OK  LD-512  Hepatitis C ab/B sa negative, Quantiron TB-Indeterminate 12/7    Other Medical Issues- Mx per respective team:  ·   · Hypernatremia-resolved  · Acute renal failure-improved  · Morbidly obese BMI 40  · Hx of thrombocytoepnia- chronic. Etiology unknown.   · A fib on AC- INR 5  · L renal stone- non obstructing on CT 11/30  · CKD  · DMT2  · HTN/HLD  · Hx of CHF per pt     Recommendation for ID issues I am following:  ------------------------------------------------------------------------------    FU BCX from 12/13-NGSF  -> serial procal nl, all culture negative. Low platlet probably driven from COVID infection- will DC Zosyn  -> steroid/dexamethasone and AC/Keri coctails per primary team  -> monitor labs  --> repeat Quantiferon TB pending    DW ICU MD/nurse. Family meeting today                        -     Subjective: Intubated/ unresponsive  Was briefly on pressors last night- currently off  Plat further down, no bleeding though  Remains in critical condition  Cr is up to 1/6  Notes/ labs reviewed  CXR this am remains with diffuse opacity-> Unchanged  TTE with EF 55-60%, no severe valvular pathology         HPI:  Tarny Davidson is a 66 y.o. WHITE/NON- with PMH as listed above. Non vaccinated due to her medical conditions and her Taoism per pt. She has been feeling sick since 2-3 days prior to Nov 30, when she came to ED with right flank pain and abd pain. No Fever or chills, she is SOB and had dry cough as always from her CHF no new changes. COVID tested neg,. CT done showed some infiltrate on lung and  UA and Urine culture done and sent out with Aumgentin and Doxycycline for treatment of possible PNA. Returned to ED on 12/5 with worsened SOB, hypoxia and tachycardia. COVID 19 test + and CT chest with extensive infiltrate B/L. Her  tested + for COVID 19 as well. She was aon 5L oxygen and on dexamethasone. She was placed on CTX and Zithromax as well. Chest not very tight per pt at time of exam. She asks if we can treat her with Ivermectin. She denies abd pain/N/V/chest pain. Denies severe ext pain. Was intubated X1 month 6 yrs ago- post septic shock. Hx of TB exposure- unknown. Runs a [employer].        Active Hospital Problems    Diagnosis Date Noted    Pneumonia due to severe acute respiratory syndrome coronavirus 2 (SARS-CoV-2) 12/07/2021    Stage 3a chronic kidney disease ([address] 12/07/2021    Anemia 12/07/2021    Acute respiratory failure with hypoxemia (HCC) 12/06/2021  Pneumonia due to COVID-19 virus 12/05/2021    Thrombocytopenia ([facility] 12/05/2021    Hypoxia 12/05/2021    DM due to underlying condition with diabetic nephropathy ([facility] 12/05/2021    E-coli UTI 12/05/2021    Class 3 severe obesity with body mass index (BMI) of 40.0 to 44.9 in adult [facility]) 12/05/2021    Paroxysmal A-fib ([facility]     Hypertension      Past Medical History:   Diagnosis Date    A-fib ([facility]     Anxiety     CAD (coronary artery disease)     Chronic kidney disease     Diabetes ([facility]     borderline    High cholesterol     Hypertension     Kidney stones     Psychiatric disorder     anxiety     Past Surgical History:   Procedure Laterality Date    HX CHOLECYSTECTOMY      HX HEART CATHETERIZATION      HX HYSTERECTOMY      HX ORTHOPAEDIC      bilateral knee surgery    HX ORTHOPAEDIC      right elbow    HX UROLOGICAL      stent placement    [facility] CARDIAC SURG PROCEDURE UNLIST      stent x 1     Family History   Problem Relation Age of Onset    Breast Cancer Maternal Aunt      Social History     Socioeconomic History    Marital status:      Spouse name: Not on file    Number of children: Not on file    Years of education: Not on file    Highest education level: Not on file   Occupational History    Not on file   Tobacco Use    Smoking status: Never Smoker    Smokeless tobacco: Never Used   Substance and Sexual Activity    Alcohol use: No    Drug use: No    Sexual activity: Not on file   Other Topics Concern    Not on file   Social History Narrative    Not on file     Social Determinants of Health     Financial Resource Strain:     Difficulty of Paying Living Expenses: Not on file   Food Insecurity:     Worried About Running Out of Food in the Last Year: Not on file    [place] of Food in the Last Year: Not on file   Transportation Needs:     Lack of Transportation (Medical): Not on file    Lack of Transportation (Non-Medical):  Not on file   Physical Activity:     Days of Exercise per Week: Not on file    Minutes of Exercise per Session: Not on file   Stress:     Feeling of Stress : Not on file   Social Connections:     Frequency of Communication with Friends and Family: Not on file    Frequency of Social Gatherings with Friends and Family: Not on file    Attends Caodaism Services: Not on file    Active Member of [address] [place] [address] or Organizations: Not on file    Attends Club or Organization Meetings: Not on file    Marital Status: Not on file   Intimate Partner Violence:     Fear of Current or Ex-Partner: Not on file    Emotionally Abused: Not on file    Physically Abused: Not on file    Sexually Abused: Not on file   Housing Stability:     Unable to Pay for Housing in the Last Year: Not on file    Number of [place] in the Last Year: Not on file    Unstable Housing in the Last Year: Not on file       Allergies:  Patient has no known allergies.      Medications:  Current Facility-Administered Medications   Medication Dose Route Frequency    Warfarin - Hold warfarin today 12-15-21   Other ONCE    nystatin (MYCOSTATIN) 100,000 unit/gram powder   Topical BID    piperacillin-tazobactam (ZOSYN) 3.375 g in 0.9% sodium chloride (MBP/ADV) 100 mL MBP  3.375 g IntraVENous Q8H    PHENYLephrine (SHANIKA-SYNEPHRINE) 30 mg in 0.9% sodium chloride 250 mL infusion   mcg/min IntraVENous TITRATE    [Held by provider] furosemide (LASIX) injection 20 mg  20 mg IntraVENous BID    dexamethasone (DECADRON) 4 mg/mL injection 6 mg  6 mg IntraVENous DAILY    insulin glargine (LANTUS) injection 10 Units  10 Units SubCUTAneous QHS    pantoprazole (PROTONIX) 40 mg in 0.9% sodium chloride 10 mL injection  40 mg IntraVENous DAILY    [Held by provider] dextrose 5% infusion  50 mL/hr IntraVENous CONTINUOUS    midazolam (VERSED) injection 2 mg  2 mg IntraVENous Q3H PRN    fentaNYL citrate (PF) injection 50 mcg  50 mcg IntraVENous Q4H PRN    chlorhexidine (PERIDEX) 0.12 % mouthwash 10 mL  10 mL Oral Q12H    fentaNYL (PF) 900 mcg/30 ml infusion soln  0-200 mcg/hr IntraVENous TITRATE    midazolam in normal saline (VERSED) 1 mg/mL infusion  2-10 mg/hr IntraVENous TITRATE    insulin lispro (HUMALOG) injection   SubCUTAneous Q6H    melatonin (rapid dissolve) tablet 10 mg  10 mg Oral QHS    ascorbic acid (vitamin C) (VITAMIN C) tablet 500 mg  500 mg Oral BID    zinc sulfate (ZINCATE) 50 mg zinc (220 mg) capsule 1 Capsule  1 Capsule Oral DAILY    Warfarin - Pharmacy to Dose  1 Each Other Rx Dosing/Monitoring    HYDROcodone-acetaminophen (NORCO)  mg tablet 1 Tablet  1 Tablet Oral Q6H PRN    isosorbide mononitrate ER (IMDUR) tablet 30 mg  30 mg Oral DAILY    nitroglycerin (NITROSTAT) tablet 0.4 mg  0.4 mg SubLINGual Q5MIN PRN    sodium chloride (NS) flush 5-40 mL  5-40 mL IntraVENous Q8H    sodium chloride (NS) flush 5-40 mL  5-40 mL IntraVENous PRN    acetaminophen (TYLENOL) tablet 650 mg  650 mg Oral Q6H PRN    Or    acetaminophen (TYLENOL) suppository 650 mg  650 mg Rectal Q6H PRN    polyethylene glycol (MIRALAX) packet 17 g  17 g Oral DAILY PRN    ondansetron (ZOFRAN ODT) tablet 4 mg  4 mg Oral Q8H PRN    Or    ondansetron (ZOFRAN) injection 4 mg  4 mg IntraVENous Q6H PRN    cholecalciferol (VITAMIN D3) (1000 Units /25 mcg) tablet 2,000 Units  2,000 Units Oral DAILY    glucose chewable tablet 16 g  16 g Oral PRN    glucagon (GLUCAGEN) injection 1 mg  1 mg IntraMUSCular PRN    dextrose (D50W) injection syrg 12.5-25 g  25-50 mL IntraVENous PRN      Physical Exam:    Temp (24hrs), Av.5 °F (36.4 °C), Min:90.1 °F (32.3 °C), Max:98.3 °F (36.8 °C)    Visit Vitals  BP (!) 126/40   Pulse (!) 59   Temp 98.2 °F (36.8 °C)   Resp 20   Ht 5' 6\" (1.676 m)   Wt 86.6 kg (190 lb 14.7 oz)   SpO2 95%   BMI 30.81 kg/m²        GEN: WD Morbidly obese, intubated and lying supine      HEENT: Unicteric. Darline Thibodeaux   ET/Oral tube in place  CHEST:  CTA anteroirly  CVS:RRR  ABD: Obese, fecal management in place  GIRISH: Montoya in place  EXT: No apparent swelling or redness on UE/LE joints. Skin: Dry and intact. No rash, no redness.  Bruising by right PIV site  CNS:Intubated/ non responsive     Microbiology  All Micro Results     Procedure Component Value Units Date/Time    CULTURE, BLOOD [[id number]] Collected: 12/13/21 1150    Order Status: Completed Specimen: Blood Updated: 12/15/21 0824     Special Requests: NO SPECIAL REQUESTS        Culture result: NO GROWTH 2 DAYS       CULTURE, BLOOD [[id number]] Collected: 12/05/21 1106    Order Status: Completed Specimen: Blood Updated: 12/11/21 0729     Special Requests: NO SPECIAL REQUESTS        Culture result: NO GROWTH 6 DAYS       CULTURE, BLOOD [[id number]] Collected: 12/05/21 1106    Order Status: Completed Specimen: Blood Updated: 12/11/21 0729     Special Requests: NO SPECIAL REQUESTS        Culture result: NO GROWTH 6 DAYS       STREP PNEUMO AG, URINE [[id number]] Collected: 12/08/21 2051    Order Status: Completed Specimen: Urine, random Updated: 12/09/21 1024     Strep pneumo Ag, urine Negative       LEGIONELLA PNEUMOPHILA AG, URINE [[id number]] Collected: 12/08/21 2051    Order Status: Completed Specimen: Urine, random Updated: 12/09/21 1024     Legionella Ag, urine Negative       LEGIONELLA PNEUMOPHILA AG, URINE [[id number]] Collected: 12/05/21 1730    Order Status: Canceled Specimen: Urine     STREP Taryn John, URINE [[id number]] Collected: 12/05/21 1730    Order Status: Canceled Specimen: Urine     RESPIRATORY VIRUS PANEL W/COVID-19, PCR [[id number]]  (Abnormal) Collected: 12/05/21 1110    Order Status: Completed Specimen: Nasopharyngeal Updated: 12/05/21 1633     Adenovirus Not detected        Coronavirus 229E Not detected        Coronavirus HKU1 Not detected        Coronavirus CVNL63 Not detected        Coronavirus OC43 Not detected        SARS-CoV-2, PCR Detected        Comment: CALLED TO AND CORRECTLY REPEATED BY:  DR Unique Escobedo [facility] ER ON 12EMU51 AT 1626 HRS TO 300 [address] Not detected        Rhinovirus and Enterovirus Not detected        Influenza A Not detected        Influenza A, subtype H1 Not detected        Influenza A, subtype H3 Not detected        INFLUENZA A H1N1 PCR Not detected        Influenza B Not detected        Parainfluenza 1 Not detected        Parainfluenza 2 Not detected        Parainfluenza 3 Not detected        Parainfluenza virus 4 Not detected        RSV by PCR Not detected        B. parapertussis, PCR Not detected        Bordetella pertussis - PCR Not detected        Chlamydophila pneumoniae DNA, QL, PCR Not detected        Mycoplasma pneumoniae DNA, QL, PCR Not detected       COVID-19 RAPID TEST [[id number]]  (Abnormal) Collected: 12/05/21 1109    Order Status: Completed Specimen: Nasopharyngeal Updated: 12/05/21 1136     Specimen source Nasopharyngeal        COVID-19 rapid test Detected        Comment:      The specimen is POSITIVE for SARS-CoV-2, the novel coronavirus associated with COVID-19. This test has been authorized by the FDA under an Emergency Use Authorization (EUA) for use by authorized laboratories.         Fact sheet for Healthcare Providers: ConventionUpdate.co.nz  Fact sheet for Patients: ConventionUpdate.co.nz       Methodology: Isothermal Nucleic Acid Amplification  CALLED TO AND CORRECTLY REPEATED BY:  Tiana Suazo, RN ER, TO JAF AT 1136 12/5/2021                Lab results:    Chemistry  Recent Labs     12/15/21  0635 12/14/21  0500 12/13/21  0640   * 162* 141*    140 140   K 4.3 3.7 4.6    106 111   CO2 27 27 23   BUN 68* 67* 60*   CREA 1.67* 1.50* 1.04   CA 8.2* 8.2* 8.6   AGAP 7 7 6   BUCR 41* 45* 58*   AP 92 89 93   TP 5.0* 4.8* 5.3*   ALB 2.0* 1.9* 2.0*   GLOB 3.0 2.9 3.3   AGRAT 0.7* 0.7* 0.6*       CBC w/ Diff  Recent Labs     12/15/21  0635 12/14/21  0500 12/13/21  1000   WBC 10.0 9.8 10.3   RBC 3.32* 3.25* 3.35*   HGB 10.7* 10.5* 10.8* HCT 31.7* 31.3* 32.7*   PLT 18* 28* 48*   GRANS 91* 91*  --    LYMPH 3* 3*  --    EOS 1 0  --        Imaging: report posted below as per radiologist     CTA chest 12/5    No evidence of a pulmonary embolism or aortic dissection. Moderate groundglass opacities and airspace disease seen bilaterally suggesting  atypical pneumonia and the findings are typical in appearance for Covid 19  pneumonia.     Mildly enlarged right hilar lymph node which may be reactive. Advise follow-up  to exclude any neoplastic or myeloproliferative process.     15 mm slightly complex right thyroid lobe nodule.  Advise nonemergent thyroid  ultrasound.

## 2021-12-15 NOTE — PROGRESS NOTES
Labs reviewed thrombocytopenia favor consumptive acute illness possible medication related as well, transfuse for platelets <[id number] or bleeding.     JCP

## 2021-12-15 NOTE — PROGRESS NOTES
Palliative Medicine      Family meeting with               Pearl Blackburn son of patient - eldest, and his spouse. Angelito Rosas son of patient - on facetime call               Crystal Brandon - granddaughter of patient and daughter of Angelito Rosas- present in           room               Mariana Lacey - daughter of patient. Lives in [place] - on phone. Also present, Purvis Hammans, NP, Aneta Jeffries RN, Dr RADHA Wilcox and Bri Saenz for spiritual Support. Dr Chris Wilcox provided medical update that included renal and heart function, platelet issues, respiratory ventilator and O2 needs. A review of the Mr Back's healthcare wishes outlined in his Advance Medical Directive that include, DNR/DNI and the wish to not live on machines. Team shared with family members that the intubation tube is temporary and a plan for its removal will need to be made. Family questioned \"what does that look like\". Palliative team explained the process of compassionate extubation including premedication to assist with breathing, restlessness and pain. Added that the focus then changes to comfort and symptom management and that less aggressive O2 delivery would be used. Family members were teary and Ms Mariana Lacey was heard crying on the phone. Words of compassionate support provide. Family would like time this evening to privately discuss and absorb all the information provided. They were given contact information for palliative department. Palliative Team will continue to be available for support.       Aneta Jeffries BSN, RN, [facility]  Palliative Medicine Inpatient RN  Palliative COP[facility] Line: [phone]

## 2021-12-16 NOTE — PROGRESS NOTES
Nutrition Assessment     Type and Reason for Visit: Reassess    Nutrition Recommendations/Plan: Continue with tube feeding at goal rate to meet nutritional needs. 12/16/21 0829   Enteral Nutrition   Feeding Route Orogastric   EN Formula Renal   Schedule Continuous   Feeding Regimen Nepro @ 40ml/r (goal rate). Water Flushes 200ml Q4 (1200ml)   Current EN & Flush Order Provides Nepro @ 40ml/hr + D5% @ 50ml/hr + flushes provides:     1788kcals, 71g PRO, 207g CHO, 1838ml free water     Nutrition Assessment:  PMHx: Chronic A fib, chronic leg pain, DM with CKD, HTN. Patient admitted with COVID 19 PNA, a fib RVR. Remains in ICU intubated. Estimated Daily Nutrient Needs:  Energy (kcal):  1653  Protein (g):  69       Fluid (ml/day):  1653    Nutrition Related Findings:  Lab: GFR est nonAA 32, BUN 65, creatinine 1.60 POC glucose 144-165. Med: zinc, protonix, melatonin, humalog, lantus, lasix, D5% @ 50ml/hr (60g CHO, 204kcals), vit d, vit c. +BM 12/15. TF infusing nepro @ 40ml/hr- goal rate. Current Nutrition Therapies:  DIET NPO  ADULT TUBE FEEDING Orogastric; Renal; Delivery Method: Continuous; Continuous Initial Rate (mL/hr): 10; Continuous Advance Tube Feeding: Yes; Advancement Volume (mL/hr): 10; Advancement Frequency: Q 6 hours; Continuous Goal Rate (mL/hr): 40; Water. ..     Anthropometric Measures:  · Height:  5' 6\" (167.6 cm)  · Current Body Wt:  86.6 kg (190 lb 14.7 oz)  · BMI: 30.8    Nutrition Diagnosis:   · Inadequate oral intake related to impaired respiratory function as evidenced by nutrition support-enteral nutrition,intubation,NPO or clear liquid status due to medical condition    Nutrition Intervention:  Food and/or Nutrient Delivery: Continue tube feeding  Nutrition Education and Counseling: No recommendations at this time  Coordination of Nutrition Care: Continue to monitor while inpatient    Goals:  Continue to meet nutritional needs by tube feeding throughout the next 3-5 days Nutrition Monitoring and Evaluation:   Behavioral-Environmental Outcomes: None identified  Food/Nutrient Intake Outcomes: Enteral nutrition intake/tolerance,Diet advancement/tolerance  Physical Signs/Symptoms Outcomes: Biochemical data,Weight,Skin,GI status,Nausea/vomiting    Discharge Planning:     Too soon to determine     Electronically signed by Cyndi Simmons RD on 12/16/2021 at 8:30 AM

## 2021-12-16 NOTE — PROGRESS NOTES
met with Palliative Care and members of the patient's family to discuss the patient and her  (also a patient in the next room) and their conditions. After talking and hearing from the physician the family wanted to see the patient's through the windows. I took them to the windows and allowed them to share stories, fears and sadness. Explained more about what comfort care might look like. They will talk as a family and let us know where they are leaning. Family expressed gratitude for 's visit. Chaplains will continue to follow and will provide pastoral care on an as needed/requested basis.  recommends bedside caregivers page  on duty if patient shows signs of acute spiritual or emotional distress. [phone] [place] [place]   Board Certified   [phone] - Office

## 2021-12-16 NOTE — PROGRESS NOTES
[facility]ists  Pulmonary, Critical Care, and Sleep Medicine    Name: Melvin Herrera MRN: [id number]   : 1950 Hospital: [facility] [place]    Date: 2021  Room: [address] [place] [address] Note                                              Consult requesting physician: Dr. Hannah Torres  Reason for Consult: Severe Covid pneumonia with respiratory failure      Subjective/History of Present Illness:     Patient is a 66 y.o. female with PMHx significant for chronic A. fib on Coumadin therapy, diabetes mellitus, chronic kidney disease, hypertension, thyroid nodule was admitted to the hospital on 2021 with Covid infection. She has not been vaccinated for Covid infection. Patient progressed to worsening respiratory failure and hypoxemia. During admission, patient had declined intubation. Today, her  reported CODE STATUS and wanted patient to be intubated. Patient underwent COVID-19 intubation in the telemetry unit, and subsequently transferred to the ICU.    2021  Patient in ICU. Intubated  sedated no improvement in hypoxemic respiratory failure had to increase PEEP to 10 and FiO2 to 70% today. Severe thrombocytopenia  HIT negative covid related ? Coumadin on hold no bleeding  Worsening oxygenation  DNR    Review of Systems:  Limited due to patient condition    Patient came to ER on  with right flank pain, and right basal pneumonia; was sent home with antibiotic therapyAugmentin and doxycycline. Urine culture 2021pansensitive E. coli.       No Known Allergies   Past Medical History:   Diagnosis Date    A-fib ([facility] [address]     Anxiety     CAD (coronary artery disease)     Chronic kidney disease     Diabetes ([facility] [address]     borderline    High cholesterol     Hypertension     Kidney stones     Psychiatric disorder     anxiety      Past Surgical History:   Procedure Laterality Date    HX CHOLECYSTECTOMY      HX HEART CATHETERIZATION      HX HYSTERECTOMY  HX ORTHOPAEDIC      bilateral knee surgery    HX ORTHOPAEDIC      right elbow    HX UROLOGICAL      stent placement    [place] CARDIAC SURG PROCEDURE UNLIST      stent x 1      Social History     Tobacco Use    Smoking status: Never Smoker    Smokeless tobacco: Never Used   Substance Use Topics    Alcohol use: No      Family History   Problem Relation Age of Onset    Breast Cancer Maternal Aunt       Prior to Admission medications    Medication Sig Start Date End Date Taking? Authorizing Provider   allopurinoL (ZYLOPRIM) 300 mg tablet Take 300 mg by mouth daily. Provider, Historical   torsemide (DEMADEX) 100 mg tablet Take 50 mg by mouth daily. Provider, Historical   HYDROcodone-acetaminophen (NORCO)  mg tablet Take 1 Tab by mouth every eight (8) hours as needed for Pain. Provider, Historical   metoprolol succinate (TOPROL-XL) 100 mg tablet Take 150 mg by mouth daily. Provider, Historical   warfarin (Coumadin) 2 mg tablet Take 4 mg by mouth daily. Provider, Historical   isosorbide mononitrate ER (IMDUR) 30 mg tablet Take 30 mg by mouth daily. Provider, Historical   magnesium oxide (MAG-OX) 400 mg tablet Take 400 mg by mouth daily. Provider, Historical   pantoprazole (PROTONIX) 40 mg tablet Take 40 mg by mouth daily. Provider, Historical   ergocalciferol (Vitamin D2) 1,250 mcg (50,000 unit) capsule Take 50,000 Units by mouth. Provider, Historical   docusate sodium (Colace) 100 mg capsule Take 100 mg by mouth two (2) times a day. Provider, Historical   nitroglycerin (NITROSTAT) 0.4 mg SL tablet 0.4 mg by SubLINGual route every five (5) minutes as needed for Chest Pain. Up to 3 doses. Provider, Historical   hydrALAZINE (APRESOLINE) 50 mg tablet Take 25 mg by mouth three (3) times daily. Layla Alex MD   aspirin delayed-release 81 mg tablet Take  by mouth daily. Layla Alex MD   folic acid (FOLVITE) 1 mg tablet Take  by mouth daily.     Layla Alex MD   potassium chloride (K-DUR, KLOR-CON) 20 mEq tablet Take  by mouth two (2) times a day.     Other, MD Layla     Current Facility-Administered Medications   Medication Dose Route Frequency    nystatin (MYCOSTATIN) 100,000 unit/gram powder   Topical BID    PHENYLephrine (SHANIKA-SYNEPHRINE) 30 mg in 0.9% sodium chloride 250 mL infusion   mcg/min IntraVENous TITRATE    [Held by provider] furosemide (LASIX) injection 20 mg  20 mg IntraVENous BID    insulin glargine (LANTUS) injection 10 Units  10 Units SubCUTAneous QHS    pantoprazole (PROTONIX) 40 mg in 0.9% sodium chloride 10 mL injection  40 mg IntraVENous DAILY    [Held by provider] dextrose 5% infusion  50 mL/hr IntraVENous CONTINUOUS    chlorhexidine (PERIDEX) 0.12 % mouthwash 10 mL  10 mL Oral Q12H    fentaNYL (PF) 900 mcg/30 ml infusion soln  0-200 mcg/hr IntraVENous TITRATE    midazolam in normal saline (VERSED) 1 mg/mL infusion  2-10 mg/hr IntraVENous TITRATE    insulin lispro (HUMALOG) injection   SubCUTAneous Q6H    melatonin (rapid dissolve) tablet 10 mg  10 mg Oral QHS    ascorbic acid (vitamin C) (VITAMIN C) tablet 500 mg  500 mg Oral BID    zinc sulfate (ZINCATE) 50 mg zinc (220 mg) capsule 1 Capsule  1 Capsule Oral DAILY    Warfarin - Pharmacy to Dose  1 Each Other Rx Dosing/Monitoring    isosorbide mononitrate ER (IMDUR) tablet 30 mg  30 mg Oral DAILY    sodium chloride (NS) flush 5-40 mL  5-40 mL IntraVENous Q8H    cholecalciferol (VITAMIN D3) (1000 Units /25 mcg) tablet 2,000 Units  2,000 Units Oral DAILY         Objective:   Vital Signs:    Visit Vitals  BP (!) 134/44   Pulse 61   Temp 98.1 °F (36.7 °C)   Resp 19   Ht 5' 6\" (1.676 m)   Wt 86.6 kg (190 lb 14.7 oz)   SpO2 93%   BMI 30.81 kg/m²       O2 Device: Endotracheal tube,Ventilator   O2 Flow Rate (L/min): 40 l/min   Temp (24hrs), Av.9 °F (36.6 °C), Min:97 °F (36.1 °C), Max:98.4 °F (36.9 °C)       Intake/Output:   Last shift:      701 - 1900  In: 1480 [I.V.:540]  Out: 250 [Urine:250]    Last 3 shifts: 12/14 1901 - 12/16 0700  In: 4236 [I.V.:1236]  Out: 0331 [Urine:2525; Drains:350]      Intake/Output Summary (Last 24 hours) at 12/16/2021 1501  Last data filed at 12/16/2021 1200  Gross per 24 hour   Intake 3727.4 ml   Output 1575 ml   Net 2152.4 ml       Last 3 Recorded Weights in this Encounter    12/07/21 1827 12/11/21 0650 12/12/21 1910   Weight: 114.3 kg (252 lb) 86.6 kg (190 lb 14.7 oz) 86.6 kg (190 lb 14.7 oz)       ABG:    Lab Results   Component Value Date/Time    PHI 7.45 12/16/2021 05:34 AM    PCO2I 34.5 (L) 12/16/2021 05:34 AM    PO2I 114 (H) 12/16/2021 05:34 AM    HCO3I 23.7 12/16/2021 05:34 AM    FIO2I 70 12/16/2021 05:34 AM     Ventilator Mode: Assist control  Respiratory Rate  Back-Up Rate: 18  Insp Time (sec): 1.16 sec  I:E Ratio: 1:2  Ventilator Volumes  Vt Set (ml): 400 ml  Vt Exhaled (Machine Breath) (ml): 368 ml  Ve Observed (l/min): 7.33 l/min  Ventilator Pressures  PIP Observed (cm H2O): 26 cm H2O  Plateau Pressure (cm H2O): 28 cm H2O  MAP (cm H2O): 17  PEEP/VENT (cm H2O): 10 cm H20  Auto PEEP Observed (cm H2O): 0 cm H2O       Physical Exam: Limitations in exam due to full PPE requirements.   Patient remains intubated and sedated; arousable to painful stimuli  HEENT: pupils not dilated, reactive, no scleral jaundice, moist oral mucosa, no nasal drainage; neck supple  Neck: No adenopathy or thyroid swelling  OGno bloody aspirates or respiratory secretions noted  Orogastric tube tube feeding   CVS: Normal heart sounds; S1 and S2 with no murmur; telemetrysinus bradycardia; JVD not elevated   RS: Moderate air entry bilaterally; mild crckles at the bsaes R>L ; no wheezing or rhonchi; breath sounds are symmetrical; no tachypneia or distress while on ventilator support  Abd: Soft, no tenderness, no distention, no guarding or rigidity; bowel sounds present; no hepatosplenomegaly  Neuro: Intubated and sedated; limited exam  Extrm: No peripheral leg edema; no focal swelling or clubbing  Skin: no rash  Lymphatic: no cervical or supraclavicular adenopathy  Vasc: DPs palpable both feet  MS: No joint swelling      Data:       Recent Results (from the past 24 hour(s))   GLUCOSE, POC    Collection Time: 12/15/21  4:24 PM   Result Value Ref Range    Glucose (POC) 161 (H) 70 - 110 mg/dL   GLUCOSE, POC    Collection Time: 12/15/21 11:41 PM   Result Value Ref Range    Glucose (POC) 158 (H) 70 - 110 mg/dL   PROTHROMBIN TIME + INR    Collection Time: 12/16/21  3:32 AM   Result Value Ref Range    Prothrombin time 15.8 (H) 11.5 - 15.2 sec    INR 1.3 (H) 0.8 - 1.2     D DIMER    Collection Time: 12/16/21  3:32 AM   Result Value Ref Range    D DIMER 2.43 (H) <0.46 ug/ml(FEU)   METABOLIC PANEL, COMPREHENSIVE    Collection Time: 12/16/21  3:32 AM   Result Value Ref Range    Sodium 140 136 - 145 mmol/L    Potassium 3.5 3.5 - 5.5 mmol/L    Chloride 106 100 - 111 mmol/L    CO2 26 21 - 32 mmol/L    Anion gap 8 3.0 - 18 mmol/L    Glucose 125 (H) 74 - 99 mg/dL    BUN 65 (H) 7.0 - 18 MG/DL    Creatinine 1.60 (H) 0.6 - 1.3 MG/DL    BUN/Creatinine ratio 41 (H) 12 - 20      GFR est AA 38 (L) >60 ml/min/1.73m2    GFR est non-AA 32 (L) >60 ml/min/1.73m2    Calcium 8.3 (L) 8.5 - 10.1 MG/DL    Bilirubin, total 0.7 0.2 - 1.0 MG/DL    ALT (SGPT) 37 13 - 56 U/L    AST (SGOT) 23 10 - 38 U/L    Alk.  phosphatase 86 45 - 117 U/L    Protein, total 5.0 (L) 6.4 - 8.2 g/dL    Albumin 1.9 (L) 3.4 - 5.0 g/dL    Globulin 3.1 2.0 - 4.0 g/dL    A-G Ratio 0.6 (L) 0.8 - 1.7     CBC WITH AUTOMATED DIFF    Collection Time: 12/16/21  3:32 AM   Result Value Ref Range    WBC 12.5 4.6 - 13.2 K/uL    RBC 3.07 (L) 4.20 - 5.30 M/uL    HGB 9.9 (L) 12.0 - 16.0 g/dL    HCT 29.5 (L) 35.0 - 45.0 %    MCV 96.1 78.0 - 100.0 FL    MCH 32.2 24.0 - 34.0 PG    MCHC 33.6 31.0 - 37.0 g/dL    RDW 13.7 11.6 - 14.5 %    PLATELET 17 (LL) 976 - 420 K/uL    MPV 10.3 9.2 - 11.8 FL    NRBC 0.0 0  WBC    ABSOLUTE NRBC 0.00 0.00 - 0.01 K/uL NEUTROPHILS 93 (H) 40 - 73 %    LYMPHOCYTES 2 (L) 21 - 52 %    MONOCYTES 3 3 - 10 %    EOSINOPHILS 1 0 - 5 %    BASOPHILS 0 0 - 2 %    IMMATURE GRANULOCYTES 2 (H) 0.0 - 0.5 %    ABS. NEUTROPHILS 11.7 (H) 1.8 - 8.0 K/UL    ABS. LYMPHOCYTES 0.2 (L) 0.9 - 3.6 K/UL    ABS. MONOCYTES 0.3 0.05 - 1.2 K/UL    ABS. EOSINOPHILS 0.1 0.0 - 0.4 K/UL    ABS. BASOPHILS 0.0 0.0 - 0.1 K/UL    ABS. IMM. GRANS. 0.2 (H) 0.00 - 0.04 K/UL    DF AUTOMATED     GLUCOSE, POC    Collection Time: 12/16/21  5:27 AM   Result Value Ref Range    Glucose (POC) 122 (H) 70 - 110 mg/dL   BLOOD GAS, ARTERIAL POC    Collection Time: 12/16/21  5:34 AM   Result Value Ref Range    Device: ADULT VENT      FIO2 (POC) 70 %    pH (POC) 7.45 7.35 - 7.45      pCO2 (POC) 34.5 (L) 35.0 - 45.0 MMHG    pO2 (POC) 114 (H) 80 - 100 MMHG    HCO3 (POC) 23.7 22 - 26 MMOL/L    sO2 (POC) 98.7 (H) 92 - 97 %    Base excess (POC) 0.1 mmol/L    Mode ASSIST CONTROL      Tidal volume 369 ml    Set Rate 18 bpm    PEEP/CPAP (POC) 10 cmH2O    Allens test (POC) Positive      Site LEFT RADIAL      Specimen type (POC) ARTERIAL      Performed by Allison Lara    GLUCOSE, POC    Collection Time: 12/16/21 11:18 AM   Result Value Ref Range    Glucose (POC) 139 (H) 70 - 110 mg/dL         Chemistry Recent Labs     12/16/21  0332 12/15/21  0635 12/14/21  0500   * 135* 162*    139 140   K 3.5 4.3 3.7    105 106   CO2 26 27 27   BUN 65* 68* 67*   CREA 1.60* 1.67* 1.50*   CA 8.3* 8.2* 8.2*   AGAP 8 7 7   BUCR 41* 41* 45*   AP 86 92 89   TP 5.0* 5.0* 4.8*   ALB 1.9* 2.0* 1.9*   GLOB 3.1 3.0 2.9   AGRAT 0.6* 0.7* 0.7*        Lactic Acid Lactic acid   Date Value Ref Range Status   11/11/2014 0.9 0.4 - 2.0 MMOL/L Final     No results for input(s): LAC in the last 72 hours.      Liver Enzymes Protein, total   Date Value Ref Range Status   12/16/2021 5.0 (L) 6.4 - 8.2 g/dL Final     Albumin   Date Value Ref Range Status   12/16/2021 1.9 (L) 3.4 - 5.0 g/dL Final     Globulin   Date Value Ref Range Status   12/16/2021 3.1 2.0 - 4.0 g/dL Final     A-G Ratio   Date Value Ref Range Status   12/16/2021 0.6 (L) 0.8 - 1.7   Final     Alk. phosphatase   Date Value Ref Range Status   12/16/2021 86 45 - 117 U/L Final     Recent Labs     12/16/21  0332 12/15/21  0635 12/14/21  0500   TP 5.0* 5.0* 4.8*   ALB 1.9* 2.0* 1.9*   GLOB 3.1 3.0 2.9   AGRAT 0.6* 0.7* 0.7*   AP 86 92 89        CBC w/Diff Recent Labs     12/16/21  0332 12/15/21  0635 12/14/21  0500   WBC 12.5 10.0 9.8   RBC 3.07* 3.32* 3.25*   HGB 9.9* 10.7* 10.5*   HCT 29.5* 31.7* 31.3*   PLT 17* 18* 28*   GRANS 93* 91* 91*   LYMPH 2* 3* 3*   EOS 1 1 0        Cardiac Enzymes No results found for: CPK, CK, CKMMB, CKMB, RCK3, CKMBT, CKNDX, CKND1, MANE, TROPT, TROIQ, BANDAR, TROPT, TNIPOC, BNP, BNPP     BNP No results found for: BNP, BNPP, XBNPT     Coagulation Recent Labs     12/16/21  0332 12/15/21  0635 12/14/21  0500   PTP 15.8* 17.8* 22.4*   INR 1.3* 1.5* 2.1*         Thyroid  Lab Results   Component Value Date/Time    TSH 0.19 (L) 12/08/2021 03:00 AM       No results found for: T4     Urinalysis Lab Results   Component Value Date/Time    Color YELLOW 12/05/2021 03:02 PM    Appearance CLEAR 12/05/2021 03:02 PM    Specific gravity 1.014 12/05/2021 03:02 PM    pH (UA) 5.0 12/05/2021 03:02 PM    Protein 100 (A) 12/05/2021 03:02 PM    Glucose Negative 12/05/2021 03:02 PM    Ketone Negative 12/05/2021 03:02 PM    Bilirubin Negative 12/05/2021 03:02 PM    Urobilinogen 0.2 12/05/2021 03:02 PM    Nitrites Negative 12/05/2021 03:02 PM    Leukocyte Esterase TRACE (A) 12/05/2021 03:02 PM    Epithelial cells 2+ 12/05/2021 03:02 PM    Bacteria FEW (A) 12/05/2021 03:02 PM    WBC 4 to 10 12/05/2021 03:02 PM    RBC 0 to 3 12/05/2021 03:02 PM          Culture data during this hospitalization.    All Micro Results     Procedure Component Value Units Date/Time    CULTURE, BLOOD [[id number]] Collected: 12/13/21 1150    Order Status: Completed Specimen: Blood Updated: 12/16/21 0725     Special Requests: NO SPECIAL REQUESTS        Culture result: NO GROWTH 3 DAYS       CULTURE, BLOOD [[id number]] Collected: 12/05/21 1106    Order Status: Completed Specimen: Blood Updated: 12/11/21 0729     Special Requests: NO SPECIAL REQUESTS        Culture result: NO GROWTH 6 DAYS       CULTURE, BLOOD [[id number]] Collected: 12/05/21 1106    Order Status: Completed Specimen: Blood Updated: 12/11/21 0729     Special Requests: NO SPECIAL REQUESTS        Culture result: NO GROWTH 6 DAYS       STREP PNEUMO AG, URINE [[id number]] Collected: 12/08/21 2051    Order Status: Completed Specimen: Urine, random Updated: 12/09/21 1024     Strep pneumo Ag, urine Negative       LEGIONELLA PNEUMOPHILA AG, URINE [[id number]] Collected: 12/08/21 2051    Order Status: Completed Specimen: Urine, random Updated: 12/09/21 1024     Legionella Ag, urine Negative       LEGIONELLA PNEUMOPHILA AG, URINE [[id number]] Collected: 12/05/21 1730    Order Status: Canceled Specimen: Urine     STREP Maudry Pippins, URINE [[id number]] Collected: 12/05/21 1730    Order Status: Canceled Specimen: Urine     RESPIRATORY VIRUS PANEL W/COVID-19, PCR [[id number]]  (Abnormal) Collected: 12/05/21 1110    Order Status: Completed Specimen: Nasopharyngeal Updated: 12/05/21 1633     Adenovirus Not detected        Coronavirus 229E Not detected        Coronavirus HKU1 Not detected        Coronavirus CVNL63 Not detected        Coronavirus OC43 Not detected        SARS-CoV-2, PCR Detected        Comment: CALLED TO AND CORRECTLY REPEATED BY:  DR Davina Hutchins [facility] ER ON [id number] AT 1626 HRS TO 1396.           [facility] Not detected        Rhinovirus and Enterovirus Not detected        Influenza A Not detected        Influenza A, subtype H1 Not detected        Influenza A, subtype H3 Not detected        INFLUENZA A H1N1 PCR Not detected        Influenza B Not detected        Parainfluenza 1 Not detected        Parainfluenza 2 Not detected        Parainfluenza 3 Not detected        Parainfluenza virus 4 Not detected        RSV by PCR Not detected        B. parapertussis, PCR Not detected        Bordetella pertussis - PCR Not detected        Chlamydophila pneumoniae DNA, QL, PCR Not detected        Mycoplasma pneumoniae DNA, QL, PCR Not detected       COVID-19 RAPID TEST [[id number]]  (Abnormal) Collected: 12/05/21 1109    Order Status: Completed Specimen: Nasopharyngeal Updated: 12/05/21 1136     Specimen source Nasopharyngeal        COVID-19 rapid test Detected        Comment:      The specimen is POSITIVE for SARS-CoV-2, the novel coronavirus associated with COVID-19. This test has been authorized by the FDA under an Emergency Use Authorization (EUA) for use by authorized laboratories. Fact sheet for Healthcare Providers: ConventionSocialtextdate.co.nz  Fact sheet for Patients: Neurosearchdate.co.nz       Methodology: Isothermal Nucleic Acid Amplification  CALLED TO AND CORRECTLY REPEATED BY:  Shayla Samuels RN ER, TO JAF AT 1136 12/5/2021                LE Doppler 12/7/21 Interpretation Summary  · No evidence of deep vein thrombosis in the right lower extremity. · No evidence of deep vein thrombosis in the left lower extremity. ECHO 12/7/21 Interpretation Summary  Result status: Final result  · Left Ventricle: Normal cavity size, wall thickness and systolic function (ejection fraction normal). The estimated EF is 55 - 60%. There is moderate (grade 2) left ventricular diastolic dysfunction E/E' ratio = 15.88. Wall Scoring: The left ventricular wall motion is normal.  · Right Atrium: Mildly dilated right atrium. · Left Atrium: Dilated left atrium. · Mitral Valve: No stenosis. Mitral valve non-specific thickening. Mild mitral annular calcification. Mild to moderate regurgitation. · Pulmonary Artery: Pulmonary arterial systolic pressure (PASP) is 41 mmHg. Pulmonary hypertension found to be mild.   · IVC/Hepatic Veins: Mechanically ventilated; cannot use inferior caval vein diameter to estimate central venous pressure. Images report reviewed by me:  CT 12/5 (Most Recent)  Results from [place] encounter on 12/05/21    CTA CHEST W OR W WO CONT    Narrative  EXAM: CTA chest    INDICATION: Rule out PE , Covid positive    COMPARISON: None    TECHNIQUE: Axial CT imaging from the thoracic inlet through the diaphragm with  intravenous contrast. Coronal and sagittal MIP reformats were generated. One or  more dose reduction techniques were used on this CT: automated exposure control,  adjustment of the mAs and/or kVp according to patient size, and iterative  reconstruction techniques. The specific techniques used on this CT exam have  been documented in the patient's electronic medical record. Digital Imaging and  Communications in Medicine (DICOM) format image data are available to  nonaffiliated external healthcare facilities or entities on a secure, media  free, reciprocally searchable basis with patient authorization for at least a  12-month period after this study. _______________    FINDINGS:    EXAM QUALITY: Overall exam quality is satisfactory. Pulmonary arterial  enhancement is adequate with adequate breath hold and no significant artifact. PULMONARY ARTERIES: No evidence of pulmonary embolism. THYROID: There is a 15 mm right thyroid lobe nodule which appears complex. Advise nonemergent thyroid ultrasound. LYMPH Nodes: There is a mildly enlarged right infrahilar lymph node measuring 1  cm image 89. PLEURA: There are no pleural effusion. HEART: Cardiac size is enlarged. There is no pericardial effusion. There is mild  tricuspid regurgitation into the hepatic veins. Moderate to severe calcific  coronary disease present. VASCULATURE/MEDIASTINUM: Main pulmonary artery is enlarged measuring 4 cm  suggesting chronic pulmonary hypertension. Small hiatal hernia present.     LUNGS: Moderate groundglass opacities and airspace disease seen bilaterally  suggesting atypical pneumonia. The findings are typical in appearance for Covid  19 pneumonia. AIRWAY: Patent    UPPER ABDOMEN: Unremarkable. OTHER: No acute or aggressive osseous abnormalities identified. _______________    Impression  No evidence of a pulmonary embolism or aortic dissection. Moderate groundglass opacities and airspace disease seen bilaterally suggesting  atypical pneumonia and the findings are typical in appearance for Covid 19  pneumonia. Mildly enlarged right hilar lymph node which may be reactive. Advise follow-up  to exclude any neoplastic or myeloproliferative process. 15 mm slightly complex right thyroid lobe nodule. Advise nonemergent thyroid  ultrasound. CXR reviewed by me:  XR 12/12 (Most Recent). Results from Hospital Encounter encounter on 12/05/21    XR CHEST PORT    Narrative  EXAM: XR CHEST PORT    CLINICAL INDICATION/HISTORY: Intubated; await patient to come to the ICU for  first chest x-ray  -Additional: None    COMPARISON: One day prior    TECHNIQUE: Portable frontal view of the chest    _______________    FINDINGS:    SUPPORT DEVICES: Endotracheal tube, visualized nasogastric tube both project in  stable position from prior study. Nasogastric tube is below the diaphragm, tip  collimated from view. HEART AND MEDIASTINUM: Stable appearing cardiac size and mediastinal contours. LUNGS AND PLEURAL SPACES: Prominence of central pulmonary vascular markings and  basilar areas of interstitial and airspace opacity are not appreciably changed  from one day prior. No pneumothorax or definite pleural effusion. BONY THORAX AND SOFT TISSUES: Unremarkable.    _______________    Impression  1. Support devices in stable/appropriate position as visualized. 2. Combination interstitial/airspace disease without appreciable change from  prior study.          IMPRESSION:   · Acute respiratory failure with hypoxemia  · Severe Covid pneumonia  · Anemia  · Thrombocytopenia  · Chronic kidney disease stage III  · Paroxysmal atrial fibrillation  ·   Patient Active Problem List   Diagnosis Code    Hypertension I10    Paroxysmal A-fib ([facility] [address] I48.0    Pneumonia due to COVID-19 virus U07.1, J12.82    Thrombocytopenia ([facility]) D69.6    Hypoxia R09.02    DM due to underlying condition with diabetic nephropathy ([facility]) E08.21    E-coli UTI N39.0, B96.20    Class 3 severe obesity with body mass index (BMI) of 40.0 to 44.9 in adult ([facility]) E66.01, Z68.41    Acute respiratory failure with hypoxemia ([facility]) J96.01    Pneumonia due to severe acute respiratory syndrome coronavirus 2 (SARS-CoV-2) U07.1, J12.82    Stage 3a chronic kidney disease ([facility]) N18.31    Anemia D64.9    Encounter for palliative care Z51.5       · Code status: Full code      RECOMMENDATIONS:   Respiratory: Patient with severe Covid pneumonia, and hypoxemia; patient presenting to ICU in ARDS state due to Covid inflammation. Day 9 on mechanical ventilation   worseing hypoxemia  Mechanical ventilationVCV mode; FiO2 70%; PEEP 10; plateau pressure 21; lung volume protection strategy. Chest x-ray from today reviewed imagesET tube and OG tube in good position; persistent bilateral diffuse interstitial and groundglass infiltrates; left retrocardiac consolidation persist; no pneumothorax or pleural effusions. Continue vent and sedation bundles. Sedationmidazolam and fentanyl infusion; mild sinus bradycardia with normal blood pressure; hold fentanyl drip if needed, if bradycardia worsens. Patient currently not needing paralytic. CXR  IMPRESSION  1. Support devices in stable/appropriate position as visualized. 2. Combination of interstitial and airspace disease appearing essentially    unchanged from prior study. Keep SPO2 >=88%. HOB 30 degree elevation while not proning. Aspiration precautions.   CTA chest on admission no PE; severe bilateral pulmonary opacities and groundglass densities consistent with severe Covid pneumonia; no significant pleural effusions; right hilar adenopathy likely reactivewill need follow-up in outpatient; thyroid nodule will also need follow-up in outpatient. CVS:BP borderline  BP meds and lasix now on hold   Bradycardia improved  Blood pressure remained stable; hydralazine 10 mg 3 times a day. Ultrasound legsnegative for DVTs  Echocardiogramnormal LV function, and mild pulmonary hypertension; dilated left atrium; mild to moderate mitral regurgitation. proBNP mildly elevated; troponins were mildly elevated on admissionpeak troponin 0.07. Patient on Coumadin at home for paroxysmal atrial fibrillation but on hold today because of low platleets   ID: Severe Covid pneumonia. Unvaccinated status. Zosyn added for possible sepsis   Respiratory viral panel was positive for SARS-CoV-2 PCR. Procalcitonin <0.05not elevated  CRP elevated ferritin elevated   D-dimer not reliable since patient has elevated INR on chronic Coumadin therapy. S/p 1 dose of Tocilizumab 12/7 morning. Steroidss/p 20 mg dose daily; weaned down to 6 mg daily. CTA chest negative for PEs on admission. Ultrasound of the legs ordered. Patient not needing antibiotics; afebrile; WBC not elevated; procalcitonin not elevated0.08. Continue vitamin supplements. ID on case. Hematology/Oncology: Stable hemoglobin9.9; platelets dropped to 17K; INR mildldy elevated now off coumadin on hold, spoke to hematology no heparin HIT pending   Thrombocytopenia likely related to Covid related sepsis. Check fibrinogen and D-dimer. worseing plt today still hold coumadin HIT  negative  PVL 12/14/2021  · No evidence of deep vein thrombosis in the right lower extremity veins assessed.   · No evidence of deep vein thrombosis in the left lower extremity veins assessed  Hold Coumadin again today   HIT panel send   pvl pending  Hematology consulted   Monitor for any bleeding issues. Consult hematologyDr. Angella Hwang. Renal: Known CKD; s/p CTA chest during this admission. Good urine output; creatinine improved to 1.15; sodium normal.  Continue fluids per nephrology. Monitor and replace electrolytes as needed. GI/: Tube feeding. PPI prophylaxis. LFTsimproved. Endocrine: Blood sugars mildly elevatedcontinue Lantus insulin10 units nightly. Sliding scale insulin. Target blood sugar 140180. Patient on steroids for severe Covid pneumoniasteroids being weaned. TSH 0.19likely sick euthyroid state; free T4 mildly increased; free T3 mildly decreased. Neurology: Intubated and sedatedlimited exam.  Skin/Wound: ICU nursing care. Electrolytes: Replace electrolytes per ICU electrolyte replacement protocol. IVF: D5W 50 mL/h; free water via OG tube. Nutrition: Tube feedingNeprotolerating well. Simon Gutter Prophylaxis: DVT Prophylaxis: Coumadin. GI Prophylaxis: Protonix. Restraints: Wrist soft restraints for patient interfering with medical therapy/management and patient safety. Lines/Tubes: PIVs; midline planned  ETT: 12/7/2021   OGT/NGT: 12/7/2021   Montoya: 12/7/2021 (Medically necessary for strict input/output monitoring in critically ill patient, will remove it when not needed. Montoya bundle followed). Advance Directive/Palliative Care: consulted    Prognosis appears very poor in this patient with Covid pneumonia with severe hypoxemic respiratory failure and ARDS needing to be intubated now; mortality is very high; risk of multiorgan failure with complications such as refractory hypoxemia, encephalopathy, critical illness myopathy, acute renal failure needing dialysis, shock state needing pressors, prolonged ventilator support, risk of tracheostomy and chronic debility/bedbound state. Quality Care: PPI, DVT prophylaxis, HOB elevated, Infection control all reviewed and addressed. Care of plan d/w RN, RT.   High complexity decision making was performed during the evaluation of this patient at high risk for decompensation with multiple organ involvement. Total critical care time spent rendering care exclusive of procedures/family discussion/coordination of care: 40 minutes. Genie Ng) [address] [facility]     [phone]      Unfortunately, patient's  is now in ICU with Covid pneumonia and on ventilator support. Update grandson on 12/11. Do not inform patient of her  medical condition per family request.     DNR       Please note: Voice-recognition software may have been used to generate this report, which may have resulted in some phonetic-based errors in grammar and contents. Even though attempts were made to correct all the mistakes, some may have been missed, and remained in the body of the document. Philip Shin MD  12/16/2021         Note  Patient with SARS-CoV-2 pneumonia with severe pneumonia and hypoxemic respiratory failure; patient was on high flow nasal cannula oxygen; patient is not vaccinated, and in the age group that is being infected with delta virus strain; the strain causes severe respiratory failure and complications reported in the [place] and worldwide; unfortunately, the prognosis is poor in unvaccinated patients, with a high risk of complications, multiorgan failure, chronic hypoxemia and respiratory failure, intubation, mechanical ventilation, thromboembolic disease risk in multiple organs, high risk for long Covid syndrome, and high risk for mortality. The CDC federal and state guidelines have emphasized repeatedly the importance of vaccination to prevent severe infection, mortality, disabilities, long Covid syndrome from Covid virus with several strains currently in the [place]. The treatment protocols are followed based on local hospital protocols, with guidance from centers such as [facility] General protocols. [facility] is not part of any research protocol.   The local hospital protocols have been guided by THE [facility] pharmacy department. Covid plasma is no longer considered standard of care in the [facility] due to lack of benefit in trials. Nebulization and CPAP therapies are not considered as part of the protocol in THE [facility] due to high risk of aerosolization, and high risks of spreading Covid infection to the healthcare staff. Dexamethasone considered standard of care in patients admitted with severe Covid pneumonia; variable dosing has been reported with this medication. ECMO and CRRT facilities not available at THE [facility]; patients with severe Covid pneumonia and respiratory failure are usually unstable for transportation when they are in critically ill state with a high risk of dying during transportation. REMDESIVIR-not recommended in patients with late presentation with severe respiratory failure     Tocilizumab anti-inflammatory medicationhas shown some benefit in patients with elevated inflammatory markers. Ivermectin not considered standard of care in the [address]; no significant randomized controlled studies done in the [facility] to suggest benefit of ivermectin in patients with COVID-19 with severe respiratory failure; risk of toxicity related to ivermectin outweighs any potential benefits based on consensus opinion in the [address] [facility] [address]. This drug is not considered standard of care in [facility].

## 2021-12-16 NOTE — PROGRESS NOTES
CM notes patient remains orally  intubated in ICU on Covid isolation. Patient not stable enough to transfer out of an acute care setting at this time. Care Management Interventions  PCP Verified by CM:  Yes  Transition of Care Consult (CM Consult): Discharge Planning  Health Maintenance Reviewed: Yes  Support Systems: Spouse/Significant Other  Confirm Follow Up Transport: Family  The Plan for Transition of Care is Related to the Following Treatment Goals : HH and physician follow up vs SNF  The Patient and/or Patient Representative was Provided with a Choice of Provider and Agrees with the Discharge Plan?: Yes  Name of the Patient Representative Who was Provided with a Choice of Provider and Agrees with the Discharge Plan: pt  Freedom of Choice List was Provided with Basic Dialogue that Supports the Patient's Individualized Plan of Care/Goals, Treatment Preferences and Shares the Quality Data Associated with the Providers?: Yes  Discharge Location  Discharge Placement:  (TBD depending upon family decisions and clinical progress)

## 2021-12-16 NOTE — PROGRESS NOTES
1915 : Bedside and Verbal shift change report given to Marcelino Rajan RN (oncoming nurse) by MITZI Loya Mt, RN (offgoing nurse). Report included the following information SBAR, Kardex, Intake/Output, MAR and Recent Results. 2000 : Patient resting in NAD. Face grimaces to voice but did not follow commands at time of assessment. Intubated and lightly sedated. Lung sounds clear and diminished bilaterally. HR bradycardic @ baseline. Montoya and FMS intact and draining. Hygiene care performed. Bilateral soft wrist restraints maintained to protect LDAs. 0000 : Reassessment. Patient continues to rest comfortably. VSS.    0400 : Reassessment. No changes at this time. 0500 : MD made aware of critical PLT 17. No signs of bleeding on assessment. No additional orders received. 0730 : Bedside and Verbal shift change report given to Kenisha Gaspar RN (oncoming nurse) by Bhanu Berger RN  (offgoing nurse). Report included the following information SBAR, Kardex, Intake/Output, MAR and Recent Results.

## 2021-12-16 NOTE — PROGRESS NOTES
[facility]  A Division of [address] [place] [address])                                                                                                                      Lala Whatley MD  Office #: - Option # [phone]  Fax #: [phone]  Date of Admission: 12/5/2021Date of Note: 12/16/2021  Reason for FU: Evaluation and antibiotic management of Severe COVID 19. Current Antimicrobials:    Prior Antimicrobials:  Zosyn 12/12 to date # D4    Actrema- X1 12/7  Dexamethasone 12/7 to date( 20mg)   Augmentin and doxycycline PO on 11/30 to 12/5  Ceftriaxone/ Zithromax 12/05 to 12/6       Assessment- ID related:  --------------------------------------------------------------------------  Critically ill patient with guarded prognosis,  post Actrema, high risk f super- infection:   · Thrombocytopenia- driven by possible Sepsis? Likely viral related  Plat down to 55K-> -> 17K-HIT panel pending  CXR remains with diffuse infiltrate, no change. · Acute renal failure:   · Severe COVID 19 infection  · Viral PNA- BL. Extensive on CT chest  · Acute resp failure- Hypoxic 2/2 above  · Intubated 12/7  · E.coli bacteruia 10/30  · Hx of septic shock with prolonged intubation 6 yrs ago- [facility]  COVId rapid test/RVP PCR + 12/5  Strep/leg urine ag: negative  BCX 12/5 [facility]  BCX 12/13- [facility]( post Zosyn)  procal X4-LN-> last done 12/11 <0.05  CRP 14->-> -> 0.5  ferretin 1104-> -> 836  CPK/trop-OK  LD-512  Hepatitis C ab/B sa negative, Quantiron TB-Indeterminate 12/7    Other Medical Issues- Mx per respective team:  ·   · Hypernatremia-resolved  · Acute renal failure-improved  · Morbidly obese BMI 40  · Hx of thrombocytoepnia- chronic. Etiology unknown.   · A fib on AC- INR 5  · L renal stone- non obstructing on CT 11/30  · CKD  · DMT2  · HTN/HLD  · Hx of CHF per pt     Recommendation for ID issues I am following:  ------------------------------------------------------------------------------    FU BCX from 12/13-[facility]  --DC Zosyn    -> serial procal nl, all culture negative. Low platlet probably driven from COVID infection- will DC Zosyn  -> dexamethasone and AC/Keri coctails per primary team  -> monitor routine labs  --> FU repeat Quantiferon TB pending    DW ICU MD/nurse. Family meeting today                        -     Subjective: Intubated/ unresponsive- Remains in critical condition  Afebrile, WBC at 12.5  Plat remain low- off AC due to very low plat  Cr is up to 1.6 and remains elevated  Notes/ labs reviewed  CXR this am remains with diffuse opacity/infiltrate-> Unchanged  TTE with EF 55-60%, no severe valvular pathology         HPI:  Faheem Vera is a 66 y.o. WHITE/NON- with PMH as listed above. Non vaccinated due to her medical conditions and her Cheondoism per pt. She has been feeling sick since 2-3 days prior to Nov 30, when she came to ED with right flank pain and abd pain. No Fever or chills, she is SOB and had dry cough as always from her CHF no new changes. COVID tested neg,. CT done showed some infiltrate on lung and  UA and Urine culture done and sent out with Aumgentin and Doxycycline for treatment of possible PNA. Returned to ED on 12/5 with worsened SOB, hypoxia and tachycardia. COVID 19 test + and CT chest with extensive infiltrate B/L. Her  tested + for COVID 19 as well. She was aon 5L oxygen and on dexamethasone. She was placed on CTX and Zithromax as well. Chest not very tight per pt at time of exam. She asks if we can treat her with Ivermectin. She denies abd pain/N/V/chest pain. Denies severe ext pain. Was intubated X1 month 6 yrs ago- post septic shock. Hx of TB exposure- unknown. Runs a [employer].        Active Hospital Problems    Diagnosis Date Noted    Encounter for palliative care     Pneumonia due to severe acute respiratory syndrome coronavirus 2 (SARS-CoV-2) 12/07/2021    Stage 3a chronic kidney disease ([facility] 12/07/2021    Anemia 12/07/2021    Acute respiratory failure with hypoxemia ([facility] 12/06/2021    Pneumonia due to COVID-19 virus 12/05/2021    Thrombocytopenia ([facility] 12/05/2021    Hypoxia 12/05/2021    DM due to underlying condition with diabetic nephropathy ([facility] 12/05/2021    E-coli UTI 12/05/2021    Class 3 severe obesity with body mass index (BMI) of 40.0 to 44.9 in adult [facility]) 12/05/2021    Paroxysmal A-fib ([facility]     Hypertension      Past Medical History:   Diagnosis Date    A-fib ([facility]     Anxiety     CAD (coronary artery disease)     Chronic kidney disease     Diabetes ([facility]     borderline    High cholesterol     Hypertension     Kidney stones     Psychiatric disorder     anxiety     Past Surgical History:   Procedure Laterality Date    HX CHOLECYSTECTOMY      HX HEART CATHETERIZATION      HX HYSTERECTOMY      HX ORTHOPAEDIC      bilateral knee surgery    HX ORTHOPAEDIC      right elbow    HX UROLOGICAL      stent placement    AK CARDIAC SURG PROCEDURE UNLIST      stent x 1     Family History   Problem Relation Age of Onset    Breast Cancer Maternal Aunt      Social History     Socioeconomic History    Marital status:      Spouse name: Not on file    Number of children: Not on file    Years of education: Not on file    Highest education level: Not on file   Occupational History    Not on file   Tobacco Use    Smoking status: Never Smoker    Smokeless tobacco: Never Used   Substance and Sexual Activity    Alcohol use: No    Drug use: No    Sexual activity: Not on file   Other Topics Concern    Not on file   Social History Narrative    Not on file     Social Determinants of Health     Financial Resource Strain:     Difficulty of Paying Living Expenses: Not on file   Food Insecurity:     Worried About Running Out of Food in the Last Year: Not on file    [address] in the Last Year: Not on file   Transportation Needs:     Lack of Transportation (Medical): Not on file    Lack of Transportation (Non-Medical): Not on file   Physical Activity:     Days of Exercise per Week: Not on file    Minutes of Exercise per Session: Not on file   Stress:     Feeling of Stress : Not on file   Social Connections:     Frequency of Communication with Friends and Family: Not on file    Frequency of Social Gatherings with Friends and Family: Not on file    Attends Rastafarian Services: Not on file    Active Member of [address] [place] [address] [employer] or Organizations: Not on file    Attends Club or Organization Meetings: Not on file    Marital Status: Not on file   Intimate Partner Violence:     Fear of Current or Ex-Partner: Not on file    Emotionally Abused: Not on file    Physically Abused: Not on file    Sexually Abused: Not on file   Housing Stability:     Unable to Pay for Housing in the Last Year: Not on file    Number of [place] in the Last Year: Not on file    Unstable Housing in the Last Year: Not on file       Allergies:  Patient has no known allergies.      Medications:  Current Facility-Administered Medications   Medication Dose Route Frequency    nystatin (MYCOSTATIN) 100,000 unit/gram powder   Topical BID    PHENYLephrine (SHANIKA-SYNEPHRINE) 30 mg in 0.9% sodium chloride 250 mL infusion   mcg/min IntraVENous TITRATE    [Held by provider] furosemide (LASIX) injection 20 mg  20 mg IntraVENous BID    insulin glargine (LANTUS) injection 10 Units  10 Units SubCUTAneous QHS    pantoprazole (PROTONIX) 40 mg in 0.9% sodium chloride 10 mL injection  40 mg IntraVENous DAILY    [Held by provider] dextrose 5% infusion  50 mL/hr IntraVENous CONTINUOUS    midazolam (VERSED) injection 2 mg  2 mg IntraVENous Q3H PRN    fentaNYL citrate (PF) injection 50 mcg  50 mcg IntraVENous Q4H PRN    chlorhexidine (PERIDEX) 0.12 % mouthwash 10 mL  10 mL Oral Q12H    fentaNYL (PF) 900 mcg/30 ml infusion soln  0-200 mcg/hr IntraVENous TITRATE    midazolam in normal saline (VERSED) 1 mg/mL infusion  2-10 mg/hr IntraVENous TITRATE    insulin lispro (HUMALOG) injection   SubCUTAneous Q6H    melatonin (rapid dissolve) tablet 10 mg  10 mg Oral QHS    ascorbic acid (vitamin C) (VITAMIN C) tablet 500 mg  500 mg Oral BID    zinc sulfate (ZINCATE) 50 mg zinc (220 mg) capsule 1 Capsule  1 Capsule Oral DAILY    Warfarin - Pharmacy to Dose  1 Each Other Rx Dosing/Monitoring    HYDROcodone-acetaminophen (NORCO)  mg tablet 1 Tablet  1 Tablet Oral Q6H PRN    isosorbide mononitrate ER (IMDUR) tablet 30 mg  30 mg Oral DAILY    nitroglycerin (NITROSTAT) tablet 0.4 mg  0.4 mg SubLINGual Q5MIN PRN    sodium chloride (NS) flush 5-40 mL  5-40 mL IntraVENous Q8H    sodium chloride (NS) flush 5-40 mL  5-40 mL IntraVENous PRN    acetaminophen (TYLENOL) tablet 650 mg  650 mg Oral Q6H PRN    Or    acetaminophen (TYLENOL) suppository 650 mg  650 mg Rectal Q6H PRN    polyethylene glycol (MIRALAX) packet 17 g  17 g Oral DAILY PRN    ondansetron (ZOFRAN ODT) tablet 4 mg  4 mg Oral Q8H PRN    Or    ondansetron (ZOFRAN) injection 4 mg  4 mg IntraVENous Q6H PRN    cholecalciferol (VITAMIN D3) (1000 Units /25 mcg) tablet 2,000 Units  2,000 Units Oral DAILY    glucose chewable tablet 16 g  16 g Oral PRN    glucagon (GLUCAGEN) injection 1 mg  1 mg IntraMUSCular PRN    dextrose (D50W) injection syrg 12.5-25 g  25-50 mL IntraVENous PRN      Physical Exam:    Temp (24hrs), Av.9 °F (36.6 °C), Min:97 °F (36.1 °C), Max:98.4 °F (36.9 °C)    Visit Vitals  BP (!) 128/47   Pulse 64   Temp 97.9 °F (36.6 °C)   Resp 21   Ht 5' 6\" (1.676 m)   Wt 86.6 kg (190 lb 14.7 oz)   SpO2 94%   BMI 30.81 kg/m²        GEN: WD Morbidly obese, intubated and lying supine- sedated  HEENT: Unicteric. Kim Sai   ET/Oral tube in place  CHEST/ CVS: per ICU exam  ABD: Obese, fecal management in place  GIRISH: Montoya in place  CNS:Intubated/ non responsive     Microbiology  All Micro Results     Procedure Component Value Units Date/Time    CULTURE, BLOOD [[id number]] Collected: 12/13/21 1150    Order Status: Completed Specimen: Blood Updated: 12/16/21 0725     Special Requests: NO SPECIAL REQUESTS        Culture result: NO GROWTH 3 DAYS       CULTURE, BLOOD [[id number]] Collected: 12/05/21 1106    Order Status: Completed Specimen: Blood Updated: 12/11/21 0729     Special Requests: NO SPECIAL REQUESTS        Culture result: NO GROWTH 6 DAYS       CULTURE, BLOOD [[id number]] Collected: 12/05/21 1106    Order Status: Completed Specimen: Blood Updated: 12/11/21 0729     Special Requests: NO SPECIAL REQUESTS        Culture result: NO GROWTH 6 DAYS       STREP PNEUMO AG, URINE [[id number]] Collected: 12/08/21 2051    Order Status: Completed Specimen: Urine, random Updated: 12/09/21 1024     Strep pneumo Ag, urine Negative       LEGIONELLA PNEUMOPHILA AG, URINE [[id number]] Collected: 12/08/21 2051    Order Status: Completed Specimen: Urine, random Updated: 12/09/21 1024     Legionella Ag, urine Negative       LEGIONELLA PNEUMOPHILA AG, URINE [[id number]] Collected: 12/05/21 1730    Order Status: Canceled Specimen: Urine     STREP Pattricia Norse, URINE [[id number]] Collected: 12/05/21 1730    Order Status: Canceled Specimen: Urine     RESPIRATORY VIRUS PANEL W/COVID-19, PCR [[id number]]  (Abnormal) Collected: 12/05/21 1110    Order Status: Completed Specimen: Nasopharyngeal Updated: 12/05/21 1633     Adenovirus Not detected        Coronavirus 229E Not detected        Coronavirus HKU1 Not detected        Coronavirus CVNL63 Not detected        Coronavirus OC43 Not detected        SARS-CoV-2, PCR Detected        Comment: CALLED TO AND CORRECTLY REPEATED BY:  DR Danni Perkins [facility] ER ON [id number] AT 1626 HRS TO 1396.           Nonah Conrad Not detected        Rhinovirus and Enterovirus Not detected        Influenza A Not detected        Influenza A, subtype H1 Not detected        Influenza A, subtype H3 Not detected INFLUENZA A H1N1 PCR Not detected        Influenza B Not detected        Parainfluenza 1 Not detected        Parainfluenza 2 Not detected        Parainfluenza 3 Not detected        Parainfluenza virus 4 Not detected        RSV by PCR Not detected        B. parapertussis, PCR Not detected        Bordetella pertussis - PCR Not detected        Chlamydophila pneumoniae DNA, QL, PCR Not detected        Mycoplasma pneumoniae DNA, QL, PCR Not detected       COVID-19 RAPID TEST [[id number]]  (Abnormal) Collected: 12/05/21 1109    Order Status: Completed Specimen: Nasopharyngeal Updated: 12/05/21 1136     Specimen source Nasopharyngeal        COVID-19 rapid test Detected        Comment:      The specimen is POSITIVE for SARS-CoV-2, the novel coronavirus associated with COVID-19. This test has been authorized by the FDA under an Emergency Use Authorization (EUA) for use by authorized laboratories.         Fact sheet for Healthcare Providers: ConventionUpdate.co.nz  Fact sheet for Patients: ConventionUpdate.co.nz       Methodology: Isothermal Nucleic Acid Amplification  CALLED TO AND CORRECTLY REPEATED BY:  Ted Walker RN ER, TO JAF AT 1136 12/5/2021                Lab results:    Chemistry  Recent Labs     12/16/21  0332 12/15/21  0635 12/14/21  0500   * 135* 162*    139 140   K 3.5 4.3 3.7    105 106   CO2 26 27 27   BUN 65* 68* 67*   CREA 1.60* 1.67* 1.50*   CA 8.3* 8.2* 8.2*   AGAP 8 7 7   BUCR 41* 41* 45*   AP 86 92 89   TP 5.0* 5.0* 4.8*   ALB 1.9* 2.0* 1.9*   GLOB 3.1 3.0 2.9   AGRAT 0.6* 0.7* 0.7*       CBC w/ Diff  Recent Labs     12/16/21  0332 12/15/21  0635 12/14/21  0500   WBC 12.5 10.0 9.8   RBC 3.07* 3.32* 3.25*   HGB 9.9* 10.7* 10.5*   HCT 29.5* 31.7* 31.3*   PLT 17* 18* 28*   GRANS 93* 91* 91*   LYMPH 2* 3* 3*   EOS 1 1 0       Imaging: report posted below as per radiologist     CTA chest 12/5    No evidence of a pulmonary embolism or aortic dissection. Moderate groundglass opacities and airspace disease seen bilaterally suggesting  atypical pneumonia and the findings are typical in appearance for Covid 19  pneumonia.     Mildly enlarged right hilar lymph node which may be reactive. Advise follow-up  to exclude any neoplastic or myeloproliferative process.     15 mm slightly complex right thyroid lobe nodule. Advise nonemergent thyroid  Ultrasound.   Total duration of time spent with patient interview and exam and discussion of plan of care, review of chart in care everywhere, discussion with medical staff- nursing/attending and additional specialities as indicated: 25 minutes.

## 2021-12-16 NOTE — PROGRESS NOTES
Pharmacy Dosing Services: Warfarin Dosing    Consult for Warfarin Dosing by Pharmacy by Dr. Talita Luna provided for this 66 y.o.  female , for indication of Atrial Fibrillation. Day of Therapy - Continue home medication  Dose to achieve an INR goal of 2-3    Order entered to HOLD Warfarin to be given today at 18:00. PT/INR Lab Results   Component Value Date/Time    INR 1.3 (H) 12/16/2021 03:32 AM      Platelets Lab Results   Component Value Date/Time    PLATELET 17 (LL) 49/63/9887 03:32 AM      H/H     Lab Results   Component Value Date/Time    HGB 9.9 (L) 12/16/2021 03:32 AM        Warfarin Administrations (last 168 hours)       Date/Time Action Medication Dose    12/11/21 1706 Given    warfarin (COUMADIN) tablet 4 mg 4 mg    12/10/21 1716 Given    warfarin (COUMADIN) tablet 2 mg 2 mg            Pharmacy to follow daily and will provide subsequent Warfarin dosing based on clinical status.   MIGUEL Puri  [facility] 23 [id number] [phone]

## 2021-12-17 NOTE — PROGRESS NOTES
[id number] [facility] 54: [phone]  [facility]: [address] [place] [address] Way: [phone]    Patient Name: Faheem Vera  YOB: 1950    Date of Follow-up Visit: 12/17/2021   Reason for Consult: goals of care   Requesting Provider: Dr Leighton Chang   Primary Care Physician: Jessica Barrientos DO      SUMMARY:   Faheem Vera is a 66y.o. year old with a past history of chronic A fib on  Coumadin, chronic leg pain, anxiety, DM with CKD and HTN  who was admitted on 12/5/2021 from home  with a diagnosis of COVID 19 pneumonia and acute respiratory failure  Current medical issues leading to Palliative Medicine involvement include: 66year old female who presented with COVID 19 pneumonia who is now orally intubated. Palliative Medicine is consulted for goals of care discussions. 12/15/2021:  Lying in bed, orally intubated and sedated. Vent day 8    12/17/2021 remains on the ventilator. Spoke with son Marden Scheuermann, family planning on coming in Saturday 12/18/2021 to compassionately extubate and move to comfort measures. PALLIATIVE DIAGNOSES:   1. Encounter for palliative care/Goals of care   2. COVID 19 pneumonia   3. Acute respiratory failure   4. Obesity        PLAN:   12/17/2021 Mr Nolan Gay seen through glass doors due to COVID 19. He remains orally intubated and sedated. Critically ill unfortunately despite aggressive measures not improving. Appreciate PCCM discussion with son Marden Scheuermann. We also spoke with him via phone. He and his family ( children ) plan on coming to the hospital tomorrow around 1pm to see their mother and move to compassionate extubation and comfort measures. Goals of care no chest compressions or shock,at cardiac arrest remains intubated. Discussed with PCCM and RN who aware of family plans.  Comfort measures discussed at length including use of Morphine and Ativan to help with symptom management and stopping all aggressive treatments such as IVAB, IVF's, labs and x rays. Tuan Flores voiced his understanding. Symptom management: When family comes and patient is compassionately extubated would consider placing Morphine PCA with basal rate of 1 mg / hr. Titration can be 1 mg  Per hour up to an hourly rate of 5 mg. Would also place Ativan 0.5 mg IV q 2 hrs PRN, Robinul 0.2 mg IV q 4 hrs PRN secretion control. ( please see below for previous notes per palliative team)     12/15/2021:  Seen through glass door of ICU to prevent disease exposure due to COVID-19. Patient is orally intubated and sedated. AMD on file reflects primary MPOA as spouse, Melyssa Gonzalez, who is unfortunately also hospitalized with COVID-19 and intubated. There is no secondary MPOA. According to the [facility], in this instance, legal decision making falls to a majority of the patient's 3 adult children, Brett Argueta, Anusha Mccoy and Lorie Trevizo. Family meeting held today with the following family members:                Nguyễn Ifrah of patient - eldest,  and his spouse. Erika Carrington of patient - on facetime call   Ifeanyi Moncada - granddaughter of patient and daughter of Anusha Mccoy- present in room               Lorie Mahoneydae- daughter of patient who lives in [place] - on phone     Also present were this Mic Lieberman RN, Dr RADHA Marsh and [email or web address] for spiritual support. Medical update provided by Dr. Arabella Reis including patient's overall poor prognosis renal and cardiac status, platelet issues (today down to 18) and respiratory status including high vent settings and oxygen requirements. Reviewed AMD on file and Ms. Back's wishes with family which include the wish for DNR/DNI and to not be maintained on machines long term. All 3 children expressed understanding and agreement with these wishes and confirmed DNR/DNI status. Code status updated last night by ICU attending (appreciate assistance). Discussed with family that the ETT is only temporary in the short term and that plans will need to be made for removal, specifically the options of compassionate extubation or tracheostomy and continued ventilatory support. Discussed that patient is not a candidate for a tracheostomy at this time due to high vent settings. Family inquired about compassionate extubation and what that looks like. Explained the process of compassionate extubation including the use of medications to help with breathing, restlessness and pain and a less aggressive oxygen delivery system. Family members were appropriately tearful and daughter, Ernestina Guzman, was heard crying on the phone. Compassionate listening and emotional support provided. Family requested to be able to see patient via window outside. Family wishes to discuss privately and talk together this evening about all of the information provided and consider next steps. Palliative medicine phone number provided for any questions or needs. Goals of care are DNR in the event of cardiac arrest.    Please see below for previous conversations with the palliative medicine team:    1. Goals of care seen along Ms Zofia Mckinney NP patient seen behind glass doors due to COVID 19 pandemic to preserve PPE. She is orally intubated and not able to participate her goals of care decisions. Her  is her legal next of kin , however he is hospitalized with COVID 23 and currently intubated. There is no AMD her legal medical decision makers are a majority of her children. The family has decided to have grand daughter Kamryn Dan be point of contact. Kamryn Dan can bring medical information back to family however she can not make medical decisions. Medical update given to Kamryn Dan. Goals of care remain full code with full interventions. 2. COVID 19 pneumonia; ID and pulmonology on board and managing on IVAB  3. Acute resp failure currently orally intubated with PEEP of 8 FIO2 of 70%   4.  Obesity class 3 BMI 40.0 to 44.9   5. Initial consult note routed to primary continuity provider  6. Communicated plan of care with: Palliative IDT, grand daughter     Patient/Health Care Proxy Stated Goals: Prolong life      TREATMENT PREFERENCES:   Code Status: DNR    Advance Care Planning:  [] The [facility] Interdisciplinary Team has updated the ACP Navigator with [address] and Patient Capacity    Primary Decision [facility] ([address]):   Primary Decision MakeElda Bell - [phone]   SURINDER of children are secondary medical decision makers  Medical Interventions: Full interventions           Other:  As far as possible, the palliative care team has discussed with patient / health care proxy about goals of care / treatment preferences for patient. HISTORY:     History obtained from: chart     CHIEF COMPLAINT: COVID 19 pneumonia     HPI/SUBJECTIVE:    The patient is:   [] Verbal and participatory  [x] Non-participatory due to:  Orally intubated and sedated   Please see summary     Clinical Pain Assessment (nonverbal scale for nonverbal patients): Clinical Pain Assessment  Severity: 0     Activity (Movement): Lying quietly, normal position    Duration: for how long has pt been experiencing pain (e.g., 2 days, 1 month, years)  Frequency: how often pain is an issue (e.g., several times per day, once every few days, constant)     FUNCTIONAL ASSESSMENT:     Palliative Performance Scale (PPS):  PPS: 20    ECOG  ECOG Status : Completely disabled     PSYCHOSOCIAL/SPIRITUAL SCREENING:      Any spiritual / Jew concerns: unable to assess for patient   [] Yes /  [] No    Caregiver Burnout:  [] Yes /  [] No /  [x] No Caregiver Present      Anticipatory grief assessment: unable to assess for patient   [] Normal  / [] Maladaptive        REVIEW OF SYSTEMS:     Positive and pertinent negative findings in ROS are noted above in HPI.   The following systems were [] reviewed / [x] unable to be reviewed as noted in HPI  Other findings are noted below. Systems: constitutional, ears/nose/mouth/throat, respiratory, gastrointestinal, genitourinary, musculoskeletal, integumentary, neurologic, psychiatric, endocrine. Positive findings noted below. Modified ESAS Completed by: provider           Pain: 0           Dyspnea: 0           Stool Occurrence(s): 1        PHYSICAL EXAM:     Wt Readings from Last 3 Encounters:   12/12/21 86.6 kg (190 lb 14.7 oz)   11/30/21 114.3 kg (252 lb)   07/19/21 108 kg (238 lb)     Blood pressure (!) 122/42, pulse 83, temperature 99.1 °F (37.3 °C), resp. rate 22, height 5' 6\" (1.676 m), weight 86.6 kg (190 lb 14.7 oz), SpO2 95 %.   Pain:  Pain Scale 1: FLACC  Pain Intensity 1: 0     Pain Location 1: Leg  Pain Orientation 1: Lower     Pain Intervention(s) 1: Repositioned  Last bowel movement: none recorded    Constitutional: remains orally intubated, sedated appears comfortable   Eyes: closed   ENMT: orally intubated   Respiratory: ventilator supported   Neurologic: sedated, eyes closed        HISTORY:     Active Problems:    Hypertension ()      Paroxysmal A-fib (HCC) ()      Pneumonia due to COVID-19 virus (12/5/2021)      Thrombocytopenia ([address] (12/5/2021)      Hypoxia (12/5/2021)      DM due to underlying condition with diabetic nephropathy ([address] (12/5/2021)      E-coli UTI (12/5/2021)      Class 3 severe obesity with body mass index (BMI) of 40.0 to 44.9 in adult [facility]) (12/5/2021)      Acute respiratory failure with hypoxemia ([address] (12/6/2021)      Pneumonia due to severe acute respiratory syndrome coronavirus 2 (SARS-CoV-2) (12/7/2021)      Stage 3a chronic kidney disease ([address] (12/7/2021)      Anemia (12/7/2021)      Encounter for palliative care ()      Past Medical History:   Diagnosis Date    A-fib ([address]     Anxiety     CAD (coronary artery disease)     Chronic kidney disease     Diabetes ([address]     borderline    High cholesterol     Hypertension     Kidney stones     Psychiatric disorder     anxiety      Past Surgical History:   Procedure Laterality Date    HX CHOLECYSTECTOMY      HX HEART CATHETERIZATION      HX HYSTERECTOMY      HX ORTHOPAEDIC      bilateral knee surgery    HX ORTHOPAEDIC      right elbow    HX UROLOGICAL      stent placement    [place] CARDIAC SURG PROCEDURE UNLIST      stent x 1      Family History   Problem Relation Age of Onset    Breast Cancer Maternal Aunt      History reviewed, no pertinent family history.   Social History     Tobacco Use    Smoking status: Never Smoker    Smokeless tobacco: Never Used   Substance Use Topics    Alcohol use: No     No Known Allergies   Current Facility-Administered Medications   Medication Dose Route Frequency    nystatin (MYCOSTATIN) 100,000 unit/gram powder   Topical BID    PHENYLephrine (SHANIKA-SYNEPHRINE) 30 mg in 0.9% sodium chloride 250 mL infusion   mcg/min IntraVENous TITRATE    [Held by provider] furosemide (LASIX) injection 20 mg  20 mg IntraVENous BID    insulin glargine (LANTUS) injection 10 Units  10 Units SubCUTAneous QHS    pantoprazole (PROTONIX) 40 mg in 0.9% sodium chloride 10 mL injection  40 mg IntraVENous DAILY    [Held by provider] dextrose 5% infusion  50 mL/hr IntraVENous CONTINUOUS    midazolam (VERSED) injection 2 mg  2 mg IntraVENous Q3H PRN    fentaNYL citrate (PF) injection 50 mcg  50 mcg IntraVENous Q4H PRN    chlorhexidine (PERIDEX) 0.12 % mouthwash 10 mL  10 mL Oral Q12H    fentaNYL (PF) 900 mcg/30 ml infusion soln  0-200 mcg/hr IntraVENous TITRATE    midazolam in normal saline (VERSED) 1 mg/mL infusion  2-10 mg/hr IntraVENous TITRATE    insulin lispro (HUMALOG) injection   SubCUTAneous Q6H    melatonin (rapid dissolve) tablet 10 mg  10 mg Oral QHS    ascorbic acid (vitamin C) (VITAMIN C) tablet 500 mg  500 mg Oral BID    zinc sulfate (ZINCATE) 50 mg zinc (220 mg) capsule 1 Capsule  1 Capsule Oral DAILY    Warfarin - Pharmacy to Dose  1 Each Other Rx Dosing/Monitoring    HYDROcodone-acetaminophen (NORCO)  mg tablet 1 Tablet  1 Tablet Oral Q6H PRN    isosorbide mononitrate ER (IMDUR) tablet 30 mg  30 mg Oral DAILY    nitroglycerin (NITROSTAT) tablet 0.4 mg  0.4 mg SubLINGual Q5MIN PRN    sodium chloride (NS) flush 5-40 mL  5-40 mL IntraVENous Q8H    sodium chloride (NS) flush 5-40 mL  5-40 mL IntraVENous PRN    acetaminophen (TYLENOL) tablet 650 mg  650 mg Oral Q6H PRN    Or    acetaminophen (TYLENOL) suppository 650 mg  650 mg Rectal Q6H PRN    polyethylene glycol (MIRALAX) packet 17 g  17 g Oral DAILY PRN    ondansetron (ZOFRAN ODT) tablet 4 mg  4 mg Oral Q8H PRN    Or    ondansetron (ZOFRAN) injection 4 mg  4 mg IntraVENous Q6H PRN    cholecalciferol (VITAMIN D3) (1000 Units /25 mcg) tablet 2,000 Units  2,000 Units Oral DAILY    glucose chewable tablet 16 g  16 g Oral PRN    glucagon (GLUCAGEN) injection 1 mg  1 mg IntraMUSCular PRN    dextrose (D50W) injection syrg 12.5-25 g  25-50 mL IntraVENous PRN        LAB AND IMAGING FINDINGS:     Lab Results   Component Value Date/Time    WBC 12.9 12/17/2021 05:14 AM    HGB 9.7 (L) 12/17/2021 05:14 AM    PLATELET 22 (LL) 92/67/8561 05:14 AM     Lab Results   Component Value Date/Time    Sodium 142 12/17/2021 05:14 AM    Potassium 3.8 12/17/2021 05:14 AM    Chloride 109 12/17/2021 05:14 AM    CO2 25 12/17/2021 05:14 AM    BUN 71 (H) 12/17/2021 05:14 AM    Creatinine 1.57 (H) 12/17/2021 05:14 AM    Calcium 8.4 (L) 12/17/2021 05:14 AM    Magnesium 2.0 12/05/2021 11:08 AM    Phosphorus 2.5 11/13/2014 04:06 AM      Lab Results   Component Value Date/Time    Alk.  phosphatase 83 12/17/2021 05:14 AM    Protein, total 5.2 (L) 12/17/2021 05:14 AM    Albumin 2.0 (L) 12/17/2021 05:14 AM    Globulin 3.2 12/17/2021 05:14 AM     Lab Results   Component Value Date/Time    INR 1.2 12/17/2021 05:14 AM    Prothrombin time 14.4 12/17/2021 05:14 AM    aPTT 67.2 (H) 12/06/2021 06:05 AM      Lab Results   Component Value Date/Time    Ferritin 836 (H) 12/13/2021 06:44 AM      No results found for: PH, PCO2, PO2  No components found for: [place]   Lab Results   Component Value Date/Time    CK 63 12/13/2021 11:50 AM    CK - MB <1.0 12/13/2021 11:50 AM              Total time: 25 minutes      > 50% counseling / coordination: yes, family, nursing, MD     Prolonged service was provided for  []30 min   []75 min in face to face time in the presence of the patient, spent as noted above.   Time Start:   Time End:

## 2021-12-17 NOTE — PALLIATIVE CARE
Addendum       Spoke with son Kellie Buhs, family plans on coming in at 3:30 tomorrow   To compassionately extubate and move to comfort measures Mrs Crystal Bragg. They plan for prayer outside room, michel has been notified. All family on board. For symptom management would place Morphine PCA no loading dose, basal rate of 1 mg / hr. Can titrate up by 1 mg / hr up to 5 mg / hr    2 mg IV Morphine prior to extubation x 1    Ativan 1 mg IV q 2 hr PRN     Robinul IV q 4 hrs PRN     Scop patch. Family aware patient and   cannot be placed in same room they are understanding of this.      RN aware, PCCM aware

## 2021-12-17 NOTE — ACP (ADVANCE CARE PLANNING)
[facility] [employer] [address] POST (Physician Orders for Scope of Treatment)                   Date of conversation: 12/17/2021      Location: [facility]   Length (minutes): 40     Participants:              [x] Patient - intubated               [] Healthcare agent (already designated in existing ACP document)                          Name: Unique Riddle                           Relationship to Patient:Son                             [x] Surrogate Decision Maker / Next of Kin- Acting as Spokesperson for family. Yaritza Brannon is in agreement. Name: Brett Argueta                           Relationship to Patient Son   Mr Brett Argueta shared that his sister Samantha Timmons and Ida Proctor are having a difficult time processing the situation but Samantha Timmons (patient's daughter is in agreement). Samantha Timmons lost her spouse 2 years ago. Palliative team offered to reach out to Samantha Timmons, but Dickerson Sandra shared that the family is supporting her. [employer]® ACP Facilitator: Clair Hough RN, Elsa Larkin, NP      Conversation Topics      Palliative team called and spoke with Mr. Brett Argueta after speaking with Paula Chilel. Dickerson Sandra shared that he has received a medical update from Dr Delia Marsh. He stated that his siblings are all in agreement to transition their father to comfort once they are able to see him. Patient's daughter, Samantha Timmons will be coming from [place] tomorrow. Comfort measures discussed at length including no further labs, x rays, IVAB, IVF\"s, no further aggressive care. Discussed comfort medications; Roxanol and ativan to help alleviate pain and dyspnea. Introduced the completion of Physician Order For Scope of Treatment. POST form sent via [employer] for signature to [email or web address]. [employer]    Form returned and placed on chart for scanning. Family plans to come in tomorrow to compassionately extubate patient. We offered support in this most difficult time.      Goals of care no chest compressions, no shock at cardiac arrest, remains intubated and continue current medical treatment until compassionate extubation on Saturday. Needs to discuss with spiritual/Taoism advisor: [] Yes  [x] No     Needs more information about illness and complications:  [x] Yes  [] No        Cardiopulmonary Resuscitation       Order Elected for CPR:  []  Attempt Resuscitation   [x]  Do Not Attempt Resuscitation     When NOT in Cardiopulmonary Arrest, Order Elected:       [x] Comfort Measures- to start when family is present and agrees to extubation time. [] Limited Additional Interventions  [] Full Interventions     Artificially Administered Nutrition, Order Elected:     [x] No Feeding Tube   [] Feeding Tube for a defined trial period  [] Feeding Tube long-term if indicated     The following was provided (check all that apply):                            Healthcare Decision Information:              [x] Help with Breathing Facts              [] Tube Feeding Facts              [x] CPR Facts                 [] Review of existing Advance Directive  [] Assistance with Completion of New Advance Directive   [x] Review of [place] POST Form                                   Meeting Outcomes:              [x] ACP discussion completed              [x] [place] form completed  [x] [place] prepared for Provider review and signature              [x] Original placed on Chart, if in facility (form to be sent with patient at discharge)  [x] Copy given to healthcare agent               [x] Copy scanned to electronic medical record     If ACP discussion not completed, last interview topic discussed: Family will be coming to the hospital on Saturday at approximately 12 noon to see patient via window prior to any decision to extubate.         Follow-up plan:                [] Schedule follow-up conversation to continue planning              [x] Referred individual to Provider for additional questions/concerns   [] Advised patient/agent/surrogate to review completed POST form and update if needed with changes in condition, patient preferences or care setting      [] This note routed to one or more involved healthcare providers      Guanako BELCHER, RN, [address] [place] [address]: [phone]

## 2021-12-17 NOTE — PROGRESS NOTES
Platelets improved favor consumptive acute illness and medications        Continue to hold coumadin      COVID positive per primary team.      Will follow    JCP

## 2021-12-17 NOTE — PROGRESS NOTES
Pharmacy Dosing Services: Warfarin    Consult for Warfarin Dosing by Pharmacy by Dr. Sanaz Piper provided for this 66 y.o.  female , for indication of Atrial Fibrillation. Dose to achieve an INR goal of 2-3    Hold  Warfarin  order today at 18:00. LABS    PT/INR Lab Results   Component Value Date/Time    INR 1.2 12/17/2021 05:14 AM      Platelets Lab Results   Component Value Date/Time    PLATELET 22 (LL) 96/76/1800 05:14 AM      H/H     Lab Results   Component Value Date/Time    HGB 9.7 (L) 12/17/2021 05:14 AM        Warfarin Administrations (last 168 hours)       Date/Time Action Medication Dose    12/11/21 1706 Given    warfarin (COUMADIN) tablet 4 mg 4 mg    12/10/21 1716 Given    warfarin (COUMADIN) tablet 2 mg 2 mg            Pharmacy to follow daily and will provide subsequent Warfarin dosing based on clinical status.   Joel Arroyo, [address])  Contact information

## 2021-12-17 NOTE — PROGRESS NOTES
[facility]  Pulmonary, Critical Care, and Sleep Medicine    Name: Mike Mack MRN: [id number]   : 1950 Hospital: [facility] [place]    Date: 2021  Room: [address] [place] [address] Note                                              Consult requesting physician: Dr. Antoine Zuñiga  Reason for Consult: Severe Covid pneumonia with respiratory failure      Subjective/History of Present Illness:     Patient is a 66 y.o. female with PMHx significant for chronic A. fib on Coumadin therapy, diabetes mellitus, chronic kidney disease, hypertension, thyroid nodule was admitted to the hospital on 2021 with Covid infection. She has not been vaccinated for Covid infection. Patient progressed to worsening respiratory failure and hypoxemia. During admission, patient had declined intubation. Today, her  reported CODE STATUS and wanted patient to be intubated. Patient underwent COVID-19 intubation in the telemetry unit, and subsequently transferred to the ICU.    2021  Patient in ICU. Intubated  sedated no improvement in hypoxemic respiratory failure AC  PEEP to 10 and FiO2 to 65% today. Severe thrombocytopenia  HIT negative covid related ? I updated son María Elena Nick he spoke to family and palliative care   Considering comfort care on 2021  DNR    Review of Systems:  Limited due to patient condition    Patient came to ER on  with right flank pain, and right basal pneumonia; was sent home with antibiotic therapyAugmentin and doxycycline. Urine culture ansensitive E. coli.       No Known Allergies   Past Medical History:   Diagnosis Date    A-fib ([facility] [address]     Anxiety     CAD (coronary artery disease)     Chronic kidney disease     Diabetes ([facility] [address]     borderline    High cholesterol     Hypertension     Kidney stones     Psychiatric disorder     anxiety      Past Surgical History:   Procedure Laterality Date    HX CHOLECYSTECTOMY      HX HEART CATHETERIZATION      HX HYSTERECTOMY      HX ORTHOPAEDIC      bilateral knee surgery    HX ORTHOPAEDIC      right elbow    HX UROLOGICAL      stent placement    LA CARDIAC SURG PROCEDURE UNLIST      stent x 1      Social History     Tobacco Use    Smoking status: Never Smoker    Smokeless tobacco: Never Used   Substance Use Topics    Alcohol use: No      Family History   Problem Relation Age of Onset    Breast Cancer Maternal Aunt       Prior to Admission medications    Medication Sig Start Date End Date Taking? Authorizing Provider   allopurinoL (ZYLOPRIM) 300 mg tablet Take 300 mg by mouth daily. Provider, Historical   torsemide (DEMADEX) 100 mg tablet Take 50 mg by mouth daily. Provider, Historical   HYDROcodone-acetaminophen (NORCO)  mg tablet Take 1 Tab by mouth every eight (8) hours as needed for Pain. Provider, Historical   metoprolol succinate (TOPROL-XL) 100 mg tablet Take 150 mg by mouth daily. Provider, Historical   warfarin (Coumadin) 2 mg tablet Take 4 mg by mouth daily. Provider, Historical   isosorbide mononitrate ER (IMDUR) 30 mg tablet Take 30 mg by mouth daily. Provider, Historical   magnesium oxide (MAG-OX) 400 mg tablet Take 400 mg by mouth daily. Provider, Historical   pantoprazole (PROTONIX) 40 mg tablet Take 40 mg by mouth daily. Provider, Historical   ergocalciferol (Vitamin D2) 1,250 mcg (50,000 unit) capsule Take 50,000 Units by mouth. Provider, Historical   docusate sodium (Colace) 100 mg capsule Take 100 mg by mouth two (2) times a day. Provider, Historical   nitroglycerin (NITROSTAT) 0.4 mg SL tablet 0.4 mg by SubLINGual route every five (5) minutes as needed for Chest Pain. Up to 3 doses. Provider, Historical   hydrALAZINE (APRESOLINE) 50 mg tablet Take 25 mg by mouth three (3) times daily. Layla Alex MD   aspirin delayed-release 81 mg tablet Take  by mouth daily.     Layla Alex MD   folic acid (FOLVITE) 1 mg tablet Take  by mouth daily. Layla Alex MD   potassium chloride (K-DUR, KLOR-CON) 20 mEq tablet Take  by mouth two (2) times a day.     Layla Alex MD     Current Facility-Administered Medications   Medication Dose Route Frequency    nystatin (MYCOSTATIN) 100,000 unit/gram powder   Topical BID    PHENYLephrine (SHANIKA-SYNEPHRINE) 30 mg in 0.9% sodium chloride 250 mL infusion   mcg/min IntraVENous TITRATE    [Held by provider] furosemide (LASIX) injection 20 mg  20 mg IntraVENous BID    insulin glargine (LANTUS) injection 10 Units  10 Units SubCUTAneous QHS    pantoprazole (PROTONIX) 40 mg in 0.9% sodium chloride 10 mL injection  40 mg IntraVENous DAILY    [Held by provider] dextrose 5% infusion  50 mL/hr IntraVENous CONTINUOUS    chlorhexidine (PERIDEX) 0.12 % mouthwash 10 mL  10 mL Oral Q12H    fentaNYL (PF) 900 mcg/30 ml infusion soln  0-200 mcg/hr IntraVENous TITRATE    midazolam in normal saline (VERSED) 1 mg/mL infusion  2-10 mg/hr IntraVENous TITRATE    insulin lispro (HUMALOG) injection   SubCUTAneous Q6H    melatonin (rapid dissolve) tablet 10 mg  10 mg Oral QHS    ascorbic acid (vitamin C) (VITAMIN C) tablet 500 mg  500 mg Oral BID    zinc sulfate (ZINCATE) 50 mg zinc (220 mg) capsule 1 Capsule  1 Capsule Oral DAILY    Warfarin - Pharmacy to Dose  1 Each Other Rx Dosing/Monitoring    isosorbide mononitrate ER (IMDUR) tablet 30 mg  30 mg Oral DAILY    sodium chloride (NS) flush 5-40 mL  5-40 mL IntraVENous Q8H    cholecalciferol (VITAMIN D3) (1000 Units /25 mcg) tablet 2,000 Units  2,000 Units Oral DAILY         Objective:   Vital Signs:    Visit Vitals  BP (!) 122/42   Pulse 83   Temp 99.1 °F (37.3 °C)   Resp 22   Ht 5' 6\" (1.676 m)   Wt 86.6 kg (190 lb 14.7 oz)   SpO2 95%   BMI 30.81 kg/m²       O2 Device: Endotracheal tube,Ventilator   O2 Flow Rate (L/min): 40 l/min   Temp (24hrs), Av.1 °F (36.7 °C), Min:95 °F (35 °C), Max:99.3 °F (37.4 °C)       Intake/Output:   Last shift:       07 -  1900  In: 800 [I.V.:220]  Out: 275 [Urine:275]    Last 3 shifts: 12/15 1901 - 12/17 0700  In: 4877.4 [I.V.:1307.4]  Out: 2200 [Urine:1900; Drains:300]      Intake/Output Summary (Last 24 hours) at 12/17/2021 1506  Last data filed at 12/17/2021 1200  Gross per 24 hour   Intake 2150 ml   Output 1250 ml   Net 900 ml       Last 3 Recorded Weights in this Encounter    12/07/21 1827 12/11/21 0650 12/12/21 1910   Weight: 114.3 kg (252 lb) 86.6 kg (190 lb 14.7 oz) 86.6 kg (190 lb 14.7 oz)       ABG:    Lab Results   Component Value Date/Time    PHI 7.45 12/17/2021 05:18 AM    PCO2I 35.0 12/17/2021 05:18 AM    PO2I 68 (L) 12/17/2021 05:18 AM    HCO3I 24.5 12/17/2021 05:18 AM    FIO2I 65 12/17/2021 05:18 AM     Ventilator Mode: Assist control,VC+  Respiratory Rate  Back-Up Rate: 18  Insp Time (sec): 1.16 sec  I:E Ratio: 1:1.87  Ventilator Volumes  Vt Set (ml): 400 ml  Vt Exhaled (Machine Breath) (ml): 392 ml  Ve Observed (l/min): 9.33 l/min  Ventilator Pressures  PIP Observed (cm H2O): 34 cm H2O  Plateau Pressure (cm H2O): 29 cm H2O  MAP (cm H2O): 21  PEEP/VENT (cm H2O): 10 cm H20  Auto PEEP Observed (cm H2O): 0.3 cm H2O       Physical Exam: Limitations in exam due to full PPE requirements.   Patient remains intubated and sedated; arousable to painful stimuli  HEENT: pupils not dilated, reactive, no scleral jaundice, moist oral mucosa, no nasal drainage; neck supple  Neck: No adenopathy or thyroid swelling  OGno bloody aspirates or respiratory secretions noted  Orogastric tube tube feeding   CVS: Normal heart sounds; S1 and S2 with no murmur; telemetrysinus bradycardia; JVD not elevated   RS: Moderate air entry bilaterally; mild crckles at the bsaes ; no wheezing or rhonchi; breath sounds are symmetrical; no tachypneia or distress while on ventilator support  Abd: Soft, no tenderness, no distention, no guarding or rigidity; bowel sounds present; no hepatosplenomegaly  Neuro: Intubated and sedated; limited exam  Extrm: 2 plus  peripheral leg edema; no focal swelling or clubbing  Skin: no rash  Lymphatic: no cervical or supraclavicular adenopathy  Vasc: DPs palpable both feet  MS: No joint swelling      Data:       Recent Results (from the past 24 hour(s))   GLUCOSE, POC    Collection Time: 12/16/21  5:18 PM   Result Value Ref Range    Glucose (POC) 221 (H) 70 - 110 mg/dL   GLUCOSE, POC    Collection Time: 12/16/21 11:49 PM   Result Value Ref Range    Glucose (POC) 153 (H) 70 - 110 mg/dL   PROTHROMBIN TIME + INR    Collection Time: 12/17/21  5:14 AM   Result Value Ref Range    Prothrombin time 14.4 11.5 - 15.2 sec    INR 1.2 0.8 - 1.2     D DIMER    Collection Time: 12/17/21  5:14 AM   Result Value Ref Range    D DIMER 2.36 (H) <0.46 ug/ml(FEU)   METABOLIC PANEL, COMPREHENSIVE    Collection Time: 12/17/21  5:14 AM   Result Value Ref Range    Sodium 142 136 - 145 mmol/L    Potassium 3.8 3.5 - 5.5 mmol/L    Chloride 109 100 - 111 mmol/L    CO2 25 21 - 32 mmol/L    Anion gap 8 3.0 - 18 mmol/L    Glucose 133 (H) 74 - 99 mg/dL    BUN 71 (H) 7.0 - 18 MG/DL    Creatinine 1.57 (H) 0.6 - 1.3 MG/DL    BUN/Creatinine ratio 45 (H) 12 - 20      GFR est AA 39 (L) >60 ml/min/1.73m2    GFR est non-AA 32 (L) >60 ml/min/1.73m2    Calcium 8.4 (L) 8.5 - 10.1 MG/DL    Bilirubin, total 0.8 0.2 - 1.0 MG/DL    ALT (SGPT) 35 13 - 56 U/L    AST (SGOT) 23 10 - 38 U/L    Alk.  phosphatase 83 45 - 117 U/L    Protein, total 5.2 (L) 6.4 - 8.2 g/dL    Albumin 2.0 (L) 3.4 - 5.0 g/dL    Globulin 3.2 2.0 - 4.0 g/dL    A-G Ratio 0.6 (L) 0.8 - 1.7     CBC WITH AUTOMATED DIFF    Collection Time: 12/17/21  5:14 AM   Result Value Ref Range    WBC 12.9 4.6 - 13.2 K/uL    RBC 2.94 (L) 4.20 - 5.30 M/uL    HGB 9.7 (L) 12.0 - 16.0 g/dL    HCT 28.3 (L) 35.0 - 45.0 %    MCV 96.3 78.0 - 100.0 FL    MCH 33.0 24.0 - 34.0 PG    MCHC 34.3 31.0 - 37.0 g/dL    RDW 13.3 11.6 - 14.5 %    PLATELET 22 (LL) 197 - 420 K/uL    NRBC 0.0 0  WBC    ABSOLUTE NRBC 0.00 0.00 - 0.01 K/uL NEUTROPHILS 93 (H) 40 - 73 %    LYMPHOCYTES 1 (L) 21 - 52 %    MONOCYTES 4 3 - 10 %    EOSINOPHILS 0 0 - 5 %    BASOPHILS 0 0 - 2 %    IMMATURE GRANULOCYTES 1 (H) 0.0 - 0.5 %    ABS. NEUTROPHILS 12.0 (H) 1.8 - 8.0 K/UL    ABS. LYMPHOCYTES 0.2 (L) 0.9 - 3.6 K/UL    ABS. MONOCYTES 0.5 0.05 - 1.2 K/UL    ABS. EOSINOPHILS 0.0 0.0 - 0.4 K/UL    ABS. BASOPHILS 0.0 0.0 - 0.1 K/UL    ABS. IMM. GRANS. 0.1 (H) 0.00 - 0.04 K/UL    DF AUTOMATED     BLOOD GAS, ARTERIAL POC    Collection Time: 12/17/21  5:18 AM   Result Value Ref Range    Device: ADULT VENT      FIO2 (POC) 65 %    pH (POC) 7.45 7.35 - 7.45      pCO2 (POC) 35.0 35.0 - 45.0 MMHG    pO2 (POC) 68 (L) 80 - 100 MMHG    HCO3 (POC) 24.5 22 - 26 MMOL/L    sO2 (POC) 94.6 92 - 97 %    Base excess (POC) 0.5 mmol/L    Mode Volume Control      Tidal volume 400 ml    Set Rate 18 bpm    PEEP/CPAP (POC) 10 cmH2O    PIP (POC) 32      Allens test (POC) Positive      Site LEFT RADIAL      Patient temp. 97.6      Specimen type (POC) ARTERIAL      Performed by Jay Vernon    GLUCOSE, POC    Collection Time: 12/17/21  5:20 AM   Result Value Ref Range    Glucose (POC) 141 (H) 70 - 110 mg/dL   GLUCOSE, POC    Collection Time: 12/17/21 12:40 PM   Result Value Ref Range    Glucose (POC) 115 (H) 70 - 110 mg/dL         Chemistry Recent Labs     12/17/21  0514 12/16/21  0332 12/15/21  0635   * 125* 135*    140 139   K 3.8 3.5 4.3    106 105   CO2 25 26 27   BUN 71* 65* 68*   CREA 1.57* 1.60* 1.67*   CA 8.4* 8.3* 8.2*   AGAP 8 8 7   BUCR 45* 41* 41*   AP 83 86 92   TP 5.2* 5.0* 5.0*   ALB 2.0* 1.9* 2.0*   GLOB 3.2 3.1 3.0   AGRAT 0.6* 0.6* 0.7*        Lactic Acid Lactic acid   Date Value Ref Range Status   11/11/2014 0.9 0.4 - 2.0 MMOL/L Final     No results for input(s): LAC in the last 72 hours.      Liver Enzymes Protein, total   Date Value Ref Range Status   12/17/2021 5.2 (L) 6.4 - 8.2 g/dL Final     Albumin   Date Value Ref Range Status   12/17/2021 2.0 (L) 3.4 - 5.0 g/dL Final     Globulin   Date Value Ref Range Status   12/17/2021 3.2 2.0 - 4.0 g/dL Final     A-G Ratio   Date Value Ref Range Status   12/17/2021 0.6 (L) 0.8 - 1.7   Final     Alk. phosphatase   Date Value Ref Range Status   12/17/2021 83 45 - 117 U/L Final     Recent Labs     12/17/21  0514 12/16/21  0332 12/15/21  0635   TP 5.2* 5.0* 5.0*   ALB 2.0* 1.9* 2.0*   GLOB 3.2 3.1 3.0   AGRAT 0.6* 0.6* 0.7*   AP 83 86 92        CBC w/Diff Recent Labs     12/17/21  0514 12/16/21  0332 12/15/21  0635   WBC 12.9 12.5 10.0   RBC 2.94* 3.07* 3.32*   HGB 9.7* 9.9* 10.7*   HCT 28.3* 29.5* 31.7*   PLT 22* 17* 18*   GRANS 93* 93* 91*   LYMPH 1* 2* 3*   EOS 0 1 1        Cardiac Enzymes No results found for: CPK, CK, CKMMB, CKMB, RCK3, CKMBT, CKNDX, CKND1, MANE, TROPT, TROIQ, BANDAR, TROPT, TNIPOC, BNP, BNPP     BNP No results found for: BNP, BNPP, XBNPT     Coagulation Recent Labs     12/17/21  0514 12/16/21  0332 12/15/21  0635   PTP 14.4 15.8* 17.8*   INR 1.2 1.3* 1.5*         Thyroid  Lab Results   Component Value Date/Time    TSH 0.19 (L) 12/08/2021 03:00 AM       No results found for: T4     Urinalysis Lab Results   Component Value Date/Time    Color YELLOW 12/05/2021 03:02 PM    Appearance CLEAR 12/05/2021 03:02 PM    Specific gravity 1.014 12/05/2021 03:02 PM    pH (UA) 5.0 12/05/2021 03:02 PM    Protein 100 (A) 12/05/2021 03:02 PM    Glucose Negative 12/05/2021 03:02 PM    Ketone Negative 12/05/2021 03:02 PM    Bilirubin Negative 12/05/2021 03:02 PM    Urobilinogen 0.2 12/05/2021 03:02 PM    Nitrites Negative 12/05/2021 03:02 PM    Leukocyte Esterase TRACE (A) 12/05/2021 03:02 PM    Epithelial cells 2+ 12/05/2021 03:02 PM    Bacteria FEW (A) 12/05/2021 03:02 PM    WBC 4 to 10 12/05/2021 03:02 PM    RBC 0 to 3 12/05/2021 03:02 PM          Culture data during this hospitalization.    All Micro Results     Procedure Component Value Units Date/Time    SARS-COV-2 BY DUNIA [[id number]] Collected: 12/17/21 0945    Order Status: Completed Updated: 12/17/21 1038    COVID-19 RAPID TEST [[id number]] Collected: 12/17/21 0945    Order Status: Canceled     COVID-19 RAPID TEST [[id number]]     Order Status: Canceled     CULTURE, BLOOD [[id number]] Collected: 12/13/21 1150    Order Status: Completed Specimen: Blood Updated: 12/17/21 0721     Special Requests: NO SPECIAL REQUESTS        Culture result: NO GROWTH 4 DAYS       CULTURE, BLOOD [[id number]] Collected: 12/05/21 1106    Order Status: Completed Specimen: Blood Updated: 12/11/21 0729     Special Requests: NO SPECIAL REQUESTS        Culture result: NO GROWTH 6 DAYS       CULTURE, BLOOD [[id number]] Collected: 12/05/21 1106    Order Status: Completed Specimen: Blood Updated: 12/11/21 0729     Special Requests: NO SPECIAL REQUESTS        Culture result: NO GROWTH 6 DAYS       STREP PNEUMO AG, URINE [[id number]] Collected: 12/08/21 2051    Order Status: Completed Specimen: Urine, random Updated: 12/09/21 1024     Strep pneumo Ag, urine Negative       LEGIONELLA PNEUMOPHILA AG, URINE [[id number]] Collected: 12/08/21 2051    Order Status: Completed Specimen: Urine, random Updated: 12/09/21 1024     Legionella Ag, urine Negative       LEGIONELLA PNEUMOPHILA AG, URINE [[id number]] Collected: 12/05/21 1730    Order Status: Canceled Specimen: Urine     STREP Junius Kel, URINE [[id number]] Collected: 12/05/21 1730    Order Status: Canceled Specimen: Urine     RESPIRATORY VIRUS PANEL W/COVID-19, PCR [[id number]]  (Abnormal) Collected: 12/05/21 1110    Order Status: Completed Specimen: Nasopharyngeal Updated: 12/05/21 1633     Adenovirus Not detected        Coronavirus 229E Not detected        Coronavirus HKU1 Not detected        Coronavirus CVNL63 Not detected        Coronavirus OC43 Not detected        SARS-CoV-2, PCR Detected        Comment: CALLED TO AND CORRECTLY REPEATED BY:  DR Zaira Calloway [facility] ER ON [id number] AT 1626 HRS TO 1396.           Metapneumovirus Not detected        Rhinovirus and Enterovirus Not detected        Influenza A Not detected        Influenza A, subtype H1 Not detected        Influenza A, subtype H3 Not detected        INFLUENZA A H1N1 PCR Not detected        Influenza B Not detected        Parainfluenza 1 Not detected        Parainfluenza 2 Not detected        Parainfluenza 3 Not detected        Parainfluenza virus 4 Not detected        RSV by PCR Not detected        B. parapertussis, PCR Not detected        Bordetella pertussis - PCR Not detected        Chlamydophila pneumoniae DNA, QL, PCR Not detected        Mycoplasma pneumoniae DNA, QL, PCR Not detected       COVID-19 RAPID TEST [[id number]]  (Abnormal) Collected: 12/05/21 1109    Order Status: Completed Specimen: Nasopharyngeal Updated: 12/05/21 1136     Specimen source Nasopharyngeal        COVID-19 rapid test Detected        Comment:      The specimen is POSITIVE for SARS-CoV-2, the novel coronavirus associated with COVID-19. This test has been authorized by the FDA under an Emergency Use Authorization (EUA) for use by authorized laboratories. Fact sheet for Healthcare Providers: [employer]comdate.co.nz  Fact sheet for Patients: [employer]comdate.co.nz       Methodology: Isothermal Nucleic Acid Amplification  CALLED TO AND CORRECTLY REPEATED BY:  Radha Lynn RN ER, TO JAF AT 1136 12/5/2021                LE Doppler 12/7/21 Interpretation Summary  · No evidence of deep vein thrombosis in the right lower extremity. · No evidence of deep vein thrombosis in the left lower extremity. ECHO 12/7/21 Interpretation Summary  Result status: Final result  · Left Ventricle: Normal cavity size, wall thickness and systolic function (ejection fraction normal). The estimated EF is 55 - 60%. There is moderate (grade 2) left ventricular diastolic dysfunction E/E' ratio = 15.88. Wall Scoring: The left ventricular wall motion is normal.  · Right Atrium: Mildly dilated right atrium.   · Left Atrium: Dilated left atrium. · Mitral Valve: No stenosis. Mitral valve non-specific thickening. Mild mitral annular calcification. Mild to moderate regurgitation. · Pulmonary Artery: Pulmonary arterial systolic pressure (PASP) is 41 mmHg. Pulmonary hypertension found to be mild. · IVC/Hepatic Veins: Mechanically ventilated; cannot use inferior caval vein diameter to estimate central venous pressure. Images report reviewed by me:  CT 12/5 (Most Recent)  Results from [place] encounter on 12/05/21    CTA CHEST W OR W WO CONT    Narrative  EXAM: CTA chest    INDICATION: Rule out PE , Covid positive    COMPARISON: None    TECHNIQUE: Axial CT imaging from the thoracic inlet through the diaphragm with  intravenous contrast. Coronal and sagittal MIP reformats were generated. One or  more dose reduction techniques were used on this CT: automated exposure control,  adjustment of the mAs and/or kVp according to patient size, and iterative  reconstruction techniques. The specific techniques used on this CT exam have  been documented in the patient's electronic medical record. Digital Imaging and  Communications in Medicine (DICOM) format image data are available to  nonaffiliated external healthcare facilities or entities on a secure, media  free, reciprocally searchable basis with patient authorization for at least a  12-month period after this study. _______________    FINDINGS:    EXAM QUALITY: Overall exam quality is satisfactory. Pulmonary arterial  enhancement is adequate with adequate breath hold and no significant artifact. PULMONARY ARTERIES: No evidence of pulmonary embolism. THYROID: There is a 15 mm right thyroid lobe nodule which appears complex. Advise nonemergent thyroid ultrasound. LYMPH Nodes: There is a mildly enlarged right infrahilar lymph node measuring 1  cm image 89. PLEURA: There are no pleural effusion. HEART: Cardiac size is enlarged. There is no pericardial effusion. There is mild  tricuspid regurgitation into the hepatic veins. Moderate to severe calcific  coronary disease present. VASCULATURE/MEDIASTINUM: Main pulmonary artery is enlarged measuring 4 cm  suggesting chronic pulmonary hypertension. Small hiatal hernia present. LUNGS: Moderate groundglass opacities and airspace disease seen bilaterally  suggesting atypical pneumonia. The findings are typical in appearance for Covid  19 pneumonia. AIRWAY: Patent    UPPER ABDOMEN: Unremarkable. OTHER: No acute or aggressive osseous abnormalities identified. _______________    Impression  No evidence of a pulmonary embolism or aortic dissection. Moderate groundglass opacities and airspace disease seen bilaterally suggesting  atypical pneumonia and the findings are typical in appearance for Covid 19  pneumonia. Mildly enlarged right hilar lymph node which may be reactive. Advise follow-up  to exclude any neoplastic or myeloproliferative process. 15 mm slightly complex right thyroid lobe nodule. Advise nonemergent thyroid  ultrasound. CXR reviewed by me:  XR 12/12 (Most Recent). Results from Hospital Encounter encounter on 12/05/21    XR CHEST PORT    Narrative  EXAM: XR CHEST PORT    CLINICAL INDICATION/HISTORY: Respiratory failure  -Additional: Endotracheal tube placement    COMPARISON: Several prior studies, most recently 12/16/2021    TECHNIQUE: Portable frontal view of the chest    _______________    FINDINGS:    SUPPORT DEVICES: Endotracheal tube and visualized nasogastric tube project in  stable position. Tip of the NG tube is below the diaphragm, collimated from  view. HEART AND MEDIASTINUM: Stable cardiac size and mediastinal contours. LUNGS AND PLEURAL SPACES: Similar degree of pulmonary inflation. Persistent  interstitial opacities are noted, with slight interval improvement in degree of  groundglass densities noted bilaterally. No evidence of pneumothorax or pleural  effusion.     BONY THORAX AND SOFT TISSUES: Unremarkable.    _______________    Impression  1. Support devices in stable/appropriate position as visualized. 2. Persistent interstitial infiltrates with trend towards improving airspace  disease. IMPRESSION:   · Acute respiratory failure with hypoxemia  · Severe Covid pneumonia  · Anemia  · Thrombocytopenia  · Chronic kidney disease stage III  · Paroxysmal atrial fibrillation  ·   Patient Active Problem List   Diagnosis Code    Hypertension I10    Paroxysmal A-fib ([facility] [place] [address] I48.0    Pneumonia due to COVID-19 virus U07.1, J12.82    Thrombocytopenia ([facility]) D69.6    Hypoxia R09.02    DM due to underlying condition with diabetic nephropathy ([facility]) E08.21    E-coli UTI N39.0, B96.20    Class 3 severe obesity with body mass index (BMI) of 40.0 to 44.9 in adult ([facility]) E66.01, Z68.41    Acute respiratory failure with hypoxemia ([facility]) J96.01    Pneumonia due to severe acute respiratory syndrome coronavirus 2 (SARS-CoV-2) U07.1, J12.82    Stage 3a chronic kidney disease ([facility]) N18.31    Anemia D64.9    Encounter for palliative care Z51.5       · Code status: Full code      RECOMMENDATIONS:   Respiratory: Patient with severe Covid pneumonia, and hypoxemia; patient presenting to ICU in ARDS state due to Covid inflammation. Day 9 on mechanical ventilation   worseing hypoxemia  Mechanical ventilationVCV mode; FiO2 65%; PEEP 10; plateau pressure 21; lung volume protection strategy. abg 7.45/35/68/94  Chest x-ray from today reviewed imagesET tube and OG tube in good position; persistent bilateral diffuse interstitial and groundglass infiltrates; left retrocardiac consolidation persist; no pneumothorax or pleural effusions. Continue vent and sedation bundles. Sedationmidazolam and fentanyl infusion; mild sinus bradycardia with normal blood pressure; hold fentanyl drip if needed, if bradycardia worsens. Patient currently not needing paralytic. CXR  IMPRESSION  1.  Support devices in stable/appropriate position as visualized. 2. Combination of interstitial and airspace disease appearing essentially    unchanged from prior study. Keep SPO2 >=88%. HOB 30 degree elevation while not proning. Aspiration precautions. CTA chest on admission no PE; severe bilateral pulmonary opacities and groundglass densities consistent with severe Covid pneumonia; no significant pleural effusions; right hilar adenopathy likely reactivewill need follow-up in outpatient; thyroid nodule will also need follow-up in outpatient. CVS:BP borderline  BP meds and lasix now on hold   Bradycardia improved  Blood pressure remained stable; hydralazine 10 mg 3 times a day. Ultrasound legsnegative for DVTs  Echocardiogramnormal LV function, and mild pulmonary hypertension; dilated left atrium; mild to moderate mitral regurgitation. proBNP mildly elevated; troponins were mildly elevated on admissionpeak troponin 0.07. Patient on Coumadin at home for paroxysmal atrial fibrillation but on hold today because of low platleets   ID:   Severe Covid pneumonia. Unvaccinated status. Zosyn added for possible sepsis   Respiratory viral panel was positive for SARS-CoV-2 PCR. Procalcitonin <0.05not elevated  CRP elevated ferritin elevated   D-dimer not reliable since patient has elevated INR on chronic Coumadin therapy. S/p 1 dose of Tocilizumab 12/7 morning. Steroidss/p 20 mg dose daily; weaned down to 6 mg daily. CTA chest negative for PEs on admission. Ultrasound of the legs ordered. Patient not needing antibiotics; afebrile; WBC not elevated; procalcitonin not elevated0.08. Continue vitamin supplements. ID on case. Hematology/Oncology: Stable hemoglobin9.7; platelets dropped to 22K; INR mildldy elevated now off coumadin on hold, spoke to hematology no heparin HIT pending   Thrombocytopenia likely related to Covid related sepsis. Check fibrinogen and D-dimer.   worseing plt today still hold coumadin HIT negative  PVL 12/14/2021  · No evidence of deep vein thrombosis in the right lower extremity veins assessed. · No evidence of deep vein thrombosis in the left lower extremity veins assessed  Hold Coumadin again today   HIT panel send   pvl pending  Hematology consulted   Monitor for any bleeding issues. Consult hematologyDr. Karley Aguilar. Renal: Known CKD; s/p CTA chest during this admission. Good urine output; creatinine improved to 1.15; sodium normal.  Continue fluids per nephrology. Monitor and replace electrolytes as needed. GI/: Tube feeding. PPI prophylaxis. LFTsimproved. Endocrine: Blood sugars mildly elevatedcontinue Lantus insulin10 units nightly. Sliding scale insulin. Target blood sugar 140180. Patient on steroids for severe Covid pneumoniasteroids being weaned. TSH 0.19likely sick euthyroid state; free T4 mildly increased; free T3 mildly decreased. Neurology: Intubated and sedatedlimited exam.  Skin/Wound: ICU nursing care. Electrolytes: Replace electrolytes per ICU electrolyte replacement protocol. IVF: D5W 50 mL/h; free water via OG tube. Nutrition: Tube feedingNeprotolerating well. Yassine Chime Prophylaxis: DVT Prophylaxis: Coumadin. GI Prophylaxis: Protonix. Restraints: Wrist soft restraints for patient interfering with medical therapy/management and patient safety. Lines/Tubes: PIVs; midline planned  ETT: 12/7/2021   OGT/NGT: 12/7/2021   Montoya: 12/7/2021 (Medically necessary for strict input/output monitoring in critically ill patient, will remove it when not needed. Montoya bundle followed).   Advance Directive/Palliative Care: consulted    Prognosis appears very poor in this patient with Covid pneumonia with severe hypoxemic respiratory failure and ARDS needing to be intubated now; mortality is very high; risk of multiorgan failure with complications such as refractory hypoxemia, encephalopathy, critical illness myopathy, acute renal failure needing dialysis, shock state needing pressors, prolonged ventilator support, risk of tracheostomy and chronic debility/bedbound state. Quality Care: PPI, DVT prophylaxis, HOB elevated, Infection control all reviewed and addressed. Care of plan d/w RN, RT. High complexity decision making was performed during the evaluation of this patient at high risk for decompensation with multiple organ involvement. Total critical care time spent rendering care exclusive of procedures/family discussion/coordination of care: 40 minutes. Sia Ng) [address] [facility]     [phone]      Unfortunately, patient's  is now in ICU with Covid pneumonia and on ventilator support. Update grandson on 12/11. Do not inform patient of her  medical condition per family request.     DNR       Please note: Voice-recognition software may have been used to generate this report, which may have resulted in some phonetic-based errors in grammar and contents. Even though attempts were made to correct all the mistakes, some may have been missed, and remained in the body of the document. Pete Fairchild MD  12/17/2021         Note  Patient with SARS-CoV-2 pneumonia with severe pneumonia and hypoxemic respiratory failure; patient was on high flow nasal cannula oxygen; patient is not vaccinated, and in the age group that is being infected with delta virus strain; the strain causes severe respiratory failure and complications reported in the [place] and worldwide; unfortunately, the prognosis is poor in unvaccinated patients, with a high risk of complications, multiorgan failure, chronic hypoxemia and respiratory failure, intubation, mechanical ventilation, thromboembolic disease risk in multiple organs, high risk for long Covid syndrome, and high risk for mortality.   The CDC federal and state guidelines have emphasized repeatedly the importance of vaccination to prevent severe infection, mortality, disabilities, long Covid syndrome from Covid virus with several strains currently in the [place]. The treatment protocols are followed based on local hospital protocols, with guidance from centers such as [facility] protocols. [facility] is not part of any research protocol. The local hospital protocols have been guided by [facility] pharmacy department. Covid plasma is no longer considered standard of care in the [facility] due to lack of benefit in trials. Nebulization and CPAP therapies are not considered as part of the protocol in [facility] due to high risk of aerosolization, and high risks of spreading Covid infection to the healthcare staff. Dexamethasone considered standard of care in patients admitted with severe Covid pneumonia; variable dosing has been reported with this medication. ECMO and CRRT facilities not available at [facility]; patients with severe Covid pneumonia and respiratory failure are usually unstable for transportation when they are in critically ill state with a high risk of dying during transportation. REMDESIVIR-not recommended in patients with late presentation with severe respiratory failure     Tocilizumab anti-inflammatory medicationhas shown some benefit in patients with elevated inflammatory markers. Ivermectin not considered standard of care in the [address]; no significant randomized controlled studies done in the [facility] to suggest benefit of ivermectin in patients with COVID-19 with severe respiratory failure; risk of toxicity related to ivermectin outweighs any potential benefits based on consensus opinion in the [address]. This drug is not considered standard of care in [facility].

## 2021-12-17 NOTE — PROGRESS NOTES
Hospitalist Progress Note    Patient: Brina Watson MRN: [id number]  CSN: [id number]    YOB: 1950  Age: 66 y.o. 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 ([facility]) E08.21    E-coli UTI N39.0, B96.20    Class 3 severe obesity with body mass index (BMI) of 40.0 to 44.9 in adult ([facility]) E66.01, Z68.41    Acute respiratory failure with hypoxemia ([facility]) J96.01    Pneumonia due to severe acute respiratory syndrome coronavirus 2 (SARS-CoV-2) U07.1, J12.82    Stage 3a chronic kidney disease ([facility]) N18.31    Anemia D64.9    Encounter for palliative care Z51.5        Chief complaint :  SOB, resp failure  65 yo female with past medical history of chronic A fib on  Coumadin, chronic leg pain, anxiety, DM with CKD and HTN presents to the ED with 1 wk of Fatigue, Cough and SOB. Found to be hypoxic, COVID-19 PNA and A fib with RVR. CRITICAL CARE PLAN    Resp -   Acute resp failure with hypoxia -  Secondary to COVID 19  See vent orders, VAP bundle. HOB>30 degrees. Bronchodilators. Weaning and SBT per pulmonary. ID -   COVID 19 pneumonia  Vitamin/antioxidant cocktail  dexamethasone. actemra x 1  ANTIBIOTICS stopped, procalcitonin normal.       CVS - Monitor HD. BP controlled  Echo with normal LVF   PAF - rate controlled  Anticoagulation with coumadin on hold due to thrombocytopenia    Heme/onc - Follow H&H, plts. INR. Thrombocytopenia - likely from sepsis/COVID - 19, monitor platelets,   Heme/onc following. Transfuse platelets if any bleeding or if platelets <78T    Renal - Trend BUN, Cr, follow I/O, ratliff in place. Check and replace Mg, K, phos.   PA - pre renal, monitor renal function    Endocrine - Follow FSG  DM - on lantus, SSI    Neuro/ Pain/ Sedation -  Sedation bundle. GI - NPO for now. OG tube, tube feeding. Prophylaxis - DVT: SCDs, GI: protonix. Palliative care met with family, family considering comfort care weekend. [phone]  35 minutes of critical care time spent in the direct evaluation and treatment of this high risk patient. The reason for providing this level of medical care for this critically ill patient was due a critical illness that impaired one or more vital organ systems such that there was a high probability of imminent or life threatening deterioration in the patients condition. This care involved high complexity decision making to assess, manipulate, and support vital system functions, to treat this degreee vital organ system failure and to prevent further life threatening deterioration of the patients condition. Disposition : TBD    Physical Exam:  General: As above    HEENT: NC, Atraumatic. PERRLA, anicteric sclerae. Lungs: CTA Bilaterally. No Wheezing/Rhonchi/Rales.   Heart:  S1 S2,  No murmur, No Rubs, No Gallops  Abdomen: Soft, Non distended, Non tender.  +Bowel sounds,   Extremities: No c/c/e             Vital signs/Intake and Output:  Visit Vitals  BP (!) (P) 123/45 (BP 1 Location: Right upper arm, BP Patient Position: At rest)   Pulse 87   Temp 99.1 °F (37.3 °C)   Resp 24   Ht 5' 6\" (1.676 m)   Wt 86.6 kg (190 lb 14.7 oz)   SpO2 96%   BMI 30.81 kg/m²     Current Shift:  12/17 0701 - 12/17 1900  In: 1380 [I.V.:440]  Out: 275 [Urine:275]  Last three shifts:  12/15 1901 - 12/17 0700  In: 4877.4 [I.V.:1307.4]  Out: 2200 [Urine:1900; Drains:300]            Labs: Results:       Chemistry Recent Labs     12/17/21  0514 12/16/21  0332 12/15/21  0635   * 125* 135*    140 139   K 3.8 3.5 4.3    106 105   CO2 25 26 27   BUN 71* 65* 68*   CREA 1.57* 1.60* 1.67*   CA 8.4* 8.3* 8.2*   AGAP 8 8 7   BUCR 45* 41* 41*   AP 83 86 92   TP 5.2* 5.0* 5.0* ALB 2.0* 1.9* 2.0*   GLOB 3.2 3.1 3.0   AGRAT 0.6* 0.6* 0.7*      CBC w/Diff Recent Labs     12/17/21  0514 12/16/21  0332 12/15/21  0635   WBC 12.9 12.5 10.0   RBC 2.94* 3.07* 3.32*   HGB 9.7* 9.9* 10.7*   HCT 28.3* 29.5* 31.7*   PLT 22* 17* 18*   GRANS 93* 93* 91*   LYMPH 1* 2* 3*   EOS 0 1 1      Cardiac Enzymes No results for input(s): CPK, CKND1, MANE in the last 72 hours. No lab exists for component: CKRMB, TROIP   Coagulation Recent Labs     12/17/21 0514 12/16/21 0332   PTP 14.4 15.8*   INR 1.2 1.3*       Lipid Panel Lab Results   Component Value Date/Time    Cholesterol, total 208 (H) 12/20/2020 03:16 PM    HDL Cholesterol 47 12/20/2020 03:16 PM    LDL, calculated 128.6 (H) 12/20/2020 03:16 PM    VLDL, calculated 32.4 12/20/2020 03:16 PM    Triglyceride 162 (H) 12/20/2020 03:16 PM    CHOL/HDL Ratio 4.4 12/20/2020 03:16 PM      BNP No results for input(s): BNPP in the last 72 hours.    Liver Enzymes Recent Labs     12/17/21 0514   TP 5.2*   ALB 2.0*   AP 83      Thyroid Studies Lab Results   Component Value Date/Time    TSH 0.19 (L) 12/08/2021 03:00 AM        Procedures/imaging: see electronic medical records for all procedures/Xrays and details which were not copied into this note but were reviewed prior to creation of Plan

## 2021-12-17 NOTE — PROGRESS NOTES
Patient on SBT 0749 to [phone] failed to desat to 89%.  patine placed back on previous settings

## 2021-12-17 NOTE — PROGRESS NOTES
[facility]  A Division of [address] [place] [address])                                                                                                                      Yu Moreno MD  Office #: - Option # [phone]  Fax #: [phone]  Dr Claudeen Fridge will be available for questions/ consults by phone over the weekend. I will be back on Monday, Dec 20, 2021. Thanks. Date of Admission: 12/5/2021Date of Note: 12/17/2021  Reason for FU: Evaluation and antibiotic management of Severe COVID 19. Current Antimicrobials:    Prior Antimicrobials: Actrema- X1 12/7  Dexamethasone 12/7 to date( 20mg)   Augmentin and doxycycline PO on 11/30 to 12/5  Ceftriaxone/ Zithromax 12/05 to 12/6  Zosyn 12/12 to 12/16       Assessment- ID related:  --------------------------------------------------------------------------  Critically ill patient with guarded prognosis,  post Actrema, high risk f super- infection:   · Severe COVID 19 infection  · Viral PNA- BL. Extensive on CT chest  · Acute resp failure- Hypoxic 2/2 above  · Intubated 12/7  · E.coli bacteruia 10/30  · Hx of septic shock with prolonged intubation 6 yrs ago- [place], [place]  COVId rapid test/RVP PCR + 12/5  Strep/leg urine ag: negative  BCX 12/5 NGSF  BCX 12/13- NGSF( post Zosyn)  procal X4-LN-> last done 12/11 <0.05  CRP 14->-> -> 0.5  ferretin 1104-> -> 836  CPK/trop-OK  LD-512  Hepatitis C ab/B sa negative, Quantiron TB-Indeterminate 12/7    Other Medical Issues- Mx per respective team:  ·   · Hypernatremia-resolved  · Acute renal failure-improved  · Morbidly obese BMI 40  · Hx of thrombocytoepnia- chronic. Etiology unknown.   · A fib on AC- INR 5  · L renal stone- non obstructing on CT 11/30  · CKD  · DMT2  · HTN/HLD  · Hx of CHF per pt     Recommendation for ID issues I am following:  ------------------------------------------------------------------------------    Monitor off ABX  --will check procal QOD  -> serial procal nl, all culture negative. Low platlet probably driven from COVID infection  -> dexamethasone and AC/Keri coctails per primary team  -> monitor routine labs  --> FU repeat Quantiferon TB- in progress    DW ICUnurse. -     Subjective: Intubated/ unresponsive- Remains in guraded condition  On bear hug- temp currently at 98.8 WBC at 12.9  Plat remain low but turning up to 22K- off AC due to very low plat  Cr remains elevated  On Versed for sedation  Family meeting 12/16- DNR for code presently  DW with RN in room- thick but scant secretion from trach  Notes/ labs reviewed  CXR this am remains with diffuse opacity/infiltrate-> Unchanged  TTE with EF 55-60%, no severe valvular pathology         HPI:  Janae Pulliam is a 66 y.o. WHITE/NON- with PMH as listed above. Non vaccinated due to her medical conditions and her Christian per pt. She has been feeling sick since 2-3 days prior to Nov 30, when she came to ED with right flank pain and abd pain. No Fever or chills, she is SOB and had dry cough as always from her CHF no new changes. COVID tested neg,. CT done showed some infiltrate on lung and  UA and Urine culture done and sent out with Aumgentin and Doxycycline for treatment of possible PNA. Returned to ED on 12/5 with worsened SOB, hypoxia and tachycardia. COVID 19 test + and CT chest with extensive infiltrate B/L. Her  tested + for COVID 19 as well. She was aon 5L oxygen and on dexamethasone. She was placed on CTX and Zithromax as well. Chest not very tight per pt at time of exam. She asks if we can treat her with Ivermectin. She denies abd pain/N/V/chest pain. Denies severe ext pain. Was intubated X1 month 6 yrs ago- post septic shock. Hx of TB exposure- unknown. Runs a [employer].        Active Hospital Problems    Diagnosis Date Noted    Encounter for palliative care     Pneumonia due to severe acute respiratory syndrome coronavirus 2 (SARS-CoV-2) 12/07/2021    Stage 3a chronic kidney disease ([facility] 12/07/2021    Anemia 12/07/2021    Acute respiratory failure with hypoxemia ([facility] 12/06/2021    Pneumonia due to COVID-19 virus 12/05/2021    Thrombocytopenia ([facility] 12/05/2021    Hypoxia 12/05/2021    DM due to underlying condition with diabetic nephropathy ([facility] 12/05/2021    E-coli UTI 12/05/2021    Class 3 severe obesity with body mass index (BMI) of 40.0 to 44.9 in adult [facility]) 12/05/2021    [facility]     Hypertension      Past Medical History:   Diagnosis Date    A-fib ([facility]     Anxiety     CAD (coronary artery disease)     Chronic kidney disease     Diabetes ([facility]     borderline    High cholesterol     Hypertension     Kidney stones     Psychiatric disorder     anxiety     Past Surgical History:   Procedure Laterality Date    HX CHOLECYSTECTOMY      HX HEART CATHETERIZATION      HX HYSTERECTOMY      HX ORTHOPAEDIC      bilateral knee surgery    HX ORTHOPAEDIC      right elbow    HX UROLOGICAL      stent placement    OR CARDIAC SURG PROCEDURE UNLIST      stent x 1     Family History   Problem Relation Age of Onset    Breast Cancer Maternal Aunt      Social History     Socioeconomic History    Marital status:      Spouse name: Not on file    Number of children: Not on file    Years of education: Not on file    Highest education level: Not on file   Occupational History    Not on file   Tobacco Use    Smoking status: Never Smoker    Smokeless tobacco: Never Used   Substance and Sexual Activity    Alcohol use: No    Drug use: No    Sexual activity: Not on file   Other Topics Concern    Not on file   Social History Narrative    Not on file     Social Determinants of Health     Financial Resource Strain:     Difficulty of Paying Living Expenses: Not on file   Food Insecurity:     Worried About Running Out of Food in the Last Year: Not on file    Sylvain of Food in the Last Year: Not on file Transportation Needs:     Lack of Transportation (Medical): Not on file    Lack of Transportation (Non-Medical): Not on file   Physical Activity:     Days of Exercise per Week: Not on file    Minutes of Exercise per Session: Not on file   Stress:     Feeling of Stress : Not on file   Social Connections:     Frequency of Communication with Friends and Family: Not on file    Frequency of Social Gatherings with Friends and Family: Not on file    Attends Adventist Services: Not on file    Active Member of [address] [place] [address] [employer] or Organizations: Not on file    Attends Club or Organization Meetings: Not on file    Marital Status: Not on file   Intimate Partner Violence:     Fear of Current or Ex-Partner: Not on file    Emotionally Abused: Not on file    Physically Abused: Not on file    Sexually Abused: Not on file   Housing Stability:     Unable to Pay for Housing in the Last Year: Not on file    Number of [place] in the Last Year: Not on file    Unstable Housing in the Last Year: Not on file       Allergies:  Patient has no known allergies.      Medications:  Current Facility-Administered Medications   Medication Dose Route Frequency    nystatin (MYCOSTATIN) 100,000 unit/gram powder   Topical BID    PHENYLephrine (SHANIKA-SYNEPHRINE) 30 mg in 0.9% sodium chloride 250 mL infusion   mcg/min IntraVENous TITRATE    [Held by provider] furosemide (LASIX) injection 20 mg  20 mg IntraVENous BID    insulin glargine (LANTUS) injection 10 Units  10 Units SubCUTAneous QHS    pantoprazole (PROTONIX) 40 mg in 0.9% sodium chloride 10 mL injection  40 mg IntraVENous DAILY    [Held by provider] dextrose 5% infusion  50 mL/hr IntraVENous CONTINUOUS    midazolam (VERSED) injection 2 mg  2 mg IntraVENous Q3H PRN    fentaNYL citrate (PF) injection 50 mcg  50 mcg IntraVENous Q4H PRN    chlorhexidine (PERIDEX) 0.12 % mouthwash 10 mL  10 mL Oral Q12H    fentaNYL (PF) 900 mcg/30 ml infusion soln  0-200 mcg/hr IntraVENous TITRATE    midazolam in normal saline (VERSED) 1 mg/mL infusion  2-10 mg/hr IntraVENous TITRATE    insulin lispro (HUMALOG) injection   SubCUTAneous Q6H    melatonin (rapid dissolve) tablet 10 mg  10 mg Oral QHS    ascorbic acid (vitamin C) (VITAMIN C) tablet 500 mg  500 mg Oral BID    zinc sulfate (ZINCATE) 50 mg zinc (220 mg) capsule 1 Capsule  1 Capsule Oral DAILY    Warfarin - Pharmacy to Dose  1 Each Other Rx Dosing/Monitoring    HYDROcodone-acetaminophen (NORCO)  mg tablet 1 Tablet  1 Tablet Oral Q6H PRN    isosorbide mononitrate ER (IMDUR) tablet 30 mg  30 mg Oral DAILY    nitroglycerin (NITROSTAT) tablet 0.4 mg  0.4 mg SubLINGual Q5MIN PRN    sodium chloride (NS) flush 5-40 mL  5-40 mL IntraVENous Q8H    sodium chloride (NS) flush 5-40 mL  5-40 mL IntraVENous PRN    acetaminophen (TYLENOL) tablet 650 mg  650 mg Oral Q6H PRN    Or    acetaminophen (TYLENOL) suppository 650 mg  650 mg Rectal Q6H PRN    polyethylene glycol (MIRALAX) packet 17 g  17 g Oral DAILY PRN    ondansetron (ZOFRAN ODT) tablet 4 mg  4 mg Oral Q8H PRN    Or    ondansetron (ZOFRAN) injection 4 mg  4 mg IntraVENous Q6H PRN    cholecalciferol (VITAMIN D3) (1000 Units /25 mcg) tablet 2,000 Units  2,000 Units Oral DAILY    glucose chewable tablet 16 g  16 g Oral PRN    glucagon (GLUCAGEN) injection 1 mg  1 mg IntraMUSCular PRN    dextrose (D50W) injection syrg 12.5-25 g  25-50 mL IntraVENous PRN      Physical Exam:    Temp (24hrs), Av.8 °F (36.6 °C), Min:96.8 °F (36 °C), Max:98.6 °F (37 °C)    Visit Vitals  BP (!) 122/41   Pulse 66   Temp 98.6 °F (37 °C)   Resp 20   Ht 5' 6\" (1.676 m)   Wt 86.6 kg (190 lb 14.7 oz)   SpO2 95%   BMI 30.81 kg/m²        GEN: WD Morbidly obese, intubated and lying supine- sedated  HEENT: Unicteric.    ET/Oral tube in place  CHEST: CTA anteriorly  CVS: RRR - no mur/gallop  ABD: Obese, fecal management in place  GIRISH: Monotya in place  CNS:Intubated/ non responsive     Microbiology  All Micro Results     Procedure Component Value Units Date/Time    SARS-COV-2 BY DUNIA [[id number]] Collected: 12/17/21 0945    Order Status: Completed Updated: 12/17/21 1038    COVID-19 RAPID TEST [[id number]] Collected: 12/17/21 0945    Order Status: Canceled     COVID-19 RAPID TEST [[id number]]     Order Status: Canceled     CULTURE, BLOOD [[id number]] Collected: 12/13/21 1150    Order Status: Completed Specimen: Blood Updated: 12/17/21 0721     Special Requests: NO SPECIAL REQUESTS        Culture result: NO GROWTH 4 DAYS       CULTURE, BLOOD [[id number]] Collected: 12/05/21 1106    Order Status: Completed Specimen: Blood Updated: 12/11/21 0729     Special Requests: NO SPECIAL REQUESTS        Culture result: NO GROWTH 6 DAYS       CULTURE, BLOOD [[id number]] Collected: 12/05/21 1106    Order Status: Completed Specimen: Blood Updated: 12/11/21 0729     Special Requests: NO SPECIAL REQUESTS        Culture result: NO GROWTH 6 DAYS       STREP PNEUMO AG, URINE [[id number]] Collected: 12/08/21 2051    Order Status: Completed Specimen: Urine, random Updated: 12/09/21 1024     Strep pneumo Ag, urine Negative       LEGIONELLA PNEUMOPHILA AG, URINE [[id number]] Collected: 12/08/21 2051    Order Status: Completed Specimen: Urine, random Updated: 12/09/21 1024     Legionella Ag, urine Negative       LEGIONELLA PNEUMOPHILA AG, URINE [[id number]] Collected: 12/05/21 1730    Order Status: Canceled Specimen: Urine     STREP Karen Alvarenga, URINE [[id number]] Collected: 12/05/21 1730    Order Status: Canceled Specimen: Urine     RESPIRATORY VIRUS PANEL W/COVID-19, PCR [[id number]]  (Abnormal) Collected: 12/05/21 1110    Order Status: Completed Specimen: Nasopharyngeal Updated: 12/05/21 1633     Adenovirus Not detected        Coronavirus 229E Not detected        Coronavirus HKU1 Not detected        Coronavirus CVNL63 Not detected        Coronavirus OC43 Not detected        SARS-CoV-2, PCR Detected        Comment: CALLED TO AND CORRECTLY REPEATED BY:  DR Rohan Keane [facility]ULY [facility] ER ON [id number] AT 1626 HRS TO 1396. Metapneumovirus Not detected        Rhinovirus and Enterovirus Not detected        Influenza A Not detected        Influenza A, subtype H1 Not detected        Influenza A, subtype H3 Not detected        INFLUENZA A H1N1 PCR Not detected        Influenza B Not detected        Parainfluenza 1 Not detected        Parainfluenza 2 Not detected        Parainfluenza 3 Not detected        Parainfluenza virus 4 Not detected        RSV by PCR Not detected        B. parapertussis, PCR Not detected        Bordetella pertussis - PCR Not detected        Chlamydophila pneumoniae DNA, QL, PCR Not detected        Mycoplasma pneumoniae DNA, QL, PCR Not detected       COVID-19 RAPID TEST [[id number]]  (Abnormal) Collected: 12/05/21 1109    Order Status: Completed Specimen: Nasopharyngeal Updated: 12/05/21 1136     Specimen source Nasopharyngeal        COVID-19 rapid test Detected        Comment:      The specimen is POSITIVE for SARS-CoV-2, the novel coronavirus associated with COVID-19. This test has been authorized by the FDA under an Emergency Use Authorization (EUA) for use by authorized laboratories.         Fact sheet for Healthcare Providers: ConventionUpdate.co.nz  Fact sheet for Patients: ConventionUpdate.co.nz       Methodology: Isothermal Nucleic Acid Amplification  CALLED TO AND CORRECTLY REPEATED BY:  Mendez Gottlieb RN ER, TO [facility] AT 1136 12/5/2021                Lab results:    Chemistry  Recent Labs     12/17/21  0514 12/16/21  0332 12/15/21  0635   * 125* 135*    140 139   K 3.8 3.5 4.3    106 105   CO2 25 26 27   BUN 71* 65* 68*   CREA 1.57* 1.60* 1.67*   CA 8.4* 8.3* 8.2*   AGAP 8 8 7   BUCR 45* 41* 41*   AP 83 86 92   TP 5.2* 5.0* 5.0*   ALB 2.0* 1.9* 2.0*   GLOB 3.2 3.1 3.0   AGRAT 0.6* 0.6* 0.7*       CBC w/ Diff  Recent Labs     12/17/21  0514 12/16/21  0332 12/15/21  0635   WBC 12.9 12.5 10.0 RBC 2.94* 3.07* 3.32*   HGB 9.7* 9.9* 10.7*   HCT 28.3* 29.5* 31.7*   PLT 22* 17* 18*   GRANS 93* 93* 91*   LYMPH 1* 2* 3*   EOS 0 1 1       Imaging: report posted below as per radiologist     CTA chest 12/5    No evidence of a pulmonary embolism or aortic dissection. Moderate groundglass opacities and airspace disease seen bilaterally suggesting  atypical pneumonia and the findings are typical in appearance for Covid 19  pneumonia.     Mildly enlarged right hilar lymph node which may be reactive. Advise follow-up  to exclude any neoplastic or myeloproliferative process.     15 mm slightly complex right thyroid lobe nodule. Advise nonemergent thyroid  Ultrasound.   Total duration of time spent with patient interview and exam and discussion of plan of care, review of chart in care everywhere, discussion with medical staff- nursing/attending and additional specialities as indicated: 25 minutes.

## 2021-12-17 NOTE — PROGRESS NOTES
1915 : Bedside and Verbal shift change report given to Marcelino Forde, RN (oncoming nurse) by Ines Marks RN (offgoing nurse). Report included the following information SBAR, Kardex, Intake/Output, MAR and Recent Results. 2000 : Patient remains intubated and sedated. Resting quietly and comfortably w/ VSS on Versed infusion. 0000 : Reassessment. Temp decreased below 97.0 F. Bare hugger on. Temp improved. Will continue to monitor. 0400 : Reassessment. Patient continues to rest comfortably. VSS.     0730 : Bedside and Verbal shift change report given to RAHDA Ornelas (oncoming nurse) by Edith Oscar RN  (offgoing nurse). Report included the following information SBAR, Kardex, Intake/Output, MAR and Recent Results. no

## 2021-12-18 NOTE — PROGRESS NOTES
Hospitalist Progress Note    Patient: Tosin Jacinto MRN: [id number]  CSN: [id number]    YOB: 1950  Age: 66 y.o. Sex: female    DOA: 12/5/2021 LOS:  LOS: 13 days                Assessment and Plan: Active Problems:    Hypertension ()      Paroxysmal A-fib (HCC) ()      Pneumonia due to COVID-19 virus (12/5/2021)      Thrombocytopenia ([facility] (12/5/2021)      Hypoxia (12/5/2021)      DM due to underlying condition with diabetic nephropathy ([facility] (12/5/2021)      E-coli UTI (12/5/2021)      Class 3 severe obesity with body mass index (BMI) of 40.0 to 44.9 in [facility]) (12/5/2021)      Acute respiratory failure with hypoxemia ([facility] (12/6/2021)      Pneumonia due to severe acute respiratory syndrome coronavirus 2 (SARS-CoV-2) (12/7/2021)      Stage 3a chronic kidney disease ([facility] (12/7/2021)      Anemia (12/7/2021)      Encounter for palliative care ()        Long discussion with Dr. Cathi Miller and review of chart. Patient is end stage and family aware  Will do terminal extubation      Chief complaint:  SOB, resp failure  67 yo female with past medical history of chronic A fib on  Coumadin, chronic leg pain, anxiety, DM with CKD and HTN presents to the ED with 1 wk of Fatigue, Cough and SOB. Found to be hypoxic, COVID-19 PNA and A fib with RVR. Subjective:  Sedated and intubated        Review of systems:    Per nursing     Objective:    Vital signs/Intake and Output:  Visit Vitals  BP (!) 121/54   Pulse 63   Temp 97.7 °F (36.5 °C)   Resp 18   Ht 5' 6\" (1.676 m)   Wt 86.6 kg (190 lb 14.7 oz)   SpO2 97%   BMI 30.81 kg/m²     Current Shift:  12/18 0701 - 12/18 1900  In: 445.1 [I.V.:25.1]  Out: -   Last three shifts:  12/16 1901 - 12/18 0700  In: 3194.9 [I.V.:854.9]  Out: 9726 [Urine:1550; Drains:300]    Physical Exam:  Sedated and intubated.           Labs: Results:       Chemistry Recent Labs     12/18/21  0538 12/17/21  0514 12/16/21  0332   GLU 83 133* 125*    142 140   K 4.1 3.8 3.5   CL 112* 109 106   CO2 25 25 26   BUN 71* 71* 65*   CREA 1.61* 1.57* 1.60*   CA 8.5 8.4* 8.3*   AGAP 7 8 8   BUCR 44* 45* 41*   AP 75 83 86   TP 5.4* 5.2* 5.0*   ALB 2.2* 2.0* 1.9*   GLOB 3.2 3.2 3.1   AGRAT 0.7* 0.6* 0.6*      CBC w/Diff Recent Labs     12/18/21  0538 12/17/21  0514 12/16/21  0332   WBC 12.8 12.9 12.5   RBC 3.00* 2.94* 3.07*   HGB 9.6* 9.7* 9.9*   HCT 29.7* 28.3* 29.5*   PLT 30* 22* 17*   GRANS 91* 93* 93*   LYMPH 2* 1* 2*   EOS 2 0 1      Cardiac Enzymes No results for input(s): CPK, CKND1, MANE in the last 72 hours. No lab exists for component: CKRMB, TROIP   Coagulation Recent Labs     12/17/21  0514 12/16/21 0332   PTP 14.4 15.8*   INR 1.2 1.3*       Lipid Panel Lab Results   Component Value Date/Time    Cholesterol, total 208 (H) 12/20/2020 03:16 PM    HDL Cholesterol 47 12/20/2020 03:16 PM    LDL, calculated 128.6 (H) 12/20/2020 03:16 PM    VLDL, calculated 32.4 12/20/2020 03:16 PM    Triglyceride 162 (H) 12/20/2020 03:16 PM    CHOL/HDL Ratio 4.4 12/20/2020 03:16 PM      BNP No results for input(s): BNPP in the last 72 hours.    Liver Enzymes Recent Labs     12/18/21  0538   TP 5.4*   ALB 2.2*   AP 75      Thyroid Studies Lab Results   Component Value Date/Time    TSH 0.19 (L) 12/08/2021 03:00 AM        Procedures/imaging: see electronic medical records for all procedures/Xrays and details which were not copied into this note but were reviewed prior to creation of Plan

## 2021-12-18 NOTE — PROGRESS NOTES
Pharmacy Dosing Services: Warfarin Dosing    Consult for Warfarin Dosing by Pharmacy by Dr. Vera Hem provided for this 66 y.o.  female , for indication of Atrial Fibrillation. Day of Therapy - continue home med  Dose to achieve an INR goal of 2-3    Order entered to HOLD Warfarin to be given today at 18:00. PT/INR Lab Results   Component Value Date/Time    INR 1.2 12/17/2021 05:14 AM      Platelets Lab Results   Component Value Date/Time    PLATELET 30 (L) 59/42/6610 05:38 AM      H/H     Lab Results   Component Value Date/Time    HGB 9.6 (L) 12/18/2021 05:38 AM        Warfarin Administrations (last 168 hours)       Date/Time Action Medication Dose    12/11/21 1706 Given    warfarin (COUMADIN) tablet 4 mg 4 mg            Pharmacy to follow daily and will provide subsequent Warfarin dosing based on clinical status.   Anne [place] [address] [id number] [phone]

## 2021-12-18 NOTE — PROGRESS NOTES
[facility]  Pulmonary, Critical Care, and Sleep Medicine    Name: Vanessa Topete MRN: [id number]   : 1950 Hospital: [facility] [place]    Date: 2021  Room: [address] [place] [address] Note                                              Consult requesting physician: Dr. Lawanda Hodgkin  Reason for Consult: Severe Covid pneumonia with respiratory failure      Subjective/History of Present Illness:     Patient is a 66 y.o. female with PMHx significant for chronic A. fib on Coumadin therapy, diabetes mellitus, chronic kidney disease, hypertension, thyroid nodule was admitted to the hospital on 2021 with Covid infection. She has not been vaccinated for Covid infection. Patient progressed to worsening respiratory failure and hypoxemia. During admission, patient had declined intubation. Today, her  reported CODE STATUS and wanted patient to be intubated. Patient underwent COVID-19 intubation in the telemetry unit, and subsequently transferred to the ICU.    2021  Patient in ICU. Intubated  sedated no improvement in hypoxemic respiratory failure AC  PEEP to 10 and FiO2 to 60% today. from 65  Severe thrombocytopenia slighly better   On minimal sedation   I updated son Ronnie Maya son and family considering comfort care   Recommendations placed by palliative care   BP stable  DNR    Review of Systems:  Limited due to patient condition    Patient came to ER on  with right flank pain, and right basal pneumonia; was sent home with antibiotic therapyAugmentin and doxycycline. Urine culture 2021pansensitive E. coli.       No Known Allergies   Past Medical History:   Diagnosis Date    A-fib ([facility] [address] [facility]     Anxiety     CAD (coronary artery disease)     Chronic kidney disease     Diabetes ([facility] [address]     borderline    High cholesterol     Hypertension     Kidney stones     Psychiatric disorder     anxiety      Past Surgical History:   Procedure Laterality Date    HX CHOLECYSTECTOMY      HX HEART CATHETERIZATION      HX HYSTERECTOMY      HX ORTHOPAEDIC      bilateral knee surgery    HX ORTHOPAEDIC      right elbow    HX UROLOGICAL      stent placement    AR CARDIAC SURG PROCEDURE UNLIST      stent x 1      Social History     Tobacco Use    Smoking status: Never Smoker    Smokeless tobacco: Never Used   Substance Use Topics    Alcohol use: No      Family History   Problem Relation Age of Onset    Breast Cancer Maternal Aunt       Prior to Admission medications    Medication Sig Start Date End Date Taking? Authorizing Provider   allopurinoL (ZYLOPRIM) 300 mg tablet Take 300 mg by mouth daily. Provider, Historical   torsemide (DEMADEX) 100 mg tablet Take 50 mg by mouth daily. Provider, Historical   HYDROcodone-acetaminophen (NORCO)  mg tablet Take 1 Tab by mouth every eight (8) hours as needed for Pain. Provider, Historical   metoprolol succinate (TOPROL-XL) 100 mg tablet Take 150 mg by mouth daily. Provider, Historical   warfarin (Coumadin) 2 mg tablet Take 4 mg by mouth daily. Provider, Historical   isosorbide mononitrate ER (IMDUR) 30 mg tablet Take 30 mg by mouth daily. Provider, Historical   magnesium oxide (MAG-OX) 400 mg tablet Take 400 mg by mouth daily. Provider, Historical   pantoprazole (PROTONIX) 40 mg tablet Take 40 mg by mouth daily. Provider, Historical   ergocalciferol (Vitamin D2) 1,250 mcg (50,000 unit) capsule Take 50,000 Units by mouth. Provider, Historical   docusate sodium (Colace) 100 mg capsule Take 100 mg by mouth two (2) times a day. Provider, Historical   nitroglycerin (NITROSTAT) 0.4 mg SL tablet 0.4 mg by SubLINGual route every five (5) minutes as needed for Chest Pain. Up to 3 doses. Provider, Historical   hydrALAZINE (APRESOLINE) 50 mg tablet Take 25 mg by mouth three (3) times daily. Layla Alex MD   aspirin delayed-release 81 mg tablet Take  by mouth daily.     Layla Alex MD   folic acid (FOLVITE) 1 mg tablet Take  by mouth daily. Isai, MD Layla   potassium chloride (K-DUR, KLOR-CON) 20 mEq tablet Take  by mouth two (2) times a day.     Layla Alex MD     Current Facility-Administered Medications   Medication Dose Route Frequency    nystatin (MYCOSTATIN) 100,000 unit/gram powder   Topical BID    PHENYLephrine (SHANIKA-SYNEPHRINE) 30 mg in 0.9% sodium chloride 250 mL infusion   mcg/min IntraVENous TITRATE    [Held by provider] furosemide (LASIX) injection 20 mg  20 mg IntraVENous BID    insulin glargine (LANTUS) injection 10 Units  10 Units SubCUTAneous QHS    pantoprazole (PROTONIX) 40 mg in 0.9% sodium chloride 10 mL injection  40 mg IntraVENous DAILY    [Held by provider] dextrose 5% infusion  50 mL/hr IntraVENous CONTINUOUS    chlorhexidine (PERIDEX) 0.12 % mouthwash 10 mL  10 mL Oral Q12H    fentaNYL (PF) 900 mcg/30 ml infusion soln  0-200 mcg/hr IntraVENous TITRATE    midazolam in normal saline (VERSED) 1 mg/mL infusion  2-10 mg/hr IntraVENous TITRATE    insulin lispro (HUMALOG) injection   SubCUTAneous Q6H    melatonin (rapid dissolve) tablet 10 mg  10 mg Oral QHS    ascorbic acid (vitamin C) (VITAMIN C) tablet 500 mg  500 mg Oral BID    zinc sulfate (ZINCATE) 50 mg zinc (220 mg) capsule 1 Capsule  1 Capsule Oral DAILY    Warfarin - Pharmacy to Dose  1 Each Other Rx Dosing/Monitoring    isosorbide mononitrate ER (IMDUR) tablet 30 mg  30 mg Oral DAILY    sodium chloride (NS) flush 5-40 mL  5-40 mL IntraVENous Q8H    cholecalciferol (VITAMIN D3) (1000 Units /25 mcg) tablet 2,000 Units  2,000 Units Oral DAILY         Objective:   Vital Signs:    Visit Vitals  BP (!) 121/50 (BP 1 Location: Right upper arm, BP Patient Position: At rest)   Pulse 61   Temp 97.3 °F (36.3 °C)   Resp 18   Ht 5' 6\" (1.676 m)   Wt 86.6 kg (190 lb 14.7 oz)   SpO2 96%   BMI 30.81 kg/m²       O2 Device: Endotracheal tube,Ventilator   O2 Flow Rate (L/min): 40 l/min   Temp (24hrs), Av.4 °F (36.9 °C), Min:97.2 °F (36.2 °C), Max:99.1 °F (37.3 °C)       Intake/Output:   Last shift:      12/18 0701 - 12/18 1900  In: 425.1 [I.V.:5.1]  Out: -     Last 3 shifts: 12/16 1901 - 12/18 0700  In: 3194.9 [I.V.:854.9]  Out: 3962 [Urine:1550; Drains:300]      Intake/Output Summary (Last 24 hours) at 12/18/2021 1333  Last data filed at 12/18/2021 0800  Gross per 24 hour   Intake 1470 ml   Output 825 ml   Net 645 ml       Last 3 Recorded Weights in this Encounter    12/07/21 1827 12/11/21 0650 12/12/21 1910   Weight: 114.3 kg (252 lb) 86.6 kg (190 lb 14.7 oz) 86.6 kg (190 lb 14.7 oz)       ABG:    No results found for: PH, PHI, PCO2, PCO2I, PO2, PO2I, HCO3, HCO3I, FIO2, FIO2I  Ventilator Mode: Assist control,VC+  Respiratory Rate  Back-Up Rate: 18  Insp Time (sec): 1.16 sec  I:E Ratio: 1:2.0  Ventilator Volumes  Vt Set (ml): 400 ml  Vt Exhaled (Machine Breath) (ml): 357 ml  Ve Observed (l/min): 7.17 l/min  Ventilator Pressures  PIP Observed (cm H2O): 29 cm H2O  Plateau Pressure (cm H2O): 28 cm H2O  MAP (cm H2O): 17  PEEP/VENT (cm H2O): 10 cm H20  Auto PEEP Observed (cm H2O): 0 cm H2O       Physical Exam: Limitations in exam due to full PPE requirements.   Patient remains intubated and sedated; opens eyes to command, nodding, very weak  HEENT: pupils not dilated, reactive, no scleral jaundice, moist oral mucosa, no nasal drainage; neck supple  Neck: No adenopathy or thyroid swelling  OGno bloody aspirates or respiratory secretions noted  Orogastric tube tube feeding   CVS: Normal heart sounds; S1 and S2 with no murmur; telemetrysinus bradycardia; JVD not elevated   RS: Moderate air entry bilaterally; mild crckles at the bsaes R>L ; no wheezing or rhonchi; breath sounds are symmetrical; no tachypneia or distress while on ventilator support  Abd: Soft, no tenderness, no distention, no guarding or rigidity; bowel sounds present; no hepatosplenomegaly  Neuro: Intubated and sedated; limited exam  Extrm: 1 plus  peripheral leg edema; no focal swelling or clubbing  Skin: no rash  Lymphatic: no cervical or supraclavicular adenopathy  Vasc: DPs palpable both feet  MS: No joint swelling      Data:       Recent Results (from the past 24 hour(s))   GLUCOSE, POC    Collection Time: 12/18/21 12:09 AM   Result Value Ref Range    Glucose (POC) 106 70 - 208 mg/dL   METABOLIC PANEL, COMPREHENSIVE    Collection Time: 12/18/21  5:38 AM   Result Value Ref Range    Sodium 144 136 - 145 mmol/L    Potassium 4.1 3.5 - 5.5 mmol/L    Chloride 112 (H) 100 - 111 mmol/L    CO2 25 21 - 32 mmol/L    Anion gap 7 3.0 - 18 mmol/L    Glucose 83 74 - 99 mg/dL    BUN 71 (H) 7.0 - 18 MG/DL    Creatinine 1.61 (H) 0.6 - 1.3 MG/DL    BUN/Creatinine ratio 44 (H) 12 - 20      GFR est AA 38 (L) >60 ml/min/1.73m2    GFR est non-AA 31 (L) >60 ml/min/1.73m2    Calcium 8.5 8.5 - 10.1 MG/DL    Bilirubin, total 1.0 0.2 - 1.0 MG/DL    ALT (SGPT) 33 13 - 56 U/L    AST (SGOT) 24 10 - 38 U/L    Alk. phosphatase 75 45 - 117 U/L    Protein, total 5.4 (L) 6.4 - 8.2 g/dL    Albumin 2.2 (L) 3.4 - 5.0 g/dL    Globulin 3.2 2.0 - 4.0 g/dL    A-G Ratio 0.7 (L) 0.8 - 1.7     CBC WITH AUTOMATED DIFF    Collection Time: 12/18/21  5:38 AM   Result Value Ref Range    WBC 12.8 4.6 - 13.2 K/uL    RBC 3.00 (L) 4.20 - 5.30 M/uL    HGB 9.6 (L) 12.0 - 16.0 g/dL    HCT 29.7 (L) 35.0 - 45.0 %    MCV 99.0 78.0 - 100.0 FL    MCH 32.0 24.0 - 34.0 PG    MCHC 32.3 31.0 - 37.0 g/dL    RDW 14.1 11.6 - 14.5 %    PLATELET 30 (L) 021 - 420 K/uL    MPV 11.9 (H) 9.2 - 11.8 FL    NRBC 0.0 0  WBC    ABSOLUTE NRBC 0.00 0.00 - 0.01 K/uL    NEUTROPHILS 91 (H) 40 - 73 %    LYMPHOCYTES 2 (L) 21 - 52 %    MONOCYTES 5 3 - 10 %    EOSINOPHILS 2 0 - 5 %    BASOPHILS 0 0 - 2 %    IMMATURE GRANULOCYTES 1 (H) 0.0 - 0.5 %    ABS. NEUTROPHILS 11.6 (H) 1.8 - 8.0 K/UL    ABS. LYMPHOCYTES 0.3 (L) 0.9 - 3.6 K/UL    ABS. MONOCYTES 0.6 0.05 - 1.2 K/UL    ABS. EOSINOPHILS 0.2 0.0 - 0.4 K/UL    ABS. BASOPHILS 0.0 0.0 - 0.1 K/UL    ABS. IMM. GRANS. 0.1 (H) 0.00 - 0.04 K/UL    DF AUTOMATED     LD    Collection Time: 12/18/21  5:39 AM   Result Value Ref Range     (H) 81 - 234 U/L   GLUCOSE, POC    Collection Time: 12/18/21  5:42 AM   Result Value Ref Range    Glucose (POC) 93 70 - 110 mg/dL   GLUCOSE, POC    Collection Time: 12/18/21 12:14 PM   Result Value Ref Range    Glucose (POC) 88 70 - 110 mg/dL         Chemistry Recent Labs     12/18/21  0538 12/17/21  0514 12/16/21  0332   GLU 83 133* 125*    142 140   K 4.1 3.8 3.5   * 109 106   CO2 25 25 26   BUN 71* 71* 65*   CREA 1.61* 1.57* 1.60*   CA 8.5 8.4* 8.3*   AGAP 7 8 8   BUCR 44* 45* 41*   AP 75 83 86   TP 5.4* 5.2* 5.0*   ALB 2.2* 2.0* 1.9*   GLOB 3.2 3.2 3.1   AGRAT 0.7* 0.6* 0.6*        Lactic Acid Lactic acid   Date Value Ref Range Status   11/11/2014 0.9 0.4 - 2.0 MMOL/L Final     No results for input(s): LAC in the last 72 hours. Liver Enzymes Protein, total   Date Value Ref Range Status   12/18/2021 5.4 (L) 6.4 - 8.2 g/dL Final     Albumin   Date Value Ref Range Status   12/18/2021 2.2 (L) 3.4 - 5.0 g/dL Final     Globulin   Date Value Ref Range Status   12/18/2021 3.2 2.0 - 4.0 g/dL Final     A-G Ratio   Date Value Ref Range Status   12/18/2021 0.7 (L) 0.8 - 1.7   Final     Alk.  phosphatase   Date Value Ref Range Status   12/18/2021 75 45 - 117 U/L Final     Recent Labs     12/18/21  0538 12/17/21  0514 12/16/21  0332   TP 5.4* 5.2* 5.0*   ALB 2.2* 2.0* 1.9*   GLOB 3.2 3.2 3.1   AGRAT 0.7* 0.6* 0.6*   AP 75 83 86        CBC w/Diff Recent Labs     12/18/21  0538 12/17/21  0514 12/16/21  0332   WBC 12.8 12.9 12.5   RBC 3.00* 2.94* 3.07*   HGB 9.6* 9.7* 9.9*   HCT 29.7* 28.3* 29.5*   PLT 30* 22* 17*   GRANS 91* 93* 93*   LYMPH 2* 1* 2*   EOS 2 0 1        Cardiac Enzymes No results found for: CPK, CK, CKMMB, CKMB, RCK3, CKMBT, CKNDX, CKND1, MANE, TROPT, TROIQ, BANDAR, TROPT, TNIPOC, BNP, BNPP     BNP No results found for: BNP, BNPP, XBNPT     Coagulation Recent Labs 12/17/21  0514 12/16/21  0332   PTP 14.4 15.8*   INR 1.2 1.3*         Thyroid  Lab Results   Component Value Date/Time    TSH 0.19 (L) 12/08/2021 03:00 AM       No results found for: T4     Urinalysis Lab Results   Component Value Date/Time    Color YELLOW 12/05/2021 03:02 PM    Appearance CLEAR 12/05/2021 03:02 PM    Specific gravity 1.014 12/05/2021 03:02 PM    pH (UA) 5.0 12/05/2021 03:02 PM    Protein 100 (A) 12/05/2021 03:02 PM    Glucose Negative 12/05/2021 03:02 PM    Ketone Negative 12/05/2021 03:02 PM    Bilirubin Negative 12/05/2021 03:02 PM    Urobilinogen 0.2 12/05/2021 03:02 PM    Nitrites Negative 12/05/2021 03:02 PM    Leukocyte Esterase TRACE (A) 12/05/2021 03:02 PM    Epithelial cells 2+ 12/05/2021 03:02 PM    Bacteria FEW (A) 12/05/2021 03:02 PM    WBC 4 to 10 12/05/2021 03:02 PM    RBC 0 to 3 12/05/2021 03:02 PM          Culture data during this hospitalization. All Micro Results     Procedure Component Value Units Date/Time    CULTURE, BLOOD [[id number]] Collected: 12/13/21 1150    Order Status: Completed Specimen: Blood Updated: 12/18/21 0655     Special Requests: NO SPECIAL REQUESTS        Culture result: NO GROWTH 5 DAYS       SARS-COV-2 BY DUNIA [[id number]]  (Abnormal) Collected: 12/17/21 0945    Order Status: Completed Specimen: Nasopharyngeal Updated: 12/17/21 2013     SARS-CoV-2, DUNIA Detected        Comment: This test has been authorized by the FDA under an Emergency Use Authorization (EUA) for use by authorized laboratories. Testing performed by nucleic acid amplification method.   CALLED TO AND CORRECTLY REPEATED BY:  Adrian Basilio RN ICU, TO JAF AT 1955 12/17/2021         COVID-19 RAPID TEST [[id number]] Collected: 12/17/21 0945    Order Status: Canceled     COVID-19 RAPID TEST [[id number]]     Order Status: Canceled     CULTURE, BLOOD [[id number]] Collected: 12/05/21 1106    Order Status: Completed Specimen: Blood Updated: 12/11/21 0729     Special Requests: NO SPECIAL REQUESTS Culture result: NO GROWTH 6 DAYS       CULTURE, BLOOD [[id number]] Collected: 12/05/21 1106    Order Status: Completed Specimen: Blood Updated: 12/11/21 0729     Special Requests: NO SPECIAL REQUESTS        Culture result: NO GROWTH 6 DAYS       STREP Joanae Calvin, URINE [[id number]] Collected: 12/08/21 2051    Order Status: Completed Specimen: Urine, random Updated: 12/09/21 1024     Strep pneumo Ag, urine Negative       LEGIONELLA PNEUMOPHILA AG, URINE [[id number]] Collected: 12/08/21 2051    Order Status: Completed Specimen: Urine, random Updated: 12/09/21 1024     Legionella Ag, urine Negative       LEGIONELLA PNEUMOPHILA AG, URINE [[id number]] Collected: 12/05/21 1730    Order Status: Canceled Specimen: Urine     STREP Starlette Calvin, URINE [[id number]] Collected: 12/05/21 1730    Order Status: Canceled Specimen: Urine     RESPIRATORY VIRUS PANEL W/COVID-19, PCR [[id number]]  (Abnormal) Collected: 12/05/21 1110    Order Status: Completed Specimen: Nasopharyngeal Updated: 12/05/21 1633     Adenovirus Not detected        Coronavirus 229E Not detected        Coronavirus HKU1 Not detected        Coronavirus CVNL63 Not detected        Coronavirus OC43 Not detected        SARS-CoV-2, PCR Detected        Comment: CALLED TO AND CORRECTLY REPEATED BY:  DR Guerita Holden [facility] ER ON [id number] AT 1626 HRS TO 1396.           Metapneumovirus Not detected        Rhinovirus and Enterovirus Not detected        Influenza A Not detected        Influenza A, subtype H1 Not detected        Influenza A, subtype H3 Not detected        INFLUENZA A H1N1 PCR Not detected        Influenza B Not detected        Parainfluenza 1 Not detected        Parainfluenza 2 Not detected        Parainfluenza 3 Not detected        Parainfluenza virus 4 Not detected        RSV by PCR Not detected        B. parapertussis, PCR Not detected        Bordetella pertussis - PCR Not detected        Chlamydophila pneumoniae DNA, QL, PCR Not detected        Mycoplasma pneumoniae DNA, QL, PCR Not detected       COVID-19 RAPID TEST [[id number]]  (Abnormal) Collected: 12/05/21 1109    Order Status: Completed Specimen: Nasopharyngeal Updated: 12/05/21 1136     Specimen source Nasopharyngeal        COVID-19 rapid test Detected        Comment:      The specimen is POSITIVE for SARS-CoV-2, the novel coronavirus associated with COVID-19. This test has been authorized by the FDA under an Emergency Use Authorization (EUA) for use by authorized laboratories. Fact sheet for Healthcare Providers: e(ye)BRAIN.co.nz  Fact sheet for Patients: e(ye)BRAIN.co.nz       Methodology: Isothermal Nucleic Acid Amplification  CALLED TO AND CORRECTLY REPEATED BY:  Ted Walker RN ER, TO SHAEF AT 1136 12/5/2021                LE Doppler 12/7/21 Interpretation Summary  · No evidence of deep vein thrombosis in the right lower extremity. · No evidence of deep vein thrombosis in the left lower extremity. ECHO 12/7/21 Interpretation Summary  Result status: Final result  · Left Ventricle: Normal cavity size, wall thickness and systolic function (ejection fraction normal). The estimated EF is 55 - 60%. There is moderate (grade 2) left ventricular diastolic dysfunction E/E' ratio = 15.88. Wall Scoring: The left ventricular wall motion is normal.  · Right Atrium: Mildly dilated right atrium. · Left Atrium: Dilated left atrium. · Mitral Valve: No stenosis. Mitral valve non-specific thickening. Mild mitral annular calcification. Mild to moderate regurgitation. · Pulmonary Artery: Pulmonary arterial systolic pressure (PASP) is 41 mmHg. Pulmonary hypertension found to be mild. · IVC/Hepatic Veins: Mechanically ventilated; cannot use inferior caval vein diameter to estimate central venous pressure.        Images report reviewed by me:  CT 12/5 (Most Recent)  Results from [place] encounter on 12/05/21    CTA CHEST W OR W WO CONT    Narrative  EXAM: CTA chest    INDICATION: Rule out PE , Covid positive    COMPARISON: None    TECHNIQUE: Axial CT imaging from the thoracic inlet through the diaphragm with  intravenous contrast. Coronal and sagittal MIP reformats were generated. One or  more dose reduction techniques were used on this CT: automated exposure control,  adjustment of the mAs and/or kVp according to patient size, and iterative  reconstruction techniques. The specific techniques used on this CT exam have  been documented in the patient's electronic medical record. Digital Imaging and  Communications in Medicine (DICOM) format image data are available to  nonaffiliated external healthcare facilities or entities on a secure, media  free, reciprocally searchable basis with patient authorization for at least a  12-month period after this study. _______________    FINDINGS:    EXAM QUALITY: Overall exam quality is satisfactory. Pulmonary arterial  enhancement is adequate with adequate breath hold and no significant artifact. PULMONARY ARTERIES: No evidence of pulmonary embolism. THYROID: There is a 15 mm right thyroid lobe nodule which appears complex. Advise nonemergent thyroid ultrasound. LYMPH Nodes: There is a mildly enlarged right infrahilar lymph node measuring 1  cm image 89. PLEURA: There are no pleural effusion. HEART: Cardiac size is enlarged. There is no pericardial effusion. There is mild  tricuspid regurgitation into the hepatic veins. Moderate to severe calcific  coronary disease present. VASCULATURE/MEDIASTINUM: Main pulmonary artery is enlarged measuring 4 cm  suggesting chronic pulmonary hypertension. Small hiatal hernia present. LUNGS: Moderate groundglass opacities and airspace disease seen bilaterally  suggesting atypical pneumonia. The findings are typical in appearance for Covid  19 pneumonia. AIRWAY: Patent    UPPER ABDOMEN: Unremarkable.     OTHER: No acute or aggressive osseous abnormalities identified. _______________    Impression  No evidence of a pulmonary embolism or aortic dissection. Moderate groundglass opacities and airspace disease seen bilaterally suggesting  atypical pneumonia and the findings are typical in appearance for Covid 19  pneumonia. Mildly enlarged right hilar lymph node which may be reactive. Advise follow-up  to exclude any neoplastic or myeloproliferative process. 15 mm slightly complex right thyroid lobe nodule. Advise nonemergent thyroid  ultrasound. CXR reviewed by me:  XR 12/12 (Most Recent). Results from [place] encounter on 12/05/21    XR CHEST PORT    Narrative  EXAM: CHEST RADIOGRAPH, SINGLE VIEW    CLINICAL INDICATION/HISTORY: Intubated; await patient to come to the ICU for  first chest x-ray  <Additional:  COVID-19 pneumonia    COMPARISON: 12/17/2021    TECHNIQUE: Portable frontal view of the chest was obtained.    _______________    FINDINGS:    SUPPORT DEVICES: ETT is 2.3 cm above the arie, nasogastric tube extends at  least to the proximal stomach and below the range of imaging. HEART AND MEDIASTINUM: Cardiac silhouette is top normal in size. LUNGS AND PLEURAL SPACES: Pulmonary vessels are normal. Bilateral interstitial  and groundglass infiltrates are again noted, most prominently at the right  perihilar and basilar region without significant change. No pneumothorax or  pleural effusion. BONY THORAX AND SOFT TISSUES: No acute osseous abnormality. _______________    Impression  Bilateral interstitial and groundglass pneumonia, right greater than left,  without significant change.          IMPRESSION:   · Acute respiratory failure with hypoxemia  · Severe Covid pneumonia  · Anemia  · Thrombocytopenia  · Chronic kidney disease stage III  · Paroxysmal atrial fibrillation  ·   Patient Active Problem List   Diagnosis Code    Hypertension I10    Paroxysmal A-fib ([facility] [address] I48.0    Pneumonia due to COVID-19 virus U07.1, J12.82  Thrombocytopenia ([facility]) D69.6    Hypoxia R09.02    DM due to underlying condition with diabetic nephropathy ([facility]) E08.21    E-coli UTI N39.0, B96.20    Class 3 severe obesity with body mass index (BMI) of 40.0 to 44.9 in adult ([facility]) E66.01, Z68.41    Acute respiratory failure with hypoxemia ([facility]) J96.01    Pneumonia due to severe acute respiratory syndrome coronavirus 2 (SARS-CoV-2) U07.1, J12.82    Stage 3a chronic kidney disease (HCC) N18.31    Anemia D64.9    Encounter for palliative care Z51.5       · Code status: Full code      RECOMMENDATIONS:   Respiratory: Patient with severe Covid pneumonia, and hypoxemia; patient presenting to ICU in ARDS state due to Covid inflammation. Day 11 on mechanical ventilation  ( 12/7/2021)  worseing hypoxemia  Mechanical ventilationVCV mode; FiO2 65%; PEEP 10; plateau pressure 21; lung volume protection strategy. abg 7.45/35/68/94  Chest x-ray from today reviewed imagesET tube and OG tube in good position; persistent bilateral diffuse interstitial and groundglass infiltrates; left retrocardiac consolidation persist; no pneumothorax or pleural effusions. Continue vent and sedation bundles. Sedationmidazolam and fentanyl infusion; mild sinus bradycardia with normal blood pressure; hold fentanyl drip if needed, if bradycardia worsens. Patient currently not needing paralytic. CXR  IMPRESSION  No change  IMPRESSION     Bilateral interstitial and groundglass pneumonia, right greater than left,  without significant change. Keep SPO2 >=88%. HOB 30 degree elevation while not proning. Aspiration precautions. CTA chest on admission no PE; severe bilateral pulmonary opacities and groundglass densities consistent with severe Covid pneumonia; no significant pleural effusions; right hilar adenopathy likely reactivewill need follow-up in outpatient; thyroid nodule will also need follow-up in outpatient.   CVS:BP borderline, not on pressors   BP meds and lasix now on hold   Bradycardia improved  Blood pressure remained stable; hydralazine 10 mg 3 times a day. Ultrasound legsnegative for DVTs  Echocardiogramnormal LV function, and mild pulmonary hypertension; dilated left atrium; mild to moderate mitral regurgitation. proBNP mildly elevated; troponins were mildly elevated on admissionpeak troponin 0.07. Patient on Coumadin at home for paroxysmal atrial fibrillation but on hold today because of low platleets   ID:   Severe Covid pneumonia. Unvaccinated status. Zosyn added for possible sepsis   Respiratory viral panel was positive for SARS-CoV-2 PCR. Procalcitonin <0.05not elevated  CRP elevated ferritin elevated   D-dimer not reliable since patient has elevated INR on chronic Coumadin therapy. S/p 1 dose of Tocilizumab 12/7 morning. Steroidss/p 20 mg dose daily; weaned down to 6 mg daily. CTA chest negative for PEs on admission. Ultrasound of the legs ordered. Patient not needing antibiotics; afebrile; WBC not elevated; procalcitonin not elevated0.08. Continue vitamin supplements. ID on case. Hematology/Oncology: Stable hemoglobin9.7; platelets dropped to 22K; INR mildldy elevated now off coumadin on hold, spoke to hematology no heparin HIT pending   Thrombocytopenia likely related to Covid related sepsis. Check fibrinogen and D-dimer. worseing plt today still hold coumadin HIT  negative  PVL 12/14/2021  · No evidence of deep vein thrombosis in the right lower extremity veins assessed. · No evidence of deep vein thrombosis in the left lower extremity veins assessed  Hold Coumadin again today   HIT panel send   pvl pending  Hematology consulted   Monitor for any bleeding issues. Consult hematologyDr. Bk Yost. Renal: Known CKD; s/p CTA chest during this admission. Good urine output; creatinine improved to 1.15; sodium normal.  Continue fluids per nephrology. Monitor and replace electrolytes as needed. GI/: Tube feeding.   PPI prophylaxis. LFTsimproved. Endocrine: Blood sugars mildly elevatedcontinue Lantus insulin10 units nightly. Sliding scale insulin. Target blood sugar 140180. Patient on steroids for severe Covid pneumoniasteroids being weaned. TSH 0.19likely sick euthyroid state; free T4 mildly increased; free T3 mildly decreased. Neurology: Intubated and sedatedlimited exam.  Skin/Wound: ICU nursing care. Electrolytes: Replace electrolytes per ICU electrolyte replacement protocol. IVF: D5W 50 mL/h; free water via OG tube. Nutrition: Tube feedingNeprotolerating well. Vearl Pippins Prophylaxis: DVT Prophylaxis: Coumadin. GI Prophylaxis: Protonix. Restraints: Wrist soft restraints for patient interfering with medical therapy/management and patient safety. Lines/Tubes: PIVs; midline planned  ETT: 12/7/2021   OGT/NGT: 12/7/2021   Montoya: 12/7/2021 (Medically necessary for strict input/output monitoring in critically ill patient, will remove it when not needed. Montoya bundle followed). Advance Directive/Palliative Care: consulted    Prognosis appears very poor in this patient with Covid pneumonia with severe hypoxemic respiratory failure and ARDS needing to be intubated now; mortality is very high; risk of multiorgan failure with complications such as refractory hypoxemia, encephalopathy, critical illness myopathy, acute renal failure needing dialysis, shock state needing pressors, prolonged ventilator support, risk of tracheostomy and chronic debility/bedbound state. Quality Care: PPI, DVT prophylaxis, HOB elevated, Infection control all reviewed and addressed. Care of plan d/w RN, RT. High complexity decision making was performed during the evaluation of this patient at high risk for decompensation with multiple organ involvement.   Family considering comfort care today  Palliative care place recommendations   Total critical care time spent rendering care exclusive of procedures/family discussion/coordination of care: 28 minutes. Nettie Ng) [facility] [facility]     [phone]      Unfortunately, patient's  is now in ICU with Covid pneumonia and on ventilator support. Update grandson on 12/11. Do not inform patient of her  medical condition per family request.     DNR       Please note: Voice-recognition software may have been used to generate this report, which may have resulted in some phonetic-based errors in grammar and contents. Even though attempts were made to correct all the mistakes, some may have been missed, and remained in the body of the document. Kady Adrian MD  12/18/2021         Note  Patient with SARS-CoV-2 pneumonia with severe pneumonia and hypoxemic respiratory failure; patient was on high flow nasal cannula oxygen; patient is not vaccinated, and in the age group that is being infected with delta virus strain; the strain causes severe respiratory failure and complications reported in the [place] and worldwide; unfortunately, the prognosis is poor in unvaccinated patients, with a high risk of complications, multiorgan failure, chronic hypoxemia and respiratory failure, intubation, mechanical ventilation, thromboembolic disease risk in multiple organs, high risk for long Covid syndrome, and high risk for mortality. The CDC federal and state guidelines have emphasized repeatedly the importance of vaccination to prevent severe infection, mortality, disabilities, long Covid syndrome from Covid virus with several strains currently in the [place]. The treatment protocols are followed based on local hospital protocols, with guidance from centers such as [facility] protocols. [facility] is not part of any research protocol. The local hospital protocols have been guided by [facility] pharmacy department. Covid plasma is no longer considered standard of care in the [facility] due to lack of benefit in trials.   Nebulization and CPAP therapies are not considered as part of the protocol in [facility] due to high risk of aerosolization, and high risks of spreading Covid infection to the healthcare staff. Dexamethasone considered standard of care in patients admitted with severe Covid pneumonia; variable dosing has been reported with this medication. ECMO and CRRT facilities not available at [facility]; patients with severe Covid pneumonia and respiratory failure are usually unstable for transportation when they are in critically ill state with a high risk of dying during transportation. REMDESIVIR-not recommended in patients with late presentation with severe respiratory failure     Tocilizumab anti-inflammatory medicationhas shown some benefit in patients with elevated inflammatory markers. Ivermectin not considered standard of care in the [address]; no significant randomized controlled studies done in the [facility] to suggest benefit of ivermectin in patients with COVID-19 with severe respiratory failure; risk of toxicity related to ivermectin outweighs any potential benefits based on consensus opinion in the [address]. This drug is not considered standard of care in [facility].

## 2021-12-18 NOTE — PROGRESS NOTES
I spoke to son Yahir Nuñez is coming to see both [place] couple and pray say good bye from the outside window  Comfort care and compassionate extubation later today  I will place orders on hold  Once family  There will be change to comfort care    I will also communicate with hospitalist on call  ARowan Wilcox MD

## 2021-12-19 NOTE — PROGRESS NOTES
Bedside shift [facility] Report included the following information SBAR, Kardex, Intake/Output, MAR, Recent Results, Med Rec Status and Cardiac Rhythm AFIB/Sinus Tach and plan of care. Comfort extubation at 1610, 2LNC applied per orders. Patient without distress, nodding yes and no appropriately, resting calmly with eyes closed. Very drowsy and weak. Bedside shift change report given to CORNELIA Jackson RN. Report included the following information SBAR, Kardex, Intake/Output, MAR, Recent Results, Med Rec Status and Cardiac Rhythm AFIB/Sinus Tach and plan of care.

## 2021-12-19 NOTE — PROGRESS NOTES
0700 Bedside and Verbal shift change report given to JONY Riddle (oncoming nurse) by Xena Galo RN (offgoing nurse). Report included the following information SBAR, Kardex, Intake/Output and Recent Results. 0800 Shift assessment. Patient is on comfort measures. 1200 Reassessment, no changes. 1600 Reassessment, no changes. 1700 TRANSFER - OUT REPORT:    Verbal report given to Shantel(name) on Sim Gannon  being transferred to [address] [place] [address](unit) for routine progression of care       Report consisted of patients Situation, Background, Assessment and   Recommendations(SBAR). Information from the following report(s) SBAR, Kardex, Intake/Output and Recent Results was reviewed with the receiving nurse. Lines:   Peripheral IV 12/09/21 Left; Proximal Cephalic (Active)   Site Assessment Clean, dry, & intact 12/19/21 0800   Phlebitis Assessment 0 12/19/21 0800   Infiltration Assessment 0 12/19/21 0800   Dressing Status Clean, dry, & intact 12/19/21 0800   Dressing Type Transparent;Disk with Chlorhexadine gluconate (CHG) 12/19/21 0800   Hub Color/Line Status Capped 12/19/21 0800   Action Taken Open ports on tubing capped 12/19/21 0800   Alcohol Cap Used Yes 12/19/21 0800       Peripheral IV 08/96/69 Right Basilic (Active)   Site Assessment Clean, dry, & intact 12/19/21 0800   Phlebitis Assessment 0 12/19/21 0800   Infiltration Assessment 0 12/19/21 0800   Dressing Status Clean, dry, & intact 12/19/21 0800   Dressing Type Transparent;Disk with Chlorhexadine gluconate (CHG) 12/19/21 0800   Hub Color/Line Status Capped 12/19/21 0800   Action Taken Open ports on tubing capped 12/19/21 0800   Alcohol Cap Used Yes 12/19/21 0800        Opportunity for questions and clarification was provided.       Patient transported with:   Monitor  O2 @ 2 liters  Registered Nurse

## 2021-12-19 NOTE — PROGRESS NOTES
1703-Report received. 1928-Verbal shift change report given to 7 Lyudmila Ackerman by Noemi Cruz RN. Report included the following information SBAR, Kardex, Summary, Intake/Output and MAR.

## 2021-12-19 NOTE — PROGRESS NOTES
Patient is comfort care since yesterday evening. hospitalist already placed transfer order. I will sign off  ARowan Ivory MD

## 2021-12-19 NOTE — PROGRESS NOTES
Hospitalist Progress Note    Patient: Pramod Hernandez MRN: [id number]  CSN: [id number]    YOB: 1950  Age: 66 y.o. 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 Corey Quach and review of chart. Patient is end stage and family aware    Terminal extubation done yesterday    I strongly believe we should do comfort measures        Chief complaint:  SOB, resp failure  65 yo female with past medical history of chronic A fib on  Coumadin, chronic leg pain, anxiety, DM with CKD and HTN presents to the ED with 1 wk of Fatigue, Cough and SOB. Found to be hypoxic, COVID-19 PNA and A fib with RVR.         Subjective:    minimall responsive per nursing staff  When I went in there she was moaning in agitation      Review of systems:      Objective:    Vital signs/Intake and Output:  Visit Vitals  /66   Pulse (!) 119   Temp 98 °F (36.7 °C)   Resp 29   Ht 5' 6\" (1.676 m)   Wt 86.6 kg (190 lb 14.7 oz)   SpO2 (!) 87%   BMI 30.81 kg/m²     Current Shift:  No intake/output data recorded.   Last three shifts:  12/17 1901 - 12/19 0700  In: 915.1 [I.V.:105.1]  Out: 1241 [Urine:1325]    Physical Exam:  Minimally responsive          Labs: Results:       Chemistry Recent Labs     12/18/21  4282 12/17/21 0514   GLU 83 133*    142   K 4.1 3.8   * 109   CO2 25 25   BUN 71* 71*   CREA 1.61* 1.57*   CA 8.5 8.4*   AGAP 7 8   BUCR 44* 45*   AP 75 83   TP 5.4* 5.2*   ALB 2.2* 2.0*   GLOB 3.2 3.2   AGRAT 0.7* 0.6*      CBC w/Diff Recent Labs     12/18/21 0538 12/17/21 0514   WBC 12.8 12.9   RBC 3.00* 2.94*   HGB 9.6* 9.7*   HCT 29.7* 28.3*   PLT 30* 22*   GRANS 91* 93*   LYMPH 2* 1*   EOS 2 0      Cardiac Enzymes No results for input(s): CPK, CKND1, MANE in the last 72 hours. No lab exists for component: CKRMB, TROIP   Coagulation Recent Labs     12/17/21 0514   PTP 14.4   INR 1.2       Lipid Panel Lab Results   Component Value Date/Time    Cholesterol, total 208 (H) 12/20/2020 03:16 PM    HDL Cholesterol 47 12/20/2020 03:16 PM    LDL, calculated 128.6 (H) 12/20/2020 03:16 PM    VLDL, calculated 32.4 12/20/2020 03:16 PM    Triglyceride 162 (H) 12/20/2020 03:16 PM    CHOL/HDL Ratio 4.4 12/20/2020 03:16 PM      BNP No results for input(s): BNPP in the last 72 hours.    Liver Enzymes Recent Labs     12/18/21 0538   TP 5.4*   ALB 2.2*   AP 75      Thyroid Studies Lab Results   Component Value Date/Time    TSH 0.19 (L) 12/08/2021 03:00 AM        Procedures/imaging: see electronic medical records for all procedures/Xrays and details which were not copied into this note but were reviewed prior to creation of Plan

## 2021-12-19 NOTE — PROGRESS NOTES
1930 Report received from [address] [place] [address], patient was compassionately extubated today and remains in ICU on comfort care measures. Blinds in patient's room are open and there are a few family members outside of the ICU watching through the window.

## 2021-12-20 NOTE — PROGRESS NOTES
Noted pt transfer to [address] [place] [address] with comfort measures. CM to follow and assist as needed. Care Management Interventions  PCP Verified by CM:  Yes  Transition of Care Consult (CM Consult): Discharge Planning  Health Maintenance Reviewed: Yes  Support Systems: Spouse/Significant Other  Confirm Follow Up Transport: Family  The Plan for Transition of Care is Related to the Following Treatment Goals : HH and physician follow up vs SNF  The Patient and/or Patient Representative was Provided with a Choice of Provider and Agrees with the Discharge Plan?: Yes  Name of the Patient Representative Who was Provided with a Choice of Provider and Agrees with the Discharge Plan: pt  Freedom of Choice List was Provided with Basic Dialogue that Supports the Patient's Individualized Plan of Care/Goals, Treatment Preferences and Shares the Quality Data Associated with the Providers?: Yes  Discharge Location  Discharge Placement:  (TBD depending upon family decisions and clinical progress)

## 2021-12-20 NOTE — PROGRESS NOTES
Hospitalist Progress Note    Patient: Sim Gannon MRN: [id number]  CSN: [id number]    YOB: 1950  Age: 66 y.o. Sex: female    DOA: 12/5/2021 LOS:  LOS: 15 days                Assessment/Plan     Patient Active Problem List   Diagnosis Code    Hypertension I10    Paroxysmal A-fib ([facility] I48.0    Pneumonia due to COVID-19 virus U07.1, J12.82    Thrombocytopenia (HCC) D69.6    Hypoxia R09.02    DM due to underlying condition with diabetic nephropathy ([facility]) E08.21    E-coli UTI N39.0, B96.20    Class 3 severe obesity with body mass index (BMI) of 40.0 to 44.9 in adult ([facility]) E66.01, Z68.41    Acute respiratory failure with hypoxemia ([facility]) J96.01    Pneumonia due to severe acute respiratory syndrome coronavirus 2 (SARS-CoV-2) U07.1, J12.82    Stage 3a chronic kidney disease (HCC) N18.31    Anemia D64.9    Encounter for palliative care Z51.5        Chief complaint :  SOB, resp failure  67 yo female with past medical history of chronic A fib on  Coumadin, chronic leg pain, anxiety, DM with CKD and HTN presents to the ED with 1 wk of Fatigue, Cough and SOB. Found to be hypoxic, COVID-19 PNA and A fib with RVR. Prolonged hospitalization for COVID 19  Compassionate extubation 12/08/2021  Appears comfortable. Disposition : on comfort measures. Physical Exam:  General: As above    HEENT: NC, Atraumatic. PERRLA, anicteric sclerae. Lungs: CTA Bilaterally. No Wheezing/Rhonchi/Rales. Heart:  S1 S2,  No murmur, No Rubs, No Gallops  Abdomen: Soft, Non distended, Non tender.  +Bowel sounds,   Extremities: No c/c/e             Vital signs/Intake and Output:  Visit Vitals  BP (!) 140/53   Pulse (!) 108   Temp 98.3 °F (36.8 °C)   Resp 26   Ht 5' 6\" (1.676 m)   Wt 86.6 kg (190 lb 14.7 oz)   SpO2 (!) 46%   BMI 30.81 kg/m²     Current Shift:  No intake/output data recorded.   Last three shifts:  12/18 1901 - 12/20 0700  In: -   Out: 500 [Urine:500]            Labs: Results:       Chemistry Recent Labs     12/18/21  0538   GLU 83      K 4.1   *   CO2 25   BUN 71*   CREA 1.61*   CA 8.5   AGAP 7   BUCR 44*   AP 75   TP 5.4*   ALB 2.2*   GLOB 3.2   AGRAT 0.7*      CBC w/Diff Recent Labs     12/18/21  0538   WBC 12.8   RBC 3.00*   HGB 9.6*   HCT 29.7*   PLT 30*   GRANS 91*   LYMPH 2*   EOS 2      Cardiac Enzymes No results for input(s): CPK, CKND1, MANE in the last 72 hours. No lab exists for component: CKRMB, TROIP   Coagulation No results for input(s): PTP, INR, APTT, INREXT, INREXT in the last 72 hours. Lipid Panel Lab Results   Component Value Date/Time    Cholesterol, total 208 (H) 12/20/2020 03:16 PM    HDL Cholesterol 47 12/20/2020 03:16 PM    LDL, calculated 128.6 (H) 12/20/2020 03:16 PM    VLDL, calculated 32.4 12/20/2020 03:16 PM    Triglyceride 162 (H) 12/20/2020 03:16 PM    CHOL/HDL Ratio 4.4 12/20/2020 03:16 PM      BNP No results for input(s): BNPP in the last 72 hours.    Liver Enzymes Recent Labs     12/18/21  0538   TP 5.4*   ALB 2.2*   AP 75      Thyroid Studies Lab Results   Component Value Date/Time    TSH 0.19 (L) 12/08/2021 03:00 AM        Procedures/imaging: see electronic medical records for all procedures/Xrays and details which were not copied into this note but were reviewed prior to creation of Plan

## 2021-12-20 NOTE — PROGRESS NOTES
Pt is comfort care   passed away from same illness- over weekend    Unfortunate to see this    Will sign off. Please re-consult as needed. Elpidio Merrill MD  [facility]  Office #:     [phone]  [id number]   Office Fax: [phone]

## 2021-12-20 NOTE — PROGRESS NOTES
[address] [facility] 54: [phone] ([phone])  [facility]: [address] [place] [address] Way: [phone]    Patient Name: Buck Jackson  YOB: 1950    Date of Follow-up Visit: 12/20/2021   Reason for Consult: goals of care   Requesting Provider: Dr Bello Rivera   Primary Care Physician: Mikaela Farley DO      SUMMARY:   Buck Jackson is a 66y.o. year old with a past history of chronic A fib on  Coumadin, chronic leg pain, anxiety, DM with CKD and HTN  who was admitted on 12/5/2021 from home  with a diagnosis of COVID 19 pneumonia and acute respiratory failure  Current medical issues leading to Palliative Medicine involvement include: 66year old female who presented with COVID 19 pneumonia who is now orally intubated. Palliative Medicine is consulted for goals of care discussions. 12/15/2021:  Lying in bed, orally intubated and sedated. Vent day 8    12/17/2021 remains on the ventilator. Spoke with son Celestina Zhu, family planning on coming in Saturday 12/18/2021 to compassionately extubate and move to comfort measures. 12/20/2021:  Lying in bed with eyes rolled back, not responding to commands, able to open eyes one time with much effort. Does not appear comfortable. Tachypnea noted. Discussed with bedside RN in room, oxygen saturation 46%. Requested PRN dose of Morphine for comfort. Remains on comfort measures. PALLIATIVE DIAGNOSES:   1. Encounter for palliative care/Goals of care   2. COVID 19 pneumonia   3. Acute respiratory failure   4. Obesity        PLAN:   12/20/2021:  Ms. Zoe Le seen at bedside in room 307. Lying in bed with eyes rolled back. Not responding to commands. Was able to open eyes one time with much effort. Does not appear comfortable, tachypnea and increased work of breathing noted. Discussed with bedside RN in room, oxygen saturation 46% on 2 L. Requested as needed dose of morphine for comfort. Remains on comfort measures. Patient was compassionately extubated on 12/18/2021. Goals of care are DNR/DNI, comfort measures, no feeding tube. Symptom management: MAR reviewed. Noted 4 doses of morphine and 4 doses of Ativan in last 24 hours. Increased frequency of PRN morphine to assist with symptom management and goal of comfort    Please see below for previous conversations with the palliative medicine team:    12/17/2021 Mr Deedee Kim seen through glass doors due to COVID 19. He remains orally intubated and sedated. Critically ill unfortunately despite aggressive measures not improving. Appreciate PCCM discussion with son Anthony Maldonado. We also spoke with him via phone. He and his family ( children ) plan on coming to the hospital tomorrow around 1pm to see their mother and move to compassionate extubation and comfort measures. Goals of care no chest compressions or shock,at cardiac arrest remains intubated. Discussed with PCCM and RN who aware of family plans. Comfort measures discussed at length including use of Morphine and Ativan to help with symptom management and stopping all aggressive treatments such as IVAB, IVF's, labs and x rays. Anthony Maldonado voiced his understanding. Symptom management: When family comes and patient is compassionately extubated would consider placing Morphine PCA with basal rate of 1 mg / hr. Titration can be 1 mg  Per hour up to an hourly rate of 5 mg. Would also place Ativan 0.5 mg IV q 2 hrs PRN, Robinul 0.2 mg IV q 4 hrs PRN secretion control. ( please see below for previous notes per palliative team)     12/15/2021:  Seen through glass door of ICU to prevent disease exposure due to COVID-19. Patient is orally intubated and sedated. AMD on file reflects primary MPOA as spouse, Cj Pate, who is unfortunately also hospitalized with COVID-19 and intubated. There is no secondary MPOA.   According to the [address], in this instance, legal decision making falls to a majority of the patient's 3 adult children, Arcelia Carroll, Lydia Jorgensen and Gerda Dempsey. Family meeting held today with the following family members:                Saul López of patient - eldest,  and his spouse. Ghazal Fraser of patient - on facetime call   Meir Mendoza - granddaughter of patient and daughter of Lydia Jorgensen- present in room               Gerda Dempsey- daughter of patient who lives in [place] - on phone     Also present were this Wendy Mon RN, Dr RADHA Bejarano and [facility] for spiritual support. Medical update provided by Dr. Sobia Francis including patient's overall poor prognosis renal and cardiac status, platelet issues (today down to 18) and respiratory status including high vent settings and oxygen requirements. Reviewed AMD on file and Ms. Back's wishes with family which include the wish for DNR/DNI and to not be maintained on machines long term. All 3 children expressed understanding and agreement with these wishes and confirmed DNR/DNI status. Code status updated last night by ICU attending (appreciate assistance). Discussed with family that the ETT is only temporary in the short term and that plans will need to be made for removal, specifically the options of compassionate extubation or tracheostomy and continued ventilatory support. Discussed that patient is not a candidate for a tracheostomy at this time due to high vent settings. Family inquired about compassionate extubation and what that looks like. Explained the process of compassionate extubation including the use of medications to help with breathing, restlessness and pain and a less aggressive oxygen delivery system. Family members were appropriately tearful and daughter, Gerda Dempsey, was heard crying on the phone. Compassionate listening and emotional support provided. Family requested to be able to see patient via window outside.   Family wishes to discuss privately and talk together this evening about all of the information provided and consider next steps. Palliative medicine phone number provided for any questions or needs. Goals of care are DNR in the event of cardiac arrest.    Please see below for previous conversations with the palliative medicine team:    1. Goals of care seen along Ms Zofia Mckinney, NP patient seen behind glass doors due to COVID 19 pandemic to preserve PPE. She is orally intubated and not able to participate her goals of care decisions. Her  is her legal next of kin , however he is hospitalized with COVID 23 and currently intubated. There is no AMD her legal medical decision makers are a majority of her children. The family has decided to have grand daughter Kamryn Dan be point of contact. Kamryn Dan can bring medical information back to family however she can not make medical decisions. Medical update given to Kamryn Dan. Goals of care remain full code with full interventions. 2. COVID 19 pneumonia; ID and pulmonology on board and managing on IVAB  3. Acute resp failure currently orally intubated with PEEP of 8 FIO2 of 70%   4. Obesity class 3 BMI 40.0 to 44.9   5. Initial consult note routed to primary continuity provider  6. Communicated plan of care with: Palliative IDT, grand daughter     Patient/Health Care Proxy Stated Goals: Prolong life      TREATMENT PREFERENCES:   Code Status: DNR/DNI    Advance Care Planning:  [] [facility] Interdisciplinary Team has updated the ACP Navigator with [address] and Patient Capacity    Primary Decision [facility] ([address]):   Primary Decision Maker: Sadia Glasgow - [phone]   RVRTJYMK of children are secondary medical decision makers  Medical Interventions: Full interventions           Other:  As far as possible, the palliative care team has discussed with patient / health care proxy about goals of care / treatment preferences for patient.      HISTORY:     History obtained from: chart     CHIEF COMPLAINT: COVID 19 pneumonia     HPI/SUBJECTIVE:    The patient is:   [] Verbal and participatory  [x] Non-participatory due to:  Orally intubated and sedated   Please see summary     Clinical Pain Assessment (nonverbal scale for nonverbal patients): Clinical Pain Assessment  Severity: 0     Activity (Movement): Restless, excessive activity and/or withdrawal reflexes    Duration: for how long has pt been experiencing pain (e.g., 2 days, 1 month, years)  Frequency: how often pain is an issue (e.g., several times per day, once every few days, constant)     FUNCTIONAL ASSESSMENT:     Palliative Performance Scale (PPS):  PPS: 20    ECOG  ECOG Status : Completely disabled     PSYCHOSOCIAL/SPIRITUAL SCREENING:      Any spiritual / Orthodox concerns: unable to assess for patient   [] Yes /  [] No    Caregiver Burnout:  [] Yes /  [] No /  [x] No Caregiver Present      Anticipatory grief assessment: unable to assess for patient   [] Normal  / [] Maladaptive        REVIEW OF SYSTEMS:     Positive and pertinent negative findings in ROS are noted above in HPI. The following systems were [] reviewed / [x] unable to be reviewed as noted in HPI  Other findings are noted below. Systems: constitutional, ears/nose/mouth/throat, respiratory, gastrointestinal, genitourinary, musculoskeletal, integumentary, neurologic, psychiatric, endocrine. Positive findings noted below. Modified ESAS Completed by: provider           Pain: 0           Dyspnea: 0           Stool Occurrence(s): 1        PHYSICAL EXAM:     Wt Readings from Last 3 Encounters:   12/12/21 86.6 kg (190 lb 14.7 oz)   11/30/21 114.3 kg (252 lb)   07/19/21 108 kg (238 lb)     Blood pressure (!) 140/53, pulse (!) 108, temperature 98.3 °F (36.8 °C), resp. rate 26, height 5' 6\" (1.676 m), weight 86.6 kg (190 lb 14.7 oz), SpO2 (!) 46 %.   Pain:  Pain Scale 1: Adult Nonverbal Pain Scale  Pain Intensity 1: 0     Pain Location 1: Leg  Pain Orientation 1: Lower     Pain Intervention(s) 1: Repositioned  Last bowel movement: FMS in place    Constitutional: lying in bed with eyes rolled back, appears uncomfortable, tachypneic  Eyes: rolled back, semi-open   ENMT: mouth open, dry MM   Respiratory: tachypneic breathing  Neurologic: not responsive to verbal stimuli or light touch       HISTORY:     Active Problems:    Hypertension ()      Paroxysmal A-fib (HCC) ()      Pneumonia due to COVID-19 virus (12/5/2021)      Thrombocytopenia ([address] (12/5/2021)      Hypoxia (12/5/2021)      DM due to underlying condition with diabetic nephropathy ([address] (12/5/2021)      E-coli UTI (12/5/2021)      Class 3 severe obesity with body mass index (BMI) of 40.0 to 44.9 in [facility]) (12/5/2021)      Acute respiratory failure with hypoxemia (HCC) (12/6/2021)      Pneumonia due to severe acute respiratory syndrome coronavirus 2 (SARS-CoV-2) (12/7/2021)      Stage 3a chronic kidney disease ([address] (12/7/2021)      Anemia (12/7/2021)      Encounter for palliative care ()      Past Medical History:   Diagnosis Date    A-fib [facility]     Anxiety     CAD (coronary artery disease)     Chronic kidney disease     Diabetes ([facility]     borderline    High cholesterol     Hypertension     Kidney stones     Psychiatric disorder     anxiety      Past Surgical History:   Procedure Laterality Date    HX CHOLECYSTECTOMY      HX HEART CATHETERIZATION      HX HYSTERECTOMY      HX ORTHOPAEDIC      bilateral knee surgery    HX ORTHOPAEDIC      right elbow    HX UROLOGICAL      stent placement    [place] CARDIAC SURG PROCEDURE UNLIST      stent x 1      Family History   Problem Relation Age of Onset    Breast Cancer Maternal Aunt      History reviewed, no pertinent family history.   Social History     Tobacco Use    Smoking status: Never Smoker    Smokeless tobacco: Never Used   Substance Use Topics    Alcohol use: No     No Known Allergies   Current Facility-Administered Medications   Medication Dose Route Frequency    morphine injection 2 mg  2 mg IntraVENous Q15MIN PRN    glycopyrrolate (ROBINUL) injection 0.2 mg  0.2 mg IntraVENous DAILY PRN    LORazepam (ATIVAN) injection 1 mg  1 mg IntraVENous Q4H PRN    ondansetron (ZOFRAN) injection 4 mg  4 mg IntraVENous Q6H PRN        LAB AND IMAGING FINDINGS:     Lab Results   Component Value Date/Time    WBC 12.8 12/18/2021 05:38 AM    HGB 9.6 (L) 12/18/2021 05:38 AM    PLATELET 30 (L) 60/61/3133 05:38 AM     Lab Results   Component Value Date/Time    Sodium 144 12/18/2021 05:38 AM    Potassium 4.1 12/18/2021 05:38 AM    Chloride 112 (H) 12/18/2021 05:38 AM    CO2 25 12/18/2021 05:38 AM    BUN 71 (H) 12/18/2021 05:38 AM    Creatinine 1.61 (H) 12/18/2021 05:38 AM    Calcium 8.5 12/18/2021 05:38 AM    Magnesium 2.0 12/05/2021 11:08 AM    Phosphorus 2.5 11/13/2014 04:06 AM      Lab Results   Component Value Date/Time    Alk. phosphatase 75 12/18/2021 05:38 AM    Protein, total 5.4 (L) 12/18/2021 05:38 AM    Albumin 2.2 (L) 12/18/2021 05:38 AM    Globulin 3.2 12/18/2021 05:38 AM     Lab Results   Component Value Date/Time    INR 1.2 12/17/2021 05:14 AM    Prothrombin time 14.4 12/17/2021 05:14 AM    aPTT 67.2 (H) 12/06/2021 06:05 AM      Lab Results   Component Value Date/Time    Ferritin 836 (H) 12/13/2021 06:44 AM      No results found for: PH, PCO2, PO2  No components found for: [place]   Lab Results   Component Value Date/Time    CK 63 12/13/2021 11:50 AM    CK - MB <1.0 12/13/2021 11:50 AM              Total time: 35 minutes      > 50% counseling / coordination: yes, family, nursing, MD     Prolonged service was provided for  []30 min   []75 min in face to face time in the presence of the patient, spent as noted above.   Time Start:   Time End:

## 2021-12-20 NOTE — PROGRESS NOTES
NUTRITION update    ASSESSMENT/PLAN:     Current Diet: NPO    Chart reviewed, note change in goals of care now focused on comfort measures only. Aggressive nutrition intervention is not indicated at this time. Will assist as needed; please consult if nutrition intervention is medically indicated and consistent with patient's goals of care.       Will April, RD

## 2021-12-21 NOTE — PROGRESS NOTES
Bereavement Note:     responded to the death of Mike Mack, who is a 66 y.o., female, offering Spiritual Care to patient and family, see flow sheets for interventions. Date of Death: 21    Extended Emergency Contact Information  Primary Emergency Contact: Winston Back  Address: [address] [place] [address] Phone: [phone]  Mobile Phone: [phone]  Relation: Spouse  Secondary Emergency Contact: Koby España  [place] Phone: [phone]  Relation: Son                 YES      NO  UNKNOWN  Life Net   []        [x]    []   [employer]   [] [x] []   Medical Examiner  []        [x]  []   Going to [employer]  []        [] [x]      Autopsy   []        [x]         []   Sympathy Card  [x]        []  Bereavement Materials  [x]        []           Business Card Provided  [x]        []              Home: Josiephine Massed will continue to follow family and will provide spiritual care as needed. Rev.  Carmela Israel,Certified [employer] Advance Care   Coordinator [place]  [phone]

## 2021-12-21 NOTE — PROGRESS NOTES
1915 - Bedside report received from [place], [facility]. Patient in bed. Pain 0/10.     2033 - Patient in bed at this time. IV to R and L arms  intact and patent. Pt unresponsive on comfort measures only. Pt appears comfortable for now. Call light within reach. 2200-Pt sats st 69-PT appears comfortable. 0020-Pt comfortable, sats at 60 %. 0550-Pt comfortable, recorded the highest O2 sats at 100 %. Bedside and Verbal shift change report given to wSeta Stout by Dana Troncoso. Report included the following information SBAR, Kardex, OR Summary, Intake/Output and MAR. RN aware that pt is gasping at this time,  Not in any distress, has been comfortable this whole shift.

## 2021-12-21 NOTE — DISCHARGE SUMMARY
Called to pronounce patient. No response to noxious stimuli. No spontaneous breath sounds nor cardiac sounds. Pupils fixed and dilated. TOD: 1025. Family notified by RN  Winn Oppenheim Date: 12/5/2021   Date of Death: 12/21/2021     Patient ID:   Susan Kurtz   66 y.o.   1950     Cause of Death: COVID 23, CKD, PAF, DM, HTN. Diagnosis   Patient Active Problem List    Diagnosis Date Noted    Encounter for palliative care     Pneumonia due to severe acute respiratory syndrome coronavirus 2 (SARS-CoV-2) 12/07/2021    Stage 3a chronic kidney disease ([facility] 12/07/2021    Anemia 12/07/2021    Acute respiratory failure with hypoxemia ([facility] 12/06/2021    Pneumonia due to COVID-19 virus 12/05/2021    Thrombocytopenia ([facility] 12/05/2021    Hypoxia 12/05/2021    DM due to underlying condition with diabetic nephropathy ([facility] 12/05/2021    E-coli UTI 12/05/2021    Class 3 severe obesity with body mass index (BMI) of 40.0 to 44.9 in adult [facility]) 12/05/2021    Hypertension     Paroxysmal A-fib [facility])         Hospital Course: Susan Kurtz is a 66 y.o. female  with past medical history of chronic A fib on  Coumadin, chronic leg pain, anxiety, DM with CKD and HTN presents to the ED with 1 wk of Fatigue, Cough and SOB. She was seen on 11/30 for right flank pain and weakness, found to the have pneumonia and UTI at that time but stable and dc on oral Augmentin and Doxy. She states that her pain has not improved with the PO Abx. She is also had a cough that started on 11/27 with some pink-tinged sputum. More SOB for which prompted her ER visit today  Denies any fever, chest pain, nausea, vomiting, bowel or urinary compliant.      At ER, She was fount hypoxia, tachyacardia and hypotension. EKG done on arrival shows a.fib with RVR. Code sepsis called. She was placed with Sepsis protocol. Rapid COVID test was positive. She is admitted for further care.      She was admitted to intensive care unit, she was seen and followed by pulmonary critical care, infectious disease, nephrology, hematology. She had prolonged hospitalization and intubation. For her COVID 19 she was placed on vitamin/antioxidant cocktail, dexamethasone and also received Actemra. She was out of window for remdesivir. She failed multiple breathing trials. Her hospital course with worsening thrombocytopenia and paroxysmal atrial fibrillation along with acute kidney injury. Given multiple failed attempts of SBT and her increasing requirement of oxygen and PEEP. Palliative care discussed with patient's family who agreed for compassionate extubation and comfort measures.   PCP: Jessica Barrientos DO

## 2021-12-21 NOTE — PROGRESS NOTES
Informed eldest son Jason Kurtz that pt has passed. He indicates [place] [address] as the  home.

## 2021-12-21 NOTE — PALLIATIVE CARE
Palliative Medicine    In to see patient on comfort measures. Found patient lying in bed, no respirations and no palpable pulse. Skin cool to touch, eyes closed, mouth open.   Spoke with bedside RN, hospitalist paged by nursing team.    BETTINA Ivey-BC  Palliative Medicine

## 2021-12-21 NOTE — ROUTINE PROCESS
Bedside shift change report [facility] (oncoming nurse) by Jermaine Dominique RN (offgoing nurse). Report included the following information SBAR, Kardex, Intake/Output, MAR and Recent Results.

## 2022-01-04 NOTE — PROGRESS NOTES
Physician Progress Note      aSm BONILLA #:                  [id number]  :                       1950  ADMIT DATE:       2021 10:18 AM  DISCH DATE:        2021 10:25 AM  RESPONDING  PROVIDER #:        Jamey Monae MD          QUERY TEXT:    Pt admitted with Covid 19 pneumonia and acute respiratory failure. Pt began to have a significant drop in her platelets on  and progress notes indicated thrombocytopenia likely from sepsis. If possible, please document in the progress notes and discharge summary if you are evaluating and /or treating any of the following: The medical record reflects the following:  Risk Factors: COVID 19 pneumonia  Clinical Indicators: Patient presented w/ covid pneumonia and respiratory failure  Initial labs: WBC 5.1, 80% neuts  plt 88   WBC 12.7, 93% neuts, plt 106   WBC 9.0, 89% neuts, plt 55   HEME/Onc: thrombocytopenia, acute drop, likely due to sepsis   Pulm: Acute drop in platelets concerning for sepsis-recommending antibiotics   Pulm: Zosyn added for possible sepsis   ID: Thrombocytopenia- driven by possible sepsis? PLT 48  -:  Hospitalist: Thrombocytopenia - likely from sepsis, monitor platelets,   WBC 12.5, 93% neuts PLT 17    Treatment: IV Decadron, monitor labs, IV Zosyn, BC, serial CXR's, ID and Pulmonary consults, mechanical vent, transitioned to comfort care only    Arash Nash RN, BSN, [address] [place] [address]  [phone]  Options provided:  -- Likely Sepsis due to COVID pneumonia with associated thrombocytopenia  -- Sepsis was ruled out  -- Other - I will add my own diagnosis  -- Disagree - Not applicable / Not valid  -- Disagree - Clinically unable to determine / Unknown  -- Refer to Clinical Documentation Reviewer    PROVIDER RESPONSE TEXT:    This patient likely had sepsis due to Covid 19 pneumonia with associated thrombocytopenia.     Query created by: Jatin Garcia on 2021 3:39 PM      QUERY TEXT:    Pt admitted with Covid 19 pneumonia and has CHF documented. If possible, please document in progress notes and discharge summary further specificity regarding the type and acuity of CHF:      The medical record reflects the following:  Risk Factors: HTN, CKD, Parox a. fib, obesity, per PCP office visit PMH: diastolic chf  Clinical Indicators: Presented with covid- 19 pneumonia and acute respiratory failure. 12/5 CXR: Cardiomegaly with increased interstitial edema since the prior exam.  12/6 ID consult: check echo and bnp--few doses of lasix may help as well with baseline hx of CHF per hx  12/7 BNP = 1726  12/7 Echo:Left Ventricle: Normal cavity size, wall thickness and systolic function (ejection fraction normal). The estimated EF is 55 - 60%. There is moderate (grade 2) left ventricular diastolic dysfunction E/E' ratio = 15.88. Wall Scoring: The left ventricular wall motion is normal.  Mildly dilated right atrium, dilated left atrium, Pulmonary hypertension found to be mild  12/13 BNP = 1010  12/14 BNP = 829  12/14 Pulmonary note: CHF low dose lasix  Echo  Treatment: CXR, echo, monitor BNP, IV Lasix    Renuka Azevedo, RN, BSN, [address]  [phone]  Options provided:  -- Acute on Chronic Diastolic CHF/HFpEF  -- Chronic Diastolic CHF/HFpEF  -- Other - I will add my own diagnosis  -- Disagree - Not applicable / Not valid  -- Disagree - Clinically unable to determine / Unknown  -- Refer to Clinical Documentation Reviewer    PROVIDER RESPONSE TEXT:    This patient is in acute on chronic diastolic CHF/HFpEF.     Query created by: Dio Hill on 12/30/2021 4:11 PM      Electronically signed by:  Mike Mesa MD 1/4/2022 3:00 PM

## 2022-01-07 LAB
HEPARIN AGGREGATION,XHEAGT: NEGATIVE
PLATELET FACTOR 4,XPF4T: NEGATIVE
